# Patient Record
Sex: FEMALE | Race: WHITE | NOT HISPANIC OR LATINO
[De-identification: names, ages, dates, MRNs, and addresses within clinical notes are randomized per-mention and may not be internally consistent; named-entity substitution may affect disease eponyms.]

---

## 2017-09-15 ENCOUNTER — APPOINTMENT (OUTPATIENT)
Dept: NEUROSURGERY | Facility: CLINIC | Age: 57
End: 2017-09-15
Payer: COMMERCIAL

## 2017-09-15 DIAGNOSIS — Z78.9 OTHER SPECIFIED HEALTH STATUS: ICD-10-CM

## 2017-09-15 PROCEDURE — 99203 OFFICE O/P NEW LOW 30 MIN: CPT

## 2017-09-18 VITALS — BODY MASS INDEX: 44.2 KG/M2 | HEIGHT: 55 IN | WEIGHT: 191 LBS

## 2017-09-18 PROBLEM — Z78.9 NON-SMOKER: Status: ACTIVE | Noted: 2017-09-18

## 2017-09-18 RX ORDER — AMLODIPINE BESYLATE 5 MG/1
5 TABLET ORAL
Qty: 30 | Refills: 0 | Status: ACTIVE | COMMUNITY
Start: 2017-03-18

## 2017-09-18 RX ORDER — ROSUVASTATIN CALCIUM 10 MG/1
10 TABLET, FILM COATED ORAL
Qty: 30 | Refills: 0 | Status: ACTIVE | COMMUNITY
Start: 2017-05-25

## 2017-09-25 ENCOUNTER — APPOINTMENT (OUTPATIENT)
Dept: CT IMAGING | Facility: CLINIC | Age: 57
End: 2017-09-25

## 2017-11-09 ENCOUNTER — APPOINTMENT (OUTPATIENT)
Dept: NEUROSURGERY | Facility: CLINIC | Age: 57
End: 2017-11-09
Payer: COMMERCIAL

## 2017-11-09 VITALS
TEMPERATURE: 98.2 F | BODY MASS INDEX: 29.35 KG/M2 | HEIGHT: 67 IN | RESPIRATION RATE: 15 BRPM | WEIGHT: 187 LBS | DIASTOLIC BLOOD PRESSURE: 90 MMHG | HEART RATE: 74 BPM | SYSTOLIC BLOOD PRESSURE: 153 MMHG

## 2017-11-09 DIAGNOSIS — Z78.9 OTHER SPECIFIED HEALTH STATUS: ICD-10-CM

## 2017-11-09 DIAGNOSIS — Z80.43 FAMILY HISTORY OF MALIGNANT NEOPLASM OF TESTIS: ICD-10-CM

## 2017-11-09 DIAGNOSIS — Z86.69 PERSONAL HISTORY OF OTHER DISEASES OF THE NERVOUS SYSTEM AND SENSE ORGANS: ICD-10-CM

## 2017-11-09 DIAGNOSIS — Z80.0 FAMILY HISTORY OF MALIGNANT NEOPLASM OF DIGESTIVE ORGANS: ICD-10-CM

## 2017-11-09 DIAGNOSIS — F17.210 NICOTINE DEPENDENCE, CIGARETTES, UNCOMPLICATED: ICD-10-CM

## 2017-11-09 DIAGNOSIS — Z86.39 PERSONAL HISTORY OF OTHER ENDOCRINE, NUTRITIONAL AND METABOLIC DISEASE: ICD-10-CM

## 2017-11-09 DIAGNOSIS — Z80.3 FAMILY HISTORY OF MALIGNANT NEOPLASM OF BREAST: ICD-10-CM

## 2017-11-09 DIAGNOSIS — Z85.9 PERSONAL HISTORY OF MALIGNANT NEOPLASM, UNSPECIFIED: ICD-10-CM

## 2017-11-09 DIAGNOSIS — Z87.09 PERSONAL HISTORY OF OTHER DISEASES OF THE RESPIRATORY SYSTEM: ICD-10-CM

## 2017-11-09 DIAGNOSIS — Z98.2 PRESENCE OF CEREBROSPINAL FLUID DRAINAGE DEVICE: ICD-10-CM

## 2017-11-09 DIAGNOSIS — G91.1 OBSTRUCTIVE HYDROCEPHALUS: ICD-10-CM

## 2017-11-09 DIAGNOSIS — Z86.79 PERSONAL HISTORY OF OTHER DISEASES OF THE CIRCULATORY SYSTEM: ICD-10-CM

## 2017-11-09 PROCEDURE — 99214 OFFICE O/P EST MOD 30 MIN: CPT

## 2017-11-09 RX ORDER — MULTIVITAMIN
TABLET ORAL
Refills: 0 | Status: ACTIVE | COMMUNITY

## 2017-11-09 RX ORDER — ZINC OXIDE 13 %
CREAM (GRAM) TOPICAL
Refills: 0 | Status: ACTIVE | COMMUNITY

## 2017-11-09 RX ORDER — LEVOCETIRIZINE DIHYDROCHLORIDE 5 MG/1
5 TABLET, FILM COATED ORAL
Refills: 0 | Status: ACTIVE | COMMUNITY

## 2017-11-12 PROBLEM — G91.1 OBSTRUCTIVE HYDROCEPHALUS: Status: ACTIVE | Noted: 2017-09-15

## 2017-11-12 PROBLEM — Z98.2 VENTRICULAR SHUNT IN PLACE: Status: ACTIVE | Noted: 2017-11-12

## 2018-06-12 ENCOUNTER — INPATIENT (INPATIENT)
Facility: HOSPITAL | Age: 58
LOS: 2 days | Discharge: HOME | End: 2018-06-15
Attending: HOSPITALIST | Admitting: HOSPITALIST
Payer: COMMERCIAL

## 2018-06-12 VITALS
RESPIRATION RATE: 18 BRPM | WEIGHT: 173.94 LBS | HEART RATE: 104 BPM | OXYGEN SATURATION: 99 % | DIASTOLIC BLOOD PRESSURE: 76 MMHG | SYSTOLIC BLOOD PRESSURE: 150 MMHG | HEIGHT: 67 IN | TEMPERATURE: 98 F

## 2018-06-12 LAB
ALBUMIN SERPL ELPH-MCNC: 4.5 G/DL — SIGNIFICANT CHANGE UP (ref 3.3–5)
ALP SERPL-CCNC: 125 U/L — HIGH (ref 40–120)
ALT FLD-CCNC: 26 U/L — SIGNIFICANT CHANGE UP (ref 12–78)
ANION GAP SERPL CALC-SCNC: 6 MMOL/L — SIGNIFICANT CHANGE UP (ref 5–17)
APPEARANCE UR: CLEAR — SIGNIFICANT CHANGE UP
AST SERPL-CCNC: 19 U/L — SIGNIFICANT CHANGE UP (ref 15–37)
BILIRUB SERPL-MCNC: 0.5 MG/DL — SIGNIFICANT CHANGE UP (ref 0.2–1.2)
BILIRUB UR-MCNC: NEGATIVE — SIGNIFICANT CHANGE UP
BUN SERPL-MCNC: 15 MG/DL — SIGNIFICANT CHANGE UP (ref 7–23)
CALCIUM SERPL-MCNC: 9.8 MG/DL — SIGNIFICANT CHANGE UP (ref 8.5–10.1)
CHLORIDE SERPL-SCNC: 106 MMOL/L — SIGNIFICANT CHANGE UP (ref 96–108)
CO2 SERPL-SCNC: 25 MMOL/L — SIGNIFICANT CHANGE UP (ref 22–31)
COLOR SPEC: YELLOW — SIGNIFICANT CHANGE UP
CREAT SERPL-MCNC: 0.74 MG/DL — SIGNIFICANT CHANGE UP (ref 0.5–1.3)
DIFF PNL FLD: NEGATIVE — SIGNIFICANT CHANGE UP
GLUCOSE SERPL-MCNC: 100 MG/DL — HIGH (ref 70–99)
GLUCOSE UR QL: NEGATIVE MG/DL — SIGNIFICANT CHANGE UP
HCT VFR BLD CALC: 43.6 % — SIGNIFICANT CHANGE UP (ref 34.5–45)
HGB BLD-MCNC: 15.1 G/DL — SIGNIFICANT CHANGE UP (ref 11.5–15.5)
INR BLD: 1.05 RATIO — SIGNIFICANT CHANGE UP (ref 0.88–1.16)
KETONES UR-MCNC: NEGATIVE — SIGNIFICANT CHANGE UP
LEUKOCYTE ESTERASE UR-ACNC: NEGATIVE — SIGNIFICANT CHANGE UP
MCHC RBC-ENTMCNC: 32 PG — SIGNIFICANT CHANGE UP (ref 27–34)
MCHC RBC-ENTMCNC: 34.6 GM/DL — SIGNIFICANT CHANGE UP (ref 32–36)
MCV RBC AUTO: 92.4 FL — SIGNIFICANT CHANGE UP (ref 80–100)
NITRITE UR-MCNC: NEGATIVE — SIGNIFICANT CHANGE UP
PH UR: 7 — SIGNIFICANT CHANGE UP (ref 5–8)
PLATELET # BLD AUTO: 340 K/UL — SIGNIFICANT CHANGE UP (ref 150–400)
POTASSIUM SERPL-MCNC: 4 MMOL/L — SIGNIFICANT CHANGE UP (ref 3.5–5.3)
POTASSIUM SERPL-SCNC: 4 MMOL/L — SIGNIFICANT CHANGE UP (ref 3.5–5.3)
PROT SERPL-MCNC: 8.6 GM/DL — HIGH (ref 6–8.3)
PROT UR-MCNC: NEGATIVE MG/DL — SIGNIFICANT CHANGE UP
PROTHROM AB SERPL-ACNC: 11.3 SEC — SIGNIFICANT CHANGE UP (ref 9.8–12.7)
RBC # BLD: 4.72 M/UL — SIGNIFICANT CHANGE UP (ref 3.8–5.2)
RBC # FLD: 12.7 % — SIGNIFICANT CHANGE UP (ref 10.3–14.5)
SODIUM SERPL-SCNC: 137 MMOL/L — SIGNIFICANT CHANGE UP (ref 135–145)
SP GR SPEC: 1.01 — SIGNIFICANT CHANGE UP (ref 1.01–1.02)
UROBILINOGEN FLD QL: NEGATIVE MG/DL — SIGNIFICANT CHANGE UP
WBC # BLD: 11.59 K/UL — HIGH (ref 3.8–10.5)
WBC # FLD AUTO: 11.59 K/UL — HIGH (ref 3.8–10.5)

## 2018-06-12 RX ORDER — ONDANSETRON 8 MG/1
4 TABLET, FILM COATED ORAL ONCE
Qty: 0 | Refills: 0 | Status: COMPLETED | OUTPATIENT
Start: 2018-06-12 | End: 2018-06-12

## 2018-06-12 RX ORDER — SODIUM CHLORIDE 9 MG/ML
1000 INJECTION INTRAMUSCULAR; INTRAVENOUS; SUBCUTANEOUS ONCE
Qty: 0 | Refills: 0 | Status: COMPLETED | OUTPATIENT
Start: 2018-06-12 | End: 2018-06-12

## 2018-06-12 RX ADMIN — SODIUM CHLORIDE 1000 MILLILITER(S): 9 INJECTION INTRAMUSCULAR; INTRAVENOUS; SUBCUTANEOUS at 23:20

## 2018-06-12 NOTE — ED PROVIDER NOTE - OBJECTIVE STATEMENT
56 y/o female with a PMHx of hydrocephalus s/p ventricular shunt in spine, meningitis, gastritis s/p cholecystectomy presents to the ED c/o  worsening epigastric esophageal pain and decreased appetite for 2-3 days. Pt has difficulty swallowing, watery mouth, excessive mucous, and white foam in the mouth for the 2-3 months. Pt describes pain as a burning pain accompanied with difficulty eating and swallowing. Pt states that she feels a large air bubble in her stomach after PO intake, which is then followed by increased belching and flatulence. AlkaSeltzer alleviates sx.  Last BM was 3 days ago. Pt has lost about 10 lbs in the past 2 weeks due to her decreased appetite. +LLQ pain, +Lower back pain, + abd distension, +nausea, +lethargy. Denies rhinorrhea, vomiting, cough, SOB, CP. Smoker 1ppd. No EtOH.

## 2018-06-12 NOTE — ED PROVIDER NOTE - PROGRESS NOTE DETAILS
Dr. Shanks [facility] mediciane is primary care. pt tba unable to tolerate food/fluid spoke with pt and  about gastric mass B Samara ROGERS

## 2018-06-13 DIAGNOSIS — Z98.2 PRESENCE OF CEREBROSPINAL FLUID DRAINAGE DEVICE: Chronic | ICD-10-CM

## 2018-06-13 LAB
BASOPHILS # BLD AUTO: 0 K/UL — SIGNIFICANT CHANGE UP (ref 0–0.2)
BASOPHILS NFR BLD AUTO: 0 % — SIGNIFICANT CHANGE UP (ref 0–2)
DACRYOCYTES BLD QL SMEAR: SLIGHT — SIGNIFICANT CHANGE UP
EOSINOPHIL # BLD AUTO: 0.12 K/UL — SIGNIFICANT CHANGE UP (ref 0–0.5)
EOSINOPHIL NFR BLD AUTO: 1 % — SIGNIFICANT CHANGE UP (ref 0–6)
LIDOCAIN IGE QN: 214 U/L — SIGNIFICANT CHANGE UP (ref 73–393)
LYMPHOCYTES # BLD AUTO: 35 % — SIGNIFICANT CHANGE UP (ref 13–44)
LYMPHOCYTES # BLD AUTO: 4.06 K/UL — HIGH (ref 1–3.3)
MANUAL SMEAR VERIFICATION: SIGNIFICANT CHANGE UP
MICROCYTES BLD QL: SLIGHT — SIGNIFICANT CHANGE UP
MONOCYTES # BLD AUTO: 1.27 K/UL — HIGH (ref 0–0.9)
MONOCYTES NFR BLD AUTO: 11 % — SIGNIFICANT CHANGE UP (ref 2–14)
NEUTROPHILS # BLD AUTO: 4.87 K/UL — SIGNIFICANT CHANGE UP (ref 1.8–7.4)
NEUTROPHILS NFR BLD AUTO: 42 % — LOW (ref 43–77)
NRBC # BLD: 0 /100 — SIGNIFICANT CHANGE UP (ref 0–0)
NRBC # BLD: SIGNIFICANT CHANGE UP /100 WBCS (ref 0–0)
OVALOCYTES BLD QL SMEAR: SLIGHT — SIGNIFICANT CHANGE UP
PLAT MORPH BLD: NORMAL — SIGNIFICANT CHANGE UP
POIKILOCYTOSIS BLD QL AUTO: SLIGHT — SIGNIFICANT CHANGE UP
RBC BLD AUTO: ABNORMAL
SPHEROCYTES BLD QL SMEAR: SLIGHT — SIGNIFICANT CHANGE UP
VARIANT LYMPHS # BLD: 11 % — HIGH (ref 0–6)

## 2018-06-13 PROCEDURE — 99285 EMERGENCY DEPT VISIT HI MDM: CPT

## 2018-06-13 PROCEDURE — 74177 CT ABD & PELVIS W/CONTRAST: CPT | Mod: 26

## 2018-06-13 RX ORDER — SODIUM CHLORIDE 9 MG/ML
1000 INJECTION INTRAMUSCULAR; INTRAVENOUS; SUBCUTANEOUS
Qty: 0 | Refills: 0 | Status: DISCONTINUED | OUTPATIENT
Start: 2018-06-13 | End: 2018-06-15

## 2018-06-13 RX ORDER — LANOLIN ALCOHOL/MO/W.PET/CERES
3 CREAM (GRAM) TOPICAL AT BEDTIME
Qty: 0 | Refills: 0 | Status: DISCONTINUED | OUTPATIENT
Start: 2018-06-13 | End: 2018-06-15

## 2018-06-13 RX ORDER — NICOTINE POLACRILEX 2 MG
1 GUM BUCCAL DAILY
Qty: 0 | Refills: 0 | Status: DISCONTINUED | OUTPATIENT
Start: 2018-06-13 | End: 2018-06-15

## 2018-06-13 RX ORDER — ONDANSETRON 8 MG/1
4 TABLET, FILM COATED ORAL EVERY 6 HOURS
Qty: 0 | Refills: 0 | Status: DISCONTINUED | OUTPATIENT
Start: 2018-06-13 | End: 2018-06-15

## 2018-06-13 RX ORDER — AMLODIPINE BESYLATE 2.5 MG/1
5 TABLET ORAL DAILY
Qty: 0 | Refills: 0 | Status: DISCONTINUED | OUTPATIENT
Start: 2018-06-13 | End: 2018-06-15

## 2018-06-13 RX ORDER — ENOXAPARIN SODIUM 100 MG/ML
40 INJECTION SUBCUTANEOUS EVERY 24 HOURS
Qty: 0 | Refills: 0 | Status: DISCONTINUED | OUTPATIENT
Start: 2018-06-13 | End: 2018-06-15

## 2018-06-13 RX ORDER — LORATADINE 10 MG/1
10 TABLET ORAL DAILY
Qty: 0 | Refills: 0 | Status: DISCONTINUED | OUTPATIENT
Start: 2018-06-13 | End: 2018-06-15

## 2018-06-13 RX ADMIN — ENOXAPARIN SODIUM 40 MILLIGRAM(S): 100 INJECTION SUBCUTANEOUS at 17:42

## 2018-06-13 RX ADMIN — SODIUM CHLORIDE 75 MILLILITER(S): 9 INJECTION INTRAMUSCULAR; INTRAVENOUS; SUBCUTANEOUS at 12:52

## 2018-06-13 RX ADMIN — Medication 1 PATCH: at 04:37

## 2018-06-13 RX ADMIN — LORATADINE 10 MILLIGRAM(S): 10 TABLET ORAL at 21:37

## 2018-06-13 NOTE — H&P ADULT - FAMILY HISTORY
Family history of colon cancer     Father  Still living? Unknown  Family history of gastric cancer, Age at diagnosis: Age Unknown     Grandparent  Still living? Unknown  Family history of gastric cancer, Age at diagnosis: Age Unknown

## 2018-06-13 NOTE — CONSULT NOTE ADULT - ASSESSMENT
Upper abdominal pain with regurgitation weight loss and possible dysphagia. She has attempted eating a my a 3rd bite, she does vomit at times. At times, she brings up mucus.  Consent for possible gastric cancer in addition to history of family multiple members having had cancers. She is not clear what her dad had that another family member did have a gastric gastrointestinal stromal tumor.    Clear liquids, IV fluids, analgesics.  Plan endoscopy.    Biliary dilatation possibly secondary to cholecystectomy. However, further evaluation will have to depend findings on endoscopy.  LFTs for now not elevated.    Consent regarding malignancy and differential discussed with patient.  Antiemetics for nausea

## 2018-06-13 NOTE — CONSULT NOTE ADULT - SUBJECTIVE AND OBJECTIVE BOX
Patient is a 57y old  Female who presents with a chief complaint of dysphagia, regurgitation, weight loss (13 Jun 2018 09:50)      HPI:  56 yo F hx of htn, 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  Pt reports decreased po intake as a result of her symptoms and has lost approx 10 lbs in 2 weeks.  Pt c/o recent fatigue/low energy. (13 Jun 2018 09:50)    States that she has had abdominal pain. About 6 months ago, she had severe abdominal pain that lasted a while and she thought maybe it was pancreatitis. She so different gastroenterologist and was evaluated for pancreatitis and per patient serological testing was negative.    Additionally, more recently, she been having upper abdominal pain associated with nausea and vomiting. The pains been going on for about 3 months. This radiated to the left lower quadrant, but predominantly in the upper abdomen and left upper quadrant. The pain is sharp, crampy, at times becomes more severe for her recently. She has cold our office to make an appointment.  As his symptoms got worse, while awaiting an appointment at the hospital.  States that the pain has been getting worse. Over the last couple weeks she has been having weight loss.  She's lost about 10 pounds.  She did recently try some Cyndi[place] but no other pain medications except aspirin once.        PAST MEDICAL & SURGICAL HISTORY:  Essential hypertension  Intracranial shunt      MEDICATIONS  (STANDING):  amLODIPine   Tablet 5 milliGRAM(s) Oral daily  enoxaparin Injectable 40 milliGRAM(s) SubCutaneous every 24 hours  loratadine 10 milliGRAM(s) Oral daily  nicotine 21 mG/24 Hr(s) Transdermal Patch - Peds 1 Patch Transdermal daily  sodium chloride 0.9%. 1000 milliLiter(s) (75 mL/Hr) IV Continuous <Continuous>    MEDICATIONS  (PRN):  ondansetron Injectable 4 milliGRAM(s) IV Push every 6 hours PRN Nausea      Allergies    sulfa drugs (Unknown)    Intolerances        SOCIAL HISTORY:    FAMILY HISTORY:  Family history of colon cancer  Family history of gastric cancer (Father, Grandparent)      REVIEW OF SYSTEMS:    CONSTITUTIONAL: No weakness, fevers or chills  EYES/ENT: No visual changes;  No vertigo or throat pain   NECK: No pain or stiffness  RESPIRATORY: No cough, wheezing, hemoptysis; No shortness of breath  CARDIOVASCULAR: No chest pain or palpitations  GENITOURINARY: No dysuria, frequency or hematuria  NEUROLOGICAL: No numbness or weakness  SKIN: No itching, burning, rashes, or lesions   All other review of systems is negative unless indicated above.    Vital Signs Last 24 Hrs  T(C): 36.6 (13 Jun 2018 12:55), Max: 37 (13 Jun 2018 05:35)  T(F): 97.9 (13 Jun 2018 12:55), Max: 98.6 (13 Jun 2018 05:35)  HR: 78 (13 Jun 2018 12:55) (76 - 104)  BP: 126/62 (13 Jun 2018 12:55) (107/76 - 152/80)  BP(mean): --  RR: 16 (13 Jun 2018 12:55) (16 - 18)  SpO2: 100% (13 Jun 2018 12:55) (99% - 100%)    PHYSICAL EXAM:    Constitutional: NAD, well-developed  HEENT: EOMI, throat clear  Neck: No LAD, supple  Respiratory: CTA and P  Cardiovascular: S1 and S2, RRR, no M  Gastrointestinal: BS+, soft, NT/ND, neg HSM,  Extremities: No peripheral edema, neg clubing, cyanosis  Vascular: 2+ peripheral pulses  Neurological: A/O x 3, no focal deficits  Psychiatric: Normal mood, normal affect  Skin: No rashes    LABS:  CBC Full  -  ( 12 Jun 2018 23:09 )  WBC Count : 11.59 K/uL  Hemoglobin : 15.1 g/dL  Hematocrit : 43.6 %  Platelet Count - Automated : 340 K/uL  Mean Cell Volume : 92.4 fl  Mean Cell Hemoglobin : 32.0 pg  Mean Cell Hemoglobin Concentration : 34.6 gm/dL  Auto Neutrophil # : 4.87 K/uL  Auto Lymphocyte # : 4.06 K/uL  Auto Monocyte # : 1.27 K/uL  Auto Eosinophil # : 0.12 K/uL  Auto Basophil # : 0.00 K/uL  Auto Neutrophil % : 42.0 %  Auto Lymphocyte % : 35.0 %  Auto Monocyte % : 11.0 %  Auto Eosinophil % : 1.0 %  Auto Basophil % : 0.0 %    06-12    137  |  106  |  15  ----------------------------<  100<H>  4.0   |  25  |  0.74    Ca    9.8      12 Jun 2018 23:09    TPro  8.6<H>  /  Alb  4.5  /  TBili  0.5  /  DBili  x   /  AST  19  /  ALT  26  /  AlkPhos  125<H>  06-12    PT/INR - ( 12 Jun 2018 23:09 )   PT: 11.3 sec;   INR: 1.05 ratio                 RADIOLOGY & ADDITIONAL STUDIES:  < from: CT Abdomen and Pelvis w/ Oral Cont and w/ IV Cont (06.13.18 @ 01:04) >    EXAM:  CT ABDOMEN AND PELVIS OC IC                            PROCEDURE DATE:  06/13/2018          INTERPRETATION:  CT ABDOMEN AND PELVIS DATED 6/13/2018.    CLINICAL INFORMATION:  Abdominal pain and constipation.    TECHNIQUE:  Axial CT images through the abdomen and pelvis are acquired   with administration of oral and intravenous contrast. Images are   reformatted in the sagittal and coronal planes. 90cc of Omnipaque 350   were administered without event.  10cc were discarded.    The study is correlated with a prior exam from 12/14/2011.    FINDINGS:    There is a 1.1 cm lung nodule in the left lower lobe which has increased   in size compared to prior exam from 2011 where it measured 0.9 cm.     A right posterior hepatic lobe cyst has increased in size, previously   measuring up to 3.8 cm and currently measuring up to 7.4 cm. The   gallbladder is surgically absent. There is mild intrahepatic and extra   hepatic biliary ductal dilatation with the common bile duct measuring up   to 13mm with distal tapering. No discrete choledocholith is identified.   The pancreas and spleen are unremarkable. There are again multiple   adrenal nodules, similar to the prior exam. A right adrenal nodule   previously measured up to 2.1 cm and currently measures up to 2.7 cm.   Left adrenal nodules are without significant interval change. The kidneys   enhance symmetrically. There is no right or left hydronephrosis. The   urinary bladder is unremarkable in appearance. The uterus and adnexa are   unremarkable.    There is no bowel obstruction. A normal appendix is identified. There is   apparent masslike nodular thickening of the gastric cardia with several   mildly enlarged perigastric lymph nodes which is new compared with prior   exam. There is no pneumoperitoneum, ascites, or loculated collection.    There is atherosclerotic calcification of the aortoiliac tree. The   abdominal aorta is normal in caliber.    There is a tiny fat-containing umbilical hernia.    The osseous structures areunremarkable apart from degenerative changes   of the spine and scattered bone islands, stable compared with prior exam.    IMPRESSION:    Apparent masslike nodular thickening of the gastric cardia with several   mildly enlarged perigastric lymph nodes which is concerning for   underlying neoplasm. Endoscopic evaluation is recommended for further   evaluation.    1.1 cm lung nodule in the left lower lobe which has increased in size   compared to prior exam from 2011 where it measured 0.9 cm. PET/CT and/or   biopsy is recommended for further evaluation on a nonemergent basis.    Bilateral adrenal nodules which are indeterminate based on CT with slight   interval increase in size of the right adrenal nodule. Nonemergent MRI of   the abdomen would be helpful for better characterization.          < end of copied text >

## 2018-06-13 NOTE — H&P ADULT - HISTORY OF PRESENT ILLNESS
58 yo F hx of htn, 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  Pt reports decreased po intake as a result of her symptoms and has lost approx 10 lbs in 2 weeks.  Pt c/o recent fatigue/low energy.

## 2018-06-13 NOTE — ED ADULT NURSE NOTE - OBJECTIVE STATEMENT
pt presents to ED for complaints of epigastric and esophageal burning. pt states pain began a few days ago, but has worsened today. pt states she has had decreased PO intake and pt presents to ED for complaints of epigastric and esophageal burning. pt states pain began a few days ago, but has worsened today. pt states she has had decreased PO intake the last few days. pt states she also feels like she has a gas bubble and has been belching and passing gas more often.  no other complaints at this time. 22 g placed to left AC. labs sent. fluids as ordered.

## 2018-06-13 NOTE — H&P ADULT - NSHPREVIEWOFSYSTEMS_GEN_ALL_CORE
CONSTITUTIONAL: +fatigue, no fevers or chills  EYES/ENT: No visual changes;  No vertigo or throat pain   NECK: No pain or stiffness  RESPIRATORY: No cough, wheezing, hemoptysis; No shortness of breath  CARDIOVASCULAR: No chest pain or palpitations  GASTROINTESTINAL: Per HPI  GENITOURINARY: No dysuria, frequency or hematuria  NEUROLOGICAL: No numbness or weakness  SKIN: No itching, burning, rashes, or lesions   All other review of systems is negative unless indicated above.

## 2018-06-13 NOTE — H&P ADULT - NSHPLABSRESULTS_GEN_ALL_CORE
15.1   11.59 )-----------( 340      ( 2018 23:09 )             43.6     137  |  106  |  15  ----------------------------<  100<H>  4.0   |  25  |  0.74    Ca    9.8      2018 23:09    TPro  8.6<H>  /  Alb  4.5  /  TBili  0.5  /  DBili  x   /  AST  19  /  ALT  26  /  AlkPhos  125<H>  06-12    LIVER FUNCTIONS - ( 2018 23:09 )  Alb: 4.5 g/dL / Pro: 8.6 gm/dL / ALK PHOS: 125 U/L / ALT: 26 U/L / AST: 19 U/L / GGT: x           PT/INR - ( 2018 23:09 )   PT: 11.3 sec;   INR: 1.05 ratio      Urinalysis Basic - ( 2018 23:09 )  Color: Yellow / Appearance: Clear / S.010 / pH: x  Gluc: x / Ketone: Negative  / Bili: Negative / Urobili: Negative mg/dL   Blood: x / Protein: Negative mg/dL / Nitrite: Negative   Leuk Esterase: Negative / RBC: x / WBC x   Sq Epi: x / Non Sq Epi: x / Bacteria: x    < from: CT Abdomen and Pelvis w/ Oral Cont and w/ IV Cont (18 @ 01:04) >    IMPRESSION:  Apparent masslike nodular thickening of the gastric cardia with several   mildly enlarged perigastric lymph nodes which is concerning for   underlying neoplasm. Endoscopic evaluation is recommended for further   evaluation.    1.1 cm lung nodule in the left lower lobe which has increased in size   compared to prior exam from  where it measured 0.9 cm. PET/CT and/or   biopsy is recommended for further evaluation on a nonemergent basis.    Bilateral adrenal nodules which are indeterminate based on CT with slight   interval increase in size of the right adrenal nodule. Nonemergent MRI of   the abdomen would be helpful for better characterization.

## 2018-06-13 NOTE — H&P ADULT - ASSESSMENT
58 yo F hx of htn, 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  Pt reports decreased po intake as a result of her symptoms and has lost approx 10 lbs in 2 weeks.  Pt c/o recent fatigue/low energy.     # Dysphagia, regurgitation, weight loss  - likely 2/2 gastric cancer possibly with gastric outlet obstruction  - Pt was seen by Dr. Gusman and had EGD approx 6 yr ago which showed gastritis  - Discussed with GI - Dr. Everett- EGD scheduled for 7:30 AM tomorrow  - CLD, NPO after MN    # intra and extrahepatic biliary dilation likely 2/2 hx of cholecystectomy. Less likely any biliary obstruction.  - no elevated LFTs  - GI eval    # LLL Lung nodule slightly increased in size over 7 year period from 0.9cm to 1.1 cm  - Seen by Dr. Dhillon when initially discovered however has not been followed up per pt report.   - Pt with strong smoking hx  - will obtain pulm eval    # Liver cysts, adrenal nodules of unclear etiology  - Pt aware of findings and aware of their presence on previous imaging done in 2011.  - will hold on further eval presently.    DVT ppx: Lovenox

## 2018-06-13 NOTE — H&P ADULT - NSHPPHYSICALEXAM_GEN_ALL_CORE
Constitutional: NAD, well-groomed, well-developed  HEENT: PERR, EOMI, Normal Hearing, MMM  Neck: No LAD, No JVD  Back: Normal spine flexure, No CVA tenderness  Respiratory: CTABL  Cardiovascular: S1 and S2, RRR, no M/G/R  Gastrointestinal: BS+, soft, NT/ND  Extremities: No peripheral edema  Vascular: 2+ peripheral pulses  Neurological: A/O x 3, no focal deficits  Psychiatric: Normal mood, normal affect  Musculoskeletal: 5/5 strength b/l upper and lower extremities  Skin: No rashes

## 2018-06-14 ENCOUNTER — RESULT REVIEW (OUTPATIENT)
Age: 58
End: 2018-06-14

## 2018-06-14 LAB
ANION GAP SERPL CALC-SCNC: 8 MMOL/L — SIGNIFICANT CHANGE UP (ref 5–17)
BUN SERPL-MCNC: 4 MG/DL — LOW (ref 7–23)
CALCIUM SERPL-MCNC: 8.4 MG/DL — LOW (ref 8.5–10.1)
CHLORIDE SERPL-SCNC: 109 MMOL/L — HIGH (ref 96–108)
CO2 SERPL-SCNC: 25 MMOL/L — SIGNIFICANT CHANGE UP (ref 22–31)
CREAT SERPL-MCNC: 0.52 MG/DL — SIGNIFICANT CHANGE UP (ref 0.5–1.3)
GLUCOSE SERPL-MCNC: 100 MG/DL — HIGH (ref 70–99)
MAGNESIUM SERPL-MCNC: 2.4 MG/DL — SIGNIFICANT CHANGE UP (ref 1.6–2.6)
PHOSPHATE SERPL-MCNC: 2.9 MG/DL — SIGNIFICANT CHANGE UP (ref 2.5–4.5)
POTASSIUM SERPL-MCNC: 3.4 MMOL/L — LOW (ref 3.5–5.3)
POTASSIUM SERPL-SCNC: 3.4 MMOL/L — LOW (ref 3.5–5.3)
SODIUM SERPL-SCNC: 142 MMOL/L — SIGNIFICANT CHANGE UP (ref 135–145)

## 2018-06-14 PROCEDURE — 88360 TUMOR IMMUNOHISTOCHEM/MANUAL: CPT | Mod: 26

## 2018-06-14 PROCEDURE — 88313 SPECIAL STAINS GROUP 2: CPT | Mod: 26

## 2018-06-14 PROCEDURE — 71260 CT THORAX DX C+: CPT | Mod: 26

## 2018-06-14 PROCEDURE — 88305 TISSUE EXAM BY PATHOLOGIST: CPT | Mod: 26

## 2018-06-14 PROCEDURE — 88312 SPECIAL STAINS GROUP 1: CPT | Mod: 26

## 2018-06-14 RX ORDER — PANTOPRAZOLE SODIUM 20 MG/1
40 TABLET, DELAYED RELEASE ORAL
Qty: 0 | Refills: 0 | Status: DISCONTINUED | OUTPATIENT
Start: 2018-06-14 | End: 2018-06-15

## 2018-06-14 RX ADMIN — ENOXAPARIN SODIUM 40 MILLIGRAM(S): 100 INJECTION SUBCUTANEOUS at 17:33

## 2018-06-14 RX ADMIN — PANTOPRAZOLE SODIUM 40 MILLIGRAM(S): 20 TABLET, DELAYED RELEASE ORAL at 09:54

## 2018-06-14 RX ADMIN — Medication 3 MILLIGRAM(S): at 00:52

## 2018-06-14 RX ADMIN — Medication 1 PATCH: at 09:45

## 2018-06-14 RX ADMIN — SODIUM CHLORIDE 75 MILLILITER(S): 9 INJECTION INTRAMUSCULAR; INTRAVENOUS; SUBCUTANEOUS at 04:06

## 2018-06-14 RX ADMIN — Medication 1 PATCH: at 05:46

## 2018-06-14 RX ADMIN — Medication 3 MILLIGRAM(S): at 21:37

## 2018-06-14 RX ADMIN — LORATADINE 10 MILLIGRAM(S): 10 TABLET ORAL at 21:37

## 2018-06-14 NOTE — CHART NOTE - NSCHARTNOTEFT_GEN_A_CORE
Upon Nutritional Assessment by the Registered Dietitian your patient was determined to meet criteria / has evidence of the following diagnosis/diagnoses:          [ ]  Mild Protein Calorie Malnutrition        [ ]  Moderate Protein Calorie Malnutrition        [x ] Severe Protein Calorie Malnutrition        [ ] Unspecified Protein Calorie Malnutrition        [ ] Underweight / BMI <19        [ ] Morbid Obesity / BMI > 40      Findings as based on:  •  Comprehensive nutrition assessment and consultation  •  Calorie counts (nutrient intake analysis)  •  Food acceptance and intake status from observations by staff  •  Follow up  •  Patient education  •  Intervention secondary to interdisciplinary rounds  •   concerns    . Pt at risk for severe protein-calorie malnutrition in context of acute condition    Pt reports wt loss of 6.48% UBW in past 2-3 weeks,   PO intake < 50% needs x 1 week      Pt also reports 4 episodes of trouble swallowing and regurgitation over past 4 months.     Treatment:      The following diet has been recommended:  Advance to low-fiber diet when medically feasible, and as tolerated  add MVI w/minerals  Pt rejects supplements due to sugar and HFCS in them  BRATTY  foods recommended for diarrhea (bananas, rice, applesauce, tea, toast, yogurt)    PROVIDER Section:     By signing this assessment you are acknowledging and agree with the diagnosis/diagnoses assigned by the Registered Dietitian    Comments:

## 2018-06-14 NOTE — CONSULT NOTE ADULT - ASSESSMENT
58 yo female with what appears to be a gastric cancer, although Endoscopy findings reveal esophageal pathology ? / stricture  , therefore Gastro-esophageal cancer is also in the differential.  Need ro discuss Endoscopy findings with GI and review pathology. There are perigastric nodes, no overt distant metastases.    For Gastric cancer would recommend " FLOT : regimen as neoadjuvant   For GE cancers : Chemotherapy/ RT prior to surgery  In both cases consider CT-PET/ and-or Laparoscopic exam for accurate staging    Will discuss with GI, pathology, Surgery

## 2018-06-14 NOTE — CONSULT NOTE ADULT - ASSESSMENT
57 year old woman with esophageal stricture/gastric cardia mass, suspicious for malignancy.  Pathology pending.  With her long history of reflux gastritis and esophagitis, she is high risk for malignancy.  Given the CT imaging showing gastric thickening and perigastric LAD, gastric malignancy is more likely.  However, it is also possible there is extension above the GE junction to involve the distal esophagus.  Identification of where a majority of the tumor resides is paramount as the treatment varies depending on location. 57 year old woman with esophageal stricture/gastric cardia mass, suspicious for malignancy.  Pathology pending.  With her long history of reflux gastritis and esophagitis, she is high risk for malignancy.  Given the CT imaging showing gastric thickening and perigastric LAD, gastric malignancy is more likely.  However, it is also possible there is extension above the GE junction to involve the distal esophagus.  Identification of where a majority of the tumor resides is paramount as the treatment varies depending on location.    Cancer of the GE junction is classified according to the Siewert classificaiton.  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  Additionally, the enlarged nodes are perigastric more than periesophageal, again favoring this as a Siewert 2.  These cancers are generally treated as gastric adenocarcinoma.  As a result, I would favor perioperative chemotherapy and defer to medical oncology to determine the regimen (FLOT vs. MAGIC/ECF equivalent).  I extensively discussed surgical options with the patient after neoadjuvant therapy, but standard of care for her lesion would be total gastrectomy with lower esophageal resection in continuity with regional lymphadenectomy.  This would be dependent on lack of progression or metastatic spread on neoadjuvant therapy.    Most of the staging has been performed and does not show evidence of metastatic disease (CT chest shows old lung nodule present in 2011).  The two further tests worth discussing are EUS/FNA of suspicious LNs vs. PET/CT.  Both are acceptable, however EUS gives better T and N staging (80-90% accuracy). Additionally, diagnostic laparoscopy can be considered prior to starting systemic therapy to rule out occult mets - reasonable in Siewert II with likely T3/T4 invasion.  Will discuss with medical oncology team.    At this point, she is able to eat and I would hold off on stent placement.  If this worsens as outpt, we can have interventional GI place stent for temporary palliation during chemo.    Plan:  1) OK to discharge from surgical perspective - no acute intervention needed at this time  2) PET/CT vs. EUS/FNA (preferred) - would reserve PET/CT for pre-surgical evaluation  3) Diagnostic laparoscopy to rule out occult mets prior to systemic therapy  4) Tumor markers - baseline CEA  5) Protein shakes / supplements  6) Final path pending  7) PPI    Thank you very much for involving me in the care of this jamari patient.

## 2018-06-14 NOTE — PROVIDER CONTACT NOTE (OTHER) - NAME OF MD/NP/PA/DO NOTIFIED:
GI consult Dr. Everett left msg with Carm[place]/office
Pulm consult Dr. Dhillon left msg with [place]/office
Dr. Alexandro Lainez
Dr. Whyte

## 2018-06-14 NOTE — PROGRESS NOTE ADULT - SUBJECTIVE AND OBJECTIVE BOX
CHIEF COMPLAINT: dysphagia    SUBJECTIVE: EGD this AM highly suggestive of malignancy of gastroesophageal origin.  No new complaints.     REVIEW OF SYSTEMS: All other review of systems is negative unless indicated above    Vital Signs Last 24 Hrs  T(C): 36.4 (14 Jun 2018 14:18), Max: 36.9 (13 Jun 2018 20:00)  T(F): 97.5 (14 Jun 2018 14:18), Max: 98.4 (13 Jun 2018 20:00)  HR: 103 (14 Jun 2018 14:18) (75 - 103)  BP: 112/92 (14 Jun 2018 14:18) (112/92 - 136/67)  RR: 18 (14 Jun 2018 14:18) (17 - 18)  SpO2: 100% (14 Jun 2018 14:18) (94% - 100%)    PHYSICAL EXAM:              Constitutional: NAD, well-groomed, well-developed  	HEENT: PERR, EOMI, Normal Hearing, MMM  	Neck: No LAD, No JVD  	Back: Normal spine flexure, No CVA tenderness  	Respiratory: CTABL  	Cardiovascular: S1 and S2, RRR, no M/G/R  	Gastrointestinal: BS+, soft, NT/ND  	Extremities: No peripheral edema  	Vascular: 2+ peripheral pulses  	Neurological: A/O x 3, no focal deficits  	Psychiatric: Normal mood, normal affect  	Musculoskeletal: 5/5 strength b/l upper and lower extremities              Skin: No rashes    MEDICATIONS:  MEDICATIONS  (STANDING):  amLODIPine   Tablet 5 milliGRAM(s) Oral daily  enoxaparin Injectable 40 milliGRAM(s) SubCutaneous every 24 hours  loratadine 10 milliGRAM(s) Oral daily  melatonin 3 milliGRAM(s) Oral at bedtime  nicotine 21 mG/24 Hr(s) Transdermal Patch - Peds 1 Patch Transdermal daily  pantoprazole    Tablet 40 milliGRAM(s) Oral before breakfast  sodium chloride 0.9%. 1000 milliLiter(s) (75 mL/Hr) IV Continuous <Continuous>    LABS: All Labs Reviewed:                        15.1   11.59 )-----------( 340      ( 12 Jun 2018 23:09 )             43.6     142  |  109<H>  |  4<L>  ----------------------------<  100<H>  3.4<L>   |  25  |  0.52    Ca    8.4<L>      14 Jun 2018 06:17  Phos  2.9     06-14  Mg     2.4     06-14    TPro  8.6<H>  /  Alb  4.5  /  TBili  0.5  /  DBili  x   /  AST  19  /  ALT  26  /  AlkPhos  125<H>  06-12    PT/INR - ( 12 Jun 2018 23:09 )   PT: 11.3 sec;   INR: 1.05 ratio      < from: CT Abdomen and Pelvis w/ Oral Cont and w/ IV Cont (06.13.18 @ 01:04) >  Apparent masslike nodular thickening of the gastric cardia with several   mildly enlarged perigastric lymph nodes which is concerning for   underlying neoplasm. Endoscopic evaluation is recommended for further   evaluation.

## 2018-06-14 NOTE — CONSULT NOTE ADULT - SUBJECTIVE AND OBJECTIVE BOX
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  Pt reports decreased po intake as a result of her symptoms and has lost approx 10 lbs in 2 weeks.  Pt c/o recent fatigue/low energy. (2018 09:50)    Currently without nausea or vomiting.  + flatus.  Able to tolerate clears without any issues.  Gives long history of reflux - on PPI in the past, had endoscopy 6 years ago that showed gastritis, esophagitis.  Does not sound like she had obvious Galvez's esophagus or had biopsies of the GE junction at that time.    Strong family history of cancer - son testicular, mother breast/melanoma?, grandmother colon, aunt GIST, uncle gastric?    Had EGD which showed esophageal stricture, gastric cardia mass - biopsy performed    PAST MEDICAL & SURGICAL HISTORY:  Essential hypertension  Intracranial shunt      MEDICATIONS  (STANDING):  amLODIPine   Tablet 5 milliGRAM(s) Oral daily  enoxaparin Injectable 40 milliGRAM(s) SubCutaneous every 24 hours  loratadine 10 milliGRAM(s) Oral daily  melatonin 3 milliGRAM(s) Oral at bedtime  nicotine 21 mG/24 Hr(s) Transdermal Patch - Peds 1 Patch Transdermal daily  pantoprazole    Tablet 40 milliGRAM(s) Oral before breakfast  sodium chloride 0.9%. 1000 milliLiter(s) (75 mL/Hr) IV Continuous <Continuous>    MEDICATIONS  (PRN):  ondansetron Injectable 4 milliGRAM(s) IV Push every 6 hours PRN Nausea      Allergies    sulfa drugs (Unknown)    Intolerances        FAMILY HISTORY:  Family history of colon cancer  Family history of gastric cancer (Father, Grandparent)      Review of Systems    Constitutional, Eyes, ENT, Cardiovascular, Respiratory, Gastrointestinal, Genitourinary, Musculoskeletal, Integumentary, Neurological, Psychiatric, Endocrine, Heme/Lymph and Allergic/Immunologic review of systems are otherwise negative except as noted in HPI.     Vital Signs Last 24 Hrs  T(C): 36.4 (2018 14:18), Max: 36.7 (2018 09:52)  T(F): 97.5 (2018 14:18), Max: 98.1 (2018 09:52)  HR: 103 (2018 14:18) (75 - 103)  BP: 112/92 (2018 14:18) (112/92 - 136/67)  BP(mean): --  RR: 18 (2018 14:18) (17 - 18)  SpO2: 100% (2018 14:18) (94% - 100%)    Physical Exam  Constitutional: well developed, well nourished, in no acute distress and thin.   Eyes: PERRL, EOMI, no conjunctival infection, anicteric.   ENT: pharynx is unremarkable, moist mucus membrane, no oral lesions.   Neck: supple without JVD, no thyromegaly or masses appreciated.   Pulmonary: clear to auscultation bilaterally, no dullness, no wheezing.   Cardiac: RRR, normal S1S2, no murmurs, rubs, gallops.   Vascular: no JVD, no calf tenderness, venous stasis changes, varices.   Abdomen: normoactive bowel sounds, soft and nontender, no hepatosplenomegaly or masses appreciated.   Lymphatic: no peripheral adenopathy appreciated.   Musculoskeletal: full range of motion and no deformities appreciated.   Skin: normal appearance, no rash, nodules, vesicles, ulcers, erythema.   Neurology: grossly intact.   Psychiatric: affect appropriate.       LABS:  CBC Full  -  ( 2018 23:09 )  WBC Count : 11.59 K/uL  Hemoglobin : 15.1 g/dL  Hematocrit : 43.6 %  Platelet Count - Automated : 340 K/uL  Mean Cell Volume : 92.4 fl  Mean Cell Hemoglobin : 32.0 pg  Mean Cell Hemoglobin Concentration : 34.6 gm/dL  Auto Neutrophil # : 4.87 K/uL  Auto Lymphocyte # : 4.06 K/uL  Auto Monocyte # : 1.27 K/uL  Auto Eosinophil # : 0.12 K/uL  Auto Basophil # : 0.00 K/uL  Auto Neutrophil % : 42.0 %  Auto Lymphocyte % : 35.0 %  Auto Monocyte % : 11.0 %  Auto Eosinophil % : 1.0 %  Auto Basophil % : 0.0 %    -    142  |  109<H>  |  4<L>  ----------------------------<  100<H>  3.4<L>   |  25  |  0.52    Ca    8.4<L>      2018 06:17  Phos  2.9     06-14  Mg     2.4     06-14    TPro  8.6<H>  /  Alb  4.5  /  TBili  0.5  /  DBili  x   /  AST  19  /  ALT  26  /  AlkPhos  125<H>  06-12    PT/INR - ( 2018 23:09 )   PT: 11.3 sec;   INR: 1.05 ratio           Urinalysis Basic - ( 2018 23:09 )    Color: Yellow / Appearance: Clear / S.010 / pH: x  Gluc: x / Ketone: Negative  / Bili: Negative / Urobili: Negative mg/dL   Blood: x / Protein: Negative mg/dL / Nitrite: Negative   Leuk Esterase: Negative / RBC: x / WBC x   Sq Epi: x / Non Sq Epi: x / Bacteria: x        RADIOLOGY & ADDITIONAL STUDIES:

## 2018-06-14 NOTE — [facility]ACTICE NURSE CONSULT - ASSESSMENT
Patient alert/oriented eating a clear liquid diet. Patient is aware of presumed diagnosis after her EGD. Concerned that the disease may have spread so that she might not be eligible for surgery. Also concerned about the impact of her diagnosis on her 3 sons and . Patient reports that she has had other experiences with chronic illness as has had neurosurgery when 25 years old and remained hospitalized for 4 moths. Her eldest son hd testicular cancer 5 years ago and mother had breast cancer. Patient is aware will have a PET scan on discharge and will follow up with Dr. Whyte

## 2018-06-14 NOTE — CONSULT NOTE ADULT - SUBJECTIVE AND OBJECTIVE BOX
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  Pt reports decreased po intake as a result of her symptoms and has lost approx 10 lbs in 2 weeks.  Pt c/o recent fatigue/low energy. (2018 09:50)  < from: CT Abdomen and Pelvis w/ Oral Cont and w/ IV Cont (18 @ 01:04) >  EXAM:  CT ABDOMEN AND PELVIS OC IC                            PROCEDURE DATE:  2018          INTERPRETATION:  CT ABDOMEN AND PELVIS DATED 2018.    CLINICAL INFORMATION:  Abdominal pain and constipation.    TECHNIQUE:  Axial CT images through the abdomen and pelvis are acquired   with administration of oral and intravenous contrast. Images are   reformatted in the sagittal and coronal planes. 90cc of Omnipaque 350   were administered without event.  10cc were discarded.    The study is correlated with a prior exam from 2011.    FINDINGS:    There is a 1.1 cm lung nodule in the left lower lobe which has increased   in size compared to prior exam from  where it measured 0.9 cm.     A right posterior hepatic lobe cyst has increased in size, previously   measuring up to 3.8 cm and currently measuring up to 7.4 cm. The   gallbladder is surgically absent. There is mild intrahepatic and extra   hepatic biliary ductal dilatation with the common bile duct measuring up   to 13mm with distal tapering. No discrete choledocholith is identified.   The pancreas and spleen are unremarkable. There are again multiple   adrenal nodules, similar to the prior exam. A right adrenal nodule   previously measured up to 2.1 cm and currently measures up to 2.7 cm.   Left adrenal nodules are without significant interval change. The kidneys   enhance symmetrically. There is no right or left hydronephrosis. The   urinary bladder is unremarkable in appearance. The uterus and adnexa are   unremarkable.    There is no bowel obstruction. A normal appendix is identified. There is   apparent masslike nodular thickening of the gastric cardia with several   mildly enlarged perigastric lymph nodes which is new compared with prior   exam. There is no pneumoperitoneum, ascites, or loculated collection.    There is atherosclerotic calcification of the aortoiliac tree. The   abdominal aorta is normal in caliber.    There is a tiny fat-containing umbilical hernia.    The osseous structures areunremarkable apart from degenerative changes   of the spine and scattered bone islands, stable compared with prior exam.    IMPRESSION:    Apparent masslike nodular thickening of the gastric cardia with several   mildly enlarged perigastric lymph nodes which is concerning for   underlying neoplasm. Endoscopic evaluation is recommended for further   evaluation.    1.1 cm lung nodule in the left lower lobe which has increased in size   compared to prior exam from  where it measured 0.9 cm. PET/CT and/or   biopsy is recommended for further evaluation on a nonemergent basis.    Bilateral adrenal nodules which are indeterminate based on CT with slight   interval increase in size of the right adrenal nodule. Nonemergent MRI of   the abdomen would be helpful for better characterization.                  BRAYDEN BARBOUR   This document has been electronically signed. 2018  2:17AM              < end of copied text >            < from: CT Chest w/ IV Cont (18 @ 09:25) >    PROCEDURE DATE:  2018          INTERPRETATION:  Exam Date: 2018 9:25 AM  Clinical Information: Lung nodule seen on CT of the abdomen  Technique:       CT of the chest was performed with axialimages obtained   from the thoracic to the inlet to the bilateral adrenal glands following   administration of IV contrast      90 ml of Omnipaque 350 was injected   intravenously. Maximum intensity projection images were obtained.  Comparison:  CT abdomen 2018    FINDINGS:    Lungs, Airways and Pleura: Central tracheobronchial tree is grossly   patent. No endobronchial lesions. No pneumothorax. No pulmonary edema.   Well-circumscribed left lower lobe round nodular density measuring 1.1   cm, slightly increased in size as compared to 2011 where it   measured 0.9 cm.  No other discrete nodules. No segmental consolidation.   No pleural effusion. No pulmonary edema.    Heart and Great Vessels: Atherosclerotic changes of the aorta and   coronary vasculature. Heart is normal in size. No pericardial effusions.    Mediastinum and Delia: 1 cm right hilar lymph node.    Neck and Chest Wall: Left thyroid nodule.    Bones: Degenerative changes. Multiple tiny lucencies throughout the   osseous structures may represent osteopenia, bone scan as indicated.    Upper Abdomen:  Masslike thickening of the gastroesophageal junction into   the gastric cardia with adjacent perigastric lymph nodes compatible with   neoplasm till proven otherwise, recommend direct visualization. Multiple   nodular densities in the bilateral adrenal glands indeterminate on   current study however appear changed unchanged dating back to . Prior   cholecystectomy. Multiple enhancing lesions seen throughout theliver   which appear unchanged compared to prior study of  likely   representing hemangiomas. A posterior right hepatic cyst. Prior   cholecystectomy.    IMPRESSION:         1.1 cm nodule in the left lower lobe slightly increased in size as   compared to 2011 where measured 0.9 cm. This is amenable to   percutaneous biopsy if indicated.    Findings as described above compatible with gastric carcinoma till proven   otherwise, recommend endoscopy and tissue sampling.    < end of copied text >          PAST MEDICAL & SURGICAL HISTORY:  Essential hypertension  Intracranial shunt      MEDICATIONS  (STANDING):  amLODIPine   Tablet 5 milliGRAM(s) Oral daily  enoxaparin Injectable 40 milliGRAM(s) SubCutaneous every 24 hours  loratadine 10 milliGRAM(s) Oral daily  melatonin 3 milliGRAM(s) Oral at bedtime  nicotine 21 mG/24 Hr(s) Transdermal Patch - Peds 1 Patch Transdermal daily  pantoprazole    Tablet 40 milliGRAM(s) Oral before breakfast  sodium chloride 0.9%. 1000 milliLiter(s) (75 mL/Hr) IV Continuous <Continuous>    MEDICATIONS  (PRN):  ondansetron Injectable 4 milliGRAM(s) IV Push every 6 hours PRN Nausea      Allergies    sulfa drugs (Unknown)    Intolerances        SOCIAL HISTORY:    FAMILY HISTORY:  Family history of colon cancer  Family history of gastric cancer (Father, Grandparent)      Vital Signs Last 24 Hrs  T(C): 36.7 (2018 09:52), Max: 36.9 (2018 20:00)  T(F): 98.1 (2018 09:52), Max: 98.4 (2018 20:00)  HR: 75 (2018 09:52) (75 - 88)  BP: 136/67 (2018 09:52) (117/46 - 136/67)  BP(mean): --  RR: 18 (2018 09:52) (16 - 18)  SpO2: 96% (2018 09:52) (94% - 100%)      LABS:                        15.1   11.59 )-----------( 340      ( 2018 23:09 )             43.6     06-14    142  |  109<H>  |  4<L>  ----------------------------<  100<H>  3.4<L>   |  25  |  0.52    Ca    8.4<L>      2018 06:17  Phos  2.9     06-  Mg     2.4     -    TPro  8.6<H>  /  Alb  4.5  /  TBili  0.5  /  DBili  x   /  AST  19  /  ALT  26  /  AlkPhos  125<H>  06-12    PT/INR - ( 2018 23:09 )   PT: 11.3 sec;   INR: 1.05 ratio           Urinalysis Basic - ( 2018 23:09 )    Color: Yellow / Appearance: Clear / S.010 / pH: x  Gluc: x / Ketone: Negative  / Bili: Negative / Urobili: Negative mg/dL   Blood: x / Protein: Negative mg/dL / Nitrite: Negative   Leuk Esterase: Negative / RBC: x / WBC x   Sq Epi: x / Non Sq Epi: x / Bacteria: x        RADIOLOGY & ADDITIONAL STUDIES: HPI:  56 yo F hx of htn, 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  Pt reports decreased po intake as a result of her symptoms and has lost approx 10 lbs in 2 weeks. She underwent CT imaging ( see below) and Endoscopy this AM / the latter revealed a strictured lower esophagus and a mass in the fundus; The esophogeal stricture was dilated and biopsies of the esophagus and stomach were performed; pathology is pending.  >  EXAM:  CT ABDOMEN AND PELVIS OC IC                            PROCEDURE DATE:  2018          INTERPRETATION:  CT ABDOMEN AND PELVIS DATED 2018.    CLINICAL INFORMATION:  Abdominal pain and constipation.    TECHNIQUE:  Axial CT images through the abdomen and pelvis are acquired   with administration of oral and intravenous contrast. Images are   reformatted in the sagittal and coronal planes. 90cc of Omnipaque 350   were administered without event.  10cc were discarded.    The study is correlated with a prior exam from 2011.    FINDINGS:    There is a 1.1 cm lung nodule in the left lower lobe which has increased   in size compared to prior exam from  where it measured 0.9 cm.     A right posterior hepatic lobe cyst has increased in size, previously   measuring up to 3.8 cm and currently measuring up to 7.4 cm. The   gallbladder is surgically absent. There is mild intrahepatic and extra   hepatic biliary ductal dilatation with the common bile duct measuring up   to 13mm with distal tapering. No discrete choledocholith is identified.   The pancreas and spleen are unremarkable. There are again multiple   adrenal nodules, similar to the prior exam. A right adrenal nodule   previously measured up to 2.1 cm and currently measures up to 2.7 cm.   Left adrenal nodules are without significant interval change. The kidneys   enhance symmetrically. There is no right or left hydronephrosis. The   urinary bladder is unremarkable in appearance. The uterus and adnexa are   unremarkable.    There is no bowel obstruction. A normal appendix is identified. There is   apparent masslike nodular thickening of the gastric cardia with several   mildly enlarged perigastric lymph nodes which is new compared with prior   exam. There is no pneumoperitoneum, ascites, or loculated collection.    There is atherosclerotic calcification of the aortoiliac tree. The   abdominal aorta is normal in caliber.    There is a tiny fat-containing umbilical hernia.    The osseous structures are unremarkable apart from degenerative changes   of the spine and scattered bone islands, stable compared with prior exam.    IMPRESSION:    Apparent masslike nodular thickening of the gastric cardia with several   mildly enlarged perigastric lymph nodes which is concerning for   underlying neoplasm. Endoscopic evaluation is recommended for further   evaluation.    1.1 cm lung nodule in the left lower lobe which has increased in size   compared to prior exam from  where it measured 0.9 cm. PET/CT and/or   biopsy is recommended for further evaluation on a nonemergent basis.    Bilateral adrenal nodules which are indeterminate based on CT with slight   interval increase in size of the right adrenal nodule. Nonemergent MRI of   the abdomen would be helpful for better characterization.            < from: CT Chest w/ IV Cont (18 @ 09:25)     INTERPRETATION:  Exam Date: 2018 9:25 AM  Clinical Information: Lung nodule seen on CT of the abdomen  Technique:       CT of the chest was performed with axialimages obtained   from the thoracic to the inlet to the bilateral adrenal glands following   administration of IV contrast      90 ml of Omnipaque 350 was injected   intravenously. Maximum intensity projection images were obtained.  Comparison:  CT abdomen 2018    FINDINGS:    Lungs, Airways and Pleura: Central tracheobronchial tree is grossly   patent. No endobronchial lesions. No pneumothorax. No pulmonary edema.   Well-circumscribed left lower lobe round nodular density measuring 1.1   cm, slightly increased in size as compared to 2011 where it   measured 0.9 cm.  No other discrete nodules. No segmental consolidation.   No pleural effusion. No pulmonary edema.    Heart and Great Vessels: Atherosclerotic changes of the aorta and   coronary vasculature. Heart is normal in size. No pericardial effusions.    Mediastinum and Delia: 1 cm right hilar lymph node.    Neck and Chest Wall: Left thyroid nodule.    Bones: Degenerative changes. Multiple tiny lucencies throughout the   osseous structures may represent osteopenia, bone scan as indicated.    Upper Abdomen:  Masslike thickening of the gastroesophageal junction into   the gastric cardia with adjacent perigastric lymph nodes compatible with   neoplasm till proven otherwise, recommend direct visualization. Multiple   nodular densities in the bilateral adrenal glands indeterminate on   current study however appear changed unchanged dating back to . Prior   cholecystectomy. Multiple enhancing lesions seen throughout the liver   which appear unchanged compared to prior study of  likely   representing hemangiomas. A posterior right hepatic cyst. Prior   cholecystectomy.    IMPRESSION:         1.1 cm nodule in the left lower lobe slightly increased in size as   compared to 2011 where measured 0.9 cm. This is amenable to   percutaneous biopsy if indicated.    Findings as described above compatible with gastric carcinoma till proven   otherwise, recommend endoscopy and tissue sampling.      PAST MEDICAL & SURGICAL HISTORY:  Essential hypertension  Intracranial shunt      MEDICATIONS  (STANDING):  amLODIPine   Tablet 5 milliGRAM(s) Oral daily  enoxaparin Injectable 40 milliGRAM(s) SubCutaneous every 24 hours  loratadine 10 milliGRAM(s) Oral daily  melatonin 3 milliGRAM(s) Oral at bedtime  nicotine 21 mG/24 Hr(s) Transdermal Patch - Peds 1 Patch Transdermal daily  pantoprazole    Tablet 40 milliGRAM(s) Oral before breakfast  sodium chloride 0.9%. 1000 milliLiter(s) (75 mL/Hr) IV Continuous <Continuous>    MEDICATIONS  (PRN):  ondansetron Injectable 4 milliGRAM(s) IV Push every 6 hours PRN Nausea      Allergies    sulfa drugs (Unknown)    Intolerances        SOCIAL HISTORY:     FAMILY HISTORY:  Family history of colon cancer  Family history of gastric cancer (Father, Grandparent)  Mother breast cancer      Vital Signs Last 24 Hrs  T(C): 36.7 (2018 09:52), Max: 36.9 (2018 20:00)  T(F): 98.1 (2018 09:52), Max: 98.4 (2018 20:00)  HR: 75 (2018 09:52) (75 - 88)  BP: 136/67 (2018 09:52) (117/46 - 136/67)  BP(mean): --  RR: 18 (2018 09:52) (16 - 18)  SpO2: 96% (2018 09:52) (94% - 100%)    PHYSICAL EXAM:      Constitutional: Appears comfortable, in  NAD, A/O X 3     Eyes: EOMI, PERRLA ;No icterus    ENMT:  No oral lesions, thrush; pharynx not injected    Neck:  Supple; No masses, lymph nodes, no bruits    Breasts: NA    Back: No tenderness     Respiratory:  Clear to A/P, No wheezes, rhonchi, rales. No dullness to percussion    Cardiovascular: Normal rate and rhythm; normal S1 and S2; No murmurs. No gallop    Gastrointestinal: No distension, normal bowl sounds; No tendeness , guarding, rebound;  no masses, no hepatosplenimegaly, no fluid wave    Genitourinary: No flank pains, no inguninal adenopathy    Rectal: NA    Extremities:  No phlebitis, no edema    Vascular: No acrocyanosis, no edema    Neurological: Alert and oriented. Cranial nerves II-XII WNL, Upper extremity / lower extremity  strength WNL;  Reflexes WNL, Plantar downgoing ; No cerebellar signs    Skin: No rash, petichae, purpura, echymoses    Lymph Nodes: No cervical, supraclavicular, axillary, inguinal adenopathy    Musculoskeletal: No joint swelling    Psychiatric: Alert, oriented, normal affect        LABS:                        15.1   11.59 )-----------( 340      ( 2018 23:09 )             43.6     06-14    142  |  109<H>  |  4<L>  ----------------------------<  100<H>  3.4<L>   |  25  |  0.52    Ca    8.4<L>      2018 06:17  Phos  2.9     06-14  Mg     2.4     06-14    TPro  8.6<H>  /  Alb  4.5  /  TBili  0.5  /  DBili  x   /  AST  19  /  ALT  26  /  AlkPhos  125<H>  06-12    PT/INR - ( 2018 23:09 )   PT: 11.3 sec;   INR: 1.05 ratio           Urinalysis Basic - ( 2018 23:09 )    Color: Yellow / Appearance: Clear / S.010 / pH: x  Gluc: x / Ketone: Negative  / Bili: Negative / Urobili: Negative mg/dL   Blood: x / Protein: Negative mg/dL / Nitrite: Negative   Leuk Esterase: Negative / RBC: x / WBC x   Sq Epi: x / Non Sq Epi: x / Bacteria: x HPI:  58 yo F hx of htn, 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  Pt reports decreased po intake as a result of her symptoms and has lost approx 10 lbs in 2 weeks. She underwent CT imaging ( see below) and Endoscopy this AM / the latter revealed a strictured lower esophagus and a mass in the fundus; The esophogeal stricture was dilated and biopsies of the esophagus and stomach were performed; pathology is pending.  >  EXAM:  CT ABDOMEN AND PELVIS OC IC                            PROCEDURE DATE:  2018          INTERPRETATION:  CT ABDOMEN AND PELVIS DATED 2018.    CLINICAL INFORMATION:  Abdominal pain and constipation.    TECHNIQUE:  Axial CT images through the abdomen and pelvis are acquired   with administration of oral and intravenous contrast. Images are   reformatted in the sagittal and coronal planes. 90cc of Omnipaque 350   were administered without event.  10cc were discarded.    The study is correlated with a prior exam from 2011.    FINDINGS:    There is a 1.1 cm lung nodule in the left lower lobe which has increased   in size compared to prior exam from  where it measured 0.9 cm.     A right posterior hepatic lobe cyst has increased in size, previously   measuring up to 3.8 cm and currently measuring up to 7.4 cm. The   gallbladder is surgically absent. There is mild intrahepatic and extra   hepatic biliary ductal dilatation with the common bile duct measuring up   to 13mm with distal tapering. No discrete choledocholith is identified.   The pancreas and spleen are unremarkable. There are again multiple   adrenal nodules, similar to the prior exam. A right adrenal nodule   previously measured up to 2.1 cm and currently measures up to 2.7 cm.   Left adrenal nodules are without significant interval change. The kidneys   enhance symmetrically. There is no right or left hydronephrosis. The   urinary bladder is unremarkable in appearance. The uterus and adnexa are   unremarkable.    There is no bowel obstruction. A normal appendix is identified. There is   apparent masslike nodular thickening of the gastric cardia with several   mildly enlarged perigastric lymph nodes which is new compared with prior   exam. There is no pneumoperitoneum, ascites, or loculated collection.    There is atherosclerotic calcification of the aortoiliac tree. The   abdominal aorta is normal in caliber.    There is a tiny fat-containing umbilical hernia.    The osseous structures are unremarkable apart from degenerative changes   of the spine and scattered bone islands, stable compared with prior exam.    IMPRESSION:    Apparent masslike nodular thickening of the gastric cardia with several   mildly enlarged perigastric lymph nodes which is concerning for   underlying neoplasm. Endoscopic evaluation is recommended for further   evaluation.    1.1 cm lung nodule in the left lower lobe which has increased in size   compared to prior exam from  where it measured 0.9 cm. PET/CT and/or   biopsy is recommended for further evaluation on a nonemergent basis.    Bilateral adrenal nodules which are indeterminate based on CT with slight   interval increase in size of the right adrenal nodule. Nonemergent MRI of   the abdomen would be helpful for better characterization.            < from: CT Chest w/ IV Cont (18 @ 09:25)     INTERPRETATION:  Exam Date: 2018 9:25 AM  Clinical Information: Lung nodule seen on CT of the abdomen  Technique:       CT of the chest was performed with axialimages obtained   from the thoracic to the inlet to the bilateral adrenal glands following   administration of IV contrast      90 ml of Omnipaque 350 was injected   intravenously. Maximum intensity projection images were obtained.  Comparison:  CT abdomen 2018    FINDINGS:    Lungs, Airways and Pleura: Central tracheobronchial tree is grossly   patent. No endobronchial lesions. No pneumothorax. No pulmonary edema.   Well-circumscribed left lower lobe round nodular density measuring 1.1   cm, slightly increased in size as compared to 2011 where it   measured 0.9 cm.  No other discrete nodules. No segmental consolidation.   No pleural effusion. No pulmonary edema.    Heart and Great Vessels: Atherosclerotic changes of the aorta and   coronary vasculature. Heart is normal in size. No pericardial effusions.    Mediastinum and Delia: 1 cm right hilar lymph node.    Neck and Chest Wall: Left thyroid nodule.    Bones: Degenerative changes. Multiple tiny lucencies throughout the   osseous structures may represent osteopenia, bone scan as indicated.    Upper Abdomen:  Masslike thickening of the gastroesophageal junction into   the gastric cardia with adjacent perigastric lymph nodes compatible with   neoplasm till proven otherwise, recommend direct visualization. Multiple   nodular densities in the bilateral adrenal glands indeterminate on   current study however appear changed unchanged dating back to . Prior   cholecystectomy. Multiple enhancing lesions seen throughout the liver   which appear unchanged compared to prior study of  likely   representing hemangiomas. A posterior right hepatic cyst. Prior   cholecystectomy.    IMPRESSION:         1.1 cm nodule in the left lower lobe slightly increased in size as   compared to 2011 where measured 0.9 cm. This is amenable to   percutaneous biopsy if indicated.    Findings as described above compatible with gastric carcinoma till proven   otherwise, recommend endoscopy and tissue sampling.      PAST MEDICAL & SURGICAL HISTORY:  Essential hypertension  Intracranial shunt      MEDICATIONS  (STANDING):  amLODIPine   Tablet 5 milliGRAM(s) Oral daily  enoxaparin Injectable 40 milliGRAM(s) SubCutaneous every 24 hours  loratadine 10 milliGRAM(s) Oral daily  melatonin 3 milliGRAM(s) Oral at bedtime  nicotine 21 mG/24 Hr(s) Transdermal Patch - Peds 1 Patch Transdermal daily  pantoprazole    Tablet 40 milliGRAM(s) Oral before breakfast  sodium chloride 0.9%. 1000 milliLiter(s) (75 mL/Hr) IV Continuous <Continuous>    MEDICATIONS  (PRN):  ondansetron Injectable 4 milliGRAM(s) IV Push every 6 hours PRN Nausea      Allergies    sulfa drugs (Unknown)    Intolerances        SOCIAL HISTORY:     FAMILY HISTORY:  Family history of colon cancer  Family history of gastric cancer (Father, Grandparent)  Mother breast cancer  Son Testicular cancer  Aunt GIST      Vital Signs Last 24 Hrs  T(C): 36.7 (2018 09:52), Max: 36.9 (2018 20:00)  T(F): 98.1 (2018 09:52), Max: 98.4 (2018 20:00)  HR: 75 (2018 09:52) (75 - 88)  BP: 136/67 (2018 09:52) (117/46 - 136/67)  BP(mean): --  RR: 18 (2018 09:52) (16 - 18)  SpO2: 96% (2018 09:52) (94% - 100%)    PHYSICAL EXAM:      Constitutional: Appears comfortable, in  NAD, A/O X 3     Eyes: EOMI, PERRLA ;No icterus    ENMT:  No oral lesions, thrush; pharynx not injected    Neck:  Supple; No masses, lymph nodes, no bruits    Breasts: NA    Back: No tenderness     Respiratory:  Clear to A/P, No wheezes, rhonchi, rales. No dullness to percussion    Cardiovascular: Normal rate and rhythm; normal S1 and S2; No murmurs. No gallop    Gastrointestinal: No distension, normal bowl sounds; No tendeness , guarding, rebound;  no masses, no hepatosplenimegaly, no fluid wave    Genitourinary: No flank pains, no inguninal adenopathy    Rectal: NA    Extremities:  No phlebitis, no edema    Vascular: No acrocyanosis, no edema    Neurological: Alert and oriented. Cranial nerves II-XII WNL, Upper extremity / lower extremity  strength WNL;  Reflexes WNL, Plantar downgoing ; No cerebellar signs    Skin: No rash, petichae, purpura, echymoses    Lymph Nodes: No cervical, supraclavicular, axillary, inguinal adenopathy    Musculoskeletal: No joint swelling    Psychiatric: Alert, oriented, normal affect        LABS:                        15.1   11.59 )-----------( 340      ( 2018 23:09 )             43.6     06-14    142  |  109<H>  |  4<L>  ----------------------------<  100<H>  3.4<L>   |  25  |  0.52    Ca    8.4<L>      2018 06:17  Phos  2.9     06-14  Mg     2.4     06-14    TPro  8.6<H>  /  Alb  4.5  /  TBili  0.5  /  DBili  x   /  AST  19  /  ALT  26  /  AlkPhos  125<H>  06-12    PT/INR - ( 2018 23:09 )   PT: 11.3 sec;   INR: 1.05 ratio           Urinalysis Basic - ( 2018 23:09 )    Color: Yellow / Appearance: Clear / S.010 / pH: x  Gluc: x / Ketone: Negative  / Bili: Negative / Urobili: Negative mg/dL   Blood: x / Protein: Negative mg/dL / Nitrite: Negative   Leuk Esterase: Negative / RBC: x / WBC x   Sq Epi: x / Non Sq Epi: x / Bacteria: x

## 2018-06-14 NOTE — CONSULT NOTE ADULT - SUBJECTIVE AND OBJECTIVE BOX
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  Pt reports decreased po intake as a result of her symptoms and has lost approx 10 lbs in 2 weeks.  Pt c/o recent fatigue/low energy. (2018 09:50)      PAST MEDICAL & SURGICAL HISTORY:  Essential hypertension  Intracranial shunt      PREVIOUS DIAGNOSTIC TESTING:      MEDICATIONS  (STANDING):  amLODIPine   Tablet 5 milliGRAM(s) Oral daily  enoxaparin Injectable 40 milliGRAM(s) SubCutaneous every 24 hours  loratadine 10 milliGRAM(s) Oral daily  melatonin 3 milliGRAM(s) Oral at bedtime  nicotine 21 mG/24 Hr(s) Transdermal Patch - Peds 1 Patch Transdermal daily  pantoprazole    Tablet 40 milliGRAM(s) Oral before breakfast  sodium chloride 0.9%. 1000 milliLiter(s) (75 mL/Hr) IV Continuous <Continuous>    MEDICATIONS  (PRN):  ondansetron Injectable 4 milliGRAM(s) IV Push every 6 hours PRN Nausea      FAMILY HISTORY:  Family history of colon cancer  Family history of gastric cancer (Father, Grandparent)        REVIEW OF SYSTEM:  Denies dyspnea at the moment, otherwise 12 point ROS negative     Vital Signs Last 24 Hrs  T(C): 36.4 (2018 05:10), Max: 36.9 (2018 20:00)  T(F): 97.6 (2018 05:10), Max: 98.4 (2018 20:00)  HR: 87 (2018 05:10) (76 - 88)  BP: 117/69 (2018 05:10) (107/76 - 126/62)  BP(mean): --  RR: 17 (2018 05:10) (16 - 18)  SpO2: 94% (2018 05:10) (94% - 100%)    I&O's Summary    2018 07:01  -  2018 07:00  --------------------------------------------------------  IN: 1000 mL / OUT: 0 mL / NET: 1000 mL      PHYSICAL EXAM  General Appearance: cooperative, no acute distress,   HEENT: PERRL, conjunctiva clear, EOM's intact, non injected pharynx, no exudate, TM   normal  Neck: Supple, , no adenopathy, thyroid: not enlarged, no carotid bruit or JVD  Back: Symmetric, no  tenderness,no soft tissue tenderness  Lungs: ***  Heart: Regular rate and rhythm, S1, S2 normal, no murmur, rub or gallop  Abdomen: Soft, non-tender, bowel sounds active , no hepatosplenomegaly  Extremities: no cyanosis or edema, no joint swelling  Skin: Skin color, texture normal, no rashes   Neurologic: Alert and oriented X3 , cranial nerves intact, sensory and motor normal,    ECG:    LABS:                          15.1   11.59 )-----------( 340      ( 2018 23:09 )             43.6     06-14    142  |  109<H>  |  4<L>  ----------------------------<  100<H>  3.4<L>   |  25  |  0.52    Ca    8.4<L>      2018 06:17  Phos  2.9     06-14  Mg     2.4     06-14    TPro  8.6<H>  /  Alb  4.5  /  TBili  0.5  /  DBili  x   /  AST  19  /  ALT  26  /  AlkPhos  125<H>  06-12            PT/INR - ( 2018 23:09 )   PT: 11.3 sec;   INR: 1.05 ratio           Urinalysis Basic - ( 2018 23:09 )    Color: Yellow / Appearance: Clear / S.010 / pH: x  Gluc: x / Ketone: Negative  / Bili: Negative / Urobili: Negative mg/dL   Blood: x / Protein: Negative mg/dL / Nitrite: Negative   Leuk Esterase: Negative / RBC: x / WBC x   Sq Epi: x / Non Sq Epi: x / Bacteria: x            RADIOLOGY & ADDITIONAL STUDIES:    IMPRESSION:    Apparent masslike nodular thickening of the gastric cardia with several   mildly enlarged perigastric lymph nodes which is concerning for   underlying neoplasm. Endoscopic evaluation is recommended for further   evaluation.    1.1 cm lung nodule in the left lower lobe which has increased in size   compared to prior exam from 2011 where it measured 0.9 cm. PET/CT and/or   biopsy is recommended for further evaluation on a nonemergent basis.    Bilateral adrenal nodules which are indeterminate based on CT with slight   interval increase in size of the right adrenal nodule. Nonemergent MRI of   the abdomen would be helpful for better characterization.

## 2018-06-14 NOTE — PROGRESS NOTE ADULT - ASSESSMENT
56 yo F hx of htn, 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  Pt reports decreased po intake as a result of her symptoms and has lost approx 10 lbs in 2 weeks.  Pt c/o recent fatigue/low energy.     # Dysphagia, regurgitation, weight loss 2/2 obstruction related to gastric/esophageal cancer   - CT A/P noted   - EGD with Malignant-appearing esophageal stenosis which was Dilated and Biopsied.  Malignant gastric tumor in the gastric fundus which was Biopsied   - CLD advance as tolerated  - If pt continues to be unable to tolerate diet despite dilation of esophageal stricture consider stent?  - Med onc and surgical onc consulted  - perigastric lymph nodes likely neoplastic    # intra and extrahepatic biliary dilation likely 2/2 hx of cholecystectomy. Less likely any biliary obstruction.  - no elevated LFTs  - GI eval    # LLL Lung nodule slightly increased in size over 7 year period from 0.9cm to 1.1 cm- unlikely of any significance  - Pt with strong smoking hx  - CT Chest noted  - pulm eval appreicated    # Liver cysts, adrenal nodules of unclear etiology  - Pt aware of findings and aware of their presence on previous imaging done in 2011.  - will hold on further eval presently.    DVT ppx: Lovenox

## 2018-06-14 NOTE — ADVANCED PRACTICE NURSE CONSULT - RECOMMEDATIONS
Patient is aware that I am available for information and resources in the community. The patient is very talkative and believes she has to be the "strong one" to support her family. She will need some guidance and support but as of now understand the immediate plan of care

## 2018-06-14 NOTE — DIETITIAN INITIAL EVALUATION ADULT. - NS AS NUTRI DX NUTRIENT
Malnutrition/Pt meets criteria for severe protein-calorie malnutrition in context of chronic disease

## 2018-06-14 NOTE — DIETITIAN INITIAL EVALUATION ADULT. - ENERGY NEEDS
Ht.    67  "        Wt.   79     kg               BMI    27              IBW  61     kg               Pt is at  129  %  IBW

## 2018-06-14 NOTE — DIETITIAN INITIAL EVALUATION ADULT. - OTHER INFO
56 yo F hx of htn, post traumatic hydrocephalus with ventricular shunt to spine (>30 yr ago), smoker, vitamin D deficiency presenting with 2-3 month history of progressive dysphagia, food regurgitation, on/off epigastric pain, 10 lb weight loss in last 2-3 weeks.  Pt reports decreased po intake as a result of her symptoms and has lost approx 10 lbs in 2 weeks.  Problems: dysphagia, intra and extra-hepatic biliary dilation, hx of cholecystectomy, lung nodule, liver cysts. Consult for wt loss.  Pt is 58 yo F hx of htn, post traumatic hydrocephalus with ventricular shunt to spine (>30 yr ago), smoker, vitamin D deficiency presenting with 2-3 month history of progressive dysphagia, food regurgitation, on/off epigastric pain, 10 lb weight loss in last 2-3 weeks.  Pt reports decreased po intake as a result of her symptoms and has lost approx 10 lbs in 2 weeks.  Problems: dysphagia, intra and extra-hepatic biliary dilation, hx of cholecystectomy, lung nodule, liver cysts.  Pt reports trouble swallowing, reflux in past three months on 4 different occasions. Gurpreet 20.  No edema noted.  Skin intact. Pt at risk for severe protein-calorie malnutrition.  Pt reports wt loss of 6.48% UBW in past 2-3 weeks, PO intake < 75% x 3 weeks.  Pt also reports 4 episodes of trouble swallowing and regurgitation over past 4 months.  Pt currently experiencing "massive diarrhea".  Pt reports non-tolerance to HH clear liquid diet due to sugar and HFCS amounts.  Pt sensitive to milk products.   Pt on CL diet, consider advancing as soon as medically feasible to low fiber diet, with emphasis on BRATTY foods (bread, rice, applesauce, toast, tea, yogurt.)  Suggest add MVI w/vitamins.  Pt does not tolerate supplements. Suggest new weight. Will monitor PO intake, labs, meds and wt. Consult for wt loss.  Pt is 58 yo F hx of htn, post traumatic hydrocephalus with ventricular shunt to spine (>30 yr ago), smoker, vitamin D deficiency presenting with 2-3 month history of progressive dysphagia, food regurgitation, on/off epigastric pain, 10 lb weight loss in last 2-3 weeks.  Pt reports decreased po intake as a result of her symptoms and has lost approx 10 lbs in 2 weeks.  Problems: dysphagia, intra and extra-hepatic biliary dilation, hx of cholecystectomy, lung nodule, liver cysts.  Pt reports trouble swallowing, reflux in past three months on 4 different occasions. Pt at risk for severe protein-calorie malnutrition in context of acute illness.  Pt reports wt loss of 6.48% UBW in past 2-3 weeks, PO intake < 75% x 3 weeks.  Pt also reports 4 episodes of trouble swallowing and regurgitation over past 4 months.  Pt currently experiencing "massive diarrhea".  Pt reports non-tolerance to HH clear liquid diet due to sugar and HFCS amounts.  Pt sensitive to milk products.   Pt on CL diet, consider advancing as soon as medically feasible to low fiber diet, with emphasis on BRATTY foods (bread, rice, applesauce, toast, tea, yogurt.)  Suggest add MVI w/vitamins.  Pt does not tolerate supplements. Suggest new weight. Will monitor PO intake, tolerance, labs and weight.

## 2018-06-15 ENCOUNTER — TRANSCRIPTION ENCOUNTER (OUTPATIENT)
Age: 58
End: 2018-06-15

## 2018-06-15 VITALS
TEMPERATURE: 98 F | OXYGEN SATURATION: 99 % | HEART RATE: 89 BPM | RESPIRATION RATE: 18 BRPM | SYSTOLIC BLOOD PRESSURE: 140 MMHG | DIASTOLIC BLOOD PRESSURE: 74 MMHG

## 2018-06-15 DIAGNOSIS — Z90.49 ACQUIRED ABSENCE OF OTHER SPECIFIED PARTS OF DIGESTIVE TRACT: Chronic | ICD-10-CM

## 2018-06-15 LAB
CEA SERPL-MCNC: 4.1 NG/ML — HIGH (ref 0–3.8)
SURGICAL PATHOLOGY FINAL REPORT - CH: SIGNIFICANT CHANGE UP

## 2018-06-15 RX ORDER — NICOTINE POLACRILEX 2 MG
1 GUM BUCCAL
Qty: 1 | Refills: 0
Start: 2018-06-15

## 2018-06-15 RX ORDER — PANTOPRAZOLE SODIUM 20 MG/1
1 TABLET, DELAYED RELEASE ORAL
Qty: 30 | Refills: 0
Start: 2018-06-15 | End: 2018-07-14

## 2018-06-15 RX ADMIN — SODIUM CHLORIDE 75 MILLILITER(S): 9 INJECTION INTRAMUSCULAR; INTRAVENOUS; SUBCUTANEOUS at 00:03

## 2018-06-15 RX ADMIN — PANTOPRAZOLE SODIUM 40 MILLIGRAM(S): 20 TABLET, DELAYED RELEASE ORAL at 09:23

## 2018-06-15 RX ADMIN — Medication 1 PATCH: at 11:15

## 2018-06-15 RX ADMIN — AMLODIPINE BESYLATE 5 MILLIGRAM(S): 2.5 TABLET ORAL at 05:40

## 2018-06-15 RX ADMIN — LORATADINE 10 MILLIGRAM(S): 10 TABLET ORAL at 11:15

## 2018-06-15 NOTE — ADVANCED PRACTICE NURSE CONSULT - RECOMMEDATIONS
Neck pain radiating down R shoulder x 1 week. Also c/o R knee pain/swelling. Denies injury/trauma. Patient plans to call me and have her sons and  be in touch with me for further informatiion and resources. pateint stated she will also call me after she has the PET scan. NASIR

## 2018-06-15 NOTE — DISCHARGE NOTE ADULT - MEDICATION SUMMARY - MEDICATIONS TO TAKE
I will START or STAY ON the medications listed below when I get home from the hospital:    Xyzal 5 mg oral tablet  -- 1 tab(s) by mouth once a day (in the evening)  -- Indication: For .    amLODIPine 5 mg oral tablet  -- 1 tab(s) by mouth once a day  -- Indication: For .    pantoprazole 40 mg oral delayed release tablet  -- 1 tab(s) by mouth once a day (before a meal)  -- Indication: For GASTRIC acid suppression    nicotine 21 mg/24 hr transdermal film, extended release  -- 1 patch by transdermal patch once a day   -- Indication: For smoking cessation    Super B Complex oral tablet  -- 1 tab(s) by mouth once a day  -- Indication: For .

## 2018-06-15 NOTE — DISCHARGE NOTE ADULT - CARE PLAN
Principal Discharge DX:	Gastric cancer  Goal:	follow up  Assessment and plan of treatment:	Follow up with Dr. Whyte, Dr. Lainez, Dr. Everett  Secondary Diagnosis:	Dysphagia  Assessment and plan of treatment:	Diet as tolerated  You may need a esophageal stent in the future

## 2018-06-15 NOTE — DISCHARGE NOTE ADULT - CARE PROVIDER_API CALL
Carmella Whyte), Hematology; Internal Medicine; Medical Oncology  [address] [place] [address]  Phone: [phone]  Fax: [phone]    Alexandro Lainez (MD), Surgery; Surgical Oncology  [address] [place] [address]  Phone: [phone]  Fax: [phone]    Jay Everett), Gastroenterology  [address] [place] [address]  Phone: [phone]  Fax: [phone]    Tha Mccartney), Internal Medicine  [address] [place] [address]  Phone: [phone]

## 2018-06-15 NOTE — PROGRESS NOTE ADULT - ASSESSMENT
58 yo F hx of htn, 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  Pt reports decreased po intake as a result of her symptoms and has lost approx 10 lbs in 2 weeks.  Pt c/o recent fatigue/low energy.       Tumor, malignant-appearing, distal esophagus and going into the fundus/cardia region.  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  With family history of stomach cancer, this is concerning for hereditary gastric cancer syndrome, we will await pathology for further delineation and assessment of risk factors.    Although her tumor was dilated, dilations only temporize the obstruction and patient will likely have recurrent episodes of dysphagia.  Would advance diet to puréed diet, and I suspect that she will be able to tolerate this. Would avoid solids.    Should she tolerate diet, discharge planning and outpatient follow-up. I discussed with her that our office will give her a call to come in for discussion of pathology results once they become available.    Left lower lobe nodule, slightly increased in size. Strong history of smoking discussed with patient. However, over a 7 year mildly increased in size is likely nonsignificant but will await oncology input.      With regards to likely malignant tumor, I suspect she would benefit from neoadjuvant therapy 1st and then consideration for surgical intervention. Further staging can be undertaken as out patient.      Reflux disease and would continue acid suppression    Smoking cessation discussed with patient.    Case discussed with

## 2018-06-15 NOTE — PROGRESS NOTE ADULT - SUBJECTIVE AND OBJECTIVE BOX
Patient is a 57y old  Female who presents with a chief complaint of dysphagia, regurgitation, weight loss (13 Jun 2018 09:50)      HPI:  58 yo F hx of htn, 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  Pt reports decreased po intake as a result of her symptoms and has lost approx 10 lbs in 2 weeks.  Pt c/o recent fatigue/low energy. (13 Jun 2018 09:50)    Patient more comfortable with acid suppression. States that her epigastric discomfort and belching have improved. Negative nausea vomiting. Tolerated clear liquids.  Anxious about her diagnosis.  Negative fevers.  Metastases with oncology, oncology note appreciated message left for him  Case also discussed with patient's .  She feels that her dysphagia is better with liquids.        PAST MEDICAL & SURGICAL HISTORY:  Essential hypertension  Intracranial shunt      MEDICATIONS  (STANDING):  amLODIPine   Tablet 5 milliGRAM(s) Oral daily  enoxaparin Injectable 40 milliGRAM(s) SubCutaneous every 24 hours  loratadine 10 milliGRAM(s) Oral daily  melatonin 3 milliGRAM(s) Oral at bedtime  nicotine 21 mG/24 Hr(s) Transdermal Patch - Peds 1 Patch Transdermal daily  pantoprazole    Tablet 40 milliGRAM(s) Oral before breakfast  sodium chloride 0.9%. 1000 milliLiter(s) (75 mL/Hr) IV Continuous <Continuous>    MEDICATIONS  (PRN):  ondansetron Injectable 4 milliGRAM(s) IV Push every 6 hours PRN Nausea      Allergies    sulfa drugs (Unknown)    Intolerances        SOCIAL HISTORY:NC, pos smoking    FAMILY HISTORY:  Family history of colon cancer  Family history of gastric cancer (Father, Grandparent)      REVIEW OF SYSTEMS:    CONSTITUTIONAL: No weakness, fevers or chills  EYES/ENT: No visual changes;  No vertigo or throat pain   NECK: No pain or stiffness  RESPIRATORY: No cough, wheezing, hemoptysis; No shortness of breath  CARDIOVASCULAR: No chest pain or palpitations  GENITOURINARY: No dysuria, frequency or hematuria  NEUROLOGICAL: No numbness or weakness  SKIN: No itching, burning, rashes, or lesions   All other review of systems is negative unless indicated above.    Vital Signs Last 24 Hrs  T(C): 36.7 (15 Johnny 2018 05:07), Max: 36.7 (14 Jun 2018 09:52)  T(F): 98.1 (15 Johnny 2018 05:07), Max: 98.1 (14 Jun 2018 09:52)  HR: 76 (15 Johnny 2018 05:07) (75 - 103)  BP: 123/65 (15 Johnny 2018 05:07) (112/92 - 144/74)  BP(mean): --  RR: 18 (15 Johnny 2018 05:07) (18 - 18)  SpO2: 92% (15 Johnny 2018 05:07) (92% - 100%)    PHYSICAL EXAM:    Constitutional: NAD, well-developed  HEENT: EOMI, throat clear  Neck: No LAD, supple  Respiratory: CTA and P  Cardiovascular: S1 and S2, RRR, no M  Gastrointestinal: BS+, soft, epig mild tend/ND, neg HSM,  Extremities: No peripheral edema, neg clubing, cyanosis  Vascular: 2+ peripheral pulses  Neurological: A/O x 3, no focal deficits  Psychiatric: Normal mood, normal affect  Skin: No rashes    LABS:    06-14    142  |  109<H>  |  4<L>  ----------------------------<  100<H>  3.4<L>   |  25  |  0.52    Ca    8.4<L>      14 Jun 2018 06:17  Phos  2.9     06-14  Mg     2.4     06-14              RADIOLOGY & ADDITIONAL STUDIES:  < from: CT Abdomen and Pelvis w/ Oral Cont and w/ IV Cont (06.13.18 @ 01:04) >  EXAM:  CT ABDOMEN AND PELVIS OC IC                            PROCEDURE DATE:  06/13/2018          INTERPRETATION:  CT ABDOMEN AND PELVIS DATED 6/13/2018.    CLINICAL INFORMATION:  Abdominal pain and constipation.    TECHNIQUE:  Axial CT images through the abdomen and pelvis are acquired   with administration of oral and intravenous contrast. Images are   reformatted in the sagittal and coronal planes. 90cc of Omnipaque 350   were administered without event.  10cc were discarded.    The study is correlated with a prior exam from 12/14/2011.    FINDINGS:    There is a 1.1 cm lung nodule in the left lower lobe which has increased   in size compared to prior exam from 2011 where it measured 0.9 cm.     A right posterior hepatic lobe cyst has increased in size, previously   measuring up to 3.8 cm and currently measuring up to 7.4 cm. The   gallbladder is surgically absent. There is mild intrahepatic and extra   hepatic biliary ductal dilatation with the common bile duct measuring up   to 13mm with distal tapering. No discrete choledocholith is identified.   The pancreas and spleen are unremarkable. There are again multiple   adrenal nodules, similar to the prior exam. A right adrenal nodule   previously measured up to 2.1 cm and currently measures up to 2.7 cm.   Left adrenal nodules are without significant interval change. The kidneys   enhance symmetrically. There is no right or left hydronephrosis. The   urinary bladder is unremarkable in appearance. The uterus and adnexa are   unremarkable.    There is no bowel obstruction. A normal appendix is identified. There is   apparent masslike nodular thickening of the gastric cardia with several   mildly enlarged perigastric lymph nodes which is new compared with prior   exam. There is no pneumoperitoneum, ascites, or loculated collection.    There is atherosclerotic calcification of the aortoiliac tree. The   abdominal aorta is normal in caliber.    There is a tiny fat-containing umbilical hernia.    The osseous structures areunremarkable apart from degenerative changes   of the spine and scattered bone islands, stable compared with prior exam.    IMPRESSION:    Apparent masslike nodular thickening of the gastric cardia with several   mildly enlarged perigastric lymph nodes which is concerning for   underlying neoplasm. Endoscopic evaluation is recommended for further   evaluation.    1.1 cm lung nodule in the left lower lobe which has increased in size   compared to prior exam from 2011 where it measured 0.9 cm. PET/CT and/or   biopsy is recommended for further evaluation on a nonemergent basis.    Bilateral adrenal nodules which are indeterminate based on CT with slight   interval increase in size of the right adrenal nodule. Nonemergent MRI of   the abdomen would be helpful for better characterization.              < end of copied text >  < from: CT Chest w/ IV Cont (06.14.18 @ 09:25) >  EXAM:  CT CHEST IC                            PROCEDURE DATE:  06/14/2018          INTERPRETATION:  Exam Date: 6/14/2018 9:25 AM  Clinical Information: Lung nodule seen on CT of the abdomen  Technique:       CT of the chest was performed with axialimages obtained   from the thoracic to the inlet to the bilateral adrenal glands following   administration of IV contrast      90 ml of Omnipaque 350 was injected   intravenously. Maximum intensity projection images were obtained.  Comparison:  CT abdomen 6/13/2018    FINDINGS:    Lungs, Airways and Pleura: Central tracheobronchial tree is grossly   patent. No endobronchial lesions. No pneumothorax. No pulmonary edema.   Well-circumscribed left lower lobe round nodular density measuring 1.1   cm, slightly increased in size as compared to 12/14/2011 where it   measured 0.9 cm.  No other discrete nodules. No segmental consolidation.   No pleural effusion. No pulmonary edema.    Heart and Great Vessels: Atherosclerotic changes of the aorta and   coronary vasculature. Heart is normal in size. No pericardial effusions.    Mediastinum and Delia: 1 cm right hilar lymph node.    Neck and Chest Wall: Left thyroid nodule.    Bones: Degenerative changes. Multiple tiny lucencies throughout the   osseous structures may represent osteopenia, bone scan as indicated.    Upper Abdomen:  Masslike thickening of the gastroesophageal junction into   the gastric cardia with adjacent perigastric lymph nodes compatible with   neoplasm till proven otherwise, recommend direct visualization. Multiple   nodular densities in the bilateral adrenal glands indeterminate on   current study however appear changed unchanged dating back to 2011. Prior   cholecystectomy. Multiple enhancing lesions seen throughout theliver   which appear unchanged compared to prior study of 2011 likely   representing hemangiomas. A posterior right hepatic cyst. Prior   cholecystectomy.    IMPRESSION:         1.1 cm nodule in the left lower lobe slightly increased in size as   compared to 12/14/2011 where measured 0.9 cm. This is amenable to   percutaneous biopsy if indicated.    Findings as described above compatible with gastric carcinoma till proven   otherwise, recommend endoscopy and tissue sampling.          < end of copied text >

## 2018-06-15 NOTE — DISCHARGE NOTE ADULT - CARE PROVIDERS DIRECT ADDRESSES
,DirectAddress_Unknown,[email or web address],DirectAddress_Unknown,DirectAddress_Unknown

## 2018-06-15 NOTE — DISCHARGE NOTE ADULT - HOSPITAL COURSE
CC: dysphagia  Subjective: tiolerating diet this AM.  No new complaints.  ROS: Neg except as above    Vital Signs Last 24 Hrs  T(C): 36.7 (15 Johnny 2018 05:07), Max: 36.7 (14 Jun 2018 21:48)  T(F): 98.1 (15 Johnny 2018 05:07), Max: 98.1 (15 Johnny 2018 05:07)  HR: 76 (15 Johnny 2018 05:07) (76 - 103)  BP: 123/65 (15 Johnny 2018 05:07) (112/92 - 144/74)  RR: 18 (15 Johnny 2018 05:07) (18 - 18)  SpO2: 92% (15 Johnny 2018 05:07) (92% - 100%)    PHYSICAL EXAM:              Constitutional: NAD, well-groomed, well-developed  	HEENT: PERR, EOMI, Normal Hearing, MMM  	Neck: No LAD, No JVD  	Back: Normal spine flexure, No CVA tenderness  	Respiratory: CTABL  	Cardiovascular: S1 and S2, RRR, no M/G/R  	Gastrointestinal: BS+, soft, NT/ND  	Extremities: No peripheral edema  	Vascular: 2+ peripheral pulses  	Neurological: A/O x 3, no focal deficits  	Psychiatric: Normal mood, normal affect  	Musculoskeletal: 5/5 strength b/l upper and lower extremities              Skin: No rashes    142  |  109<H>  |  4<L>  ----------------------------<  100<H>  3.4<L>   |  25  |  0.52    Ca    8.4<L>      14 Jun 2018 06:17  Phos  2.9     06-14  Mg     2.4     06-14    A/P  56 yo F hx of htn, 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  Pt reports decreased po intake as a result of her symptoms and has lost approx 10 lbs in 2 weeks.  Pt c/o recent fatigue/low energy.     # Dysphagia, regurgitation, weight loss 2/2 obstruction related to gastric/esophageal cancer   - CT A/P noted   - EGD with Malignant-appearing esophageal stenosis which was Dilated and Biopsied.  Malignant gastric tumor in the gastric fundus which was Biopsied   - tolerating diet for nowdue to dilation however is temporizing and may need more definitive mgmt in near future  - Consultant involvement is appreciated  - perigastric lymph nodes likely neoplastic- biopsy?  - PETCT outpt being arranged  - neoadjuvant?    # intra and extrahepatic biliary dilation likely 2/2 hx of cholecystectomy. Less likely any biliary obstruction.  - no elevated LFTs  - GI eval    # LLL Lung nodule slightly increased in size over 7 year period from 0.9cm to 1.1 cm- unlikely of any significance  - Pt with strong smoking hx  - CT Chest noted- outpt f/u  - pulm eval appreicated    # Liver cysts, adrenal nodules of unclear etiology  - Pt aware of findings and aware of their presence on previous imaging done in 2011.  - will hold on further eval presently.    Case d/w Dr. Whyte, Dr. Lainez, Dr. Mccartney    time spent on discharge 35 minutes

## 2018-06-15 NOTE — DISCHARGE NOTE ADULT - PLAN OF CARE
follow up Follow up with Dr. Whyte, Dr. Lainez, Dr. Everett Diet as tolerated  You may need a esophageal stent in the future

## 2018-06-15 NOTE — DISCHARGE NOTE ADULT - PATIENT PORTAL LINK FT
You can access the [facility] Patient Portal, offered by [facility], by registering with the following website: [email or web address]

## 2018-06-15 NOTE — PROGRESS NOTE ADULT - SUBJECTIVE AND OBJECTIVE BOX
INTERVAL HISTORY:    Feeling well  Eating and tolerating diet  Path: Adenocarcinoma  HER/ PDL1 pending          Allergies    sulfa drugs (Unknown)    Intolerances        MEDICATIONS  (STANDING):  amLODIPine   Tablet 5 milliGRAM(s) Oral daily  enoxaparin Injectable 40 milliGRAM(s) SubCutaneous every 24 hours  loratadine 10 milliGRAM(s) Oral daily  melatonin 3 milliGRAM(s) Oral at bedtime  nicotine 21 mG/24 Hr(s) Transdermal Patch - Peds 1 Patch Transdermal daily  pantoprazole    Tablet 40 milliGRAM(s) Oral before breakfast  sodium chloride 0.9%. 1000 milliLiter(s) (75 mL/Hr) IV Continuous <Continuous>    MEDICATIONS  (PRN):  ondansetron Injectable 4 milliGRAM(s) IV Push every 6 hours PRN Nausea      Vital Signs Last 24 Hrs  T(C): 36.7 (15 Johnny 2018 05:07), Max: 36.7 (14 Jun 2018 21:48)  T(F): 98.1 (15 Johnny 2018 05:07), Max: 98.1 (15 Johnny 2018 05:07)  HR: 76 (15 Johnny 2018 05:07) (76 - 103)  BP: 123/65 (15 Johnny 2018 05:07) (112/92 - 144/74)  BP(mean): --  RR: 18 (15 Johnny 2018 05:07) (18 - 18)  SpO2: 92% (15 Johnny 2018 05:07) (92% - 100%)    PHYSICAL EXAM:    GENERAL: NAD,   HEAD:  Atraumatic, Normocephalic  EYES: EOMI, PERRLA, conjunctiva and sclera clear    NECK: Supple, No JVD, Normal thyroid  NERVOUS SYSTEM:    CHEST/LUNG: Clear to percussion bilaterally; No rales, rhonchi,   HEART: Regular rate and rhythm;   ABDOMEN: Soft, Nontender.  EXTREMITIES:   edema:-  LYMPH: No lymphadenopathy noted        LABS:    06-14    142  |  109<H>  |  4<L>  ----------------------------<  100<H>  3.4<L>   |  25  |  0.52    Ca    8.4<L>      14 Jun 2018 06:17  Phos  2.9     06-14  Mg     2.4     06-14              RADIOLOGY & ADDITIONAL STUDIES:    PATHOLOGY:

## 2018-06-15 NOTE — ADVANCED PRACTICE NURSE CONSULT - ASSESSMENT
Patient feeling more discomfort after taking regular diet this AM. Noted increased distention of her upper abdomen. No nausea. Has met with oncologist and oncologic surgeon, waiting to get decision  on treatment once all scans and pathology is reviewed. Spoke with patient's  who is concerned abut the plan of care yet supportive of present approach to treatment.  Patient has some financial concerns about how the insurance will cover treatment so  instructed to follow up with  at the oncologist's office.

## 2018-06-15 NOTE — PROGRESS NOTE ADULT - ASSESSMENT
Gastric or GE cancer  Localized?   + perigastric nodes    A/P    CT PET  Check final path  ? Lap exam to stage  Neoadjuvant therapy : to be dictated by tumor origin, location

## 2018-06-16 LAB — CANCER AG19-9 SERPL-ACNC: 32.4 U/ML — SIGNIFICANT CHANGE UP

## 2018-06-20 DIAGNOSIS — R14.0 ABDOMINAL DISTENSION (GASEOUS): ICD-10-CM

## 2018-06-20 DIAGNOSIS — K31.89 OTHER DISEASES OF STOMACH AND DUODENUM: ICD-10-CM

## 2018-06-20 DIAGNOSIS — I10 ESSENTIAL (PRIMARY) HYPERTENSION: ICD-10-CM

## 2018-06-20 DIAGNOSIS — R13.10 DYSPHAGIA, UNSPECIFIED: ICD-10-CM

## 2018-06-20 DIAGNOSIS — R19.06 EPIGASTRIC SWELLING, MASS OR LUMP: ICD-10-CM

## 2018-06-20 DIAGNOSIS — G91.9 HYDROCEPHALUS, UNSPECIFIED: ICD-10-CM

## 2018-06-20 DIAGNOSIS — K22.2 ESOPHAGEAL OBSTRUCTION: ICD-10-CM

## 2018-06-20 DIAGNOSIS — K76.89 OTHER SPECIFIED DISEASES OF LIVER: ICD-10-CM

## 2018-06-20 DIAGNOSIS — K21.9 GASTRO-ESOPHAGEAL REFLUX DISEASE WITHOUT ESOPHAGITIS: ICD-10-CM

## 2018-06-20 DIAGNOSIS — E43 UNSPECIFIED SEVERE PROTEIN-CALORIE MALNUTRITION: ICD-10-CM

## 2018-06-20 DIAGNOSIS — C16.0 MALIGNANT NEOPLASM OF CARDIA: ICD-10-CM

## 2018-06-20 DIAGNOSIS — K29.70 GASTRITIS, UNSPECIFIED, WITHOUT BLEEDING: ICD-10-CM

## 2018-06-20 DIAGNOSIS — R91.1 SOLITARY PULMONARY NODULE: ICD-10-CM

## 2018-06-20 DIAGNOSIS — E55.9 VITAMIN D DEFICIENCY, UNSPECIFIED: ICD-10-CM

## 2018-06-20 DIAGNOSIS — R63.0 ANOREXIA: ICD-10-CM

## 2018-06-20 DIAGNOSIS — C16.1 MALIGNANT NEOPLASM OF FUNDUS OF STOMACH: ICD-10-CM

## 2018-06-20 DIAGNOSIS — K31.1 ADULT HYPERTROPHIC PYLORIC STENOSIS: ICD-10-CM

## 2018-06-20 DIAGNOSIS — K83.8 OTHER SPECIFIED DISEASES OF BILIARY TRACT: ICD-10-CM

## 2018-06-20 DIAGNOSIS — K59.00 CONSTIPATION, UNSPECIFIED: ICD-10-CM

## 2018-06-21 ENCOUNTER — APPOINTMENT (OUTPATIENT)
Dept: GASTROENTEROLOGY | Facility: CLINIC | Age: 58
End: 2018-06-21
Payer: COMMERCIAL

## 2018-06-21 VITALS
WEIGHT: 174 LBS | HEART RATE: 85 BPM | OXYGEN SATURATION: 98 % | SYSTOLIC BLOOD PRESSURE: 134 MMHG | BODY MASS INDEX: 27.31 KG/M2 | RESPIRATION RATE: 16 BRPM | DIASTOLIC BLOOD PRESSURE: 79 MMHG | HEIGHT: 67 IN

## 2018-06-21 PROCEDURE — 99205 OFFICE O/P NEW HI 60 MIN: CPT

## 2018-06-25 ENCOUNTER — APPOINTMENT (OUTPATIENT)
Dept: SURGICAL ONCOLOGY | Facility: CLINIC | Age: 58
End: 2018-06-25

## 2018-06-26 ENCOUNTER — MESSAGE (OUTPATIENT)
Age: 58
End: 2018-06-26

## 2018-06-28 ENCOUNTER — APPOINTMENT (OUTPATIENT)
Dept: SURGICAL ONCOLOGY | Facility: CLINIC | Age: 58
End: 2018-06-28

## 2018-06-29 ENCOUNTER — APPOINTMENT (OUTPATIENT)
Dept: DERMATOLOGY | Facility: CLINIC | Age: 58
End: 2018-06-29

## 2018-06-29 ENCOUNTER — APPOINTMENT (OUTPATIENT)
Dept: GASTROENTEROLOGY | Facility: HOSPITAL | Age: 58
End: 2018-06-29

## 2018-07-03 ENCOUNTER — APPOINTMENT (OUTPATIENT)
Dept: SURGICAL ONCOLOGY | Facility: HOSPITAL | Age: 58
End: 2018-07-03

## 2018-07-15 ENCOUNTER — TRANSCRIPTION ENCOUNTER (OUTPATIENT)
Age: 58
End: 2018-07-15

## 2018-11-22 PROBLEM — I10 ESSENTIAL (PRIMARY) HYPERTENSION: Chronic | Status: ACTIVE | Noted: 2018-06-13

## 2018-11-22 PROBLEM — K29.70 GASTRITIS, UNSPECIFIED, WITHOUT BLEEDING: Chronic | Status: ACTIVE | Noted: 2018-06-15

## 2018-12-14 ENCOUNTER — RX RENEWAL (OUTPATIENT)
Age: 58
End: 2018-12-14

## 2019-02-04 ENCOUNTER — APPOINTMENT (OUTPATIENT)
Dept: NEUROSURGERY | Facility: CLINIC | Age: 59
End: 2019-02-04

## 2019-06-09 ENCOUNTER — EMERGENCY (EMERGENCY)
Facility: HOSPITAL | Age: 59
LOS: 0 days | Discharge: HOME | End: 2019-06-09
Attending: EMERGENCY MEDICINE | Admitting: EMERGENCY MEDICINE
Payer: COMMERCIAL

## 2019-06-09 VITALS
RESPIRATION RATE: 17 BRPM | HEART RATE: 98 BPM | SYSTOLIC BLOOD PRESSURE: 156 MMHG | TEMPERATURE: 99 F | DIASTOLIC BLOOD PRESSURE: 72 MMHG | OXYGEN SATURATION: 97 %

## 2019-06-09 VITALS — WEIGHT: 169.98 LBS | HEIGHT: 66 IN

## 2019-06-09 DIAGNOSIS — R10.9 UNSPECIFIED ABDOMINAL PAIN: ICD-10-CM

## 2019-06-09 DIAGNOSIS — R19.7 DIARRHEA, UNSPECIFIED: ICD-10-CM

## 2019-06-09 DIAGNOSIS — E78.5 HYPERLIPIDEMIA, UNSPECIFIED: ICD-10-CM

## 2019-06-09 DIAGNOSIS — Z90.49 ACQUIRED ABSENCE OF OTHER SPECIFIED PARTS OF DIGESTIVE TRACT: ICD-10-CM

## 2019-06-09 DIAGNOSIS — Z98.2 PRESENCE OF CEREBROSPINAL FLUID DRAINAGE DEVICE: Chronic | ICD-10-CM

## 2019-06-09 DIAGNOSIS — Z85.00 PERSONAL HISTORY OF MALIGNANT NEOPLASM OF UNSPECIFIED DIGESTIVE ORGAN: ICD-10-CM

## 2019-06-09 DIAGNOSIS — I10 ESSENTIAL (PRIMARY) HYPERTENSION: ICD-10-CM

## 2019-06-09 DIAGNOSIS — K21.9 GASTRO-ESOPHAGEAL REFLUX DISEASE WITHOUT ESOPHAGITIS: ICD-10-CM

## 2019-06-09 DIAGNOSIS — Z90.49 ACQUIRED ABSENCE OF OTHER SPECIFIED PARTS OF DIGESTIVE TRACT: Chronic | ICD-10-CM

## 2019-06-09 LAB
ALBUMIN SERPL ELPH-MCNC: 3.6 G/DL — SIGNIFICANT CHANGE UP (ref 3.3–5)
ALP SERPL-CCNC: 156 U/L — HIGH (ref 40–120)
ALT FLD-CCNC: 22 U/L — SIGNIFICANT CHANGE UP (ref 12–78)
ANION GAP SERPL CALC-SCNC: 11 MMOL/L — SIGNIFICANT CHANGE UP (ref 5–17)
APPEARANCE UR: CLEAR — SIGNIFICANT CHANGE UP
AST SERPL-CCNC: 18 U/L — SIGNIFICANT CHANGE UP (ref 15–37)
BASOPHILS # BLD AUTO: 0.02 K/UL — SIGNIFICANT CHANGE UP (ref 0–0.2)
BASOPHILS NFR BLD AUTO: 0.2 % — SIGNIFICANT CHANGE UP (ref 0–2)
BILIRUB SERPL-MCNC: 0.7 MG/DL — SIGNIFICANT CHANGE UP (ref 0.2–1.2)
BILIRUB UR-MCNC: NEGATIVE — SIGNIFICANT CHANGE UP
BUN SERPL-MCNC: 10 MG/DL — SIGNIFICANT CHANGE UP (ref 7–23)
CALCIUM SERPL-MCNC: 8.4 MG/DL — LOW (ref 8.5–10.1)
CHLORIDE SERPL-SCNC: 104 MMOL/L — SIGNIFICANT CHANGE UP (ref 96–108)
CO2 SERPL-SCNC: 24 MMOL/L — SIGNIFICANT CHANGE UP (ref 22–31)
COLOR SPEC: YELLOW — SIGNIFICANT CHANGE UP
CREAT SERPL-MCNC: 0.72 MG/DL — SIGNIFICANT CHANGE UP (ref 0.5–1.3)
DIFF PNL FLD: NEGATIVE — SIGNIFICANT CHANGE UP
EOSINOPHIL # BLD AUTO: 0.09 K/UL — SIGNIFICANT CHANGE UP (ref 0–0.5)
EOSINOPHIL NFR BLD AUTO: 1 % — SIGNIFICANT CHANGE UP (ref 0–6)
GLUCOSE SERPL-MCNC: 135 MG/DL — HIGH (ref 70–99)
GLUCOSE UR QL: NEGATIVE MG/DL — SIGNIFICANT CHANGE UP
HCT VFR BLD CALC: 33.8 % — LOW (ref 34.5–45)
HGB BLD-MCNC: 10.7 G/DL — LOW (ref 11.5–15.5)
IMM GRANULOCYTES NFR BLD AUTO: 0.3 % — SIGNIFICANT CHANGE UP (ref 0–1.5)
KETONES UR-MCNC: NEGATIVE — SIGNIFICANT CHANGE UP
LEUKOCYTE ESTERASE UR-ACNC: NEGATIVE — SIGNIFICANT CHANGE UP
LIDOCAIN IGE QN: 76 U/L — SIGNIFICANT CHANGE UP (ref 73–393)
LYMPHOCYTES # BLD AUTO: 0.95 K/UL — LOW (ref 1–3.3)
LYMPHOCYTES # BLD AUTO: 10.8 % — LOW (ref 13–44)
MCHC RBC-ENTMCNC: 30.1 PG — SIGNIFICANT CHANGE UP (ref 27–34)
MCHC RBC-ENTMCNC: 31.7 GM/DL — LOW (ref 32–36)
MCV RBC AUTO: 95.2 FL — SIGNIFICANT CHANGE UP (ref 80–100)
MONOCYTES # BLD AUTO: 0.69 K/UL — SIGNIFICANT CHANGE UP (ref 0–0.9)
MONOCYTES NFR BLD AUTO: 7.8 % — SIGNIFICANT CHANGE UP (ref 2–14)
NEUTROPHILS # BLD AUTO: 7.02 K/UL — SIGNIFICANT CHANGE UP (ref 1.8–7.4)
NEUTROPHILS NFR BLD AUTO: 79.9 % — HIGH (ref 43–77)
NITRITE UR-MCNC: NEGATIVE — SIGNIFICANT CHANGE UP
PH UR: 6.5 — SIGNIFICANT CHANGE UP (ref 5–8)
PLATELET # BLD AUTO: 267 K/UL — SIGNIFICANT CHANGE UP (ref 150–400)
POTASSIUM SERPL-MCNC: 3.3 MMOL/L — LOW (ref 3.5–5.3)
POTASSIUM SERPL-SCNC: 3.3 MMOL/L — LOW (ref 3.5–5.3)
PROT SERPL-MCNC: 6.9 GM/DL — SIGNIFICANT CHANGE UP (ref 6–8.3)
PROT UR-MCNC: NEGATIVE MG/DL — SIGNIFICANT CHANGE UP
RBC # BLD: 3.55 M/UL — LOW (ref 3.8–5.2)
RBC # FLD: 14.8 % — HIGH (ref 10.3–14.5)
SODIUM SERPL-SCNC: 139 MMOL/L — SIGNIFICANT CHANGE UP (ref 135–145)
SP GR SPEC: 1 — LOW (ref 1.01–1.02)
UROBILINOGEN FLD QL: NEGATIVE MG/DL — SIGNIFICANT CHANGE UP
WBC # BLD: 8.8 K/UL — SIGNIFICANT CHANGE UP (ref 3.8–10.5)
WBC # FLD AUTO: 8.8 K/UL — SIGNIFICANT CHANGE UP (ref 3.8–10.5)

## 2019-06-09 PROCEDURE — 99284 EMERGENCY DEPT VISIT MOD MDM: CPT

## 2019-06-09 PROCEDURE — 74177 CT ABD & PELVIS W/CONTRAST: CPT | Mod: 26

## 2019-06-09 RX ORDER — SODIUM CHLORIDE 9 MG/ML
1000 INJECTION INTRAMUSCULAR; INTRAVENOUS; SUBCUTANEOUS ONCE
Refills: 0 | Status: COMPLETED | OUTPATIENT
Start: 2019-06-09 | End: 2019-06-09

## 2019-06-09 RX ORDER — POTASSIUM CHLORIDE 20 MEQ
20 PACKET (EA) ORAL ONCE
Refills: 0 | Status: COMPLETED | OUTPATIENT
Start: 2019-06-09 | End: 2019-06-09

## 2019-06-09 RX ORDER — ONDANSETRON 8 MG/1
4 TABLET, FILM COATED ORAL ONCE
Refills: 0 | Status: COMPLETED | OUTPATIENT
Start: 2019-06-09 | End: 2019-06-09

## 2019-06-09 RX ORDER — POTASSIUM CHLORIDE 20 MEQ
20 PACKET (EA) ORAL ONCE
Refills: 0 | Status: DISCONTINUED | OUTPATIENT
Start: 2019-06-09 | End: 2019-06-09

## 2019-06-09 RX ADMIN — SODIUM CHLORIDE 1000 MILLILITER(S): 9 INJECTION INTRAMUSCULAR; INTRAVENOUS; SUBCUTANEOUS at 17:54

## 2019-06-09 RX ADMIN — Medication 20 MILLIEQUIVALENT(S): at 21:00

## 2019-06-09 RX ADMIN — SODIUM CHLORIDE 1000 MILLILITER(S): 9 INJECTION INTRAMUSCULAR; INTRAVENOUS; SUBCUTANEOUS at 19:00

## 2019-06-09 NOTE — ED STATDOCS - OBJECTIVE STATEMENT
59 y/o f with PMHx of gastritis, HTN, HLD, GERD, s/p cholecystectomy, gastroesophageal cancer surgery 11/20/18, left lower lobectomy over 30 years ago presenting to the ED c/o abd pain x1 week. +Intermittent diarrhea. States sx improve after emptying bladder or bowels. Saw PA at PCP office x3 days ago and told she does not have UTI. Denies fever, chills, any other acute c/o. Never smoker, drinks EtOH rarely.

## 2019-06-09 NOTE — ED ADULT TRIAGE NOTE - CHIEF COMPLAINT QUOTE
c/o abdominal pain at bladder site for past 5 days, pt went to pmd and U/A completed which was negative, sent to ER for evaluation of possible mass to at bladder area HX; gastroesophageal cancer surgery on 11/2018, left lower lobectomy, brain surgery

## 2019-06-09 NOTE — ED STATDOCS - PROGRESS NOTE DETAILS
58 yr. old female PMH: Gastritis,HTN, HLD, Cholecystectomy, Gastroesophgeal CA, Surgery after Chemo and Radiation presents to ED with abdominal pain onset 1 week. Had 6 BM's on Friday after being constipated. No fever or chills. Seen and examined by attending in intake. Plan: IV, IVF, Labs and CT abd./pelvis. Will F/U with results and re evaluate. Margot NP Results of Lab work and CT abd./pelvis discussed with patient. Instructed to F/U with Oncologist and PMD. MTangredi NP

## 2019-06-09 NOTE — ED STATDOCS - PMH
Essential hypertension    Gastritis    GERD (gastroesophageal reflux disease)    HLD (hyperlipidemia)

## 2019-06-09 NOTE — ED STATDOCS - GASTROINTESTINAL, MLM
abdomen non-distended. Bowel sounds present. +epigastric TTP, +firm mass palpated suprapubic, nontender

## 2019-06-09 NOTE — ED ADULT NURSE NOTE - FINAL NURSING ELECTRONIC SIGNATURE
pt called back with concern about taking clindamycin, she remembered being told to take clindamycin 3 times a day but her prescription bottle states 4 times daily, after chart review there's conflicting information, it was written in a noted as clinda 600 TID and on her discharge paperwork as clinda 300 4 times daily, advised pt to take clinda as prescribed to her (300mg 4 times daily), she states she has developed diarrhea, I advised pt to return to the ED if diarrhea continues or worsens, she develops pain or fever 09-Jun-2019 21:02

## 2019-06-17 ENCOUNTER — TRANSCRIPTION ENCOUNTER (OUTPATIENT)
Age: 59
End: 2019-06-17

## 2021-08-06 NOTE — CONSULT NOTE ADULT - ASSESSMENT
sent   1) Lung Nodule  2) Gastric Mass  3) Abdominal Lymphadenopathy  4) Anorexia  5) Weight Loss  6) Bilateral Adrenal Nodules  7) Abnormal CT    58 yo F hx of htn, 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  Pt reports decreased po intake as a result of her symptoms and has lost approximately 10 lbs in 2 weeks.  Underwent EGD today that revealed a gastric mass in the fungus, suspicious for malignancy.   CT Abdomen revealed a 1.1cm lung nodule in the LLL  This has been seen in the past by Dr. Dhillon and was being followed as an outpatient 1) Lung Nodule  2) Gastric Mass  3) Abdominal Lymphadenopathy  4) Anorexia  5) Weight Loss  6) Bilateral Adrenal Nodules  7) Abnormal CT    58 yo F hx of htn, 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  Pt reports decreased po intake as a result of her symptoms and has lost approximately 10 lbs in 2 weeks.  Underwent EGD today that revealed a gastric mass in the fungus, suspicious for malignancy.   CT Abdomen revealed a 1.1cm lung nodule in the LLL  This has been seen in the past by Dr. Dhillon in 2011, lost to follow up   CT chest ordered by me today revealed a 1.1cm nodule, no mediastinal adenopathy appreciated (awaiting final read). This nodule likely does not represent metastatic disease but may warrant transthoracic needle aspiration (IR guided if feasible)   Will continue to follow   Follow up Oncology recommendations  Thank you for the consult 1) Lung Nodule  2) Gastric Mass  3) Abdominal Lymphadenopathy  4) Anorexia  5) Weight Loss  6) Bilateral Adrenal Nodules  7) Abnormal CT    58 yo F hx of htn, 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  Pt reports decreased po intake as a result of her symptoms and has lost approximately 10 lbs in 2 weeks.  Underwent EGD today that revealed a gastric mass in the fungus, suspicious for malignancy.   CT Abdomen revealed a 1.1cm lung nodule in the LLL  This has been seen in the past by Dr. Dhillon in 2011, lost to follow up   CT chest ordered by me today revealed a 1.1cm nodule, no mediastinal adenopathy appreciated (awaiting final read). This nodule likely does not represent metastatic disease but may warrant transthoracic needle aspiration (IR guided if feasible)   Unfortunately, not accessible by bronchoscopy   Will continue to follow   Follow up Oncology recommendations  Thank you for the consult

## 2021-12-08 ENCOUNTER — INPATIENT (INPATIENT)
Facility: HOSPITAL | Age: 61
LOS: 3 days | Discharge: HOME | DRG: 389 | End: 2021-12-12
Attending: SURGERY | Admitting: SURGERY
Payer: COMMERCIAL

## 2021-12-08 VITALS — WEIGHT: 169.09 LBS | HEIGHT: 66 IN

## 2021-12-08 DIAGNOSIS — Z98.2 PRESENCE OF CEREBROSPINAL FLUID DRAINAGE DEVICE: Chronic | ICD-10-CM

## 2021-12-08 DIAGNOSIS — Z90.49 ACQUIRED ABSENCE OF OTHER SPECIFIED PARTS OF DIGESTIVE TRACT: Chronic | ICD-10-CM

## 2021-12-08 LAB
ALBUMIN SERPL ELPH-MCNC: 3.6 G/DL — SIGNIFICANT CHANGE UP (ref 3.3–5)
ALP SERPL-CCNC: 162 U/L — HIGH (ref 40–120)
ALT FLD-CCNC: 32 U/L — SIGNIFICANT CHANGE UP (ref 12–78)
ANION GAP SERPL CALC-SCNC: 4 MMOL/L — LOW (ref 5–17)
APPEARANCE UR: CLEAR — SIGNIFICANT CHANGE UP
AST SERPL-CCNC: 41 U/L — HIGH (ref 15–37)
BASOPHILS # BLD AUTO: 0.04 K/UL — SIGNIFICANT CHANGE UP (ref 0–0.2)
BASOPHILS NFR BLD AUTO: 0.5 % — SIGNIFICANT CHANGE UP (ref 0–2)
BILIRUB SERPL-MCNC: 0.7 MG/DL — SIGNIFICANT CHANGE UP (ref 0.2–1.2)
BILIRUB UR-MCNC: NEGATIVE — SIGNIFICANT CHANGE UP
BUN SERPL-MCNC: 12 MG/DL — SIGNIFICANT CHANGE UP (ref 7–23)
CALCIUM SERPL-MCNC: 8.8 MG/DL — SIGNIFICANT CHANGE UP (ref 8.5–10.1)
CHLORIDE SERPL-SCNC: 105 MMOL/L — SIGNIFICANT CHANGE UP (ref 96–108)
CO2 SERPL-SCNC: 24 MMOL/L — SIGNIFICANT CHANGE UP (ref 22–31)
COLOR SPEC: YELLOW — SIGNIFICANT CHANGE UP
CREAT SERPL-MCNC: 0.69 MG/DL — SIGNIFICANT CHANGE UP (ref 0.5–1.3)
DIFF PNL FLD: ABNORMAL
EOSINOPHIL # BLD AUTO: 0.12 K/UL — SIGNIFICANT CHANGE UP (ref 0–0.5)
EOSINOPHIL NFR BLD AUTO: 1.4 % — SIGNIFICANT CHANGE UP (ref 0–6)
GLUCOSE SERPL-MCNC: 122 MG/DL — HIGH (ref 70–99)
GLUCOSE UR QL: NEGATIVE MG/DL — SIGNIFICANT CHANGE UP
HCT VFR BLD CALC: 44.6 % — SIGNIFICANT CHANGE UP (ref 34.5–45)
HGB BLD-MCNC: 15 G/DL — SIGNIFICANT CHANGE UP (ref 11.5–15.5)
IMM GRANULOCYTES NFR BLD AUTO: 0.2 % — SIGNIFICANT CHANGE UP (ref 0–1.5)
KETONES UR-MCNC: NEGATIVE — SIGNIFICANT CHANGE UP
LACTATE SERPL-SCNC: 1.2 MMOL/L — SIGNIFICANT CHANGE UP (ref 0.7–2)
LEUKOCYTE ESTERASE UR-ACNC: ABNORMAL
LIDOCAIN IGE QN: 81 U/L — SIGNIFICANT CHANGE UP (ref 73–393)
LYMPHOCYTES # BLD AUTO: 1.34 K/UL — SIGNIFICANT CHANGE UP (ref 1–3.3)
LYMPHOCYTES # BLD AUTO: 15.6 % — SIGNIFICANT CHANGE UP (ref 13–44)
MCHC RBC-ENTMCNC: 33.6 GM/DL — SIGNIFICANT CHANGE UP (ref 32–36)
MCHC RBC-ENTMCNC: 33.9 PG — SIGNIFICANT CHANGE UP (ref 27–34)
MCV RBC AUTO: 100.7 FL — HIGH (ref 80–100)
MONOCYTES # BLD AUTO: 1.3 K/UL — HIGH (ref 0–0.9)
MONOCYTES NFR BLD AUTO: 15.1 % — HIGH (ref 2–14)
NEUTROPHILS # BLD AUTO: 5.77 K/UL — SIGNIFICANT CHANGE UP (ref 1.8–7.4)
NEUTROPHILS NFR BLD AUTO: 67.2 % — SIGNIFICANT CHANGE UP (ref 43–77)
NITRITE UR-MCNC: NEGATIVE — SIGNIFICANT CHANGE UP
PH UR: 7 — SIGNIFICANT CHANGE UP (ref 5–8)
PLATELET # BLD AUTO: 228 K/UL — SIGNIFICANT CHANGE UP (ref 150–400)
POTASSIUM SERPL-MCNC: 5 MMOL/L — SIGNIFICANT CHANGE UP (ref 3.5–5.3)
POTASSIUM SERPL-SCNC: 5 MMOL/L — SIGNIFICANT CHANGE UP (ref 3.5–5.3)
PROT SERPL-MCNC: 7.3 GM/DL — SIGNIFICANT CHANGE UP (ref 6–8.3)
PROT UR-MCNC: 15 MG/DL
RBC # BLD: 4.43 M/UL — SIGNIFICANT CHANGE UP (ref 3.8–5.2)
RBC # FLD: 12.6 % — SIGNIFICANT CHANGE UP (ref 10.3–14.5)
SODIUM SERPL-SCNC: 133 MMOL/L — LOW (ref 135–145)
SP GR SPEC: 1.01 — SIGNIFICANT CHANGE UP (ref 1.01–1.02)
TROPONIN I, HIGH SENSITIVITY RESULT: 15.75 NG/L — SIGNIFICANT CHANGE UP
UROBILINOGEN FLD QL: 1 MG/DL
WBC # BLD: 8.59 K/UL — SIGNIFICANT CHANGE UP (ref 3.8–10.5)
WBC # FLD AUTO: 8.59 K/UL — SIGNIFICANT CHANGE UP (ref 3.8–10.5)

## 2021-12-08 PROCEDURE — 93010 ELECTROCARDIOGRAM REPORT: CPT

## 2021-12-08 PROCEDURE — 99285 EMERGENCY DEPT VISIT HI MDM: CPT

## 2021-12-08 RX ORDER — MORPHINE SULFATE 50 MG/1
4 CAPSULE, EXTENDED RELEASE ORAL ONCE
Refills: 0 | Status: DISCONTINUED | OUTPATIENT
Start: 2021-12-08 | End: 2021-12-08

## 2021-12-08 RX ORDER — FAMOTIDINE 10 MG/ML
20 INJECTION INTRAVENOUS ONCE
Refills: 0 | Status: COMPLETED | OUTPATIENT
Start: 2021-12-08 | End: 2021-12-08

## 2021-12-08 RX ORDER — ONDANSETRON 8 MG/1
4 TABLET, FILM COATED ORAL ONCE
Refills: 0 | Status: COMPLETED | OUTPATIENT
Start: 2021-12-08 | End: 2021-12-08

## 2021-12-08 RX ORDER — SODIUM CHLORIDE 9 MG/ML
1000 INJECTION INTRAMUSCULAR; INTRAVENOUS; SUBCUTANEOUS ONCE
Refills: 0 | Status: COMPLETED | OUTPATIENT
Start: 2021-12-08 | End: 2021-12-08

## 2021-12-08 RX ADMIN — SODIUM CHLORIDE 1000 MILLILITER(S): 9 INJECTION INTRAMUSCULAR; INTRAVENOUS; SUBCUTANEOUS at 21:09

## 2021-12-08 RX ADMIN — FAMOTIDINE 20 MILLIGRAM(S): 10 INJECTION INTRAVENOUS at 22:20

## 2021-12-08 RX ADMIN — MORPHINE SULFATE 4 MILLIGRAM(S): 50 CAPSULE, EXTENDED RELEASE ORAL at 22:20

## 2021-12-08 RX ADMIN — ONDANSETRON 4 MILLIGRAM(S): 8 TABLET, FILM COATED ORAL at 22:20

## 2021-12-08 NOTE — ED STATDOCS - NSICDXFAMILYHX_GEN_ALL_CORE_FT
FAMILY HISTORY:  Family history of colon cancer    Father  Still living? Unknown  Family history of gastric cancer, Age at diagnosis: Age Unknown    Grandparent  Still living? Unknown  Family history of gastric cancer, Age at diagnosis: Age Unknown

## 2021-12-08 NOTE — ED STATDOCS - ATTENDING CONTRIBUTION TO CARE
Dr. Pascual: I performed a face to face bedside interview with patient regarding history of present illness, review of symptoms and past medical history. I completed an independent physical exam.  I have discussed patient's plan of care with PA.   I agree with note as stated above, having amended the EMR as needed to reflect my findings.   This includes HISTORY OF PRESENT ILLNESS, HIV, PAST MEDICAL/SURGICAL/FAMILY/SOCIAL HISTORY, ALLERGIES AND HOME MEDICATIONS, REVIEW OF SYSTEMS, PHYSICAL EXAM, and any PROGRESS NOTES during the time I functioned as the attending physician for this patient.

## 2021-12-08 NOTE — ED ADULT TRIAGE NOTE - CHIEF COMPLAINT QUOTE
sent to ED by dr. cagle for abdominal pain worsening throughout day. nausea and diarrhea. denies vomiting. history of metastatic GI cancer, on chemotherapy.

## 2021-12-08 NOTE — ED STATDOCS - PROGRESS NOTE DETAILS
pt aware of labs and awaiting ct offers no complaints currently. -Sarah Henderson PA-C called surgery resident for sbo, left message ti nurse as he was in the or. AKOSUA Pascual DO spoke with surgical resident, tba to Dr. Felix, ngclair Pascual DO

## 2021-12-08 NOTE — ED STATDOCS - GASTROINTESTINAL, MLM
abdomen soft and non-distended. Bowel sounds hyperactive. diffuse abd tenderness, no rebound or guarding

## 2021-12-08 NOTE — ED STATDOCS - OBJECTIVE STATEMENT
59 y/o F with a PMHx of GERD, HLD, gastritis, essential HTN, esophageal and stomach CA presents to the ED c/o cramping abd pain x 4 days. Pt states that her son and  at home were having diarrhea over this past weekend. Pt endorses having diarrhea, nausea and abd bloating. Last chemo session 6 days ago.  Oncologist: Dr. Whyte

## 2021-12-08 NOTE — ED STATDOCS - NSICDXPASTMEDICALHX_GEN_ALL_CORE_FT
PAST MEDICAL HISTORY:  Essential hypertension     Gastritis     GERD (gastroesophageal reflux disease)     HLD (hyperlipidemia)

## 2021-12-09 DIAGNOSIS — Z98.890 OTHER SPECIFIED POSTPROCEDURAL STATES: Chronic | ICD-10-CM

## 2021-12-09 DIAGNOSIS — K56.609 UNSPECIFIED INTESTINAL OBSTRUCTION, UNSPECIFIED AS TO PARTIAL VERSUS COMPLETE OBSTRUCTION: ICD-10-CM

## 2021-12-09 PROBLEM — E78.5 HYPERLIPIDEMIA, UNSPECIFIED: Chronic | Status: ACTIVE | Noted: 2019-06-09

## 2021-12-09 PROBLEM — K21.9 GASTRO-ESOPHAGEAL REFLUX DISEASE WITHOUT ESOPHAGITIS: Chronic | Status: ACTIVE | Noted: 2019-06-09

## 2021-12-09 LAB
ANION GAP SERPL CALC-SCNC: 7 MMOL/L — SIGNIFICANT CHANGE UP (ref 5–17)
BASOPHILS # BLD AUTO: 0.03 K/UL — SIGNIFICANT CHANGE UP (ref 0–0.2)
BASOPHILS NFR BLD AUTO: 0.8 % — SIGNIFICANT CHANGE UP (ref 0–2)
BUN SERPL-MCNC: 9 MG/DL — SIGNIFICANT CHANGE UP (ref 7–23)
CALCIUM SERPL-MCNC: 8.2 MG/DL — LOW (ref 8.5–10.1)
CHLORIDE SERPL-SCNC: 107 MMOL/L — SIGNIFICANT CHANGE UP (ref 96–108)
CO2 SERPL-SCNC: 21 MMOL/L — LOW (ref 22–31)
CREAT SERPL-MCNC: 0.51 MG/DL — SIGNIFICANT CHANGE UP (ref 0.5–1.3)
EOSINOPHIL # BLD AUTO: 0.04 K/UL — SIGNIFICANT CHANGE UP (ref 0–0.5)
EOSINOPHIL NFR BLD AUTO: 1.1 % — SIGNIFICANT CHANGE UP (ref 0–6)
GLUCOSE SERPL-MCNC: 187 MG/DL — HIGH (ref 70–99)
HCT VFR BLD CALC: 37.7 % — SIGNIFICANT CHANGE UP (ref 34.5–45)
HCV AB S/CO SERPL IA: 0.07 S/CO — SIGNIFICANT CHANGE UP (ref 0–0.99)
HCV AB SERPL-IMP: SIGNIFICANT CHANGE UP
HGB BLD-MCNC: 12.7 G/DL — SIGNIFICANT CHANGE UP (ref 11.5–15.5)
IMM GRANULOCYTES NFR BLD AUTO: 0.3 % — SIGNIFICANT CHANGE UP (ref 0–1.5)
LYMPHOCYTES # BLD AUTO: 0.65 K/UL — LOW (ref 1–3.3)
LYMPHOCYTES # BLD AUTO: 17.5 % — SIGNIFICANT CHANGE UP (ref 13–44)
MAGNESIUM SERPL-MCNC: 2 MG/DL — SIGNIFICANT CHANGE UP (ref 1.6–2.6)
MCHC RBC-ENTMCNC: 33.5 PG — SIGNIFICANT CHANGE UP (ref 27–34)
MCHC RBC-ENTMCNC: 33.7 GM/DL — SIGNIFICANT CHANGE UP (ref 32–36)
MCV RBC AUTO: 99.5 FL — SIGNIFICANT CHANGE UP (ref 80–100)
MONOCYTES # BLD AUTO: 0.72 K/UL — SIGNIFICANT CHANGE UP (ref 0–0.9)
MONOCYTES NFR BLD AUTO: 19.4 % — HIGH (ref 2–14)
NEUTROPHILS # BLD AUTO: 2.27 K/UL — SIGNIFICANT CHANGE UP (ref 1.8–7.4)
NEUTROPHILS NFR BLD AUTO: 60.9 % — SIGNIFICANT CHANGE UP (ref 43–77)
PHOSPHATE SERPL-MCNC: 2.5 MG/DL — SIGNIFICANT CHANGE UP (ref 2.5–4.5)
PLATELET # BLD AUTO: 187 K/UL — SIGNIFICANT CHANGE UP (ref 150–400)
POTASSIUM SERPL-MCNC: 3.4 MMOL/L — LOW (ref 3.5–5.3)
POTASSIUM SERPL-SCNC: 3.4 MMOL/L — LOW (ref 3.5–5.3)
RBC # BLD: 3.79 M/UL — LOW (ref 3.8–5.2)
RBC # FLD: 12.6 % — SIGNIFICANT CHANGE UP (ref 10.3–14.5)
SODIUM SERPL-SCNC: 135 MMOL/L — SIGNIFICANT CHANGE UP (ref 135–145)
WBC # BLD: 3.72 K/UL — LOW (ref 3.8–10.5)
WBC # FLD AUTO: 3.72 K/UL — LOW (ref 3.8–10.5)

## 2021-12-09 PROCEDURE — 99223 1ST HOSP IP/OBS HIGH 75: CPT

## 2021-12-09 PROCEDURE — 71045 X-RAY EXAM CHEST 1 VIEW: CPT

## 2021-12-09 PROCEDURE — 86901 BLOOD TYPING SEROLOGIC RH(D): CPT

## 2021-12-09 PROCEDURE — 74250 X-RAY XM SM INT 1CNTRST STD: CPT | Mod: 26

## 2021-12-09 PROCEDURE — 84100 ASSAY OF PHOSPHORUS: CPT

## 2021-12-09 PROCEDURE — 85730 THROMBOPLASTIN TIME PARTIAL: CPT

## 2021-12-09 PROCEDURE — 80048 BASIC METABOLIC PNL TOTAL CA: CPT

## 2021-12-09 PROCEDURE — 80053 COMPREHEN METABOLIC PANEL: CPT

## 2021-12-09 PROCEDURE — 36415 COLL VENOUS BLD VENIPUNCTURE: CPT

## 2021-12-09 PROCEDURE — 74250 X-RAY XM SM INT 1CNTRST STD: CPT

## 2021-12-09 PROCEDURE — 86850 RBC ANTIBODY SCREEN: CPT

## 2021-12-09 PROCEDURE — 85027 COMPLETE CBC AUTOMATED: CPT

## 2021-12-09 PROCEDURE — 83735 ASSAY OF MAGNESIUM: CPT

## 2021-12-09 PROCEDURE — 86900 BLOOD TYPING SEROLOGIC ABO: CPT

## 2021-12-09 PROCEDURE — 85025 COMPLETE CBC W/AUTO DIFF WBC: CPT

## 2021-12-09 PROCEDURE — 85610 PROTHROMBIN TIME: CPT

## 2021-12-09 PROCEDURE — 86803 HEPATITIS C AB TEST: CPT

## 2021-12-09 PROCEDURE — 74177 CT ABD & PELVIS W/CONTRAST: CPT | Mod: 26,MA

## 2021-12-09 PROCEDURE — C9113: CPT

## 2021-12-09 RX ORDER — SODIUM CHLORIDE 9 MG/ML
1000 INJECTION, SOLUTION INTRAVENOUS
Refills: 0 | Status: DISCONTINUED | OUTPATIENT
Start: 2021-12-09 | End: 2021-12-11

## 2021-12-09 RX ORDER — AMLODIPINE BESYLATE 2.5 MG/1
5 TABLET ORAL DAILY
Refills: 0 | Status: DISCONTINUED | OUTPATIENT
Start: 2021-12-09 | End: 2021-12-12

## 2021-12-09 RX ORDER — PANTOPRAZOLE SODIUM 20 MG/1
40 TABLET, DELAYED RELEASE ORAL DAILY
Refills: 0 | Status: DISCONTINUED | OUTPATIENT
Start: 2021-12-09 | End: 2021-12-12

## 2021-12-09 RX ORDER — IBUPROFEN 200 MG
1 TABLET ORAL
Qty: 0 | Refills: 0 | DISCHARGE

## 2021-12-09 RX ORDER — HYDROMORPHONE HYDROCHLORIDE 2 MG/ML
1 INJECTION INTRAMUSCULAR; INTRAVENOUS; SUBCUTANEOUS EVERY 4 HOURS
Refills: 0 | Status: DISCONTINUED | OUTPATIENT
Start: 2021-12-09 | End: 2021-12-12

## 2021-12-09 RX ORDER — DULOXETINE HYDROCHLORIDE 30 MG/1
1 CAPSULE, DELAYED RELEASE ORAL
Qty: 0 | Refills: 0 | DISCHARGE

## 2021-12-09 RX ORDER — AMLODIPINE BESYLATE 2.5 MG/1
1 TABLET ORAL
Qty: 0 | Refills: 0 | DISCHARGE

## 2021-12-09 RX ORDER — MORPHINE SULFATE 50 MG/1
4 CAPSULE, EXTENDED RELEASE ORAL ONCE
Refills: 0 | Status: DISCONTINUED | OUTPATIENT
Start: 2021-12-09 | End: 2021-12-09

## 2021-12-09 RX ORDER — ONDANSETRON 8 MG/1
4 TABLET, FILM COATED ORAL ONCE
Refills: 0 | Status: COMPLETED | OUTPATIENT
Start: 2021-12-09 | End: 2021-12-09

## 2021-12-09 RX ORDER — LEVOCETIRIZINE DIHYDROCHLORIDE 0.5 MG/ML
1 SOLUTION ORAL
Qty: 0 | Refills: 0 | DISCHARGE

## 2021-12-09 RX ORDER — METOCLOPRAMIDE HCL 10 MG
10 TABLET ORAL EVERY 6 HOURS
Refills: 0 | Status: DISCONTINUED | OUTPATIENT
Start: 2021-12-09 | End: 2021-12-09

## 2021-12-09 RX ORDER — KETOROLAC TROMETHAMINE 30 MG/ML
15 SYRINGE (ML) INJECTION EVERY 6 HOURS
Refills: 0 | Status: DISCONTINUED | OUTPATIENT
Start: 2021-12-09 | End: 2021-12-12

## 2021-12-09 RX ORDER — ONDANSETRON 8 MG/1
4 TABLET, FILM COATED ORAL EVERY 6 HOURS
Refills: 0 | Status: DISCONTINUED | OUTPATIENT
Start: 2021-12-09 | End: 2021-12-12

## 2021-12-09 RX ORDER — DEXAMETHASONE 0.5 MG/5ML
4 ELIXIR ORAL EVERY 6 HOURS
Refills: 0 | Status: DISCONTINUED | OUTPATIENT
Start: 2021-12-09 | End: 2021-12-12

## 2021-12-09 RX ORDER — ENOXAPARIN SODIUM 100 MG/ML
40 INJECTION SUBCUTANEOUS DAILY
Refills: 0 | Status: DISCONTINUED | OUTPATIENT
Start: 2021-12-09 | End: 2021-12-12

## 2021-12-09 RX ORDER — TERBINAFINE HCL 250 MG
1 TABLET ORAL
Qty: 0 | Refills: 0 | DISCHARGE

## 2021-12-09 RX ORDER — METOCLOPRAMIDE HCL 10 MG
10 TABLET ORAL EVERY 6 HOURS
Refills: 0 | Status: DISCONTINUED | OUTPATIENT
Start: 2021-12-09 | End: 2021-12-12

## 2021-12-09 RX ORDER — OCTREOTIDE ACETATE 200 UG/ML
50 INJECTION, SOLUTION INTRAVENOUS; SUBCUTANEOUS EVERY 8 HOURS
Refills: 0 | Status: DISCONTINUED | OUTPATIENT
Start: 2021-12-09 | End: 2021-12-12

## 2021-12-09 RX ADMIN — SODIUM CHLORIDE 125 MILLILITER(S): 9 INJECTION, SOLUTION INTRAVENOUS at 22:59

## 2021-12-09 RX ADMIN — PANTOPRAZOLE SODIUM 40 MILLIGRAM(S): 20 TABLET, DELAYED RELEASE ORAL at 10:49

## 2021-12-09 RX ADMIN — Medication 1 MILLIGRAM(S): at 05:00

## 2021-12-09 RX ADMIN — Medication 15 MILLIGRAM(S): at 23:30

## 2021-12-09 RX ADMIN — ENOXAPARIN SODIUM 40 MILLIGRAM(S): 100 INJECTION SUBCUTANEOUS at 10:49

## 2021-12-09 RX ADMIN — ONDANSETRON 4 MILLIGRAM(S): 8 TABLET, FILM COATED ORAL at 21:18

## 2021-12-09 RX ADMIN — SODIUM CHLORIDE 125 MILLILITER(S): 9 INJECTION, SOLUTION INTRAVENOUS at 05:26

## 2021-12-09 RX ADMIN — HYDROMORPHONE HYDROCHLORIDE 1 MILLIGRAM(S): 2 INJECTION INTRAMUSCULAR; INTRAVENOUS; SUBCUTANEOUS at 17:17

## 2021-12-09 RX ADMIN — Medication 15 MILLIGRAM(S): at 23:45

## 2021-12-09 RX ADMIN — SODIUM CHLORIDE 125 MILLILITER(S): 9 INJECTION, SOLUTION INTRAVENOUS at 14:32

## 2021-12-09 RX ADMIN — OCTREOTIDE ACETATE 50 MICROGRAM(S): 200 INJECTION, SOLUTION INTRAVENOUS; SUBCUTANEOUS at 21:10

## 2021-12-09 RX ADMIN — OCTREOTIDE ACETATE 50 MICROGRAM(S): 200 INJECTION, SOLUTION INTRAVENOUS; SUBCUTANEOUS at 14:32

## 2021-12-09 RX ADMIN — MORPHINE SULFATE 4 MILLIGRAM(S): 50 CAPSULE, EXTENDED RELEASE ORAL at 03:20

## 2021-12-09 RX ADMIN — MORPHINE SULFATE 4 MILLIGRAM(S): 50 CAPSULE, EXTENDED RELEASE ORAL at 03:38

## 2021-12-09 NOTE — H&P ADULT - ASSESSMENT
A/P:  59 yo F with SBO 2/2 adhesion versus possible tumor involvement of a loop of small bowel   HD stable    Plan:  Admit to Dr. Sahu   NPO  IVF  NGT   pain control  reglan  octreotide  dexamethasone  GI/DVT prop    Plan d/w Dr. Sahu

## 2021-12-09 NOTE — DIETITIAN INITIAL EVALUATION ADULT. - PHYSCIAL ASSESSMENT
Gurpreet Rehman  No BM documented appropriate wt for ht - with moderate upper body muscle/ fat wasting/other (specify)

## 2021-12-09 NOTE — ED ADULT NURSE REASSESSMENT NOTE - NS ED NURSE REASSESS COMMENT FT1
Received patient from [facility] R[facility], patient resting comfortably, no distress noted. VS stable as charted. Awaiting CT results. Patient updated on plan of care.

## 2021-12-09 NOTE — DIETITIAN INITIAL EVALUATION ADULT. - MALNUTRITION
Pt meets criteria for moderate malnutrition in context of acute on chronic illness r/t decreased ability to meet estimated nutrient needs 2/2 GI cancer/ SBO AEB moderate muscle/ fat wasting, 1.67% wt loss x 3 days Pt meets criteria for moderate malnutrition in context of acute on chronic illness

## 2021-12-09 NOTE — H&P ADULT - NSHPPHYSICALEXAM_GEN_ALL_CORE
o/e:  AO x3, NAD  AVSS    chest: non-labored breathing  Heart: RRR s1s2  abd: soft, tenderness epigastric area, RLQ, non-distended, prior surgical scars   ext. no cyanosis or edema

## 2021-12-09 NOTE — CONSULT NOTE ADULT - SUBJECTIVE AND OBJECTIVE BOX
60-year-old female  June 2018 presented with adenocarcinoma Esophageal/gastric Region (Esophageal stricture/proximal cardia mass)  Stage unknown, But clinically node positive  High-grade tumor  No actionable mutations  PD-L1/CPS 6/18  BRCA negative    June 2018 treated with chemotherapy radiation therapy  Weekly paclitaxel/carboplatin plus radiation therapy    November 12, 2018 esophagogastrectomy/pyeloplasty/J-tube placement, right thoracotomy esophagogastrectomy  NO Residual tumor in the esophagus or stomach, 07 lymph nodes negative    June 2019 (8 months later)  lower abdominal and pelvic pains  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    June 18,2019 : MARIE/BSO, pathology adenocarcinoma gastric origin ovarian and omental metastases  CPS of 1  CROW plus on next generation sequencing    July 16, 2019 Started FOLFOX chemotherapy    After 10 treatments developed peripheral neuropathy  Oxaliplatin stopped    Continued 48-hour 5-FU infusion Every 2 weeks  s/p Cycle 48 Last week    Now presents to hospital with increased burping/belching anorexia significant fatigue x5 days  Episode of loose stool, no flatus    Past medical history includes basal cell carcinoma, GERD/reflux, hydrocephalus due to injury, hypertension, hyperlipidemia, seizure, venous thrombosis right IJ vein thrombosis  Bilateral adrenal nodule,Etiology unclear    Tobacco Stopped June 2018      Physical examination  Fatigued 60-year-old female in bed with NG tube and dark-colored fluid in suction canister    Neck supple  Lungs clear  Abdomen soft minimal tenderness no rebound no guarding no bowel sounds  Extremities without edema  Lymph nodes nonpalpable  Skin warm dry without diffuse ecchymosis purpura petechiae      CBC Full  -  ( 09 Dec 2021 06:14 )  WBC Count : 3.72 K/uL  RBC Count : 3.79 M/uL  Hemoglobin : 12.7 g/dL  Hematocrit : 37.7 %  Platelet Count - Automated : 187 K/uL  Mean Cell Volume : 99.5 fl  Mean Cell Hemoglobin : 33.5 pg  Mean Cell Hemoglobin Concentration : 33.7 gm/dL  Auto Neutrophil # : 2.27 K/uL  Auto Lymphocyte # : 0.65 K/uL  Auto Monocyte # : 0.72 K/uL  Auto Eosinophil # : 0.04 K/uL  Auto Basophil # : 0.03 K/uL  Auto Neutrophil % : 60.9 %  Auto Lymphocyte % : 17.5 %  Auto Monocyte % : 19.4 %  Auto Eosinophil % : 1.1 %  Auto Basophil % : 0.8 %      12-09    135  |  107  |  9   ----------------------------<  187<H>  3.4<L>   |  21<L>  |  0.51    Ca    8.2<L>      09 Dec 2021 06:14  Phos  2.5     12-09  Mg     2.0     12-09    TPro  7.3  /  Alb  3.6  /  TBili  0.7  /  DBili  x   /  AST  41<H>  /  ALT  32  /  AlkPhos  162<H>  12-08              Vital Signs Last 24 Hrs  T(C): 36.5 (09 Dec 2021 15:44), Max: 36.8 (09 Dec 2021 02:30)  T(F): 97.7 (09 Dec 2021 15:44), Max: 98.3 (09 Dec 2021 02:30)  HR: 92 (09 Dec 2021 15:44) (89 - 94)  BP: 157/71 (09 Dec 2021 15:44) (157/71 - 176/85)  BP(mean): 110 (08 Dec 2021 20:08) (110 - 110)  RR: 18 (09 Dec 2021 15:44) (17 - 18)  SpO2: 95% (09 Dec 2021 15:44) (95% - 100%)    CT scan abdomen reviewed with Dr. Yan Nixon radiology  Partial small bowel obstruction  Evident peritoneal disease  Serosal metastases at transition site  No liver metastases noted        IMPRESSION:    Small bowel obstruction with a transition point at the anterior aspect of   the lower abdomen/upper pelvis, likely secondary to a serosal implant in   this region.    Nodularity in the omentum consistent with carcinomatosis and matted down   appearance of small bowel loops in the pelvis suspicious for additional   implants.    Moderate intra and extra hepatic biliary ductal dilatation, slightly   increased since 6/9/2019; correlation is recommended with LFTs.    Apreliminary report was provided by [facility]. The findings in the final   report were discussed with Dr. Ortega 9:18 AM on 12/9/2021.

## 2021-12-09 NOTE — H&P ADULT - NSHPLABSRESULTS_GEN_ALL_CORE
LABS:                        15.0   8.59  )-----------( 228      ( 08 Dec 2021 20:57 )             44.6     12-    133<L>  |  105  |  12  ----------------------------<  122<H>  5.0   |  24  |  0.69    Ca    8.8      08 Dec 2021 20:57    TPro  7.3  /  Alb  3.6  /  TBili  0.7  /  DBili  x   /  AST  41<H>  /  ALT  32  /  AlkPhos  162<H>  12-      Urinalysis Basic - ( 08 Dec 2021 20:57 )    Color: Yellow / Appearance: Clear / S.010 / pH: x  Gluc: x / Ketone: Negative  / Bili: Negative / Urobili: 1 mg/dL   Blood: x / Protein: 15 mg/dL / Nitrite: Negative   Leuk Esterase: Trace / RBC: 0-2 /HPF / WBC 0-2   Sq Epi: x / Non Sq Epi: Occasional / Bacteria: Few      CT abd:   Probable mid small bowel obstruction possibly due to an adhesion versus  possible tumor involvement of a loop of small bowel in the mid anterior  abdomen. Additional tiny areas of peritoneal enhancement suggestive of   tumor  implants.  2

## 2021-12-09 NOTE — DIETITIAN INITIAL EVALUATION ADULT. - PERTINENT MEDS FT
MEDICATIONS  (STANDING):  amLODIPine   Tablet 5 milliGRAM(s) Oral daily  dextrose 5% + sodium chloride 0.45%. 1000 milliLiter(s) (125 mL/Hr) IV Continuous <Continuous>  enoxaparin Injectable 40 milliGRAM(s) SubCutaneous daily  octreotide  Injectable 50 MICROGram(s) IV Push every 8 hours  pantoprazole  Injectable 40 milliGRAM(s) IV Push daily    MEDICATIONS  (PRN):  dexAMETHasone  Injectable 4 milliGRAM(s) IV Push every 6 hours PRN nausea and vomiting  HYDROmorphone  Injectable 1 milliGRAM(s) IV Push every 4 hours PRN Severe Pain (7 - 10)  ketorolac   Injectable 15 milliGRAM(s) IV Push every 6 hours PRN Moderate Pain (4 - 6)  metoclopramide Injectable 10 milliGRAM(s) IV Push every 6 hours PRN nausea and vomiting  ondansetron Injectable 4 milliGRAM(s) IV Push every 6 hours PRN Nausea

## 2021-12-09 NOTE — H&P ADULT - HISTORY OF PRESENT ILLNESS
61 yo F with PMH of GERD, HLD, Gastritic, HTN, esophageal/stomach CA chemo/radiation s/p laparoscopic resection 2018, oophorectomy 2019, h/o hydrocephalus s/p intracranial shunt 1987 presents to the ED c/o cramping abd pain x 4 days. pt states she is having abdominal pain since Saturday associated with non-bloody diarrhea. Pt reports she takes laxatives at times due to constipation. Pt last passed flatus yesterday PM and last BM Tuesday. Denies fever or chills, n/v/ Pt also gets maintenance chemo, Last chemo session 6 days ago here at (Dr. Whyte)

## 2021-12-09 NOTE — DIETITIAN INITIAL EVALUATION ADULT. - PERTINENT LABORATORY DATA
12-09    135  |  107  |  9   ----------------------------<  187<H>  3.4<L>   |  21<L>  |  0.51    Ca    8.2<L>      09 Dec 2021 06:14  Phos  2.5     12-09  Mg     2.0     12-09    TPro  7.3  /  Alb  3.6  /  TBili  0.7  /  DBili  x   /  AST  41<H>  /  ALT  32  /  AlkPhos  162<H>  12-08

## 2021-12-09 NOTE — DIETITIAN INITIAL EVALUATION ADULT. - ADD RECOMMEND
1) Suggest advance diet to Clear Liquids to Full Liquids to regular, when medically feasible and as tolerated. If pt unable to tolerate PO within 3 days, re-consult RD to initiate nutrition support, 2) MVI w/ minerals daily to ensure 100% RDA met, 3) Monitor bowel movements, if no BM for >3 days, consider implementing bowel regimen. RD will continue to monitor NPO status, labs, hydration, and wt prn.

## 2021-12-09 NOTE — DIETITIAN INITIAL EVALUATION ADULT. - OTHER INFO
59 yo F with PMH of GERD, HLD, Gastritic, HTN, esophageal/stomach CA chemo/radiation s/p laparoscopic resection 2018, oophorectomy 2019, h/o hydrocephalus s/p intracranial shunt 1987 presents to the ED c/o cramping abd pain x 4 days. pt states she is having abdominal pain since Saturday associated with non-bloody diarrhea. Pt also gets maintenance chemo, Last chemo session 6 days ago here at  (Dr. Whyte). As per surgical PA, pending short bowel series today and possible surgery tomorrow. NGT suction tube in place.    Pt is currently NPO. RD took bed scale wt 12/9/21 at 76 kg. 61 yo F with PMH of GERD, HLD, Gastritic, HTN, esophageal/stomach CA chemo/radiation s/p laparoscopic resection 2018, oophorectomy 2019, h/o hydrocephalus s/p intracranial shunt 1987 presents to the ED c/o cramping abd pain x 4 days. pt states she is having abdominal pain since Saturday associated with non-bloody diarrhea. Pt also gets maintenance chemo, Last chemo session 6 days ago here at  (Dr. Whyte). As per surgical PA, pending short bowel series today and possible surgery tomorrow. NGT suction tube in place.    Pt is currently NPO. RD took bed scale wt 12/9/21 at 76 kg. States weighed self at 170# 3 days ago - 3#/ 1.76% x 3 days (severe and clinically significant) 2/2 extremely poor intake. Observed w/ moderate fat wasting on upper body and face. Recommend to provide nutrition to pt via tolerated route ASAP and medically feasible. Pt should NOT be NPO >3 days. Will monitor NPO status closely. See recommendations below.

## 2021-12-09 NOTE — DIETITIAN INITIAL EVALUATION ADULT. - ORAL INTAKE PTA/DIET HISTORY
Reports fair to good appetite - cooks and eats "fresh" foods. States chemotherapy sometimes interferes w/ eating 2/2 nausea.

## 2021-12-09 NOTE — ED PEDIATRIC NURSE REASSESSMENT NOTE - NS ED NURSE REASSESS COMMENT FT2
16 fr NGT placed, with no complications. Chest X-Ray done to confirm placement. 250cc of yellow colored output noted immediately. Placed to low intermittent suction. Report given to Victor M RAND.

## 2021-12-09 NOTE — CONSULT NOTE ADULT - ASSESSMENT
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  Carboplatin Taxol chemotherapy radiation therapy  November 2018 surgery disease resolved    8 months later June 2019 Recurrent tumor in pelvis/ovaries/ Omentum  June 2019 MARIE/BSO,"Optimal debulking"  10 cycles of FOLFOX  Subsequent 5-FU infusion every 2 weeks Approximately 2 years    CT scan August 3, 2021 [facility]  Reported no evidence of disease    Now with small bowel obstruction  CT Scan., serosal mass transition pointOf GE junction  Evident peritoneal metastases    Plan:  Obtain last Scan from [facility]  August 5, 2021  Compared to Present Scan    Surgery consultation evaluation  Questionable exploratory lap to assess for SBO secondary to adhesions or more likely recurrent cancer    Will review with medical oncology and surgery    Continue conservative management with NG tube and hydration    Follow CBC electrolytes

## 2021-12-10 LAB
ANION GAP SERPL CALC-SCNC: 7 MMOL/L — SIGNIFICANT CHANGE UP (ref 5–17)
BUN SERPL-MCNC: 3 MG/DL — LOW (ref 7–23)
CALCIUM SERPL-MCNC: 8.9 MG/DL — SIGNIFICANT CHANGE UP (ref 8.5–10.1)
CHLORIDE SERPL-SCNC: 111 MMOL/L — HIGH (ref 96–108)
CO2 SERPL-SCNC: 23 MMOL/L — SIGNIFICANT CHANGE UP (ref 22–31)
CREAT SERPL-MCNC: 0.58 MG/DL — SIGNIFICANT CHANGE UP (ref 0.5–1.3)
GLUCOSE SERPL-MCNC: 183 MG/DL — HIGH (ref 70–99)
HCT VFR BLD CALC: 39.6 % — SIGNIFICANT CHANGE UP (ref 34.5–45)
HGB BLD-MCNC: 13.2 G/DL — SIGNIFICANT CHANGE UP (ref 11.5–15.5)
MAGNESIUM SERPL-MCNC: 2.1 MG/DL — SIGNIFICANT CHANGE UP (ref 1.6–2.6)
MCHC RBC-ENTMCNC: 33.3 GM/DL — SIGNIFICANT CHANGE UP (ref 32–36)
MCHC RBC-ENTMCNC: 33.8 PG — SIGNIFICANT CHANGE UP (ref 27–34)
MCV RBC AUTO: 101.3 FL — HIGH (ref 80–100)
PHOSPHATE SERPL-MCNC: 2.7 MG/DL — SIGNIFICANT CHANGE UP (ref 2.5–4.5)
PLATELET # BLD AUTO: 226 K/UL — SIGNIFICANT CHANGE UP (ref 150–400)
POTASSIUM SERPL-MCNC: 3.4 MMOL/L — LOW (ref 3.5–5.3)
POTASSIUM SERPL-SCNC: 3.4 MMOL/L — LOW (ref 3.5–5.3)
RBC # BLD: 3.91 M/UL — SIGNIFICANT CHANGE UP (ref 3.8–5.2)
RBC # FLD: 12.9 % — SIGNIFICANT CHANGE UP (ref 10.3–14.5)
SODIUM SERPL-SCNC: 141 MMOL/L — SIGNIFICANT CHANGE UP (ref 135–145)
WBC # BLD: 5.11 K/UL — SIGNIFICANT CHANGE UP (ref 3.8–10.5)
WBC # FLD AUTO: 5.11 K/UL — SIGNIFICANT CHANGE UP (ref 3.8–10.5)

## 2021-12-10 PROCEDURE — 99232 SBSQ HOSP IP/OBS MODERATE 35: CPT

## 2021-12-10 RX ORDER — ALBUTEROL 90 UG/1
2 AEROSOL, METERED ORAL EVERY 6 HOURS
Refills: 0 | Status: DISCONTINUED | OUTPATIENT
Start: 2021-12-10 | End: 2021-12-12

## 2021-12-10 RX ADMIN — Medication 15 MILLIGRAM(S): at 22:45

## 2021-12-10 RX ADMIN — OCTREOTIDE ACETATE 50 MICROGRAM(S): 200 INJECTION, SOLUTION INTRAVENOUS; SUBCUTANEOUS at 22:15

## 2021-12-10 RX ADMIN — ENOXAPARIN SODIUM 40 MILLIGRAM(S): 100 INJECTION SUBCUTANEOUS at 11:21

## 2021-12-10 RX ADMIN — OCTREOTIDE ACETATE 50 MICROGRAM(S): 200 INJECTION, SOLUTION INTRAVENOUS; SUBCUTANEOUS at 17:07

## 2021-12-10 RX ADMIN — ONDANSETRON 4 MILLIGRAM(S): 8 TABLET, FILM COATED ORAL at 22:22

## 2021-12-10 RX ADMIN — HYDROMORPHONE HYDROCHLORIDE 1 MILLIGRAM(S): 2 INJECTION INTRAMUSCULAR; INTRAVENOUS; SUBCUTANEOUS at 09:04

## 2021-12-10 RX ADMIN — SODIUM CHLORIDE 125 MILLILITER(S): 9 INJECTION, SOLUTION INTRAVENOUS at 06:27

## 2021-12-10 RX ADMIN — AMLODIPINE BESYLATE 5 MILLIGRAM(S): 2.5 TABLET ORAL at 11:21

## 2021-12-10 RX ADMIN — OCTREOTIDE ACETATE 50 MICROGRAM(S): 200 INJECTION, SOLUTION INTRAVENOUS; SUBCUTANEOUS at 05:29

## 2021-12-10 RX ADMIN — PANTOPRAZOLE SODIUM 40 MILLIGRAM(S): 20 TABLET, DELAYED RELEASE ORAL at 11:21

## 2021-12-10 RX ADMIN — Medication 15 MILLIGRAM(S): at 23:15

## 2021-12-10 RX ADMIN — SODIUM CHLORIDE 125 MILLILITER(S): 9 INJECTION, SOLUTION INTRAVENOUS at 22:22

## 2021-12-10 NOTE — PROVIDER CONTACT NOTE (OTHER) - SITUATION
pt does not have iv access, pt has weakened veins
Spoke to Maxi at [facility] office answering service regarding consult.

## 2021-12-10 NOTE — PATIENT PROFILE ADULT - FALL HARM RISK - UNIVERSAL INTERVENTIONS
Bed in lowest position, wheels locked, appropriate side rails in place/Call bell, personal items and telephone in reach/Instruct patient to call for assistance before getting out of bed or chair/Non-slip footwear when patient is out of bed/[place] to call system/Physically safe environment - no spills, clutter or unnecessary equipment/Purposeful Proactive Rounding/Room/bathroom lighting operational, light cord in reach

## 2021-12-11 ENCOUNTER — TRANSCRIPTION ENCOUNTER (OUTPATIENT)
Age: 61
End: 2021-12-11

## 2021-12-11 LAB
ALBUMIN SERPL ELPH-MCNC: 2.8 G/DL — LOW (ref 3.3–5)
ALP SERPL-CCNC: 275 U/L — HIGH (ref 40–120)
ALT FLD-CCNC: 240 U/L — HIGH (ref 12–78)
ANION GAP SERPL CALC-SCNC: 6 MMOL/L — SIGNIFICANT CHANGE UP (ref 5–17)
AST SERPL-CCNC: 94 U/L — HIGH (ref 15–37)
BASOPHILS # BLD AUTO: 0.05 K/UL — SIGNIFICANT CHANGE UP (ref 0–0.2)
BASOPHILS NFR BLD AUTO: 0.7 % — SIGNIFICANT CHANGE UP (ref 0–2)
BILIRUB SERPL-MCNC: 0.4 MG/DL — SIGNIFICANT CHANGE UP (ref 0.2–1.2)
BUN SERPL-MCNC: 2 MG/DL — LOW (ref 7–23)
CALCIUM SERPL-MCNC: 8.5 MG/DL — SIGNIFICANT CHANGE UP (ref 8.5–10.1)
CHLORIDE SERPL-SCNC: 108 MMOL/L — SIGNIFICANT CHANGE UP (ref 96–108)
CO2 SERPL-SCNC: 25 MMOL/L — SIGNIFICANT CHANGE UP (ref 22–31)
CREAT SERPL-MCNC: 0.52 MG/DL — SIGNIFICANT CHANGE UP (ref 0.5–1.3)
EOSINOPHIL # BLD AUTO: 0.26 K/UL — SIGNIFICANT CHANGE UP (ref 0–0.5)
EOSINOPHIL NFR BLD AUTO: 3.9 % — SIGNIFICANT CHANGE UP (ref 0–6)
GLUCOSE SERPL-MCNC: 157 MG/DL — HIGH (ref 70–99)
HCT VFR BLD CALC: 36 % — SIGNIFICANT CHANGE UP (ref 34.5–45)
HGB BLD-MCNC: 12 G/DL — SIGNIFICANT CHANGE UP (ref 11.5–15.5)
IMM GRANULOCYTES NFR BLD AUTO: 0.1 % — SIGNIFICANT CHANGE UP (ref 0–1.5)
LYMPHOCYTES # BLD AUTO: 1.35 K/UL — SIGNIFICANT CHANGE UP (ref 1–3.3)
LYMPHOCYTES # BLD AUTO: 20.2 % — SIGNIFICANT CHANGE UP (ref 13–44)
MAGNESIUM SERPL-MCNC: 2.1 MG/DL — SIGNIFICANT CHANGE UP (ref 1.6–2.6)
MCHC RBC-ENTMCNC: 33.3 GM/DL — SIGNIFICANT CHANGE UP (ref 32–36)
MCHC RBC-ENTMCNC: 34.2 PG — HIGH (ref 27–34)
MCV RBC AUTO: 102.6 FL — HIGH (ref 80–100)
MONOCYTES # BLD AUTO: 0.95 K/UL — HIGH (ref 0–0.9)
MONOCYTES NFR BLD AUTO: 14.2 % — HIGH (ref 2–14)
NEUTROPHILS # BLD AUTO: 4.05 K/UL — SIGNIFICANT CHANGE UP (ref 1.8–7.4)
NEUTROPHILS NFR BLD AUTO: 60.9 % — SIGNIFICANT CHANGE UP (ref 43–77)
PHOSPHATE SERPL-MCNC: 3.4 MG/DL — SIGNIFICANT CHANGE UP (ref 2.5–4.5)
PLATELET # BLD AUTO: 219 K/UL — SIGNIFICANT CHANGE UP (ref 150–400)
POTASSIUM SERPL-MCNC: 3.2 MMOL/L — LOW (ref 3.5–5.3)
POTASSIUM SERPL-SCNC: 3.2 MMOL/L — LOW (ref 3.5–5.3)
PROT SERPL-MCNC: 5.6 GM/DL — LOW (ref 6–8.3)
RBC # BLD: 3.51 M/UL — LOW (ref 3.8–5.2)
RBC # FLD: 13 % — SIGNIFICANT CHANGE UP (ref 10.3–14.5)
SODIUM SERPL-SCNC: 139 MMOL/L — SIGNIFICANT CHANGE UP (ref 135–145)
WBC # BLD: 6.67 K/UL — SIGNIFICANT CHANGE UP (ref 3.8–10.5)
WBC # FLD AUTO: 6.67 K/UL — SIGNIFICANT CHANGE UP (ref 3.8–10.5)

## 2021-12-11 PROCEDURE — 99239 HOSP IP/OBS DSCHRG MGMT >30: CPT

## 2021-12-11 RX ORDER — POTASSIUM CHLORIDE 20 MEQ
40 PACKET (EA) ORAL ONCE
Refills: 0 | Status: COMPLETED | OUTPATIENT
Start: 2021-12-11 | End: 2021-12-11

## 2021-12-11 RX ORDER — POTASSIUM CHLORIDE 20 MEQ
10 PACKET (EA) ORAL ONCE
Refills: 0 | Status: COMPLETED | OUTPATIENT
Start: 2021-12-11 | End: 2021-12-11

## 2021-12-11 RX ORDER — LANOLIN ALCOHOL/MO/W.PET/CERES
5 CREAM (GRAM) TOPICAL AT BEDTIME
Refills: 0 | Status: DISCONTINUED | OUTPATIENT
Start: 2021-12-11 | End: 2021-12-12

## 2021-12-11 RX ORDER — SODIUM CHLORIDE 9 MG/ML
1000 INJECTION INTRAMUSCULAR; INTRAVENOUS; SUBCUTANEOUS
Refills: 0 | Status: DISCONTINUED | OUTPATIENT
Start: 2021-12-11 | End: 2021-12-12

## 2021-12-11 RX ADMIN — ONDANSETRON 4 MILLIGRAM(S): 8 TABLET, FILM COATED ORAL at 06:17

## 2021-12-11 RX ADMIN — AMLODIPINE BESYLATE 5 MILLIGRAM(S): 2.5 TABLET ORAL at 09:28

## 2021-12-11 RX ADMIN — SODIUM CHLORIDE 60 MILLILITER(S): 9 INJECTION INTRAMUSCULAR; INTRAVENOUS; SUBCUTANEOUS at 15:16

## 2021-12-11 RX ADMIN — PANTOPRAZOLE SODIUM 40 MILLIGRAM(S): 20 TABLET, DELAYED RELEASE ORAL at 09:29

## 2021-12-11 RX ADMIN — OCTREOTIDE ACETATE 50 MICROGRAM(S): 200 INJECTION, SOLUTION INTRAVENOUS; SUBCUTANEOUS at 22:12

## 2021-12-11 RX ADMIN — Medication 5 MILLIGRAM(S): at 03:09

## 2021-12-11 RX ADMIN — Medication 100 MILLIEQUIVALENT(S): at 11:28

## 2021-12-11 RX ADMIN — OCTREOTIDE ACETATE 50 MICROGRAM(S): 200 INJECTION, SOLUTION INTRAVENOUS; SUBCUTANEOUS at 06:17

## 2021-12-11 RX ADMIN — OCTREOTIDE ACETATE 50 MICROGRAM(S): 200 INJECTION, SOLUTION INTRAVENOUS; SUBCUTANEOUS at 16:21

## 2021-12-11 RX ADMIN — ENOXAPARIN SODIUM 40 MILLIGRAM(S): 100 INJECTION SUBCUTANEOUS at 09:28

## 2021-12-11 RX ADMIN — Medication 40 MILLIEQUIVALENT(S): at 09:29

## 2021-12-11 RX ADMIN — SODIUM CHLORIDE 125 MILLILITER(S): 9 INJECTION, SOLUTION INTRAVENOUS at 06:17

## 2021-12-11 NOTE — CHART NOTE - NSCHARTNOTEFT_GEN_A_CORE
Clinical Nutrition Follow Up Note:    * 59yo female admitted with SBO, passed gastrografin challenge,  NGT removed.  pt pending d/c today.    *Labs Reviewed:  12-11    139  |  108  |  2<L>  ----------------------------<  157<H>  3.2<L>   |  25  |  0.52    Ca    8.5      11 Dec 2021 06:44  Phos  3.4     12-11  Mg     2.1     12-11    TPro  5.6<L>  /  Alb  2.8<L>  /  TBili  0.4  /  DBili  x   /  AST  94<H>  /  ALT  240<H>  /  AlkPhos  275<H>  12-11    *hypokalemia noted; monitor and replete prn.  consider checking vitamin D level and supplement prn.      *pertinent meds: dextrose 5% + sodium chloride 0.45%, pantoprazole, octreotide, hydromorphone, melatonin, reglan, dexamethasone.    *(+) BM on 12/10 , diarrhea  ; pt not on bowel regimen.    *acosta score of 21 :no PU documented.   (+1) Lt/Rt leg edema documented.    *Pt c/w minimal PO intake, meeting <50% of estimated nutr needs.  d/w pt to try small, frequent meals with protein at each meal.  encouraged ensure enlive vanilla BID.  pt verbalized understanding.    *Malnutrition dx: moderate malnutrition in context of acute on chronic illness r/t decreased ability to meet estimated nutrient needs 2/2 GI cancer/ SBO AEB moderate muscle/ fat wasting, 1.67% wt loss x 3 days        Diet, Low Fiber (12-11-21 @ 08:44) [Active]        Estimated Needs: Based on 76Kg current wt  Calories:  1900-2280Kcal (25-30 Kcal/Kg)  Protein:  114-129g (1.5-1.7 g/Kg)  Fluids: 2506-0088  mL (25-30 mL/Kg)    *Wt Hx:  Height (cm): 167.6 (12-08-21 @ 19:20)  Weight (kg): 76.7 (12-08-21 @ 19:20)  BMI (kg/m2): 27.3 (12-08-21 @ 19:20)  BSA (m2): 1.86 (12-08-21 @ 19:20)    Recommendations:  1) add ensure enlive BID  2) encourage small, frequent meals to optimize PO intake  3) daily wt checks to track/trend changes  4) consider checking vitamin D level and supplement prn        *will continue to monitor and follow up with adjustments to treatment plan prn*    Tiki Ascencio MA, RDN, CDN, CNSC [phone]

## 2021-12-11 NOTE — DISCHARGE NOTE PROVIDER - DETAILS OF MALNUTRITION DIAGNOSIS/DIAGNOSES
This patient has been assessed with a concern for Malnutrition and was treated during this hospitalization for the following Nutrition diagnosis/diagnoses:     -  12/09/2021: Moderate protein-calorie malnutrition

## 2021-12-11 NOTE — DISCHARGE NOTE PROVIDER - CARE PROVIDER_API CALL
Ryan Sahu)  Surgery  [address] [place] [address]  Phone: [phone]  Fax: [phone]  Follow Up Time: 2 weeks    Ryan Bermeo)  Medical Oncology  [address] [place] [address], [address] [place] [address]  Phone: [phone]  Fax: [phone]  Follow Up Time: 2 weeks   Ryan Sahu (MD)  Surgery  [address] [place] [address]  Phone: [phone]  Fax: [phone]  Follow Up Time: 2 weeks    Carmella Whyte  [address], [address] [place] [address]  Phone: [phone]  Fax: [phone]  Follow Up Time: 2 weeks

## 2021-12-11 NOTE — DISCHARGE NOTE PROVIDER - PROVIDER TOKENS
PROVIDER:[TOKEN:[[id number]],FOLLOWUP:[2 weeks]],PROVIDER:[TOKEN:[[id number]],FOLLOWUP:[2 weeks]] PROVIDER:[TOKEN:[[id number]],FOLLOWUP:[2 weeks]],PROVIDER:[TOKEN:[[id number]],FOLLOWUP:[2 weeks]]

## 2021-12-11 NOTE — DISCHARGE NOTE PROVIDER - HOSPITAL COURSE
61 yo F with PMH of GERD, HLD, Gastritic, HTN, esophageal/stomach CA chemo/radiation s/p laparoscopic resection 2018, oophorectomy 2019, h/o hydrocephalus s/p intracranial shunt 1987 presents to the ED c/o cramping abd pain x 4 days. pt states she is having abdominal pain since Saturday associated with non-bloody diarrhea. Pt reports she takes laxatives at times due to constipation. Pt last passed flatus yesterday PM and last BM Tuesday. Denies fever or chills, n/v/ Pt also gets maintenance chemo, Last chemo session 6 days ago here at (Dr. Whyte)  Patient was treated conservatively with NG tube placement and gastrografin challenge. Now patient is having multiple bowel movement and tolerating diet. Stable for discharge

## 2021-12-11 NOTE — DISCHARGE NOTE PROVIDER - NSDCMRMEDTOKEN_GEN_ALL_CORE_FT
amLODIPine 2.5 mg oral tablet: 1 tab(s) orally once a day  meloxicam 7.5 mg oral tablet: 1 tab(s) orally 2 times a day  pantoprazole 40 mg oral delayed release tablet: 1 tab(s) orally once a day (before a meal)  rosuvastatin 5 mg oral tablet: 1 tab(s) orally once a day  topiramate 25 mg oral capsule: 1 cap(s) orally 2 times a day

## 2021-12-11 NOTE — DISCHARGE NOTE PROVIDER - NSDCFUADDINST_GEN_ALL_CORE_FT
please resume regular activities/diet as tolerated    please take over the counter pain medications as needed

## 2021-12-11 NOTE — DISCHARGE NOTE PROVIDER - CARE PROVIDERS DIRECT ADDRESSES
,[email or web address]

## 2021-12-12 ENCOUNTER — TRANSCRIPTION ENCOUNTER (OUTPATIENT)
Age: 61
End: 2021-12-12

## 2021-12-12 VITALS
HEART RATE: 91 BPM | OXYGEN SATURATION: 95 % | SYSTOLIC BLOOD PRESSURE: 138 MMHG | TEMPERATURE: 98 F | DIASTOLIC BLOOD PRESSURE: 69 MMHG | RESPIRATION RATE: 18 BRPM

## 2021-12-12 LAB
ANION GAP SERPL CALC-SCNC: 7 MMOL/L — SIGNIFICANT CHANGE UP (ref 5–17)
BUN SERPL-MCNC: 6 MG/DL — LOW (ref 7–23)
CALCIUM SERPL-MCNC: 8.9 MG/DL — SIGNIFICANT CHANGE UP (ref 8.5–10.1)
CHLORIDE SERPL-SCNC: 109 MMOL/L — HIGH (ref 96–108)
CO2 SERPL-SCNC: 26 MMOL/L — SIGNIFICANT CHANGE UP (ref 22–31)
CREAT SERPL-MCNC: 0.58 MG/DL — SIGNIFICANT CHANGE UP (ref 0.5–1.3)
GLUCOSE SERPL-MCNC: 143 MG/DL — HIGH (ref 70–99)
MAGNESIUM SERPL-MCNC: 2 MG/DL — SIGNIFICANT CHANGE UP (ref 1.6–2.6)
PHOSPHATE SERPL-MCNC: 3.7 MG/DL — SIGNIFICANT CHANGE UP (ref 2.5–4.5)
POTASSIUM SERPL-MCNC: 3.5 MMOL/L — SIGNIFICANT CHANGE UP (ref 3.5–5.3)
POTASSIUM SERPL-SCNC: 3.5 MMOL/L — SIGNIFICANT CHANGE UP (ref 3.5–5.3)
SODIUM SERPL-SCNC: 142 MMOL/L — SIGNIFICANT CHANGE UP (ref 135–145)

## 2021-12-12 RX ORDER — ATORVASTATIN CALCIUM 80 MG/1
20 TABLET, FILM COATED ORAL ONCE
Refills: 0 | Status: COMPLETED | OUTPATIENT
Start: 2021-12-12 | End: 2021-12-12

## 2021-12-12 RX ADMIN — Medication 5 MILLIGRAM(S): at 00:41

## 2021-12-12 RX ADMIN — ENOXAPARIN SODIUM 40 MILLIGRAM(S): 100 INJECTION SUBCUTANEOUS at 09:46

## 2021-12-12 RX ADMIN — PANTOPRAZOLE SODIUM 40 MILLIGRAM(S): 20 TABLET, DELAYED RELEASE ORAL at 09:46

## 2021-12-12 RX ADMIN — OCTREOTIDE ACETATE 50 MICROGRAM(S): 200 INJECTION, SOLUTION INTRAVENOUS; SUBCUTANEOUS at 06:46

## 2021-12-12 RX ADMIN — ATORVASTATIN CALCIUM 20 MILLIGRAM(S): 80 TABLET, FILM COATED ORAL at 01:08

## 2021-12-12 RX ADMIN — AMLODIPINE BESYLATE 5 MILLIGRAM(S): 2.5 TABLET ORAL at 09:46

## 2021-12-12 RX ADMIN — SODIUM CHLORIDE 60 MILLILITER(S): 9 INJECTION INTRAMUSCULAR; INTRAVENOUS; SUBCUTANEOUS at 06:47

## 2021-12-12 NOTE — DISCHARGE NOTE NURSING/CASE MANAGEMENT/SOCIAL WORK - PATIENT PORTAL LINK FT
You can access the FollowMyHealth Patient Portal offered by [facility] by registering at the following website: [email or web address]. By joining [facility]’s FollowMyHealth portal, you will also be able to view your health information using other applications (apps) compatible with our system.

## 2021-12-12 NOTE — PROGRESS NOTE ADULT - NUTRITIONAL ASSESSMENT
This patient has been assessed with a concern for Malnutrition and has been determined to have a diagnosis/diagnoses of Moderate protein-calorie malnutrition.    This patient is being managed with:   Diet Clear Liquid-  Entered: Dec 10 2021  8:32AM    
This patient has been assessed with a concern for Malnutrition and has been determined to have a diagnosis/diagnoses of Moderate protein-calorie malnutrition.    This patient is being managed with:   Diet Low Fiber-  Entered: Dec 11 2021  8:44AM    

## 2021-12-12 NOTE — DISCHARGE NOTE NURSING/CASE MANAGEMENT/SOCIAL WORK - NSDCPEFALRISK_GEN_ALL_CORE
For information on Fall & Injury Prevention, visit: [email or web address] OR  [email or web address] OR  https://www.cdc.gov/steadi/patient.html

## 2021-12-12 NOTE — PROGRESS NOTE ADULT - REASON FOR ADMISSION
SBO 2/2 adhesion vs tumor
SBO 2/2 adhesion vs tumor  Metastatic gastric cancer
SBO 2/2 adhesion vs tumor

## 2021-12-12 NOTE — DISCHARGE NOTE NURSING/CASE MANAGEMENT/SOCIAL WORK - BRAND OF COVID-19 VACCINATION
TRANSFER - OUT REPORT:    Verbal report given to Greg Harrell on [place]  being transferred to [address] [place] [address] for routine progression of care       Report consisted of patients Situation, Background, Assessment and Recommendations(SBAR). Information from the following report(s) SBAR, Kardex, Procedure Summary and MAR was reviewed with the receiving nurse. Opportunity for questions and clarification was provided. Dual Chamber generator change with Dr Mcdaniel Mask  VVIR   2gm ancef  5 versed  100 fentanyl  Left chest incision.  Suture, dermabond, aquacel Moderna dose 1 and 2

## 2021-12-12 NOTE — PROGRESS NOTE ADULT - ASSESSMENT
61 yo F presents w/ SBO, adhesions vs. carcinomatosis  passed gastrografin challenge, contrast in the colon  Tolerating diet, having bowel function    Plan:  continue regular diet  Patient to stay for hydration due to multiple loose stool  DVT/GI PPX  will plan for discharge tomorrow    Plan discussed with surgery team and attending, Dr. Sahu
59 yo F presents w/ SBO, adhesions vs. carcinomatosis  passed gastrografin challenge, contrast in the colon  Tolerating diet, having bowel function    Plan:  diarrhea improved, stable for discharge today      Plan discussed with surgery team and attending, Dr. Sahu
61 yo F admitted for SBO  passed gastrografin challenge, contrast in the colon  NGT clamp trial minimal output    Plan:  remove NGT  start CLD  c/w IVF  pain control  monitor Bowel function  GI/DVT prop    plan d/w attending 
Metastatic gastric cancer  Had been on maintenance 5-FU infusion    Now presents with SBO    CT scan with nodularity in the omentum and serosal implant a transition point suggestive of progressive disease.    Plan:  Continue Conservative management  Observe if she can tolerate eating    Moving forward will need to reinitiate chemotherapy  Will review this with Dr. Don As to which chemotherapy regime would be best    If relapses Obstructs quickly may need surgical intervention
60-year-old female with history of metastatic gastric cancer  s/p Initial gastrectomy  FOLFOX chemotherapy  Recently on 5-FU maintenance and doing well for over a year    Now admitted with bowel obstruction    CT scan suggest progression of disease/carcinomatosis, serosal implants  Partial obstruction    Plan:  Currently receiving conservative management for acute Partial  Bowel obstruction   to decide on new chemotherapy in the next Few days  We will need to review this Strategy with surgery as I am concerned patient Is at risk for total obstruction prior to new  chemotherapy resolving obstruction

## 2021-12-12 NOTE — PROGRESS NOTE ADULT - SUBJECTIVE AND OBJECTIVE BOX
Patient seen this morning  NG tube out  Fatigued  Tolerating liquids,However still belching and still with mild abdominal tenderness  Diarrhea no flatus    Abdomen soft mild tenderness bowel sounds hypoactive  Extremities without edema    CBC Full  -  ( 11 Dec 2021 06:44 )  WBC Count : 6.67 K/uL  RBC Count : 3.51 M/uL  Hemoglobin : 12.0 g/dL  Hematocrit : 36.0 %  Platelet Count - Automated : 219 K/uL  Mean Cell Volume : 102.6 fl  Mean Cell Hemoglobin : 34.2 pg  Mean Cell Hemoglobin Concentration : 33.3 gm/dL  Auto Neutrophil # : 4.05 K/uL  Auto Lymphocyte # : 1.35 K/uL  Auto Monocyte # : 0.95 K/uL  Auto Eosinophil # : 0.26 K/uL  Auto Basophil # : 0.05 K/uL  Auto Neutrophil % : 60.9 %  Auto Lymphocyte % : 20.2 %  Auto Monocyte % : 14.2 %  Auto Eosinophil % : 3.9 %  Auto Basophil % : 0.7 %      12-11    139  |  108  |  2<L>  ----------------------------<  157<H>  3.2<L>   |  25  |  0.52    Ca    8.5      11 Dec 2021 06:44  Phos  3.4     12-11  Mg     2.1     12-11    TPro  5.6<L>  /  Alb  2.8<L>  /  TBili  0.4  /  DBili  x   /  AST  94<H>  /  ALT  240<H>  /  AlkPhos  275<H>  12-11          PT/INR - ( 10 Dec 2021 11:15 )   PT: 12.9 sec;   INR: 1.12 ratio         PTT - ( 10 Dec 2021 11:15 )  PTT:28.8 sec    Vital Signs Last 24 Hrs  T(C): 37.3 (11 Dec 2021 14:50), Max: 37.3 (11 Dec 2021 14:50)  T(F): 99.1 (11 Dec 2021 14:50), Max: 99.1 (11 Dec 2021 14:50)  HR: 110 (11 Dec 2021 14:50) (77 - 110)  BP: 118/67 (11 Dec 2021 14:50) (118/67 - 152/83)  BP(mean): --  RR: 18 (11 Dec 2021 14:50) (18 - 18)  SpO2: 97% (11 Dec 2021 14:50) (95% - 97%)  < from: CT Abdomen and Pelvis w/ Oral Cont and w/ IV Cont (12.09.21 @ 00:50) >    ACC: [id number] EXAM:  CT ABDOMEN AND PELVIS OC IC                          PROCEDURE DATE:  12/09/2021          CLINICAL INFORMATION: Esophageal/gastric cancer with surgery. Abdominal   pain.    COMPARISON: CT abdomen/pelvis 6/9/2019 and 6/13/2018 and CT chest   6/14/2018    CONTRAST/COMPLICATIONS:  IV Contrast: Omnipaque 350  90 cc administered   10 cc discarded  Oral Contrast: Omnipaque 300 + Fruit 2o  Complications: None reported at time of study completion    PROCEDURE:  CT of the Abdomen and Pelvis was performed.  Sagittal and coronal reformats were performed.    FINDINGS:  LOWER CHEST: Small left pleural effusion. Linear atelectasis or scarring   in the right lower lobe. Partially imaged postsurgical changes in the   lower chest with a probable gastric pull-through.    LIVER: 5.8 cm cyst in the left hepatic lobe.  BILE DUCTS: Moderate intrahepatic biliary ductal dilatation and dilated   common bile duct measures 1.2 cm, slightly increased since 6/9/2019;   common bile duct previously measured 1.0 cm.  GALLBLADDER: Cholecystectomy.  SPLEEN: Within normal limits.  PANCREAS: Atrophic.  ADRENALS: Able bilateral adrenal nodules including a 2.5 cm right adrenal   nodule and multiple nodules in the left adrenal gland measuring up to 1.8   cm since 6/13/2018.  KIDNEYS/URETERS: No hydronephrosis.    BLADDER: Unremarkable.  REPRODUCTIVE ORGANS: Hysterectomy.    BOWEL: Dilated loops of small bowel with a transition point at the   anterior aspect of the lower abdomen/upper pelvis (series 2 image 72).   There is enhancement with nodularity in the region of the transition   point suspicious for an implant. Colonic diverticulosis without evidence   of diverticulitis. Multiple small bowel loops appear adhesed to the   anterior abdominal wall.  PERITONEUM: Nodularity in the omentum consistent with carcinomatosis (for   example series 2 image 62 in the right hemiabdomen). Small bowel loops in   the pelvis appear matted down suspicious for underlying carcinomatosis.   Small amount of ascites.  VESSELS: Atherosclerotic changes. Abdominal aorta normal in caliber.  RETROPERITONEUM/LYMPH NODES: No lymphadenopathy.  ABDOMINAL WALL: Unremarkable.  BONES: No aggressive osseous lesion. Degenerative changes in the spine.    IMPRESSION:    Small bowel obstruction with a transition point at the anterior aspect of   the lower abdomen/upper pelvis, likely secondary to a serosal implant in   this region.    Nodularity in the omentum consistent with carcinomatosis and matted down   appearance of small bowel loops in the pelvis suspicious for additional   implants.    Moderate intra and extra hepatic biliary ductal dilatation, slightly   increased since 6/9/2019; correlation is recommended with LFTs.    Apreliminary report was provided by [facility]. The findings in the final   report were discussed with Dr. Ortega 9:18 AM on 12/9/2021.        
SURGERY DAILY PROGRESS NOTE:     Subjective:  Patient seen and examined this AM at bedside. No acute events overnight and patient resting comfortably. Tolerating diet. Reports the BM has gone down. Has bowel function. Denies fever/chills, shortness of breath, chest pain. VS reviewed    Objective:    MEDICATIONS  (STANDING):  amLODIPine   Tablet 5 milliGRAM(s) Oral daily  enoxaparin Injectable 40 milliGRAM(s) SubCutaneous daily  octreotide  Injectable 50 MICROGram(s) IV Push every 8 hours  pantoprazole  Injectable 40 milliGRAM(s) IV Push daily  sodium chloride 0.9%. 1000 milliLiter(s) (60 mL/Hr) IV Continuous <Continuous>    MEDICATIONS  (PRN):  ALBUTerol    90 MICROgram(s) HFA Inhaler 2 Puff(s) Inhalation every 6 hours PRN Bronchospasm  dexAMETHasone  Injectable 4 milliGRAM(s) IV Push every 6 hours PRN nausea and vomiting  HYDROmorphone  Injectable 1 milliGRAM(s) IV Push every 4 hours PRN Severe Pain (7 - 10)  ketorolac   Injectable 15 milliGRAM(s) IV Push every 6 hours PRN Moderate Pain (4 - 6)  melatonin 5 milliGRAM(s) Oral at bedtime PRN Sleep  metoclopramide Injectable 10 milliGRAM(s) IV Push every 6 hours PRN nausea and vomiting  ondansetron Injectable 4 milliGRAM(s) IV Push every 6 hours PRN Nausea      Vital Signs Last 24 Hrs  T(C): 36.7 (12 Dec 2021 09:45), Max: 37.3 (11 Dec 2021 14:50)  T(F): 98 (12 Dec 2021 09:45), Max: 99.1 (11 Dec 2021 14:50)  HR: 91 (12 Dec 2021 09:45) (91 - 110)  BP: 138/69 (12 Dec 2021 09:45) (107/57 - 138/69)  BP(mean): --  RR: 18 (12 Dec 2021 09:45) (18 - 18)  SpO2: 95% (12 Dec 2021 09:45) (95% - 97%)      PHYSICAL EXAM   GENERAL: NAD, AOx3, well developed  HEAD: Atraumatic, normocephalic  EYES: EOMI, PERRLA, conjunctiva and sclera clear  ENT: moist mucous membrane  NECK: supple, No JVD, midline trachea  CHEST/LUNG: clear to auscultation b/l, no rales, rhonchi, wheezing or rubs. unlabored respirations  Heart: S1, S2 normal, RRR w/o murmur  ABDOMEN: soft, nondistended, nontender  EXTREMITIES: +2 peripheral pulses, brisk cap refill. no clubbing, cyanosis or edema  NERVOUS SYSTEM: AOx3, speech clear, no neuro-deficits  MSK: full ROM, no deformities  SKIN: warm to touch, no rash or lesions      I&O's Detail    11 Dec 2021 07:01  -  12 Dec 2021 07:00  --------------------------------------------------------  IN:    sodium chloride 0.9%: 1000 mL  Total IN: 1000 mL    OUT:  Total OUT: 0 mL    Total NET: 1000 mL          Daily     Daily     LABS:                        12.0   6.67  )-----------( 219      ( 11 Dec 2021 06:44 )             36.0     12-12    142  |  109<H>  |  6<L>  ----------------------------<  143<H>  3.5   |  26  |  0.58    Ca    8.9      12 Dec 2021 08:20  Phos  3.7     12-12  Mg     2.0     12-12    TPro  5.6<L>  /  Alb  2.8<L>  /  TBili  0.4  /  DBili  x   /  AST  94<H>  /  ALT  240<H>  /  AlkPhos  275<H>  12-11          RADIOLOGY & ADDITIONAL STUDIES:        
SURGERY DAILY PROGRESS NOTE:     Subjective:  Patient seen and examined at bedside during am rounds doing well, reports passing flatus and small BM. PT had gastrografin challenge via NGT, contrast seen in the colon. Denies any fevers, chills, n/v/d, chest pain or shortness of breath    Objective:    MEDICATIONS  (STANDING):  amLODIPine   Tablet 5 milliGRAM(s) Oral daily  dextrose 5% + sodium chloride 0.45%. 1000 milliLiter(s) (125 mL/Hr) IV Continuous <Continuous>  enoxaparin Injectable 40 milliGRAM(s) SubCutaneous daily  octreotide  Injectable 50 MICROGram(s) IV Push every 8 hours  pantoprazole  Injectable 40 milliGRAM(s) IV Push daily    MEDICATIONS  (PRN):  dexAMETHasone  Injectable 4 milliGRAM(s) IV Push every 6 hours PRN nausea and vomiting  HYDROmorphone  Injectable 1 milliGRAM(s) IV Push every 4 hours PRN Severe Pain (7 - 10)  ketorolac   Injectable 15 milliGRAM(s) IV Push every 6 hours PRN Moderate Pain (4 - 6)  metoclopramide Injectable 10 milliGRAM(s) IV Push every 6 hours PRN nausea and vomiting  ondansetron Injectable 4 milliGRAM(s) IV Push every 6 hours PRN Nausea      Vital Signs Last 24 Hrs  T(C): 37.4 (10 Dec 2021 07:49), Max: 37.4 (10 Dec 2021 07:49)  T(F): 99.3 (10 Dec 2021 07:49), Max: 99.3 (10 Dec 2021 07:49)  HR: 91 (10 Dec 2021 07:49) (80 - 92)  BP: 161/71 (10 Dec 2021 07:49) (152/70 - 161/71)  BP(mean): --  RR: 19 (10 Dec 2021 07:49) (18 - 19)  SpO2: 97% (10 Dec 2021 07:49) (91% - 97%)      PHYSICAL EXAM   Gen: well-appearing, in no acute distress  CV: pulse regularly present   Resp: airway patent, non-labored breathing  Abd: soft, non-tender, non-distended  ext. no cyanosis or edema      I&O's Detail    09 Dec 2021 07:01  -  10 Dec 2021 07:00  --------------------------------------------------------  IN:    dextrose 5% + sodium chloride 0.45%:  mL  Total IN:  mL    OUT:    Nasogastric/Oral tube (mL): 0 mL  Total OUT: 0 mL    Total NET:  mL          Daily     Daily     LABS:                        12.7   3.72  )-----------( 187      ( 09 Dec 2021 06:14 )             37.7         135  |  107  |  9   ----------------------------<  187<H>  3.4<L>   |  21<L>  |  0.51    Ca    8.2<L>      09 Dec 2021 06:14  Phos  2.5       Mg     2.0         TPro  7.3  /  Alb  3.6  /  TBili  0.7  /  DBili  x   /  AST  41<H>  /  ALT  32  /  AlkPhos  162<H>        Urinalysis Basic - ( 08 Dec 2021 20:57 )    Color: Yellow / Appearance: Clear / S.010 / pH: x  Gluc: x / Ketone: Negative  / Bili: Negative / Urobili: 1 mg/dL   Blood: x / Protein: 15 mg/dL / Nitrite: Negative   Leuk Esterase: Trace / RBC: 0-2 /HPF / WBC 0-2   Sq Epi: x / Non Sq Epi: Occasional / Bacteria: Few            
Patient seen earlier this morning  NG tube removed  States she did have bowel movement but no flatus  Belching has started again after apple juice  Currently on liquid diet    Abdomen soft minimal tenderness bowel sounds present  < from: CT Abdomen and Pelvis w/ Oral Cont and w/ IV Cont (12.09.21 @ 00:50) >  PROCEDURE:  CT of the Abdomen and Pelvis was performed.  Sagittal and coronal reformats were performed.    FINDINGS:  LOWER CHEST: Small left pleural effusion. Linear atelectasis or scarring   in the right lower lobe. Partially imaged postsurgical changes in the   lower chest with a probable gastric pull-through.    LIVER: 5.8 cm cyst in the left hepatic lobe.  BILE DUCTS: Moderate intrahepatic biliary ductal dilatation and dilated   common bile duct measures 1.2 cm, slightly increased since 6/9/2019;   common bile duct previously measured 1.0 cm.  GALLBLADDER: Cholecystectomy.  SPLEEN: Within normal limits.  PANCREAS: Atrophic.  ADRENALS: Able bilateral adrenal nodules including a 2.5 cm right adrenal   nodule and multiple nodules in the left adrenal gland measuring up to 1.8   cm since 6/13/2018.  KIDNEYS/URETERS: No hydronephrosis.    BLADDER: Unremarkable.  REPRODUCTIVE ORGANS: Hysterectomy.    BOWEL: Dilated loops of small bowel with a transition point at the   anterior aspect of the lower abdomen/upper pelvis (series 2 image 72).   There is enhancement with nodularity in the region of the transition   point suspicious for an implant. Colonic diverticulosis without evidence   of diverticulitis. Multiple small bowel loops appear adhesed to the   anterior abdominal wall.  PERITONEUM: Nodularity in the omentum consistent with carcinomatosis (for   example series 2 image 62 in the right hemiabdomen). Small bowel loops in   the pelvis appear matted down suspicious for underlying carcinomatosis.   Small amount of ascites.  VESSELS: Atherosclerotic changes. Abdominal aorta normal in caliber.  RETROPERITONEUM/LYMPH NODES: No lymphadenopathy.  ABDOMINAL WALL: Unremarkable.  BONES: No aggressive osseous lesion. Degenerative changes in the spine.    IMPRESSION:    Small bowel obstruction with a transition point at the anterior aspect of   the lower abdomen/upper pelvis, likely secondary to a serosal implant in   this region.    Nodularity in the omentum consistent with carcinomatosis and matted down   appearance of small bowel loops in the pelvis suspicious for additional   implants.    Moderate intra and extra hepatic biliary ductal dilatation, slightly   increased since 6/9/2019; correlation is recommended with LFTs.      CBC Full  -  ( 10 Dec 2021 08:32 )  WBC Count : 5.11 K/uL  RBC Count : 3.91 M/uL  Hemoglobin : 13.2 g/dL  Hematocrit : 39.6 %  Platelet Count - Automated : 226 K/uL  Mean Cell Volume : 101.3 fl  Mean Cell Hemoglobin : 33.8 pg  Mean Cell Hemoglobin Concentration : 33.3 gm/dL    12-10    141  |  111<H>  |  3<L>  ----------------------------<  183<H>  3.4<L>   |  23  |  0.58    Ca    8.9      10 Dec 2021 08:32  Phos  2.7     12-10  Mg     2.1     12-10    TPro  7.3  /  Alb  3.6  /  TBili  0.7  /  DBili  x   /  AST  41<H>  /  ALT  32  /  AlkPhos  162<H>  12-08          PT/INR - ( 10 Dec 2021 11:15 )   PT: 12.9 sec;   INR: 1.12 ratio         PTT - ( 10 Dec 2021 11:15 )  PTT:28.8 sec    Vital Signs Last 24 Hrs  T(C): 37.1 (10 Dec 2021 18:13), Max: 37.4 (10 Dec 2021 07:49)  T(F): 98.7 (10 Dec 2021 18:13), Max: 99.3 (10 Dec 2021 07:49)  HR: 82 (10 Dec 2021 18:13) (80 - 91)  BP: 147/57 (10 Dec 2021 18:13) (136/75 - 161/71)  BP(mean): --  RR: 18 (10 Dec 2021 18:13) (18 - 19)  SpO2: 97% (10 Dec 2021 18:13) (91% - 98%)  `  
SURGERY DAILY PROGRESS NOTE:     Subjective:  Patient seen and examined this AM at bedside. No acute events overnight and patient resting comfortably. Patient had multiple bowel movement, mostly loose stool/diarrhea. Denies n/v, tolerating regular diet. Denies fever/chills, shortness of breath, chest pain. VS reviewed    Objective:    MEDICATIONS  (STANDING):  amLODIPine   Tablet 5 milliGRAM(s) Oral daily  enoxaparin Injectable 40 milliGRAM(s) SubCutaneous daily  octreotide  Injectable 50 MICROGram(s) IV Push every 8 hours  pantoprazole  Injectable 40 milliGRAM(s) IV Push daily  sodium chloride 0.9%. 1000 milliLiter(s) (60 mL/Hr) IV Continuous <Continuous>    MEDICATIONS  (PRN):  ALBUTerol    90 MICROgram(s) HFA Inhaler 2 Puff(s) Inhalation every 6 hours PRN Bronchospasm  dexAMETHasone  Injectable 4 milliGRAM(s) IV Push every 6 hours PRN nausea and vomiting  HYDROmorphone  Injectable 1 milliGRAM(s) IV Push every 4 hours PRN Severe Pain (7 - 10)  ketorolac   Injectable 15 milliGRAM(s) IV Push every 6 hours PRN Moderate Pain (4 - 6)  melatonin 5 milliGRAM(s) Oral at bedtime PRN Sleep  metoclopramide Injectable 10 milliGRAM(s) IV Push every 6 hours PRN nausea and vomiting  ondansetron Injectable 4 milliGRAM(s) IV Push every 6 hours PRN Nausea      Vital Signs Last 24 Hrs  T(C): 37.1 (11 Dec 2021 21:23), Max: 37.3 (11 Dec 2021 14:50)  T(F): 98.8 (11 Dec 2021 21:23), Max: 99.1 (11 Dec 2021 14:50)  HR: 97 (11 Dec 2021 21:23) (77 - 110)  BP: 107/57 (11 Dec 2021 21:23) (107/57 - 145/67)  BP(mean): --  RR: 18 (11 Dec 2021 21:23) (18 - 18)  SpO2: 96% (11 Dec 2021 21:23) (95% - 97%)      PHYSICAL EXAM   GENERAL: NAD, AOx3, well developed, well nourished  HEAD: Atraumatic, normocephalic  EYES: EOMI, PERRLA, conjunctiva and sclera clear  ENT: moist mucous membrane  NECK: supple, No JVD, midline trachea  CHEST/LUNG: clear to auscultation b/l, no rales, rhonchi, wheezing or rubs. unlabored respirations  Heart: S1, S2 normal, RRR w/o murmur  ABDOMEN: soft, nondistended, nontender. no organomegaly  EXTREMITIES: +2 peripheral pulses, brisk cap refill. no clubbing, cyanosis or edema  NERVOUS SYSTEM: AOx3, speech clear, no neuro-deficits  MSK: full ROM, no deformities  SKIN: warm to touch, no rash or lesions      I&O's Detail      Daily     Daily     LABS:                        12.0   6.67  )-----------( 219      ( 11 Dec 2021 06:44 )             36.0     12-11    139  |  108  |  2<L>  ----------------------------<  157<H>  3.2<L>   |  25  |  0.52    Ca    8.5      11 Dec 2021 06:44  Phos  3.4     12-11  Mg     2.1     12-11    TPro  5.6<L>  /  Alb  2.8<L>  /  TBili  0.4  /  DBili  x   /  AST  94<H>  /  ALT  240<H>  /  AlkPhos  275<H>  12-11    PT/INR - ( 10 Dec 2021 11:15 )   PT: 12.9 sec;   INR: 1.12 ratio         PTT - ( 10 Dec 2021 11:15 )  PTT:28.8 sec      RADIOLOGY & ADDITIONAL STUDIES:

## 2021-12-16 DIAGNOSIS — Z87.891 PERSONAL HISTORY OF NICOTINE DEPENDENCE: ICD-10-CM

## 2021-12-16 DIAGNOSIS — K21.9 GASTRO-ESOPHAGEAL REFLUX DISEASE WITHOUT ESOPHAGITIS: ICD-10-CM

## 2021-12-16 DIAGNOSIS — Z88.2 ALLERGY STATUS TO SULFONAMIDES: ICD-10-CM

## 2021-12-16 DIAGNOSIS — Z91.040 LATEX ALLERGY STATUS: ICD-10-CM

## 2021-12-16 DIAGNOSIS — Z92.3 PERSONAL HISTORY OF IRRADIATION: ICD-10-CM

## 2021-12-16 DIAGNOSIS — I10 ESSENTIAL (PRIMARY) HYPERTENSION: ICD-10-CM

## 2021-12-16 DIAGNOSIS — K56.609 UNSPECIFIED INTESTINAL OBSTRUCTION, UNSPECIFIED AS TO PARTIAL VERSUS COMPLETE OBSTRUCTION: ICD-10-CM

## 2021-12-16 DIAGNOSIS — Z92.21 PERSONAL HISTORY OF ANTINEOPLASTIC CHEMOTHERAPY: ICD-10-CM

## 2021-12-16 DIAGNOSIS — C16.9 MALIGNANT NEOPLASM OF STOMACH, UNSPECIFIED: ICD-10-CM

## 2021-12-16 DIAGNOSIS — E44.0 MODERATE PROTEIN-CALORIE MALNUTRITION: ICD-10-CM

## 2021-12-16 DIAGNOSIS — Z90.49 ACQUIRED ABSENCE OF OTHER SPECIFIED PARTS OF DIGESTIVE TRACT: ICD-10-CM

## 2021-12-16 DIAGNOSIS — E78.5 HYPERLIPIDEMIA, UNSPECIFIED: ICD-10-CM

## 2022-03-03 ENCOUNTER — OUTPATIENT (OUTPATIENT)
Dept: OUTPATIENT SERVICES | Facility: HOSPITAL | Age: 62
LOS: 1 days | End: 2022-03-03

## 2022-03-03 ENCOUNTER — INPATIENT (INPATIENT)
Facility: HOSPITAL | Age: 62
LOS: 21 days | Discharge: HOSPICE | DRG: 329 | End: 2022-03-25
Attending: SURGERY | Admitting: SURGERY
Payer: COMMERCIAL

## 2022-03-03 ENCOUNTER — APPOINTMENT (OUTPATIENT)
Dept: SURGICAL ONCOLOGY | Facility: CLINIC | Age: 62
End: 2022-03-03
Payer: COMMERCIAL

## 2022-03-03 VITALS
OXYGEN SATURATION: 97 % | RESPIRATION RATE: 15 BRPM | TEMPERATURE: 98 F | DIASTOLIC BLOOD PRESSURE: 69 MMHG | SYSTOLIC BLOOD PRESSURE: 122 MMHG | HEART RATE: 95 BPM

## 2022-03-03 DIAGNOSIS — Z20.818 CONTACT WITH AND (SUSPECTED) EXPOSURE TO OTHER BACTERIAL COMMUNICABLE DISEASES: ICD-10-CM

## 2022-03-03 DIAGNOSIS — C48.2 MALIGNANT NEOPLASM OF PERITONEUM, UNSPECIFIED: ICD-10-CM

## 2022-03-03 DIAGNOSIS — Z98.2 PRESENCE OF CEREBROSPINAL FLUID DRAINAGE DEVICE: Chronic | ICD-10-CM

## 2022-03-03 DIAGNOSIS — Z98.890 OTHER SPECIFIED POSTPROCEDURAL STATES: Chronic | ICD-10-CM

## 2022-03-03 DIAGNOSIS — Z90.49 ACQUIRED ABSENCE OF OTHER SPECIFIED PARTS OF DIGESTIVE TRACT: Chronic | ICD-10-CM

## 2022-03-03 LAB
ANION GAP SERPL CALC-SCNC: 5 MMOL/L — SIGNIFICANT CHANGE UP (ref 5–17)
BUN SERPL-MCNC: 18 MG/DL — SIGNIFICANT CHANGE UP (ref 7–23)
CALCIUM SERPL-MCNC: 8.4 MG/DL — LOW (ref 8.5–10.1)
CHLORIDE SERPL-SCNC: 110 MMOL/L — HIGH (ref 96–108)
CO2 SERPL-SCNC: 23 MMOL/L — SIGNIFICANT CHANGE UP (ref 22–31)
CREAT SERPL-MCNC: 0.6 MG/DL — SIGNIFICANT CHANGE UP (ref 0.5–1.3)
EGFR: 101 ML/MIN/1.73M2 — SIGNIFICANT CHANGE UP
GLUCOSE SERPL-MCNC: 99 MG/DL — SIGNIFICANT CHANGE UP (ref 70–99)
POTASSIUM SERPL-MCNC: 4.6 MMOL/L — SIGNIFICANT CHANGE UP (ref 3.5–5.3)
POTASSIUM SERPL-SCNC: 4.6 MMOL/L — SIGNIFICANT CHANGE UP (ref 3.5–5.3)
SARS-COV-2 RNA SPEC QL NAA+PROBE: SIGNIFICANT CHANGE UP
SODIUM SERPL-SCNC: 138 MMOL/L — SIGNIFICANT CHANGE UP (ref 135–145)

## 2022-03-03 PROCEDURE — 89051 BODY FLUID CELL COUNT: CPT

## 2022-03-03 PROCEDURE — 85730 THROMBOPLASTIN TIME PARTIAL: CPT

## 2022-03-03 PROCEDURE — 88108 CYTOPATH CONCENTRATE TECH: CPT

## 2022-03-03 PROCEDURE — 71260 CT THORAX DX C+: CPT

## 2022-03-03 PROCEDURE — 80053 COMPREHEN METABOLIC PANEL: CPT

## 2022-03-03 PROCEDURE — 87070 CULTURE OTHR SPECIMN AEROBIC: CPT

## 2022-03-03 PROCEDURE — 84100 ASSAY OF PHOSPHORUS: CPT

## 2022-03-03 PROCEDURE — U0005: CPT

## 2022-03-03 PROCEDURE — 82306 VITAMIN D 25 HYDROXY: CPT

## 2022-03-03 PROCEDURE — 85027 COMPLETE CBC AUTOMATED: CPT

## 2022-03-03 PROCEDURE — 88307 TISSUE EXAM BY PATHOLOGIST: CPT

## 2022-03-03 PROCEDURE — 87075 CULTR BACTERIA EXCEPT BLOOD: CPT

## 2022-03-03 PROCEDURE — 85610 PROTHROMBIN TIME: CPT

## 2022-03-03 PROCEDURE — 86850 RBC ANTIBODY SCREEN: CPT

## 2022-03-03 PROCEDURE — 80076 HEPATIC FUNCTION PANEL: CPT

## 2022-03-03 PROCEDURE — P9047: CPT | Mod: JG

## 2022-03-03 PROCEDURE — 83605 ASSAY OF LACTIC ACID: CPT

## 2022-03-03 PROCEDURE — 82652 VIT D 1 25-DIHYDROXY: CPT

## 2022-03-03 PROCEDURE — 83615 LACTATE (LD) (LDH) ENZYME: CPT

## 2022-03-03 PROCEDURE — 87635 SARS-COV-2 COVID-19 AMP PRB: CPT

## 2022-03-03 PROCEDURE — 93005 ELECTROCARDIOGRAM TRACING: CPT

## 2022-03-03 PROCEDURE — 94640 AIRWAY INHALATION TREATMENT: CPT

## 2022-03-03 PROCEDURE — 85025 COMPLETE CBC W/AUTO DIFF WBC: CPT

## 2022-03-03 PROCEDURE — 84132 ASSAY OF SERUM POTASSIUM: CPT

## 2022-03-03 PROCEDURE — 83036 HEMOGLOBIN GLYCOSYLATED A1C: CPT

## 2022-03-03 PROCEDURE — 97161 PT EVAL LOW COMPLEX 20 MIN: CPT | Mod: GP

## 2022-03-03 PROCEDURE — 74270 X-RAY XM COLON 1CNTRST STD: CPT

## 2022-03-03 PROCEDURE — 71045 X-RAY EXAM CHEST 1 VIEW: CPT

## 2022-03-03 PROCEDURE — C9113: CPT

## 2022-03-03 PROCEDURE — 84157 ASSAY OF PROTEIN OTHER: CPT

## 2022-03-03 PROCEDURE — U0003: CPT

## 2022-03-03 PROCEDURE — 84134 ASSAY OF PREALBUMIN: CPT

## 2022-03-03 PROCEDURE — 99244 OFF/OP CNSLTJ NEW/EST MOD 40: CPT

## 2022-03-03 PROCEDURE — 97116 GAIT TRAINING THERAPY: CPT | Mod: GP

## 2022-03-03 PROCEDURE — 87102 FUNGUS ISOLATION CULTURE: CPT

## 2022-03-03 PROCEDURE — 36415 COLL VENOUS BLD VENIPUNCTURE: CPT

## 2022-03-03 PROCEDURE — 83986 ASSAY PH BODY FLUID NOS: CPT

## 2022-03-03 PROCEDURE — 83735 ASSAY OF MAGNESIUM: CPT

## 2022-03-03 PROCEDURE — 84478 ASSAY OF TRIGLYCERIDES: CPT

## 2022-03-03 PROCEDURE — 80048 BASIC METABOLIC PNL TOTAL CA: CPT

## 2022-03-03 PROCEDURE — C1889: CPT

## 2022-03-03 PROCEDURE — C9399: CPT

## 2022-03-03 PROCEDURE — 82042 OTHER SOURCE ALBUMIN QUAN EA: CPT

## 2022-03-03 PROCEDURE — 97110 THERAPEUTIC EXERCISES: CPT | Mod: GP

## 2022-03-03 PROCEDURE — 82945 GLUCOSE OTHER FLUID: CPT

## 2022-03-03 PROCEDURE — 97530 THERAPEUTIC ACTIVITIES: CPT | Mod: GP

## 2022-03-03 PROCEDURE — 82962 GLUCOSE BLOOD TEST: CPT

## 2022-03-03 PROCEDURE — 74177 CT ABD & PELVIS W/CONTRAST: CPT

## 2022-03-03 PROCEDURE — 86900 BLOOD TYPING SEROLOGIC ABO: CPT

## 2022-03-03 PROCEDURE — 86901 BLOOD TYPING SEROLOGIC RH(D): CPT

## 2022-03-03 PROCEDURE — 74018 RADEX ABDOMEN 1 VIEW: CPT

## 2022-03-03 PROCEDURE — 88305 TISSUE EXAM BY PATHOLOGIST: CPT

## 2022-03-03 RX ORDER — KETOROLAC TROMETHAMINE 30 MG/ML
15 SYRINGE (ML) INJECTION ONCE
Refills: 0 | Status: DISCONTINUED | OUTPATIENT
Start: 2022-03-03 | End: 2022-03-04

## 2022-03-03 RX ORDER — SODIUM CHLORIDE 9 MG/ML
1000 INJECTION, SOLUTION INTRAVENOUS
Refills: 0 | Status: DISCONTINUED | OUTPATIENT
Start: 2022-03-03 | End: 2022-03-06

## 2022-03-03 RX ORDER — SENNA PLUS 8.6 MG/1
2 TABLET ORAL AT BEDTIME
Refills: 0 | Status: DISCONTINUED | OUTPATIENT
Start: 2022-03-03 | End: 2022-03-18

## 2022-03-03 RX ORDER — KETOROLAC TROMETHAMINE 30 MG/ML
15 SYRINGE (ML) INJECTION ONCE
Refills: 0 | Status: DISCONTINUED | OUTPATIENT
Start: 2022-03-03 | End: 2022-03-08

## 2022-03-03 RX ORDER — ONDANSETRON 8 MG/1
4 TABLET, FILM COATED ORAL EVERY 6 HOURS
Refills: 0 | Status: DISCONTINUED | OUTPATIENT
Start: 2022-03-03 | End: 2022-03-18

## 2022-03-03 RX ORDER — ACETAMINOPHEN 500 MG
1000 TABLET ORAL EVERY 6 HOURS
Refills: 0 | Status: DISCONTINUED | OUTPATIENT
Start: 2022-03-03 | End: 2022-03-09

## 2022-03-03 RX ORDER — KETOROLAC TROMETHAMINE 30 MG/ML
15 SYRINGE (ML) INJECTION ONCE
Refills: 0 | Status: DISCONTINUED | OUTPATIENT
Start: 2022-03-03 | End: 2022-03-03

## 2022-03-03 RX ORDER — SODIUM CHLORIDE 9 MG/ML
1000 INJECTION, SOLUTION INTRAVENOUS
Refills: 0 | Status: DISCONTINUED | OUTPATIENT
Start: 2022-03-03 | End: 2022-03-18

## 2022-03-03 RX ORDER — ACETAMINOPHEN 500 MG
1000 TABLET ORAL ONCE
Refills: 0 | Status: COMPLETED | OUTPATIENT
Start: 2022-03-03 | End: 2022-03-03

## 2022-03-03 RX ORDER — PANTOPRAZOLE SODIUM 20 MG/1
40 TABLET, DELAYED RELEASE ORAL EVERY 12 HOURS
Refills: 0 | Status: DISCONTINUED | OUTPATIENT
Start: 2022-03-03 | End: 2022-03-09

## 2022-03-03 RX ORDER — ONDANSETRON 8 MG/1
4 TABLET, FILM COATED ORAL EVERY 6 HOURS
Refills: 0 | Status: DISCONTINUED | OUTPATIENT
Start: 2022-03-03 | End: 2022-03-09

## 2022-03-03 RX ORDER — ALBUTEROL 90 UG/1
2 AEROSOL, METERED ORAL EVERY 6 HOURS
Refills: 0 | Status: DISCONTINUED | OUTPATIENT
Start: 2022-03-03 | End: 2022-03-09

## 2022-03-03 RX ORDER — MORPHINE SULFATE 50 MG/1
2 CAPSULE, EXTENDED RELEASE ORAL EVERY 4 HOURS
Refills: 0 | Status: DISCONTINUED | OUTPATIENT
Start: 2022-03-03 | End: 2022-03-09

## 2022-03-03 RX ADMIN — Medication 1000 MILLIGRAM(S): at 23:38

## 2022-03-03 RX ADMIN — Medication 400 MILLIGRAM(S): at 23:08

## 2022-03-03 RX ADMIN — Medication 15 MILLIGRAM(S): at 20:56

## 2022-03-03 RX ADMIN — Medication 15 MILLIGRAM(S): at 21:26

## 2022-03-03 NOTE — PATIENT PROFILE ADULT - FALL HARM RISK - UNIVERSAL INTERVENTIONS
Bed in lowest position, wheels locked, appropriate side rails in place/Call bell, personal items and telephone in reach/Instruct patient to call for assistance before getting out of bed or chair/Non-slip footwear when patient is out of bed/[place] to call system/Physically safe environment - no spills, clutter or unnecessary equipment/Purposeful Proactive Rounding/Room/bathroom lighting operational, light cord in reach

## 2022-03-04 ENCOUNTER — RESULT REVIEW (OUTPATIENT)
Age: 62
End: 2022-03-04

## 2022-03-04 DIAGNOSIS — Z20.818 CONTACT WITH AND (SUSPECTED) EXPOSURE TO OTHER BACTERIAL COMMUNICABLE DISEASES: ICD-10-CM

## 2022-03-04 LAB
ALBUMIN FLD-MCNC: 2.2 G/DL — SIGNIFICANT CHANGE UP
ALBUMIN SERPL ELPH-MCNC: 3 G/DL — LOW (ref 3.3–5)
ALP SERPL-CCNC: 201 U/L — HIGH (ref 40–120)
ALT FLD-CCNC: 113 U/L — HIGH (ref 12–78)
ANION GAP SERPL CALC-SCNC: 6 MMOL/L — SIGNIFICANT CHANGE UP (ref 5–17)
APTT BLD: 37.3 SEC — HIGH (ref 27.5–35.5)
AST SERPL-CCNC: 60 U/L — HIGH (ref 15–37)
B PERT IGG+IGM PNL SER: CLEAR — SIGNIFICANT CHANGE UP
BILIRUB SERPL-MCNC: 0.7 MG/DL — SIGNIFICANT CHANGE UP (ref 0.2–1.2)
BUN SERPL-MCNC: 18 MG/DL — SIGNIFICANT CHANGE UP (ref 7–23)
CALCIUM SERPL-MCNC: 8.8 MG/DL — SIGNIFICANT CHANGE UP (ref 8.5–10.1)
CHLORIDE SERPL-SCNC: 105 MMOL/L — SIGNIFICANT CHANGE UP (ref 96–108)
CO2 SERPL-SCNC: 25 MMOL/L — SIGNIFICANT CHANGE UP (ref 22–31)
COLOR FLD: YELLOW — SIGNIFICANT CHANGE UP
CREAT SERPL-MCNC: 0.69 MG/DL — SIGNIFICANT CHANGE UP (ref 0.5–1.3)
EGFR: 97 ML/MIN/1.73M2 — SIGNIFICANT CHANGE UP
FLUID INTAKE SUBSTANCE CLASS: SIGNIFICANT CHANGE UP
FLUID SEGMENTED GRANULOCYTES: 15 % — SIGNIFICANT CHANGE UP
GLUCOSE FLD-MCNC: 100 MG/DL — SIGNIFICANT CHANGE UP
GLUCOSE SERPL-MCNC: 103 MG/DL — HIGH (ref 70–99)
GRAM STN FLD: SIGNIFICANT CHANGE UP
HCT VFR BLD CALC: 39.9 % — SIGNIFICANT CHANGE UP (ref 34.5–45)
HGB BLD-MCNC: 12.9 G/DL — SIGNIFICANT CHANGE UP (ref 11.5–15.5)
INR BLD: 1.22 RATIO — HIGH (ref 0.88–1.16)
LDH SERPL L TO P-CCNC: 210 U/L — SIGNIFICANT CHANGE UP
LYMPHOCYTES # FLD: 20 % — SIGNIFICANT CHANGE UP
MAGNESIUM SERPL-MCNC: 2.5 MG/DL — SIGNIFICANT CHANGE UP (ref 1.6–2.6)
MCHC RBC-ENTMCNC: 32.3 GM/DL — SIGNIFICANT CHANGE UP (ref 32–36)
MCHC RBC-ENTMCNC: 32.3 PG — SIGNIFICANT CHANGE UP (ref 27–34)
MCV RBC AUTO: 100 FL — SIGNIFICANT CHANGE UP (ref 80–100)
MONOS+MACROS # FLD: 65 % — SIGNIFICANT CHANGE UP
PH FLD: 7.7 — SIGNIFICANT CHANGE UP
PHOSPHATE SERPL-MCNC: 4.3 MG/DL — SIGNIFICANT CHANGE UP (ref 2.5–4.5)
PLATELET # BLD AUTO: 280 K/UL — SIGNIFICANT CHANGE UP (ref 150–400)
POTASSIUM SERPL-MCNC: 4.4 MMOL/L — SIGNIFICANT CHANGE UP (ref 3.5–5.3)
POTASSIUM SERPL-SCNC: 4.4 MMOL/L — SIGNIFICANT CHANGE UP (ref 3.5–5.3)
PROT FLD-MCNC: 3 G/DL — SIGNIFICANT CHANGE UP
PROT SERPL-MCNC: 5.8 GM/DL — LOW (ref 6–8.3)
PROTHROM AB SERPL-ACNC: 14.2 SEC — HIGH (ref 10.5–13.4)
RBC # BLD: 3.99 M/UL — SIGNIFICANT CHANGE UP (ref 3.8–5.2)
RBC # FLD: 14.2 % — SIGNIFICANT CHANGE UP (ref 10.3–14.5)
RCV VOL RI: 3000 /UL — HIGH (ref 0–0)
SODIUM SERPL-SCNC: 136 MMOL/L — SIGNIFICANT CHANGE UP (ref 135–145)
SPECIMEN SOURCE: SIGNIFICANT CHANGE UP
TOTAL NUCLEATED CELL COUNT, BODY FLUID: 325 /UL — SIGNIFICANT CHANGE UP
TUBE TYPE: SIGNIFICANT CHANGE UP
WBC # BLD: 8.44 K/UL — SIGNIFICANT CHANGE UP (ref 3.8–10.5)
WBC # FLD AUTO: 8.44 K/UL — SIGNIFICANT CHANGE UP (ref 3.8–10.5)

## 2022-03-04 PROCEDURE — 74177 CT ABD & PELVIS W/CONTRAST: CPT | Mod: 26

## 2022-03-04 PROCEDURE — 71260 CT THORAX DX C+: CPT | Mod: 26

## 2022-03-04 PROCEDURE — 99252 IP/OBS CONSLTJ NEW/EST SF 35: CPT | Mod: 25

## 2022-03-04 PROCEDURE — 88305 TISSUE EXAM BY PATHOLOGIST: CPT | Mod: 26

## 2022-03-04 PROCEDURE — 99253 IP/OBS CNSLTJ NEW/EST LOW 45: CPT

## 2022-03-04 PROCEDURE — 71045 X-RAY EXAM CHEST 1 VIEW: CPT | Mod: 26

## 2022-03-04 PROCEDURE — 32557 INSERT CATH PLEURA W/ IMAGE: CPT

## 2022-03-04 PROCEDURE — 88108 CYTOPATH CONCENTRATE TECH: CPT | Mod: 26

## 2022-03-04 PROCEDURE — 99232 SBSQ HOSP IP/OBS MODERATE 35: CPT

## 2022-03-04 RX ORDER — ELECTROLYTE SOLUTION,INJ
1 VIAL (ML) INTRAVENOUS
Refills: 0 | Status: DISCONTINUED | OUTPATIENT
Start: 2022-03-04 | End: 2022-03-04

## 2022-03-04 RX ORDER — DEXAMETHASONE 0.5 MG/5ML
10 ELIXIR ORAL EVERY 6 HOURS
Refills: 0 | Status: DISCONTINUED | OUTPATIENT
Start: 2022-03-04 | End: 2022-03-08

## 2022-03-04 RX ORDER — LANOLIN ALCOHOL/MO/W.PET/CERES
5 CREAM (GRAM) TOPICAL AT BEDTIME
Refills: 0 | Status: DISCONTINUED | OUTPATIENT
Start: 2022-03-04 | End: 2022-03-09

## 2022-03-04 RX ORDER — OCTREOTIDE ACETATE 200 UG/ML
100 INJECTION, SOLUTION INTRAVENOUS; SUBCUTANEOUS EVERY 8 HOURS
Refills: 0 | Status: DISCONTINUED | OUTPATIENT
Start: 2022-03-04 | End: 2022-03-09

## 2022-03-04 RX ORDER — METOCLOPRAMIDE HCL 10 MG
10 TABLET ORAL EVERY 6 HOURS
Refills: 0 | Status: DISCONTINUED | OUTPATIENT
Start: 2022-03-04 | End: 2022-03-04

## 2022-03-04 RX ORDER — PROCHLORPERAZINE MALEATE 5 MG
10 TABLET ORAL ONCE
Refills: 0 | Status: COMPLETED | OUTPATIENT
Start: 2022-03-04 | End: 2022-03-05

## 2022-03-04 RX ORDER — DEXAMETHASONE 0.5 MG/5ML
10 ELIXIR ORAL EVERY 6 HOURS
Refills: 0 | Status: DISCONTINUED | OUTPATIENT
Start: 2022-03-04 | End: 2022-03-04

## 2022-03-04 RX ORDER — TOPIRAMATE 25 MG
25 TABLET ORAL EVERY 12 HOURS
Refills: 0 | Status: DISCONTINUED | OUTPATIENT
Start: 2022-03-04 | End: 2022-03-09

## 2022-03-04 RX ORDER — METOPROLOL TARTRATE 50 MG
5 TABLET ORAL EVERY 6 HOURS
Refills: 0 | Status: DISCONTINUED | OUTPATIENT
Start: 2022-03-04 | End: 2022-03-09

## 2022-03-04 RX ADMIN — SODIUM CHLORIDE 100 MILLILITER(S): 9 INJECTION, SOLUTION INTRAVENOUS at 00:57

## 2022-03-04 RX ADMIN — ONDANSETRON 4 MILLIGRAM(S): 8 TABLET, FILM COATED ORAL at 22:05

## 2022-03-04 RX ADMIN — Medication 25 MILLIGRAM(S): at 22:05

## 2022-03-04 RX ADMIN — OCTREOTIDE ACETATE 100 MICROGRAM(S): 200 INJECTION, SOLUTION INTRAVENOUS; SUBCUTANEOUS at 15:22

## 2022-03-04 RX ADMIN — MORPHINE SULFATE 2 MILLIGRAM(S): 50 CAPSULE, EXTENDED RELEASE ORAL at 02:01

## 2022-03-04 RX ADMIN — PANTOPRAZOLE SODIUM 40 MILLIGRAM(S): 20 TABLET, DELAYED RELEASE ORAL at 22:04

## 2022-03-04 RX ADMIN — Medication 15 MILLIGRAM(S): at 09:51

## 2022-03-04 RX ADMIN — MORPHINE SULFATE 2 MILLIGRAM(S): 50 CAPSULE, EXTENDED RELEASE ORAL at 18:44

## 2022-03-04 RX ADMIN — SODIUM CHLORIDE 100 MILLILITER(S): 9 INJECTION, SOLUTION INTRAVENOUS at 23:20

## 2022-03-04 RX ADMIN — Medication 5 MILLIGRAM(S): at 22:05

## 2022-03-04 RX ADMIN — OCTREOTIDE ACETATE 100 MICROGRAM(S): 200 INJECTION, SOLUTION INTRAVENOUS; SUBCUTANEOUS at 22:04

## 2022-03-04 RX ADMIN — MORPHINE SULFATE 2 MILLIGRAM(S): 50 CAPSULE, EXTENDED RELEASE ORAL at 23:44

## 2022-03-04 RX ADMIN — Medication 102 MILLIGRAM(S): at 17:11

## 2022-03-04 RX ADMIN — Medication 102 MILLIGRAM(S): at 23:20

## 2022-03-04 RX ADMIN — SODIUM CHLORIDE 100 MILLILITER(S): 9 INJECTION, SOLUTION INTRAVENOUS at 11:17

## 2022-03-04 RX ADMIN — MORPHINE SULFATE 2 MILLIGRAM(S): 50 CAPSULE, EXTENDED RELEASE ORAL at 23:20

## 2022-03-04 RX ADMIN — PANTOPRAZOLE SODIUM 40 MILLIGRAM(S): 20 TABLET, DELAYED RELEASE ORAL at 08:44

## 2022-03-04 RX ADMIN — Medication 15 MILLIGRAM(S): at 10:14

## 2022-03-04 RX ADMIN — MORPHINE SULFATE 2 MILLIGRAM(S): 50 CAPSULE, EXTENDED RELEASE ORAL at 08:43

## 2022-03-04 RX ADMIN — MORPHINE SULFATE 2 MILLIGRAM(S): 50 CAPSULE, EXTENDED RELEASE ORAL at 02:31

## 2022-03-04 RX ADMIN — Medication 1 EACH: at 22:34

## 2022-03-04 NOTE — CHART NOTE - NSCHARTNOTEFT_GEN_A_CORE
Clinical Nutrition PPN Recommendation Note    *see full initial assessment for information*    *labs reviewed: 03-04; potassium, phos, and magnesium are upper level norm.  potassium is more into normal range, will add small amount (20mEq) and monitor closely.  will not add phos or magnesium.  corrected Ca WNL, not able to add to PN bag as CAPS is out, supplement outside of PN bag at this time.  bilirubin total WNL.  obtain triglyceride level and POCT q6hrs.    136  |  105  |  18  ----------------------------<  103<H>  4.4   |  25  |  0.69    Ca    8.8      04 Mar 2022 00:35  Phos  4.3     03-04  Mg     2.5     03-04    TPro  5.8<L>  /  Alb  3.0<L>  /  TBili  0.7  /  DBili  x   /  AST  60<H>  /  ALT  113<H>  /  AlkPhos  201<H>  03-04    ESTIMATED NUTR NEEDS: based on 73Kg admit wt  1825-2190Kcal (25-30Kcal/Kg)  110-146g protein (1.5-2 g/Kg)  1825-2190mL (25-30mL/Kg)    PPN RECOMMENDATIONS:  2000mL total volume via Peripheral line    90g Amino Acids  100g Dextrose  0g Lipids 20% (obtain triglyceride level before starting lipids)  145 mEq NaCl  9 mEq NaAcetate  0 mMol NaPhos  10 mEq KCl  10 mEq KAcetate  0 mMol KPhos  0 mEq Calcium Gluconate (supplement outside of PPN bag)  0 mEq Magnesium Sulfate  100 mg thiamine  1 mL trace elements  10 mL MVI    total calories: 700Kcal (meets ~38% of estimated calorie needs and ~82% of estimated protein needs); osmolarity 895.    ADDITIONAL RECOMMENDATIONS:  1) obtain triglyceride level  2) POCT q6hrs; maintain blood glucose control 140-180mg/dL  3) strict I/O's  4) daily lytes with magnesium and phos levels  5) daily wt checks to track/trend changes  6) obtain PICC line and ID consult    *will monitor and adjust treatement plan prn*  Tiki Ascencio MA, RDN, CDN, CNSC [phone]

## 2022-03-04 NOTE — HISTORY OF PRESENT ILLNESS
[de-identified] : Ms. 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 Whyte, which she has tolerated well. \par \par She was admitted to  about 2 months ago with a partial SBO that resolved with non-operative management. She comes today, however, with many of the same obstructive symptoms. She reports progressive abdominal distention, pain, and "gassiness" without the gas coming out most times. She does report the ability to have BMs and pass flatus. She does tolerate food PO but admits it is minimal and difficult for things to feel like they pass. Lately, as well, she has developed chest tightness on the left side and progressive shortness of breath.

## 2022-03-04 NOTE — H&P ADULT - NSHPLABSRESULTS_GEN_ALL_CORE
.  LABS:     03-03    138  |  110<H>  |  18  ----------------------------<  99  4.6   |  23  |  0.60    Ca    8.4<L>      03 Mar 2022 20:53                RADIOLOGY, EKG & ADDITIONAL TESTS: Reviewed.

## 2022-03-04 NOTE — DIETITIAN INITIAL EVALUATION ADULT. - PERTINENT MEDS FT
MEDICATIONS  (STANDING):  lactated ringers. 1000 milliLiter(s) (100 mL/Hr) IV Continuous <Continuous>  melatonin 5 milliGRAM(s) Oral at bedtime  multivitamin 1 Tablet(s) Oral daily  pantoprazole  Injectable 40 milliGRAM(s) IV Push every 12 hours  topiramate 25 milliGRAM(s) Oral every 12 hours    MEDICATIONS  (PRN):  acetaminophen   IVPB .. 1000 milliGRAM(s) IV Intermittent every 6 hours PRN Mild Pain (1 - 3)  ALBUTerol    90 MICROgram(s) HFA Inhaler 2 Puff(s) Inhalation every 6 hours PRN Shortness of Breath  ketorolac   Injectable 15 milliGRAM(s) IV Push once PRN Moderate Pain (4 - 6)  metoprolol tartrate Injectable 5 milliGRAM(s) IV Push every 6 hours PRN SBP > 140  morphine  - Injectable 2 milliGRAM(s) IV Push every 4 hours PRN Severe Pain (7 - 10)  ondansetron Injectable 4 milliGRAM(s) IV Push every 6 hours PRN Nausea

## 2022-03-04 NOTE — H&P ADULT - NSHPPHYSICALEXAM_GEN_ALL_CORE
Physical Exam:  Pt is AAOx3  General: in mild distress   Chest: decreased breath sounds left side  Heart: RR, no murmurs, no rubs, no gallops.   Abdomen: Soft, minimal tenderness, distended no masses, BS normal.    Back: Normal curvature, no tenderness.   Neuro: Physiological, no localizing findings.   Skin: Normal, no rashes, no lesions noted.   Extremities: Warm, well perfused, no edema, Pulses intact    Vital Signs (24 Hrs):  T(C): 36.2 (03-03-22 @ 23:49), Max: 36.5 (03-03-22 @ 19:58)  HR: 77 (03-03-22 @ 23:49) (77 - 95)  BP: 115/64 (03-03-22 @ 23:49) (115/64 - 122/69)  RR: 16 (03-03-22 @ 23:49) (15 - 16)  SpO2: 95% (03-03-22 @ 23:49) (95% - 97%)  Wt(kg): --  Daily     Daily     I&O's Summary

## 2022-03-04 NOTE — CONSULT NOTE ADULT - SUBJECTIVE AND OBJECTIVE BOX
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 Patient underwent PET CT roughly one month ago which showed small amount of left sided pleural effusion, as per patient. 2 days ago, patient saw her oncologist who ordered a CXR which showed marked increase in pleural effusion.  Patient saw Dr. Sahu as outpatient today and was direct admit to .  Of note, patient was admitted few months ago for abdominal pain and partial SBO.  PAtient endorses last BM this am.  Denies fever, chills, chest pain, abdominal pain, n/v/d.     Pt seen at bedside.  NAD Large left plerual effusion noted on CT scan of chest. Pt states when she ambulates she feels SOB.  now s/p Left pigtail insertion     PAST MEDICAL & SURGICAL HISTORY:  Essential hypertension    Gastritis    HLD (hyperlipidemia)    GERD (gastroesophageal reflux disease)    Intracranial shunt    S/P cholecystectomy    History of esophageal surgery        MEDICATIONS  (STANDING):  lactated ringers. 1000 milliLiter(s) (100 mL/Hr) IV Continuous <Continuous>  melatonin 5 milliGRAM(s) Oral at bedtime  multivitamin 1 Tablet(s) Oral daily  pantoprazole  Injectable 40 milliGRAM(s) IV Push every 12 hours  prochlorperazine   Tablet 10 milliGRAM(s) Oral once  topiramate 25 milliGRAM(s) Oral every 12 hours    MEDICATIONS  (PRN):  acetaminophen   IVPB .. 1000 milliGRAM(s) IV Intermittent every 6 hours PRN Mild Pain (1 - 3)  ALBUTerol    90 MICROgram(s) HFA Inhaler 2 Puff(s) Inhalation every 6 hours PRN Shortness of Breath  metoprolol tartrate Injectable 5 milliGRAM(s) IV Push every 6 hours PRN SBP > 140  morphine  - Injectable 2 milliGRAM(s) IV Push every 4 hours PRN Severe Pain (7 - 10)  ondansetron Injectable 4 milliGRAM(s) IV Push every 6 hours PRN Nausea    Antiplatelet therapy:  none                         Last dose/amt:    Allergies    latex (Unknown)  sulfa drugs (Unknown)    Intolerances        SOCIAL HISTORY:  Smoker: [ ] Yes  [x ] No        PACK YEARS:                         WHEN QUIT?  ETOH use: [ ] Yes  [x ] No              FREQUENCY / QUANTITY:  Ilicit Drug use:  [ ] Yes  [x ] No  Occupation:  Live with:   Assisted device use: no    FAMILY HISTORY:  Family history of gastric cancer (Father, Grandparent)    Family history of colon cancer        Review of Systems  CONSTITUTIONAL:  Fevers[ ] chills[ ] sweats[ ] fatigue[ ] weight loss[ ] weight gain [ ]                                     NEGATIVE [ x]   NEURO:  parathesias[ ] seizures [ ]  syncope [ ]  confusion [ ]                                                                                NEGATIVE[ x]   EYES: glasses[ ]  blurry vision[ ]  discharge[ ] pain[ ] glaucoma [ ]                                                                          NEGATIVE[x ]   ENMT:  difficulty hearing [ ]  vertigo[ ]  dysphagia[ ] epistaxis[ ] recent dental work [ ]                                    NEGATIVE[x ]   CV:  chest pain[ ] palpitations[ ] FORTUNE [x ] diaphoresis [ ]                                                                                           NEGATIVE[ ]   RESPIRATORY:  wheezing[ ] SOB[ x] cough [ ] sputum[ ] hemoptysis[ ]                                                                  NEGATIVE[ ]   GI:  nausea[ x]  vomiting [ ]  diarrhea[ ] constipation [ ] melena [ ]                                                                      NEGATIVE[ ]   : hematuria[ ]  dysuria[ ] urgency[ ] incontinence[ ]                                                                                            NEGATIVE[ ]   MUSCULOSKELETAL  arthritis[ ]  joint swelling [ ] muscle weakness [ ]                                                                NEGATIVE[x ]   SKIN/BREAST:  rash[ ] itching [ ]  hair loss[ ] masses[ ]                                                                                              NEGATIVE[x ]   PSYCH:  dementia [ ] depression [ ] anxiety[ ]                                                                                                               NEGATIVE[ x]   HEME/LYMPH:  bruises easily[ ] enlarged lymph nodes[ ] tender lymph nodes[ ]                                               NEGATIVE[x ]   ENDOCRINE:  cold intolerance[ ] heat intolerance[ ] polydipsia[ ]                                                                          NEGATIVE[x ]     PHYSICAL EXAM  Vital Signs Last 24 Hrs  T(C): 36.5 (04 Mar 2022 08:19), Max: 36.5 (03 Mar 2022 19:58)  T(F): 97.7 (04 Mar 2022 08:19), Max: 97.7 (03 Mar 2022 19:58)  HR: 81 (04 Mar 2022 08:19) (72 - 95)  BP: 116/57 (04 Mar 2022 08:19) (111/53 - 122/69)  BP(mean): --  RR: 16 (04 Mar 2022 08:19) (15 - 16)  SpO2: 94% (04 Mar 2022 08:19) (94% - 97%)    CONSTITUTIONAL:                                                                          WNL[x ]   Neuro: WNL[x ] Normal exam oriented to person/place & time with no focal motor or sensory  deficits. Other                     Eyes: WNL[x ]   Normal exam of conjunctiva & lids, pupils equally reactive. Other     ENT: WNL[x ]    Normal exam of nasal/oral mucosa with absence of cyanosis. Other  Neck: WNL[x ]  Normal exam of jugular veins, trachea & thyroid. Other  Chest: WNL[ ] Normal lung exam with good air movement absence of wheezes, rales, or rhonchi: Other decreased Lt Base                                                                                 CV:  Auscultation: normal [x ] S3[ ] S4[ ] Irregular [ ] Rub[ ] Clicks[ ]    Murmurs none:[x ]systolic [ ]  diastolic [ ] holosystolic [ ]  Carotids: No Bruits[x ] Other                 Abdominal Aorta: normal [ ] nonpalpable[ ]Other                                                                                      GI:           WNL[ ] Normal exam of abdomen, liver & spleen with no noted masses or tenderness. Other  distended hypoactive BS                                                                                                       Extremities: WNL[ ] Normal no evidence of cyanosis or deformity Edema: none[ ]trace[x ]1+[ ]2+[ ]3+[ ]4+[ ]                                                            LABS:                        12.9   8.44  )-----------( 280      ( 04 Mar 2022 00:35 )             39.9     03-04    136  |  105  |  18  ----------------------------<  103<H>  4.4   |  25  |  0.69    Ca    8.8      04 Mar 2022 00:35  Phos  4.3     03-04  Mg     2.5     03-04    TPro  5.8<L>  /  Alb  3.0<L>  /  TBili  0.7  /  DBili  x   /  AST  60<H>  /  ALT  113<H>  /  AlkPhos  201<H>  03-04    PT/INR - ( 04 Mar 2022 00:35 )   PT: 14.2 sec;   INR: 1.22 ratio         PTT - ( 04 Mar 2022 00:35 )  PTT:37.3 sec        < from: CT Chest w/ IV Cont (03.04.22 @ 02:00) >    IMPRESSION:  1. A previous gastric pull-through, esophagectomy.  2. A large left pleural effusion is present.  3. A well-defined cyst within the right hepatic lobe measures 5.3 cm, no  significant change.  4. Previous cholecystectomy, intra and extrahepatic biliary ductal   dilatation,  similar to prior.  5. An indeterminate nodule within the left adrenal gland measures 2.3 cm   no  significant interval change.  6. An indeterminate nodule within the right adrenal gland measures 2.3   cm, no  significant interval change.  7. A small amount of fluid within the abdomen and in the pelvis.  8. A dilated loops of small bowel, fluid levels, fold thickening   consistent  with small bowel obstruction a transition zone in the mid to low abdomen.  9. There is nodular enhancement within the peritoneum, the mesentery and   lining  the small bowel loops, especially seen towards the pelvis and in the mid  ventral abdomen suggesting tumor implants.    < end of copied text >

## 2022-03-04 NOTE — H&P ADULT - ASSESSMENT
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 Sahu   - CT chest/abdomen/pelvis with PO and IV contrast   - IR consult am   - Nutrition consult for PPN   - Nephrology consult for TPN   - PICC line insertion   - Pain control     Plan discussed with Dr. Sahu

## 2022-03-04 NOTE — CONSULT NOTE ADULT - ASSESSMENT
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 Patient underwent PET CT roughly one month ago which showed small amount of left sided pleural effusion, as per patient. 2 days ago, patient saw her oncologist who ordered a CXR which showed marked increase in pleural effusion.  Pt had CT + partial bowel obstruction.  Plan for OR next week.    Consulted for Lt Pleural effusion.      plan  s/p Left Pigtail  Pigtail clamped at one liter for now  will unclamp to WS later today   CXR in am   Pleural studies   DW Dr Aguilar

## 2022-03-04 NOTE — CONSULT NOTE ADULT - SUBJECTIVE AND OBJECTIVE BOX
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 Patient underwent PET CT roughly one month ago which showed small amount of left sided pleural effusion, as per patient. 2 days ago, patient saw her oncologist who ordered a CXR which showed marked increase in pleural effusion.  Patient saw Dr. Sahu as outpatient today and was direct admit to .  Of note, patient was admitted few months ago for abdominal pain and partial SBO.  PAtient endorses last BM this am.  Denies fever, chills, chest pain, abdominal pain, n/v/d.      (04 Mar 2022 00:22)      PAST MEDICAL & SURGICAL HISTORY:  Essential hypertension    Gastritis    HLD (hyperlipidemia)    GERD (gastroesophageal reflux disease)    Intracranial shunt    S/P cholecystectomy    History of esophageal surgery        MEDICATIONS  (STANDING):  lactated ringers. 1000 milliLiter(s) (100 mL/Hr) IV Continuous <Continuous>  melatonin 5 milliGRAM(s) Oral at bedtime  multivitamin 1 Tablet(s) Oral daily  pantoprazole  Injectable 40 milliGRAM(s) IV Push every 12 hours  topiramate 25 milliGRAM(s) Oral every 12 hours    MEDICATIONS  (PRN):  acetaminophen   IVPB .. 1000 milliGRAM(s) IV Intermittent every 6 hours PRN Mild Pain (1 - 3)  ALBUTerol    90 MICROgram(s) HFA Inhaler 2 Puff(s) Inhalation every 6 hours PRN Shortness of Breath  metoprolol tartrate Injectable 5 milliGRAM(s) IV Push every 6 hours PRN SBP > 140  morphine  - Injectable 2 milliGRAM(s) IV Push every 4 hours PRN Severe Pain (7 - 10)  ondansetron Injectable 4 milliGRAM(s) IV Push every 6 hours PRN Nausea      Allergies    latex (Unknown)  sulfa drugs (Unknown)    Intolerances        SOCIAL HISTORY:    FAMILY HISTORY:  Family history of gastric cancer (Father, Grandparent)    Family history of colon cancer        Vital Signs Last 24 Hrs  T(C): 36.5 (04 Mar 2022 08:19), Max: 36.5 (03 Mar 2022 19:58)  T(F): 97.7 (04 Mar 2022 08:19), Max: 97.7 (03 Mar 2022 19:58)  HR: 81 (04 Mar 2022 08:19) (72 - 95)  BP: 116/57 (04 Mar 2022 08:19) (111/53 - 122/69)  BP(mean): --  RR: 16 (04 Mar 2022 08:19) (15 - 16)  SpO2: 94% (04 Mar 2022 08:19) (94% - 97%)      LABS:                        12.9   8.44  )-----------( 280      ( 04 Mar 2022 00:35 )             39.9     03-04    136  |  105  |  18  ----------------------------<  103<H>  4.4   |  25  |  0.69    Ca    8.8      04 Mar 2022 00:35  Phos  4.3     03-04  Mg     2.5     03-04    TPro  5.8<L>  /  Alb  3.0<L>  /  TBili  0.7  /  DBili  x   /  AST  60<H>  /  ALT  113<H>  /  AlkPhos  201<H>  03-04    PT/INR - ( 04 Mar 2022 00:35 )   PT: 14.2 sec;   INR: 1.22 ratio         PTT - ( 04 Mar 2022 00:35 )  PTT:37.3 sec      RADIOLOGY & ADDITIONAL STUDIES: HPI:    Patient is a 61-year-old female diagnosed with gastroesophageal adenocarcinoma several years ago which received neoadjuvant chemotherapy/radiation followed by surgery and had a pathologic complete response.  Unfortunately within the year she had developed Krukenberg tumors underwent a second surgical procedure.  Was found to have omental metastases.  Patient was then placed on FOLFOX chemotherapy and eventually infusional 5-FU maintenance with Excellent long-term control   Patient was admitted to the hospital a little over a month ago with abdominal pain and imaging studies demonstrated partial small bowel obstruction.  There were also omental nodules.  She'll be covered with conservative management and did not require surgery.  As an outpatient she was started on FOLFIRI chemotherapy However significant toxicity with the first cycle including severe diarrhea alopecia and nausea asthenia weight loss.  Further chemotherapy was held and she recently received a singular infusion of immunotherapy.  A CT PET scan was performed a little over a week ago and did not reveal any overt metastases.More recently she had a dyspnea and chest x-ray revealed a left pleural effusion.  She was seen by a surgery and a follow-up and that because of the left effusion, symptoms was admitted to the hospital for further management.  Follow-up CT scan demonstrates signs of small bowel obstruction, once again with a transition point. This morning as she underwent a left chest tube placement .  Drained of straw-colored fluid.  Cytology pending      PAST MEDICAL & SURGICAL HISTORY:  Essential hypertension    Gastritis    HLD (hyperlipidemia)    GERD (gastroesophageal reflux disease)    Intracranial shunt    S/P cholecystectomy    History of esophageal surgery        MEDICATIONS  (STANDING):  lactated ringers. 1000 milliLiter(s) (100 mL/Hr) IV Continuous <Continuous>  melatonin 5 milliGRAM(s) Oral at bedtime  multivitamin 1 Tablet(s) Oral daily  pantoprazole  Injectable 40 milliGRAM(s) IV Push every 12 hours  topiramate 25 milliGRAM(s) Oral every 12 hours    MEDICATIONS  (PRN):  acetaminophen   IVPB .. 1000 milliGRAM(s) IV Intermittent every 6 hours PRN Mild Pain (1 - 3)  ALBUTerol    90 MICROgram(s) HFA Inhaler 2 Puff(s) Inhalation every 6 hours PRN Shortness of Breath  metoprolol tartrate Injectable 5 milliGRAM(s) IV Push every 6 hours PRN SBP > 140  morphine  - Injectable 2 milliGRAM(s) IV Push every 4 hours PRN Severe Pain (7 - 10)  ondansetron Injectable 4 milliGRAM(s) IV Push every 6 hours PRN Nausea      Allergies    latex (Unknown)  sulfa drugs (Unknown)    Intolerances        SOCIAL HISTORY:    FAMILY HISTORY:  Family history of gastric cancer (Father, Grandparent)    Family history of colon cancer        Vital Signs Last 24 Hrs  T(C): 36.5 (04 Mar 2022 08:19), Max: 36.5 (03 Mar 2022 19:58)  T(F): 97.7 (04 Mar 2022 08:19), Max: 97.7 (03 Mar 2022 19:58)  HR: 81 (04 Mar 2022 08:19) (72 - 95)  BP: 116/57 (04 Mar 2022 08:19) (111/53 - 122/69)  BP(mean): --  RR: 16 (04 Mar 2022 08:19) (15 - 16)  SpO2: 94% (04 Mar 2022 08:19) (94% - 97%)  No acute distress  Appears comfortable  No adenopathy  No rash  Abdomen distended  Nontender  Bowel sounds present      LABS:                        12.9   8.44  )-----------( 280      ( 04 Mar 2022 00:35 )             39.9     03-04    136  |  105  |  18  ----------------------------<  103<H>  4.4   |  25  |  0.69    Ca    8.8      04 Mar 2022 00:35  Phos  4.3     03-04  Mg     2.5     03-04    TPro  5.8<L>  /  Alb  3.0<L>  /  TBili  0.7  /  DBili  x   /  AST  60<H>  /  ALT  113<H>  /  AlkPhos  201<H>  03-04    PT/INR - ( 04 Mar 2022 00:35 )   PT: 14.2 sec;   INR: 1.22 ratio         PTT - ( 04 Mar 2022 00:35 )  PTT:37.3 sec      RADIOLOGY & ADDITIONAL STUDIES:  < from: CT Abdomen and Pelvis w/ Oral Cont and w/ IV Cont (03.04.22 @ 02:01) >  NTERPRETATION:  PROCEDURE INFORMATION:  Exam: CT Chest With Contrast; Diagnostic  Exam date and time: 3/4/2022 1:42 AM  Age: 61 years old  Clinical indication: Malignant neoplasm of the gastroesophageal junction;   Prior surgery; Surgery date: 6+ months; Surgery  type: Esophagectomy 4 yrs ago; Patient HX: Pleural effusion    TECHNIQUE:  Imaging protocol: Diagnostic computed tomographyof the chest with   contrast.  3D rendering (Not supervised by radiologist): MIP and/or 3D reconstructed  images were created by the technologist.  Contrast material: OMNI 350; Contrast volume: 90 ml; Contrast route:  INTRAVENOUS (IV);  Other contrast: Oral, omni 300;    COMPARISON:  CT CHEST WITH IV CONTRAST 6/14/2018 9:06 AM    FINDINGS:  Thyroid: A nodularity thyroid gland, a dominant left nodule with   calcifications  measures 15 mm, recommend ultrasound correlation.  Lungs: A consolidations in the left lower lobe and left middle lobe, may  represent atelectasis, pneumonia, pneumonitis, among other entities.  Atelectatic changes in the left upper lobe. Minimal subpleural   atelectasis of  the right lung.  Pleural spaces: A large left pleural effusion, underlying atelectasis.  Heart: Unremarkable. No cardiomegaly. No pericardial effusion.  Mediastinal space: A previous esophagectomy, a gastric pull-through.  No   leak.  Aorta: A mild-to-moderate aortic atherosclerosis. Right chest wall  infusion port is seen with the catheter tip in the SVC.  Lymph nodes: A prominent low-density lymph node within the mediastinum   measures  11 mm.  Bones/joints: A mild degeneration of the spine, no severe spinal canal  narrowing. No destructive osseous lesion. A chronic right a rib defect is  present.  Soft tissues: Unremarkable.    < end of copied text >      ak

## 2022-03-04 NOTE — REASON FOR VISIT
[Initial Consultation] : an initial consultation for [Esophagus Cancer] : esophagus cancer [Referred By: ___] : Referred By: [unfilled] [Spouse] : spouse

## 2022-03-04 NOTE — REVIEW OF SYSTEMS
[Negative] : Heme/Lymph [FreeTextEntry6] : shortness of breath, left-sided chest tightness/heaviness, wheezing  [FreeTextEntry8] : abdominal pain, distension, "excess gas" feeling, decreased PO intake

## 2022-03-04 NOTE — ASSESSMENT
[FreeTextEntry1] : 61 year old woman with metastatic GE junction signet ring adenocarcinoma. Now with recurrent/persistent partial SBO as well as left-sided effusion (malignant?). Give her pulmonary and GI problems progressing I think her needs outreach that which she can manage at home. I told her I will admit her to the hospital, where I will have a left thoracentesis performed for therapeutic and dx purposes. I will also obtain CT imaging to determine if her transition point is the same as last hospital admission, in which case there is hope that a palliative laparotomy may be beneficial. I told her I would start TPN, first, however, to give her some time to optimize her nutrition.

## 2022-03-04 NOTE — DIETITIAN INITIAL EVALUATION ADULT. - PERTINENT LABORATORY DATA
03-04    136  |  105  |  18  ----------------------------<  103<H>  4.4   |  25  |  0.69    Ca    8.8      04 Mar 2022 00:35  Phos  4.3     03-04  Mg     2.5     03-04    TPro  5.8<L>  /  Alb  3.0<L>  /  TBili  0.7  /  DBili  x   /  AST  60<H>  /  ALT  113<H>  /  AlkPhos  201<H>  03-04

## 2022-03-04 NOTE — H&P ADULT - NSICDXPASTSURGICALHX_GEN_ALL_CORE_FT
PAST SURGICAL HISTORY:  History of esophageal surgery     Intracranial shunt     S/P cholecystectomy

## 2022-03-04 NOTE — CONSULT NOTE ADULT - ATTENDING COMMENTS
Patient seen and examined. She comes in with a partial small bowel obstruction that just not appear to be improving over time. She has a history of esophageal/GEJ tumor s/p esophagectomy. She also has increasing left pleural effusion which was picked up on imaging. She does have increased work of breathing. She denies any cough, fever, chills, hemoptysis, pleuritic chest pain.     She has a left pigtail catheter in place, will drain slowly over time to avoid re expansion pulmonary edema.   Ct scan shows abdominal implants. This is likely a malignant pleural effusion, will await cytology.   Recommended a pleurx catheter in the near future.     Thank you for the consult, will cont to follow along with you.

## 2022-03-04 NOTE — DIETITIAN INITIAL EVALUATION ADULT. - MALNUTRITION
moderate malnutrition in acute on chronic illness r/t decreased PO intake 2/2 CA and altered GI function AEB meeting <75% of ENN (now NPO) and mild muscle/fat wasting

## 2022-03-04 NOTE — H&P ADULT - HISTORY OF PRESENT ILLNESS
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 Patient underwent PET CT roughly one month ago which showed small amount of left sided pleural effusion, as per patient. 2 days ago, patient saw her oncologist who ordered a CXR which showed marked increase in pleural effusion.  Patient saw Dr. Sahu as outpatient today and was direct admit to .  Of note, patient was admitted few months ago for abdominal pain and partial SBO.  PAtient endorses last BM this am.  Denies fever, chills, chest pain, abdominal pain, n/v/d.

## 2022-03-04 NOTE — DIETITIAN INITIAL EVALUATION ADULT. - OTHER INFO
60yo with PMH significant for GEJ CA s/p esophagectomy, krunkenburg tumors s/p BSO p/w SOB x 1.5 wks.  PET CT ~ 1 mo ago showed Lt sided pleural effusion.  CXR showed increase in pleural effusion.  Few months ago was admitted for abd pain and partial SBO.  Pt admitted with Lt sided pleural effusion and SBO.  pending further decision of for surgery.  Per MD Dan, pt will be NPO without oral diet for extended time, pending PICC line placement for TPN.  Will have PPN started until PICC line placed.    *pt with implanted port, unclear if approved to be used for TPN*

## 2022-03-04 NOTE — CONSULT NOTE ADULT - ASSESSMENT
Gastroesophageal cancer with omental metastases  Small bowel obstruction/partial  Rule out progressive cancer  Left pleural effusion status post chest tube placement    Plan    Check cytology  Nutritional support  Possible surgery for SBO

## 2022-03-04 NOTE — PHYSICAL EXAM
[Normal] : supple, no neck mass and thyroid not enlarged [Normal] : oriented to person, place and time, with appropriate affect [de-identified] : mild tumor cachexia  [de-identified] : clear breath sounds on right side. diminished/distant breath sounds from bass to apex on left. mild wheezing

## 2022-03-05 LAB
ALBUMIN SERPL ELPH-MCNC: 2.1 G/DL — LOW (ref 3.3–5)
ALP SERPL-CCNC: 458 U/L — HIGH (ref 40–120)
ALT FLD-CCNC: 146 U/L — HIGH (ref 12–78)
ANION GAP SERPL CALC-SCNC: 5 MMOL/L — SIGNIFICANT CHANGE UP (ref 5–17)
AST SERPL-CCNC: 184 U/L — HIGH (ref 15–37)
BASOPHILS # BLD AUTO: 0.02 K/UL — SIGNIFICANT CHANGE UP (ref 0–0.2)
BASOPHILS NFR BLD AUTO: 0.3 % — SIGNIFICANT CHANGE UP (ref 0–2)
BILIRUB SERPL-MCNC: 0.6 MG/DL — SIGNIFICANT CHANGE UP (ref 0.2–1.2)
BUN SERPL-MCNC: 14 MG/DL — SIGNIFICANT CHANGE UP (ref 7–23)
CALCIUM SERPL-MCNC: 7.9 MG/DL — LOW (ref 8.5–10.1)
CHLORIDE SERPL-SCNC: 106 MMOL/L — SIGNIFICANT CHANGE UP (ref 96–108)
CO2 SERPL-SCNC: 24 MMOL/L — SIGNIFICANT CHANGE UP (ref 22–31)
CREAT SERPL-MCNC: 0.52 MG/DL — SIGNIFICANT CHANGE UP (ref 0.5–1.3)
EGFR: 104 ML/MIN/1.73M2 — SIGNIFICANT CHANGE UP
EOSINOPHIL # BLD AUTO: 0 K/UL — SIGNIFICANT CHANGE UP (ref 0–0.5)
EOSINOPHIL NFR BLD AUTO: 0 % — SIGNIFICANT CHANGE UP (ref 0–6)
GLUCOSE SERPL-MCNC: 244 MG/DL — HIGH (ref 70–99)
HCT VFR BLD CALC: 35.7 % — SIGNIFICANT CHANGE UP (ref 34.5–45)
HGB BLD-MCNC: 11.5 G/DL — SIGNIFICANT CHANGE UP (ref 11.5–15.5)
IMM GRANULOCYTES NFR BLD AUTO: 0.4 % — SIGNIFICANT CHANGE UP (ref 0–1.5)
LYMPHOCYTES # BLD AUTO: 0.7 K/UL — LOW (ref 1–3.3)
LYMPHOCYTES # BLD AUTO: 10 % — LOW (ref 13–44)
MAGNESIUM SERPL-MCNC: 1.9 MG/DL — SIGNIFICANT CHANGE UP (ref 1.6–2.6)
MCHC RBC-ENTMCNC: 32.2 GM/DL — SIGNIFICANT CHANGE UP (ref 32–36)
MCHC RBC-ENTMCNC: 32.4 PG — SIGNIFICANT CHANGE UP (ref 27–34)
MCV RBC AUTO: 100.6 FL — HIGH (ref 80–100)
MONOCYTES # BLD AUTO: 0.28 K/UL — SIGNIFICANT CHANGE UP (ref 0–0.9)
MONOCYTES NFR BLD AUTO: 4 % — SIGNIFICANT CHANGE UP (ref 2–14)
NEUTROPHILS # BLD AUTO: 6 K/UL — SIGNIFICANT CHANGE UP (ref 1.8–7.4)
NEUTROPHILS NFR BLD AUTO: 85.3 % — HIGH (ref 43–77)
PHOSPHATE SERPL-MCNC: 3.9 MG/DL — SIGNIFICANT CHANGE UP (ref 2.5–4.5)
PLATELET # BLD AUTO: 210 K/UL — SIGNIFICANT CHANGE UP (ref 150–400)
POTASSIUM SERPL-MCNC: 4.6 MMOL/L — SIGNIFICANT CHANGE UP (ref 3.5–5.3)
POTASSIUM SERPL-SCNC: 4.6 MMOL/L — SIGNIFICANT CHANGE UP (ref 3.5–5.3)
PROT SERPL-MCNC: 4.5 GM/DL — LOW (ref 6–8.3)
RBC # BLD: 3.55 M/UL — LOW (ref 3.8–5.2)
RBC # FLD: 13.8 % — SIGNIFICANT CHANGE UP (ref 10.3–14.5)
SODIUM SERPL-SCNC: 135 MMOL/L — SIGNIFICANT CHANGE UP (ref 135–145)
WBC # BLD: 7.03 K/UL — SIGNIFICANT CHANGE UP (ref 3.8–10.5)
WBC # FLD AUTO: 7.03 K/UL — SIGNIFICANT CHANGE UP (ref 3.8–10.5)

## 2022-03-05 PROCEDURE — 99232 SBSQ HOSP IP/OBS MODERATE 35: CPT

## 2022-03-05 PROCEDURE — 99253 IP/OBS CNSLTJ NEW/EST LOW 45: CPT

## 2022-03-05 RX ORDER — ELECTROLYTE SOLUTION,INJ
1 VIAL (ML) INTRAVENOUS
Refills: 0 | Status: DISCONTINUED | OUTPATIENT
Start: 2022-03-05 | End: 2022-03-05

## 2022-03-05 RX ADMIN — Medication 1 TABLET(S): at 09:21

## 2022-03-05 RX ADMIN — OCTREOTIDE ACETATE 100 MICROGRAM(S): 200 INJECTION, SOLUTION INTRAVENOUS; SUBCUTANEOUS at 05:47

## 2022-03-05 RX ADMIN — Medication 25 MILLIGRAM(S): at 23:21

## 2022-03-05 RX ADMIN — PANTOPRAZOLE SODIUM 40 MILLIGRAM(S): 20 TABLET, DELAYED RELEASE ORAL at 23:57

## 2022-03-05 RX ADMIN — Medication 102 MILLIGRAM(S): at 05:46

## 2022-03-05 RX ADMIN — MORPHINE SULFATE 2 MILLIGRAM(S): 50 CAPSULE, EXTENDED RELEASE ORAL at 09:19

## 2022-03-05 RX ADMIN — MORPHINE SULFATE 2 MILLIGRAM(S): 50 CAPSULE, EXTENDED RELEASE ORAL at 17:00

## 2022-03-05 RX ADMIN — MORPHINE SULFATE 2 MILLIGRAM(S): 50 CAPSULE, EXTENDED RELEASE ORAL at 16:14

## 2022-03-05 RX ADMIN — OCTREOTIDE ACETATE 100 MICROGRAM(S): 200 INJECTION, SOLUTION INTRAVENOUS; SUBCUTANEOUS at 14:17

## 2022-03-05 RX ADMIN — Medication 1 EACH: at 23:34

## 2022-03-05 RX ADMIN — SODIUM CHLORIDE 100 MILLILITER(S): 9 INJECTION, SOLUTION INTRAVENOUS at 14:18

## 2022-03-05 RX ADMIN — Medication 102 MILLIGRAM(S): at 12:00

## 2022-03-05 RX ADMIN — Medication 10 MILLIGRAM(S): at 05:46

## 2022-03-05 RX ADMIN — Medication 102 MILLIGRAM(S): at 18:25

## 2022-03-05 RX ADMIN — PANTOPRAZOLE SODIUM 40 MILLIGRAM(S): 20 TABLET, DELAYED RELEASE ORAL at 09:22

## 2022-03-05 RX ADMIN — OCTREOTIDE ACETATE 100 MICROGRAM(S): 200 INJECTION, SOLUTION INTRAVENOUS; SUBCUTANEOUS at 23:58

## 2022-03-05 RX ADMIN — Medication 25 MILLIGRAM(S): at 09:22

## 2022-03-05 RX ADMIN — MORPHINE SULFATE 2 MILLIGRAM(S): 50 CAPSULE, EXTENDED RELEASE ORAL at 10:10

## 2022-03-05 NOTE — PROGRESS NOTE ADULT - ASSESSMENT
Metastatic gastric adenocarcinoma / artiley progression  L effusion R/O malignant / await cytology  Elevated liver enzymes/ multifactorial; has fatty liver/ Recent immune therapy/ small vol liver mets and PPN  recently  SBO: From adhesion or tumor implant ( CT PET negative in this area)    A/P    SBO: may need palliative procedure / As discussed with surgical Oncology  Metastatic cancer: May need salvage chemotherapy : Taxol/ Saleem planned; may start Taxol in house/ should be well tolerated  Elevated LFT : GI eval/ consider Holding PPN ON and repeating in next 24/48 hrs

## 2022-03-05 NOTE — PROGRESS NOTE ADULT - ASSESSMENT
60 yo F with pmhx GEJ cancer s/p esophagectomy, krunkenburg tumors s/p BSO presents with left sided pleural effusion   tolerating clears, on PPN  abd distended    Plan:   -cont CLD  -Nutrition consult for PPN/TPN  -offer NGT if pt vomits  - Pain control   -GI/DVT prop  -hospitalist consult  -daily labs      Plan d/w Attending

## 2022-03-05 NOTE — PROGRESS NOTE ADULT - SUBJECTIVE AND OBJECTIVE BOX
INTERVAL HISTORY:    S/P L chest tube  On PPN    Liver enzymes elevated     < from: CT Abdomen and Pelvis w/ Oral Cont and w/ IV Cont (03.04.22 @ 02:01) >  Liver: A hepatic steatosis. A well-defined cyst within the right hepatic   lobe  measures 5.3 cm, no significant change. Small enhancing nodules along the   periphery of the right hepatic lobe are suspicious for metastases.   Hepatic capsular implants also noted.  Gallbladder and bile ducts: Previous cholecystectomy, intra and   extrahepatic  biliary ductal dilatation, similar to prior.  Pancreas: An atrophic pancreas, no interval change.  Spleen: Normal. No splenomegaly.  Adrenal glands: An indeterminate nodule within the left adrenal gland   measures  2.3 cm no significant interval change. An indeterminate nodule within the   right  adrenal gland measures 2.3 cm, no significant interval change.  Kidneys and ureters: No hydronephrosis or nephrolithiasis.    Stomach and bowel: A previous gastric pull-through, esophagectomy.   Increasingly dilated  loops of small bowel, fluid levels, fold thickening consistent with small   bowel  obstruction a transition zone in the mid to lower abdomen deep to the   abdominal wall where there is soft tissue thickening of a collapsed small   bowel loop, increasing with increasing surrounding peritoneal soft tissue   implants.  Appendix: No evidence of appendicitis.    Intraperitoneal space: There is mesenteric edema, mesenteric fluid. Small   ascites. Enlarging anterior peritoneal implants and perihepatic implants.  Vasculature: A moderate aorta iliac atherosclerosis.  Lymph nodes: A mildly prominent mesenteric lymph nodes.    < end of copied text >            REVIEW OF SYSTEMS:      Allergies    latex (Unknown)  sulfa drugs (Unknown)    Intolerances        MEDICATIONS  (STANDING):  dexAMETHasone  IVPB 10 milliGRAM(s) IV Intermittent every 6 hours  lactated ringers. 1000 milliLiter(s) (100 mL/Hr) IV Continuous <Continuous>  melatonin 5 milliGRAM(s) Oral at bedtime  multivitamin 1 Tablet(s) Oral daily  octreotide  Injectable 100 MICROGram(s) IV Push every 8 hours  pantoprazole  Injectable 40 milliGRAM(s) IV Push every 12 hours  Parenteral Nutrition - Adult 1 Each (83 mL/Hr) TPN Continuous <Continuous>  Parenteral Nutrition - Adult 1 Each (83 mL/Hr) TPN Continuous <Continuous>  topiramate 25 milliGRAM(s) Oral every 12 hours    MEDICATIONS  (PRN):  acetaminophen   IVPB .. 1000 milliGRAM(s) IV Intermittent every 6 hours PRN Mild Pain (1 - 3)  ALBUTerol    90 MICROgram(s) HFA Inhaler 2 Puff(s) Inhalation every 6 hours PRN Shortness of Breath  metoprolol tartrate Injectable 5 milliGRAM(s) IV Push every 6 hours PRN SBP > 140  morphine  - Injectable 2 milliGRAM(s) IV Push every 4 hours PRN Severe Pain (7 - 10)  ondansetron Injectable 4 milliGRAM(s) IV Push every 6 hours PRN Nausea      Vital Signs Last 24 Hrs  T(C): 36.7 (05 Mar 2022 15:02), Max: 37.1 (04 Mar 2022 20:00)  T(F): 98.1 (05 Mar 2022 15:02), Max: 98.7 (04 Mar 2022 20:00)  HR: 72 (05 Mar 2022 15:02) (72 - 82)  BP: 131/70 (05 Mar 2022 15:02) (113/76 - 132/84)  BP(mean): --  RR: 16 (05 Mar 2022 15:02) (15 - 18)  SpO2: 95% (05 Mar 2022 15:02) (93% - 97%)    PHYSICAL EXAM:    GENERAL: NAD,   HEAD:  Atraumatic, Normocephalic  EYES: EOMI, PERRLA, conjunctiva and sclera clear    NECK: Supple, No JVD, Normal thyroid  NERVOUS SYSTEM:    CHEST/LUNG: Clear to percussion bilaterally; No rales, rhonchi,   HEART: Regular rate and rhythm;   ABDOMEN: Soft, Nontender.  EXTREMITIES:   edema:-  LYMPH: No lymphadenopathy noted        LABS:                        11.5   7.03  )-----------( 210      ( 05 Mar 2022 05:24 )             35.7     03-05    135  |  106  |  14  ----------------------------<  244<H>  4.6   |  24  |  0.52    Ca    7.9<L>      05 Mar 2022 05:24  Phos  3.9     03-05  Mg     1.9     03-05    TPro  4.5<L>  /  Alb  2.1<L>  /  TBili  0.6  /  DBili  x   /  AST  184<H>  /  ALT  146<H>  /  AlkPhos  458<H>  03-05    PT/INR - ( 04 Mar 2022 00:35 )   PT: 14.2 sec;   INR: 1.22 ratio         PTT - ( 04 Mar 2022 00:35 )  PTT:37.3 sec        RADIOLOGY & ADDITIONAL STUDIES:    PATHOLOGY:

## 2022-03-05 NOTE — PROGRESS NOTE ADULT - SUBJECTIVE AND OBJECTIVE BOX
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 Patient found to have large left pleural effusion Now s/p left pigtail placement. Unclamped overnight; serosanguinous output this AM. Admits to improvement in breathing       (04 Mar 2022 00:22)    Allergies: latex  sulfa drugs    PAST MEDICAL & SURGICAL HISTORY:  Essential hypertension    Gastritis    HLD (hyperlipidemia)    GERD (gastroesophageal reflux disease)    Intracranial shunt    S/P cholecystectomy    History of esophageal surgery      FAMILY HISTORY:  Family history of gastric cancer (Father, Grandparent)    Family history of colon cancer      SOCIAL HISTORY:    Home Medications:    Review of Systems:  Pertinent positives noted above, all others negative    T(F): 97.2 (03-05-22 @ 06:30), Max: 98.7 (03-04-22 @ 20:00)  HR: 76 (03-05-22 @ 08:54) (76 - 82)  BP: 132/84 (03-05-22 @ 08:54) (113/76 - 132/84)  RR: 18 (03-05-22 @ 08:54) (15 - 18)  SpO2: 93% (03-05-22 @ 08:54)  Wt(kg): --    CAPILLARY BLOOD GLUCOSE      POCT Blood Glucose.: 173 mg/dL (05 Mar 2022 11:59)    I&O's Summary    04 Mar 2022 07:01  -  05 Mar 2022 07:00  --------------------------------------------------------  IN: 1000 mL / OUT: 2000 mL / NET: -1000 mL    05 Mar 2022 07:01  -  05 Mar 2022 14:17  --------------------------------------------------------  IN: 240 mL / OUT: 0 mL / NET: 240 mL        Physical Exam:     Gen: NAD  Neuro: awake/alert  CVS:+S1S2  Resp: CTA. Left chest tube serosangenous output  Abd: soft, NT, ND  Ext: warm ,dry, no edema  Skin: well perfused    Meds:    metoprolol tartrate Injectable 5 milliGRAM(s) IV Push every 6 hours PRN     dexAMETHasone  IVPB 10 milliGRAM(s) IV Intermittent every 6 hours  octreotide  Injectable 100 MICROGram(s) IV Push every 8 hours     ALBUTerol    90 MICROgram(s) HFA Inhaler 2 Puff(s) Inhalation every 6 hours PRN     acetaminophen   IVPB .. 1000 milliGRAM(s) IV Intermittent every 6 hours PRN  melatonin 5 milliGRAM(s) Oral at bedtime  morphine  - Injectable 2 milliGRAM(s) IV Push every 4 hours PRN  ondansetron Injectable 4 milliGRAM(s) IV Push every 6 hours PRN  topiramate 25 milliGRAM(s) Oral every 12 hours           pantoprazole  Injectable 40 milliGRAM(s) IV Push every 12 hours        lactated ringers. 1000 milliLiter(s) IV Continuous <Continuous>  multivitamin 1 Tablet(s) Oral daily  Parenteral Nutrition - Adult 1 Each TPN Continuous <Continuous>  Parenteral Nutrition - Adult 1 Each TPN Continuous <Continuous>                                    11.5   7.03  )-----------( 210      ( 05 Mar 2022 05:24 )             35.7       03-05    135  |  106  |  14  ----------------------------<  244<H>  4.6   |  24  |  0.52    Ca    7.9<L>      05 Mar 2022 05:24  Phos  3.9     03-05  Mg     1.9     03-05    TPro  4.5<L>  /  Alb  2.1<L>  /  TBili  0.6  /  DBili  x   /  AST  184<H>  /  ALT  146<H>  /  AlkPhos  458<H>  03-05          PT/INR - ( 04 Mar 2022 00:35 )   PT: 14.2 sec;   INR: 1.22 ratio         PTT - ( 04 Mar 2022 00:35 )  PTT:37.3 sec    .Body Fluid Pleural Fluid --   polymorphonuclear leukocytes seen  No organisms seen per oil power field  by cytocentrifuge 03-04 @ 09:50          Radiology:   < from: Xray Chest 1 View- PORTABLE-Urgent (Xray Chest 1 View- PORTABLE-Urgent .) (03.04.22 @ 11:14) >    ACC: [id number] EXAM:  XR CHEST PORTABLE URGENT 1V                          PROCEDURE DATE:  03/04/2022          INTERPRETATION:  Portable chest radiograph    CLINICAL INFORMATION: Pigtail placement    TECHNIQUE:  Portable  AP chest radiograph.    COMPARISON: 12/9/2021 chest x-ray .    FINDINGS:  CATHETERS AND TUBES: LEFT multi-sidehole pigtail catheter overlies LEFT   lower hemithorax.    PULMONARY: Moderate LEFT effusion and/or basilar airspace consolidation   opacifies the lower two thirds of LEFT immediate thorax. LEFT upper zone   and RIGHT lung parenchyma grossly clear. No pneumothorax.    HEART/VASCULAR: The heart and mediastinum size and configuration are   within normal limits. Mediport catheter tip in SVC.    BONES: Visualized osseous structures are intact.    IMPRESSION:   LEFT multi-sidehole pigtail catheter overlies LEFT lower   hemithorax..  Moderate LEFT pleural effusion and/or basilar airspace/atelectasis..    --- End of Report ---            DANTE ROLON MD; AttendingRadiologist  This document has been electronically signed. Mar  5 2022  8:12AM    < end of copied text >      Assessment/Plan:    -Keep chest tube unclamped to WS  -Check CXR in AM  -Monitor CT output  -Follow up pleural studies    Discussed w/ Dr. Gann

## 2022-03-05 NOTE — CONSULT NOTE ADULT - SUBJECTIVE AND OBJECTIVE BOX
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 Patient underwent PET CT roughly one month ago which showed small amount of left sided pleural effusion, as per patient. 2 days ago, patient saw her oncologist who ordered a CXR which showed marked increase in pleural effusion.  Patient saw Dr. Sahu as outpatient today and was direct admit to .  Of note, patient was admitted few months ago for abdominal pain and partial SBO.  PAtient endorses last BM this am.  Denies fever, chills, chest pain, abdominal pain, n/v/d. 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  NAD Large left plerual effusion noted on CT scan of chest. Pt states when she ambulates she feels SOB.  now s/p Left pigtail insertion     PAST MEDICAL & SURGICAL HISTORY:  Essential hypertension    Gastritis    HLD (hyperlipidemia)    GERD (gastroesophageal reflux disease)    Intracranial shunt    S/P cholecystectomy    History of esophageal surgery        FAMILY HISTORY:  Family history of colon cancer  Family history of gastric cancer (Father, Grandparent)  Mother breast cancer  Son Testicular cancer  Aunt GIST             61 yr old female with small left pleural effusion now increasing in size.  Pt symptomatic with shortness of breath on exertion, warranting admission, is now s/p chest tube on 3/4/22.  She has a h/o Gastroesophageal cancer with omental metastases  She had a partial SBO one month ago and once again is not passing gas and has not had BMs here. CT abdo noted.         PMH:  HTN HLP   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/  K[place]berg Tumor one year after the above s/p Sx  Pt noted to have omental mets s/p Folfiri (severe diarrhea) and IT   s/p cholecystectomy  s/p intracranial shunt    FAMILY HISTORY:  Gastric Ca - Dad   Breast Ca - Mom   Testicular Ca - son     SH:      PHYSICAL EXAM:  Vital Signs Last 24 Hrs  T(F): 98.1 (05 Mar 2022 15:02), Max: 98.7 (04 Mar 2022 20:00)  HR: 72 (05 Mar 2022 15:02) (72 - 82)  BP: 131/70 (05 Mar 2022 15:02) (113/76 - 132/84)  RR: 16 (05 Mar 2022 15:02) (15 - 18)  SpO2: 95% (05 Mar 2022 15:02) (93% - 97%)  GENERAL: NAD, able to lie flat in bed  HEAD:  Atraumatic, Normocephalic  EYES: EOMI, PERRLA, normal sclera  ENT: Moist mucous membranes  NECK: Supple, No JVD, no nuchal rigidity  CHEST/LUNG: Clear to auscultation bilaterally; No rales, rhonchi, wheezing, or rubs. Unlabored respirations  HEART: Regular rate and rhythm; No murmurs, rubs, or gallops  ABDOMEN: Bowel sounds present; Soft, Nontender, Nondistended. No hepatomegaly  EXTREMITIES:  no pitting bilaterally  NERVOUS SYSTEM:  Alert & Oriented X3, speech clear. No focal motor or sensory deficits  MSK: FROM all 4 extremities, full and equal strength  SKIN: No rashes or lesions    LABS: All Labs Reviewed:                    11.5   7.03  )-----------( 210      ( 05 Mar 2022 05:24 )             35.7   135  |  106  |  14  ----------------------------<  244<H>  4.6   |  24  |  0.52  Ca    7.9<L>      05 Mar 2022 05:24  Phos  3.9     03-05  Mg     1.9     03-05    RADIOLOGY/EKG:  CT AP   1. A previous esophagectomy, a gastric pull-through.  3. A prominent low-density lymph node within the mediastinum measures 11   mm.  4. A large left pleural effusion, underlying atelectasis.  5. An indeterminate nodule within the left adrenal gland measures 2.3 cm   no  significant interval change.  6. An indeterminate nodule within the right adrenal gland measures 2.3   cm, no  significant interval change.  7. Small ascites. Enlarging anterior peritoneal implants and perihepatic   implants.  8. Worsening small bowel obstruction with a transition zone in the mid to   lower abdomen deep to the abdominal wall where there is soft tissue   thickening of a collapsed small bowel loop, increasing with increasing   surrounding peritoneal soft tissue implants.  9. Small enhancing nodules along the periphery of the right hepatic lobe   are suspicious for metastases.      acetaminophen   IVPB .. 1000 milliGRAM(s) IV Intermittent every 6 hours PRN  ALBUTerol    90 MICROgram(s) HFA Inhaler 2 Puff(s) Inhalation every 6 hours PRN  dexAMETHasone  IVPB 10 milliGRAM(s) IV Intermittent every 6 hours  lactated ringers. 1000 milliLiter(s) IV Continuous <Continuous>  melatonin 5 milliGRAM(s) Oral at bedtime  metoprolol tartrate Injectable 5 milliGRAM(s) IV Push every 6 hours PRN  morphine  - Injectable 2 milliGRAM(s) IV Push every 4 hours PRN  multivitamin 1 Tablet(s) Oral daily  octreotide  Injectable 100 MICROGram(s) IV Push every 8 hours  ondansetron Injectable 4 milliGRAM(s) IV Push every 6 hours PRN  pantoprazole  Injectable 40 milliGRAM(s) IV Push every 12 hours  Parenteral Nutrition - Adult 1 Each TPN Continuous <Continuous>  Parenteral Nutrition - Adult 1 Each TPN Continuous <Continuous>  topiramate 25 milliGRAM(s) Oral every 12 hours                   61 yr old female with small left pleural effusion now increasing in size.  Pt symptomatic with shortness of breath on exertion, warranting admission, is now s/p chest tube on 3/4/22.  She has a h/o Gastroesophageal cancer with omental metastases.  She had a partial SBO one month ago and once again has not been passing gas or  had BMs since 3/3/22. CT abdo noted.   Pt states her abdo pain is improving, no nausea/vomiting - is drinking water. Poor po intake and weight loss noted in the past few weeks.  No ha lightheadedness cp palps or sob since chest tube was inserted. No tingling or numbness anywhere.      PMH:  HTN HLP   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/  [place]berg Tumor one year after the above s/p Sx  Pt noted to have omental mets s/p Folfiri (severe diarrhea) and IT   Peripheral Neuropathy   s/p cholecystectomy  s/p intracranial shunt    FAMILY HISTORY:  Gastric Ca - Dad   Breast Ca - Mom   Testicular Ca - son     SH:  lives at home with her   TOB - 1 PPD, quit in 2018  ETOH - none for many years       PHYSICAL EXAM:  Vital Signs Last 24 Hrs  T(F): 98.1 (05 Mar 2022 15:02), Max: 98.7 (04 Mar 2022 20:00)  HR: 72 (05 Mar 2022 15:02) (72 - 82)  BP: 131/70 (05 Mar 2022 15:02) (113/76 - 132/84)  RR: 16 (05 Mar 2022 15:02) (15 - 18)  SpO2: 95% (05 Mar 2022 15:02) (93% - 97%)  GENERAL: NAD, able to lie flat in bed  HEAD:  Atraumatic, Normocephalic  EYES: EOMI, PERRLA, normal sclera  ENT: Moist mucous membranes  NECK: Supple, No JVD, no nuchal rigidity  CHEST/LUNG: Clear to auscultation bilaterally; left chest tube   HEART: Regular rate and rhythm; No murmurs, rubs, or gallops  ABDOMEN: Soft, Nontender, mildly distended   EXTREMITIES: mild pitting bilaterally  NERVOUS SYSTEM:  Alert & Oriented X3, speech clear. No focal motor or sensory deficits  MSK: FROM all 4 extremities, full and equal strength  SKIN: No rashes or lesions    LABS: All Labs Reviewed:                    11.5   7.03  )-----------( 210      ( 05 Mar 2022 05:24 )             35.7   135  |  106  |  14  ----------------------------<  244<H>  4.6   |  24  |  0.52  Ca    7.9<L>      05 Mar 2022 05:24  Phos  3.9     03-05  Mg     1.9     03-05    RADIOLOGY/EKG:  CT AP   1. A previous esophagectomy, a gastric pull-through.  3. A prominent low-density lymph node within the mediastinum measures 11   mm.  4. A large left pleural effusion, underlying atelectasis.  5. An indeterminate nodule within the left adrenal gland measures 2.3 cm   no  significant interval change.  6. An indeterminate nodule within the right adrenal gland measures 2.3   cm, no  significant interval change.  7. Small ascites. Enlarging anterior peritoneal implants and perihepatic   implants.  8. Worsening small bowel obstruction with a transition zone in the mid to   lower abdomen deep to the abdominal wall where there is soft tissue   thickening of a collapsed small bowel loop, increasing with increasing   surrounding peritoneal soft tissue implants.  9. Small enhancing nodules along the periphery of the right hepatic lobe   are suspicious for metastases.    MEDS:   acetaminophen   IVPB .. 1000 milliGRAM(s) IV Intermittent every 6 hours PRN  ALBUTerol    90 MICROgram(s) HFA Inhaler 2 Puff(s) Inhalation every 6 hours PRN  dexAMETHasone  IVPB 10 milliGRAM(s) IV Intermittent every 6 hours  lactated ringers. 1000 milliLiter(s) IV Continuous <Continuous>  melatonin 5 milliGRAM(s) Oral at bedtime  metoprolol tartrate Injectable 5 milliGRAM(s) IV Push every 6 hours PRN  morphine  - Injectable 2 milliGRAM(s) IV Push every 4 hours PRN  multivitamin 1 Tablet(s) Oral daily  octreotide  Injectable 100 MICROGram(s) IV Push every 8 hours  ondansetron Injectable 4 milliGRAM(s) IV Push every 6 hours PRN  pantoprazole  Injectable 40 milliGRAM(s) IV Push every 12 hours  Parenteral Nutrition - Adult 1 Each TPN Continuous <Continuous>  Parenteral Nutrition - Adult 1 Each TPN Continuous <Continuous>  topiramate 25 milliGRAM(s) Oral every 12 hours

## 2022-03-05 NOTE — PROGRESS NOTE ADULT - SUBJECTIVE AND OBJECTIVE BOX
SURGERY DAILY PROGRESS NOTE:     Subjective:  Patient seen and examined at bedside during am rounds tolerating CLD. pt is s/p left side CT placement by CT surgery POD 1. Pt is on PPN. Pt was started on MBO protocol.  Denies any fevers, chills, n/v/d, chest pain or shortness of breath    Objective:    MEDICATIONS  (STANDING):  dexAMETHasone  IVPB 10 milliGRAM(s) IV Intermittent every 6 hours  lactated ringers. 1000 milliLiter(s) (100 mL/Hr) IV Continuous <Continuous>  melatonin 5 milliGRAM(s) Oral at bedtime  multivitamin 1 Tablet(s) Oral daily  octreotide  Injectable 100 MICROGram(s) IV Push every 8 hours  pantoprazole  Injectable 40 milliGRAM(s) IV Push every 12 hours  Parenteral Nutrition - Adult 1 Each (83 mL/Hr) TPN Continuous <Continuous>  Parenteral Nutrition - Adult 1 Each (83 mL/Hr) TPN Continuous <Continuous>  topiramate 25 milliGRAM(s) Oral every 12 hours    MEDICATIONS  (PRN):  acetaminophen   IVPB .. 1000 milliGRAM(s) IV Intermittent every 6 hours PRN Mild Pain (1 - 3)  ALBUTerol    90 MICROgram(s) HFA Inhaler 2 Puff(s) Inhalation every 6 hours PRN Shortness of Breath  metoprolol tartrate Injectable 5 milliGRAM(s) IV Push every 6 hours PRN SBP > 140  morphine  - Injectable 2 milliGRAM(s) IV Push every 4 hours PRN Severe Pain (7 - 10)  ondansetron Injectable 4 milliGRAM(s) IV Push every 6 hours PRN Nausea      Vital Signs Last 24 Hrs  T(C): 36.2 (05 Mar 2022 06:30), Max: 37.1 (04 Mar 2022 20:00)  T(F): 97.2 (05 Mar 2022 06:30), Max: 98.7 (04 Mar 2022 20:00)  HR: 76 (05 Mar 2022 08:54) (76 - 82)  BP: 132/84 (05 Mar 2022 08:54) (113/76 - 132/84)  BP(mean): --  RR: 18 (05 Mar 2022 08:54) (15 - 18)  SpO2: 93% (05 Mar 2022 08:54) (93% - 97%)      PHYSICAL EXAM   Gen: well-appearing, in no acute distress  chest: clear, right side mediport in place no erythema   Heart: RRR s1s2  abd: soft, minimally distended, non-tender,   ext. no cyanosis or edema       I&O's Detail    04 Mar 2022 07:01  -  05 Mar 2022 07:00  --------------------------------------------------------  IN:    Lactated Ringers: 1000 mL  Total IN: 1000 mL    OUT:    Chest Tube (mL): 2000 mL  Total OUT: 2000 mL    Total NET: -1000 mL          Daily     Daily     LABS:                        11.5   7.03  )-----------( 210      ( 05 Mar 2022 05:24 )             35.7     03-05    135  |  106  |  14  ----------------------------<  244<H>  4.6   |  24  |  0.52    Ca    7.9<L>      05 Mar 2022 05:24  Phos  3.9     03-05  Mg     1.9     03-05    TPro  4.5<L>  /  Alb  2.1<L>  /  TBili  0.6  /  DBili  x   /  AST  184<H>  /  ALT  146<H>  /  AlkPhos  458<H>  03-05    PT/INR - ( 04 Mar 2022 00:35 )   PT: 14.2 sec;   INR: 1.22 ratio         PTT - ( 04 Mar 2022 00:35 )  PTT:37.3 sec

## 2022-03-05 NOTE — CHART NOTE - NSCHARTNOTEFT_GEN_A_CORE
Clinical Nutrition PPN Recommendation Note    *see full initial assessment for information*    *labs reviewed: ; potassium, phos, and magnesium (low end normal) are WNL. .  corrected Ca WNL, not able to add to PN bag as CAPS is out, supplement outside of PN bag at this time.  bilirubin total WNL.  obtain triglyceride level and POCT q6hrs.    03-05    135  |  106  |  14  ----------------------------<  244<H>  4.6   |  24  |  0.52    Ca    7.9<L>      05 Mar 2022 05:24  Phos  3.9     03-05  Mg     1.9     03-05    TPro  4.5<L>  /  Alb  2.1<L>  /  TBili  0.6  /  DBili  x   /  AST  184<H>  /  ALT  146<H>  /  AlkPhos  458<H>  03-05      ESTIMATED NUTR NEEDS: based on 73Kg admit wt  1825-2190Kcal (25-30Kcal/Kg)  110-146g protein (1.5-2 g/Kg)  1825-2190mL (25-30mL/Kg)    PPN RECOMMENDATIONS:  2000mL total volume via Peripheral line    90g Amino Acids  100g Dextrose  0g Lipids 20% (obtain triglyceride level before starting lipids)  138 mEq NaCl  2 mEq NaAcetate  10 mMol NaPhos  10 mEq KCl  10 mEq KAcetate  0 mMol KPhos  0 mEq Calcium Gluconate (supplement outside of PPN bag)  8 mEq Magnesium Sulfate  100 mg thiamine  1 mL trace elements  10 mL MVI    total calories: 700Kcal (meets ~38% of estimated calorie needs and ~82% of estimated protein needs); osmolarity 895.    ADDITIONAL RECOMMENDATIONS:  1) obtain triglyceride level  2) POCT q6hrs; maintain blood glucose control 140-180mg/dL  3) strict I/O's  4) daily lytes with magnesium and phos levels  5) daily wt checks to track/trend changes  6) obtain PICC line and ID consult    *will monitor and adjust treatement plan prn*  Tiki Ascencio MA, RDN, CDN, CNSC [phone] Clinical Nutrition PPN Recommendation Note    *see full initial assessment for information*    *labs reviewed: ; potassium, phos, and magnesium (low end normal) are WNL. .  corrected Ca WNL, not able to add to PN bag as CAPS is out, supplement outside of PN bag at this time.  bilirubin total WNL.  obtain triglyceride level and POCT q6hrs.    03-05    135  |  106  |  14  ----------------------------<  244<H>  4.6   |  24  |  0.52    Ca    7.9<L>      05 Mar 2022 05:24  Phos  3.9     03-05  Mg     1.9     03-05    TPro  4.5<L>  /  Alb  2.1<L>  /  TBili  0.6  /  DBili  x   /  AST  184<H>  /  ALT  146<H>  /  AlkPhos  458<H>  03-05      ESTIMATED NUTR NEEDS: based on 73Kg admit wt  1825-2190Kcal (25-30Kcal/Kg)  110-146g protein (1.5-2 g/Kg)  1825-2190mL (25-30mL/Kg)    PPN RECOMMENDATIONS:  2000mL total volume via Peripheral line    90g Amino Acids  100g Dextrose  0g Lipids 20% (obtain triglyceride level before starting lipids)  138 mEq NaCl  2 mEq NaAcetate  10 mMol NaPhos  10 mEq KCl  10 mEq KAcetate  0 mMol KPhos  0 mEq Calcium Gluconate (supplement outside of PPN bag)  8 mEq Magnesium Sulfate  100 mg thiamine  1 mL trace elements  10 mL MVI    total calories: 700Kcal (meets ~38% of estimated calorie needs and ~82% of estimated protein needs); osmolarity 895.    ADDITIONAL RECOMMENDATIONS:  1) obtain triglyceride level  2) POCT q6hrs; maintain blood glucose control 140-180mg/dL  3) strict I/O's  4) daily lytes with magnesium and phos levels  5) daily wt checks to track/trend changes  6) obtain PICC line and ID consult    *will monitor and adjust treatement plan prn*  Real Flores RDN [phone]

## 2022-03-05 NOTE — CONSULT NOTE ADULT - ASSESSMENT
Plan:   - Admit to Dr. Sahu   - CT chest/abdomen/pelvis with PO and IV contrast   - IR consult am   - Nutrition consult for PPN   - Nephrology consult for TPN   - PICC line insertion   - Pain control     Plan discussed with Dr. 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 Sahu   - CT chest/abdomen/pelvis with PO and IV contrast   - IR consult am   - Nutrition consult for PPN   - Nephrology consult for TPN   - PICC line insertion   - Pain control     Plan discussed with Dr. Sahu    A/P  Worsening shortness of breath due to left pleural effusion s/p chest tube   recurrent partial SBO   abnormal LFTs - liver mets? drug s/e - immunotherapy? from PPN?  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  [place] Tumor one year after the above s/p Sx  Pt noted to have omental mets s/p Folfiri (severe diarrhea) and IT   Peripheral Neuropathy   HTN HLP   h/o cholecystectomy    Plan:  s/p chest tube, follow-up cytology  encourage IS OOB   diet/dexamethasone/octreotide per primary team   dc crestor  cont topamax for neuropathy  cont IV PPI   cont IV BB   cont IV pain meds  on PPN, monitor lytes and LFTs - discussed with Dr Dan - cont PPN     PREOP EVAL:  61 yr old female with no known h/o MI or CVA   Ambulates, able to climb up a flight of stairs and walk on a flat surface without chest pain as recently as a few days ago  Quit TOB use in 2018   Pls get baseline EKG in AM   Patient has no active cardiac or pulmonary complaints since placement of chest tube  King perioperative risk for MI/CA is 0.8%, no further testing required at this time for intestinal surgery   discussed with French Dna and Pato     Pls call with q  [phone]

## 2022-03-06 LAB
ALBUMIN SERPL ELPH-MCNC: 2.4 G/DL — LOW (ref 3.3–5)
ALP SERPL-CCNC: 441 U/L — HIGH (ref 40–120)
ALT FLD-CCNC: 108 U/L — HIGH (ref 12–78)
ANION GAP SERPL CALC-SCNC: 4 MMOL/L — LOW (ref 5–17)
AST SERPL-CCNC: 68 U/L — HIGH (ref 15–37)
BILIRUB SERPL-MCNC: 0.4 MG/DL — SIGNIFICANT CHANGE UP (ref 0.2–1.2)
BUN SERPL-MCNC: 18 MG/DL — SIGNIFICANT CHANGE UP (ref 7–23)
CALCIUM SERPL-MCNC: 8.5 MG/DL — SIGNIFICANT CHANGE UP (ref 8.5–10.1)
CHLORIDE SERPL-SCNC: 112 MMOL/L — HIGH (ref 96–108)
CO2 SERPL-SCNC: 25 MMOL/L — SIGNIFICANT CHANGE UP (ref 22–31)
CREAT SERPL-MCNC: 0.51 MG/DL — SIGNIFICANT CHANGE UP (ref 0.5–1.3)
EGFR: 105 ML/MIN/1.73M2 — SIGNIFICANT CHANGE UP
GLUCOSE SERPL-MCNC: 139 MG/DL — HIGH (ref 70–99)
HCT VFR BLD CALC: 39 % — SIGNIFICANT CHANGE UP (ref 34.5–45)
HGB BLD-MCNC: 12.7 G/DL — SIGNIFICANT CHANGE UP (ref 11.5–15.5)
MAGNESIUM SERPL-MCNC: 2.3 MG/DL — SIGNIFICANT CHANGE UP (ref 1.6–2.6)
MCHC RBC-ENTMCNC: 32.6 GM/DL — SIGNIFICANT CHANGE UP (ref 32–36)
MCHC RBC-ENTMCNC: 32.8 PG — SIGNIFICANT CHANGE UP (ref 27–34)
MCV RBC AUTO: 100.8 FL — HIGH (ref 80–100)
PHOSPHATE SERPL-MCNC: 2.1 MG/DL — LOW (ref 2.5–4.5)
PLATELET # BLD AUTO: 244 K/UL — SIGNIFICANT CHANGE UP (ref 150–400)
POTASSIUM SERPL-MCNC: 4.4 MMOL/L — SIGNIFICANT CHANGE UP (ref 3.5–5.3)
POTASSIUM SERPL-SCNC: 4.4 MMOL/L — SIGNIFICANT CHANGE UP (ref 3.5–5.3)
PROT SERPL-MCNC: 5 GM/DL — LOW (ref 6–8.3)
RBC # BLD: 3.87 M/UL — SIGNIFICANT CHANGE UP (ref 3.8–5.2)
RBC # FLD: 14 % — SIGNIFICANT CHANGE UP (ref 10.3–14.5)
SODIUM SERPL-SCNC: 141 MMOL/L — SIGNIFICANT CHANGE UP (ref 135–145)
TRIGL SERPL-MCNC: 57 MG/DL — SIGNIFICANT CHANGE UP
WBC # BLD: 15.96 K/UL — HIGH (ref 3.8–10.5)
WBC # FLD AUTO: 15.96 K/UL — HIGH (ref 3.8–10.5)

## 2022-03-06 PROCEDURE — 71045 X-RAY EXAM CHEST 1 VIEW: CPT | Mod: 26

## 2022-03-06 PROCEDURE — 93010 ELECTROCARDIOGRAM REPORT: CPT

## 2022-03-06 PROCEDURE — 99232 SBSQ HOSP IP/OBS MODERATE 35: CPT

## 2022-03-06 RX ORDER — ENOXAPARIN SODIUM 100 MG/ML
40 INJECTION SUBCUTANEOUS EVERY 24 HOURS
Refills: 0 | Status: DISCONTINUED | OUTPATIENT
Start: 2022-03-06 | End: 2022-03-08

## 2022-03-06 RX ORDER — MINERAL OIL
133 OIL (ML) MISCELLANEOUS ONCE
Refills: 0 | Status: COMPLETED | OUTPATIENT
Start: 2022-03-06 | End: 2022-03-06

## 2022-03-06 RX ORDER — SODIUM CHLORIDE 9 MG/ML
1000 INJECTION, SOLUTION INTRAVENOUS
Refills: 0 | Status: DISCONTINUED | OUTPATIENT
Start: 2022-03-06 | End: 2022-03-08

## 2022-03-06 RX ORDER — I.V. FAT EMULSION 20 G/100ML
0.68 EMULSION INTRAVENOUS
Qty: 50 | Refills: 0 | Status: DISCONTINUED | OUTPATIENT
Start: 2022-03-06 | End: 2022-03-06

## 2022-03-06 RX ORDER — ELECTROLYTE SOLUTION,INJ
1 VIAL (ML) INTRAVENOUS
Refills: 0 | Status: DISCONTINUED | OUTPATIENT
Start: 2022-03-06 | End: 2022-03-06

## 2022-03-06 RX ADMIN — MORPHINE SULFATE 2 MILLIGRAM(S): 50 CAPSULE, EXTENDED RELEASE ORAL at 23:49

## 2022-03-06 RX ADMIN — I.V. FAT EMULSION 20.83 GM/KG/DAY: 20 EMULSION INTRAVENOUS at 22:55

## 2022-03-06 RX ADMIN — OCTREOTIDE ACETATE 100 MICROGRAM(S): 200 INJECTION, SOLUTION INTRAVENOUS; SUBCUTANEOUS at 22:21

## 2022-03-06 RX ADMIN — PANTOPRAZOLE SODIUM 40 MILLIGRAM(S): 20 TABLET, DELAYED RELEASE ORAL at 12:14

## 2022-03-06 RX ADMIN — Medication 133 MILLILITER(S): at 18:29

## 2022-03-06 RX ADMIN — ENOXAPARIN SODIUM 40 MILLIGRAM(S): 100 INJECTION SUBCUTANEOUS at 21:10

## 2022-03-06 RX ADMIN — PANTOPRAZOLE SODIUM 40 MILLIGRAM(S): 20 TABLET, DELAYED RELEASE ORAL at 22:21

## 2022-03-06 RX ADMIN — Medication 102 MILLIGRAM(S): at 01:15

## 2022-03-06 RX ADMIN — Medication 102 MILLIGRAM(S): at 23:51

## 2022-03-06 RX ADMIN — Medication 102 MILLIGRAM(S): at 12:14

## 2022-03-06 RX ADMIN — Medication 25 MILLIGRAM(S): at 22:20

## 2022-03-06 RX ADMIN — Medication 25 MILLIGRAM(S): at 12:13

## 2022-03-06 RX ADMIN — Medication 1 TABLET(S): at 12:13

## 2022-03-06 RX ADMIN — Medication 5 MILLIGRAM(S): at 00:35

## 2022-03-06 RX ADMIN — MORPHINE SULFATE 2 MILLIGRAM(S): 50 CAPSULE, EXTENDED RELEASE ORAL at 01:04

## 2022-03-06 RX ADMIN — Medication 102 MILLIGRAM(S): at 06:43

## 2022-03-06 RX ADMIN — MORPHINE SULFATE 2 MILLIGRAM(S): 50 CAPSULE, EXTENDED RELEASE ORAL at 00:34

## 2022-03-06 RX ADMIN — Medication 5 MILLIGRAM(S): at 22:20

## 2022-03-06 RX ADMIN — OCTREOTIDE ACETATE 100 MICROGRAM(S): 200 INJECTION, SOLUTION INTRAVENOUS; SUBCUTANEOUS at 07:29

## 2022-03-06 RX ADMIN — OCTREOTIDE ACETATE 100 MICROGRAM(S): 200 INJECTION, SOLUTION INTRAVENOUS; SUBCUTANEOUS at 15:35

## 2022-03-06 RX ADMIN — Medication 102 MILLIGRAM(S): at 18:19

## 2022-03-06 NOTE — PROGRESS NOTE ADULT - ASSESSMENT
A/P  Worsening shortness of breath due to left pleural effusion s/p chest tube   recurrent partial SBO   abnormal LFTs - liver mets? drug s/e - immunotherapy? from PPN?  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  [place] Tumor one year after the above s/p Sx  Pt noted to have omental mets s/p Folfiri (severe diarrhea) and IT   Peripheral Neuropathy   HTN HLP   h/o cholecystectomy    Plan:  WBCs have doubled but patient offers no complaints - vitals are stable, pt ambulating, cont to monitor   s/p chest tube on this admission, follow-up cytology  encourage IS OOB   diet/dexamethasone/octreotide per primary team   dc crestor  cont topamax for neuropathy  cont IV PPI   cont IV BB   cont IV pain meds  on PPN, monitor lytes and LFTs - discussed with Dr Dan yesterday- cont PPN     PREOP EVAL:  61 yr old female with no known h/o MI or CVA   Ambulates, able to climb up a flight of stairs and walk on a flat surface without chest pain as recently as a few days ago  Quit TOB use in 2018   pre-op EKG done - rev by me - sinus, qtc wnl, no acute st/tw changes   Patient has no active cardiac or pulmonary complaints since placement of chest tube  Malik perioperative risk for MI/CA is 0.8%, no further testing required at this time for intestinal surgery   discussed with French Dan and Pato previously     Pls call with q  [phone]    A/P  Worsening shortness of breath due to left pleural effusion s/p chest tube   recurrent partial SBO   abnormal LFTs - liver mets? drug s/e - immunotherapy? from PPN?  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  [place] Tumor one year after the above s/p Sx  Pt noted to have omental mets s/p Folfiri (severe diarrhea) and IT   Peripheral Neuropathy   HTN HLP   h/o cholecystectomy    Plan:  WBCs have doubled 2/2 IV steroids - pt otherwise stable/afebrile/ ambulating, cont to monitor   s/p chest tube on this admission, follow-up cytology  encourage IS OOB   diet/dexamethasone/octreotide per primary team   dc crestor  cont topamax for neuropathy  cont IV PPI   cont IV BB   cont IV pain meds  on PPN, monitor lytes and LFTs - discussed with Dr Dan yesterday- cont PPN     PREOP EVAL:  61 yr old female with no known h/o MI or CVA   Ambulates, able to climb up a flight of stairs and walk on a flat surface without chest pain as recently as a few days ago  Quit TOB use in 2018   pre-op EKG done - rev by me - sinus, qtc wnl, no acute st/tw changes   Patient has no active cardiac or pulmonary complaints since placement of chest tube  Malik perioperative risk for MI/CA is 0.8%, no further testing required at this time for intestinal surgery   discussed with French Dan and Pato previously     Pls call with q  [phone]    A/P  Worsening shortness of breath due to left pleural effusion s/p chest tube   recurrent partial SBO   abnormal LFTs - liver mets? drug s/e - immunotherapy? from PPN?  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  [place] Tumor one year after the above s/p Sx  Pt noted to have omental mets s/p Folfiri (severe diarrhea) and IT   Peripheral Neuropathy   HTN HLP   h/o cholecystectomy    Plan:  WBCs have doubled 2/2 IV steroids - pt otherwise stable/afebrile/ ambulating, cont to monitor   s/p chest tube on this admission, follow-up cytology  encourage IS OOB   diet/dexamethasone/octreotide per primary team   dc crestor  cont topamax for neuropathy  cont IV PPI   cont IV BB   cont IV pain meds  on PPN, monitor lytes and LFTs - discussed with Sx - cont PPN     PREOP EVAL:  61 yr old female with no known h/o MI or CVA   Ambulates, able to climb up a flight of stairs and walk on a flat surface without chest pain as recently as a few days ago  Quit TOB use in 2018   pre-op EKG done - rev by me - sinus, qtc wnl, no acute st/tw changes   Patient has no active cardiac or pulmonary complaints since placement of chest tube  Malik perioperative risk for MI/CA is 0.8%, no further testing required at this time for intestinal surgery   discussed with French Dan and Pato    VTE Px - rec addn of lovenox     Pls call with q  [phone]

## 2022-03-06 NOTE — PROGRESS NOTE ADULT - SUBJECTIVE AND OBJECTIVE BOX
SURGERY DAILY PROGRESS NOTE:     Subjective:  Patient seen and examined at bedside during am rounds reports her abdomen less distended. Pt reports passed a little flatus, denies any BM. Pt is drinking clears.  AVSS. Denies any fevers, chills, n/v/d, chest pain or shortness of breath. Pt still refused NGT    Objective:    MEDICATIONS  (STANDING):  dexAMETHasone  IVPB 10 milliGRAM(s) IV Intermittent every 6 hours  fat emulsion (Fish Oil and Plant Based) 20% Infusion 0.684 Gm/kG/Day (20.83 mL/Hr) IV Continuous <Continuous>  melatonin 5 milliGRAM(s) Oral at bedtime  mineral oil enema 133 milliLiter(s) Rectal once  multivitamin 1 Tablet(s) Oral daily  octreotide  Injectable 100 MICROGram(s) IV Push every 8 hours  pantoprazole  Injectable 40 milliGRAM(s) IV Push every 12 hours  Parenteral Nutrition - Adult 1 Each (83 mL/Hr) TPN Continuous <Continuous>  Parenteral Nutrition - Adult 1 Each (83 mL/Hr) TPN Continuous <Continuous>  topiramate 25 milliGRAM(s) Oral every 12 hours    MEDICATIONS  (PRN):  acetaminophen   IVPB .. 1000 milliGRAM(s) IV Intermittent every 6 hours PRN Mild Pain (1 - 3)  ALBUTerol    90 MICROgram(s) HFA Inhaler 2 Puff(s) Inhalation every 6 hours PRN Shortness of Breath  metoprolol tartrate Injectable 5 milliGRAM(s) IV Push every 6 hours PRN SBP > 140  morphine  - Injectable 2 milliGRAM(s) IV Push every 4 hours PRN Severe Pain (7 - 10)  ondansetron Injectable 4 milliGRAM(s) IV Push every 6 hours PRN Nausea      Vital Signs Last 24 Hrs  T(C): 36.8 (06 Mar 2022 08:58), Max: 36.8 (06 Mar 2022 08:58)  T(F): 98.2 (06 Mar 2022 08:58), Max: 98.2 (06 Mar 2022 08:58)  HR: 101 (06 Mar 2022 08:58) (64 - 101)  BP: 141/89 (06 Mar 2022 08:58) (123/73 - 141/89)  BP(mean): --  RR: 17 (06 Mar 2022 08:58) (16 - 17)  SpO2: 100% (06 Mar 2022 08:58) (94% - 100%)      PHYSICAL EXAM   Gen: well-appearing, in no acute distress  CV: pulse regularly present   Resp: airway patent, non-labored breathing  Abd: soft, non-tender, minimal distended, better than yesterday, multiple healed surgical scars  ext. no cyanosis or edema      I&O's Detail    05 Mar 2022 07:01  -  06 Mar 2022 07:00  --------------------------------------------------------  IN:    Lactated Ringers: 1000 mL    Oral Fluid: 240 mL    PPN (Peripheral Parenteral Nutrition): 996 mL  Total IN: 2236 mL    OUT:    Chest Tube (mL): 655 mL    Voided (mL): 1300 mL  Total OUT: 1955 mL    Total NET: 281 mL      06 Mar 2022 07:01  -  06 Mar 2022 15:44  --------------------------------------------------------  IN:  Total IN: 0 mL    OUT:    Voided (mL): 550 mL  Total OUT: 550 mL    Total NET: -550 mL          Daily     Daily     LABS:                        12.7   15.96 )-----------( 244      ( 06 Mar 2022 07:43 )             39.0     03-06    141  |  112<H>  |  18  ----------------------------<  139<H>  4.4   |  25  |  0.51    Ca    8.5      06 Mar 2022 07:43  Phos  2.1     03-06  Mg     2.3     03-06    TPro  5.0<L>  /  Alb  2.4<L>  /  TBili  0.4  /  DBili  x   /  AST  68<H>  /  ALT  108<H>  /  AlkPhos  441<H>  03-06

## 2022-03-06 NOTE — CHART NOTE - NSCHARTNOTEFT_GEN_A_CORE
Clinical Nutrition PPN Recommendation Note    *see full initial assessment for information*    *labs reviewed: ; potassium, and magnesium (low end normal) WNL; Po4 is low (will increase in bag 10mEq).   Corrected Ca WNL, not able to add to PN bag as CAPS is out, supplement outside of PN bag at this time.  bilirubin total WNL.  triglyceride level WNL, will start Lipids    03-06    141  |  112<H>  |  18  ----------------------------<  139<H>  4.4   |  25  |  0.51    Ca    8.5      06 Mar 2022 07:43  Phos  2.1     03-06  Mg     2.3     03-06    TPro  5.0<L>  /  Alb  2.4<L>  /  TBili  0.4  /  DBili  x   /  AST  68<H>  /  ALT  108<H>  /  AlkPhos  441<H>  03-06      ESTIMATED NUTR NEEDS: based on 73Kg admit wt  1825-2190Kcal (25-30Kcal/Kg)  110-146g protein (1.5-2 g/Kg)  1825-2190mL (25-30mL/Kg)    PPN RECOMMENDATIONS:  2000mL total volume via Peripheral line    80g Amino Acids  100g Dextrose  50g Lipids 20%   127 mEq NaCl  0 mEq NaAcetate  20 mMol NaPhos  10 mEq KCl  10 mEq KAcetate  0 mMol KPhos  0 mEq Calcium Gluconate (supplement outside of PPN bag)  8 mEq Magnesium Sulfate  100 mg thiamine  1 mL trace elements  10 mL MVI    total calories: 1150 Kcal (meets ~63% of estimated calorie needs and ~82% of estimated protein needs); osmolarity 895.    ADDITIONAL RECOMMENDATIONS:  1) obtain triglyceride level  2) POCT q6hrs; maintain blood glucose control 140-180mg/dL  3) strict I/O's  4) daily lytes with magnesium and phos levels  5) daily wt checks to track/trend changes  6) obtain PICC line and ID consult    *will monitor and adjust treatement plan prn*  Real Flores RDN [phone] Clinical Nutrition TPN Recommendation Note    *see full initial assessment for information*    *labs reviewed: ; potassium, and magnesium (low end normal) WNL; Po4 is low (will increase in bag 10mEq).   Corrected Ca WNL, not able to add to PN bag as CAPS is out, supplement outside of PN bag at this time.  bilirubin total WNL.  triglyceride level WNL, will start Lipids    03/06: RD discussed with Surgery about TPN and will provide via Mediport, Per surgery approved by IV team. RD will modify TPN daily to meet patients nutrient goals.     03-06    141  |  112<H>  |  18  ----------------------------<  139<H>  4.4   |  25  |  0.51    Ca    8.5      06 Mar 2022 07:43  Phos  2.1     03-06  Mg     2.3     03-06    TPro  5.0<L>  /  Alb  2.4<L>  /  TBili  0.4  /  DBili  x   /  AST  68<H>  /  ALT  108<H>  /  AlkPhos  441<H>  03-06      ESTIMATED NUTR NEEDS: based on 73Kg admit wt  1825-2190Kcal (25-30Kcal/Kg)  110-146g protein (1.5-2 g/Kg)  1825-2190mL (25-30mL/Kg)    TPN RECOMMENDATIONS:  2000mL total volume via Implanted Infusion Port    120g Amino Acids  150g Dextrose  50g Lipids 20%   127 mEq NaCl  0 mEq NaAcetate  20 mMol NaPhos  10 mEq KCl  10 mEq KAcetate  0 mMol KPhos  0 mEq Calcium Gluconate (supplement outside of PPN bag)  8 mEq Magnesium Sulfate  100 mg thiamine  1 mL trace elements  10 mL MVI    total calories: 1440 Kcal (meets ~78% of estimated calorie needs and ~100% of estimated protein needs); osmolarity 895.    ADDITIONAL RECOMMENDATIONS:  1) obtain triglyceride level  2) POCT q6hrs; maintain blood glucose control 140-180mg/dL  3) strict I/O's  4) daily lytes with magnesium and phos levels  5) daily wt checks to track/trend changes  6) obtain PICC line and ID consult    *will monitor and adjust treatement plan prn*  Real Flores RDN [phone] Clinical Nutrition TPN Recommendation Note    *see full initial assessment for information*    *labs reviewed: 03-06; potassium, and magnesium (low end normal) WNL; Po4 is low (will increase in bag 10mEq).   Corrected Ca WNL, not able to add to PN bag as CAPS is out, supplement outside of PN bag at this time.  bilirubin total WNL.  triglyceride level WNL, will start Lipids    03/06: RD discussed with Surgery about TPN and will provide via Mediport, Per surgery approved by IV team. RD will modify TPN daily to meet patients nutrient goals.     03-06    141  |  112<H>  |  18  ----------------------------<  139<H>  4.4   |  25  |  0.51    Ca    8.5      06 Mar 2022 07:43  Phos  2.1     03-06  Mg     2.3     03-06    TPro  5.0<L>  /  Alb  2.4<L>  /  TBili  0.4  /  DBili  x   /  AST  68<H>  /  ALT  108<H>  /  AlkPhos  441<H>  03-06    I&O's Detail    05 Mar 2022 07:01  -  06 Mar 2022 07:00  --------------------------------------------------------  IN:    Lactated Ringers: 1000 mL    Oral Fluid: 240 mL    PPN (Peripheral Parenteral Nutrition): 996 mL  Total IN: 2236 mL    OUT:    Chest Tube (mL): 655 mL    Voided (mL): 1300 mL  Total OUT: 1955 mL    Total NET: 281 mL      Fluid positive: last BM noted 03/03; 2+ edema in legs; acosta 19    ESTIMATED NUTR NEEDS: based on 73Kg admit wt  1825-2190Kcal (25-30Kcal/Kg)  110-146g protein (1.5-2 g/Kg)  1825-2190mL (25-30mL/Kg)    TPN RECOMMENDATIONS:  2000mL total volume via Implanted Infusion Port    120g Amino Acids  150g Dextrose  50g Lipids 20%   127 mEq NaCl  0 mEq NaAcetate  20 mMol NaPhos  10 mEq KCl  10 mEq KAcetate  0 mMol KPhos  0 mEq Calcium Gluconate (supplement outside of PPN bag)  8 mEq Magnesium Sulfate  100 mg thiamine  1 mL trace elements  10 mL MVI    total calories: 1440 Kcal (meets ~78% of estimated calorie needs and ~100% of estimated protein needs); osmolarity 895.    ADDITIONAL RECOMMENDATIONS:  1) obtain triglyceride level  2) POCT q6hrs; maintain blood glucose control 140-180mg/dL  3) strict I/O's  4) daily lytes with magnesium and phos levels  5) daily wt checks to track/trend changes  6) obtain PICC line and ID consult    *will monitor and adjust treatement plan prn*  Real Flores RDN [phone]

## 2022-03-06 NOTE — PROVIDER CONTACT NOTE (OTHER) - ACTION/TREATMENT ORDERED:
IVF ordered, will continue lipids as ordered peripherally IVF ordered to infuse x24hrs, will continue lipids as ordered peripherally

## 2022-03-06 NOTE — CHART NOTE - NSCHARTNOTEFT_GEN_A_CORE
Pt seen at bedside this afternoon. Pt reports she want so drink clear liquid diet. Pt only asking for broth. Pt is passing flatus and denies any BM. Pt reports he abdomen is soft. Advised pt to have limited clear diet    Plan d/w Dr. Dan

## 2022-03-06 NOTE — PROGRESS NOTE ADULT - SUBJECTIVE AND OBJECTIVE BOX
Patient is a 61y old  Female who presents with a chief complaint of pleural effusion and partial SBO (05 Mar 2022 18:11)    HPI:  61F with pmhx GEJ cancer s/p esophagectomy, [place] tumors s/p BSO presents to ED with sob. Patient found to have large left pleural effusion Now s/p left pigtail placement. Unclamped overnight; serosanguinous output this AM. Admits to improvement in breathing     (04 Mar 2022 00:22)    Allergies: latex  sulfa drugs    PAST MEDICAL & SURGICAL HISTORY:  Essential hypertension    Gastritis    HLD (hyperlipidemia)    GERD (gastroesophageal reflux disease)    Intracranial shunt    S/P cholecystectomy    History of esophageal surgery      FAMILY HISTORY:  Family history of gastric cancer (Father, Grandparent)    Family history of colon cancer      SOCIAL HISTORY:    Home Medications:    Review of Systems:  ROS as noted above, all others negative    T(F): 98.2 (03-06-22 @ 08:58), Max: 98.2 (03-06-22 @ 08:58)  HR: 101 (03-06-22 @ 08:58) (64 - 101)  BP: 141/89 (03-06-22 @ 08:58) (123/73 - 141/89)  RR: 17 (03-06-22 @ 08:58) (16 - 17)  SpO2: 100% (03-06-22 @ 08:58)  Wt(kg): --    CAPILLARY BLOOD GLUCOSE      POCT Blood Glucose.: 157 mg/dL (06 Mar 2022 05:45)    I&O's Summary    05 Mar 2022 07:01  -  06 Mar 2022 07:00  --------------------------------------------------------  IN: 2236 mL / OUT: 1955 mL / NET: 281 mL    06 Mar 2022 07:01  -  06 Mar 2022 10:09  --------------------------------------------------------  IN: 0 mL / OUT: 550 mL / NET: -550 mL        Physical Exam:     Gen: NAD  Neuro: awake/alert  CVS:+S1S2  Resp: CTA. Left chest tube serosangenous output  Abd: soft, NT, ND  Ext: warm ,dry, no edema  Skin: well perfused    Meds:    metoprolol tartrate Injectable 5 milliGRAM(s) IV Push every 6 hours PRN     dexAMETHasone  IVPB 10 milliGRAM(s) IV Intermittent every 6 hours  octreotide  Injectable 100 MICROGram(s) IV Push every 8 hours     ALBUTerol    90 MICROgram(s) HFA Inhaler 2 Puff(s) Inhalation every 6 hours PRN     acetaminophen   IVPB .. 1000 milliGRAM(s) IV Intermittent every 6 hours PRN  melatonin 5 milliGRAM(s) Oral at bedtime  morphine  - Injectable 2 milliGRAM(s) IV Push every 4 hours PRN  ondansetron Injectable 4 milliGRAM(s) IV Push every 6 hours PRN  topiramate 25 milliGRAM(s) Oral every 12 hours           pantoprazole  Injectable 40 milliGRAM(s) IV Push every 12 hours        multivitamin 1 Tablet(s) Oral daily  Parenteral Nutrition - Adult 1 Each TPN Continuous <Continuous>                                    12.7   15.96 )-----------( 244      ( 06 Mar 2022 07:43 )             39.0       03-06    141  |  112<H>  |  18  ----------------------------<  139<H>  4.4   |  25  |  0.51    Ca    8.5      06 Mar 2022 07:43  Phos  2.1     03-06  Mg     2.3     03-06    TPro  5.0<L>  /  Alb  2.4<L>  /  TBili  0.4  /  DBili  x   /  AST  68<H>  /  ALT  108<H>  /  AlkPhos  441<H>  03-06              .Body Fluid Pleural Fluid   No growth   polymorphonuclear leukocytes seen  No organisms seen per oil power field  by cytocentrifuge 03-04 @ 09:50          Radiology:   < from: Xray Chest 1 View- PORTABLE-Urgent (Xray Chest 1 View- PORTABLE-Urgent .) (03.04.22 @ 11:14) >    ACC: [id number] EXAM:  XR CHEST PORTABLE URGENT 1V                          PROCEDURE DATE:  03/04/2022          INTERPRETATION:  Portable chest radiograph    CLINICAL INFORMATION: Pigtail placement    TECHNIQUE:  Portable  AP chest radiograph.    COMPARISON: 12/9/2021 chest x-ray .    FINDINGS:  CATHETERS AND TUBES: LEFT multi-sidehole pigtail catheter overlies LEFT   lower hemithorax.    PULMONARY: Moderate LEFT effusion and/or basilar airspace consolidation   opacifies the lower two thirds of LEFT immediate thorax. LEFT upper zone   and RIGHT lung parenchyma grossly clear. No pneumothorax.    HEART/VASCULAR: The heart and mediastinum size and configuration are   within normal limits. Mediport catheter tip in SVC.    BONES: Visualized osseous structures are intact.    IMPRESSION:   LEFT multi-sidehole pigtail catheter overlies LEFT lower   hemithorax..  Moderate LEFT pleural effusion and/or basilar airspace/atelectasis..    --- End of Report ---            DANTE ROLON MD; AttendingRadiologist  This document has been electronically signed. Mar  5 2022  8:12AM    < end of copied text >      Assessment/Plan:    -Keep chest tube to WS  -CXR this AM w/ improvement in effussion  -Monitor CT output  -Follow up pleural studies    Discussed w/ Dr. Gann

## 2022-03-06 NOTE — PROVIDER CONTACT NOTE (OTHER) - RECOMMENDATIONS
Recommending port access
IVF till can be readdressed tomorrow, ok to continue lipids peripherally
Recheck BGM at 1200 at start sliding scale if BGM still elevated.

## 2022-03-06 NOTE — PROGRESS NOTE ADULT - SUBJECTIVE AND OBJECTIVE BOX
CC: 61 yr old female with small left pleural effusion now increasing in size.  Pt symptomatic with shortness of breath on exertion, warranting admission, is now s/p chest tube on 3/4/22.  She has a h/o Gastroesophageal cancer with omental metastases.  She had a partial SBO one month ago and once again has not been passing gas or  had BMs since 3/3/22. CT abdo noted.   Pt states her abdo pain is improving, no nausea/vomiting - is drinking water. Poor po intake and weight loss noted in the past few weeks.  No ha lightheadedness cp palps or sob since chest tube was inserted. No tingling or numbness anywhere.    3/6 - denies ha cp palps sob lightheadedness, ambulating in hallway, eager to eat - thinks she may have passed gas     Vital Signs Last 24 Hrs  T(F): 98 (06 Mar 2022 15:50), Max: 98.2 (06 Mar 2022 08:58)  HR: 77 (06 Mar 2022 15:50) (64 - 101)  BP: 131/67 (06 Mar 2022 15:50) (123/73 - 141/89)  RR: 17 (06 Mar 2022 15:50) (16 - 17)  SpO2: 95% (06 Mar 2022 15:50) (94% - 100%)  Constitutional: NAD, awake and alert  HEENT: PERR, EOMI  Neck: Soft and supple,  No JVD  Respiratory: Breath sounds are clear bilaterally, No wheezing, rales or rhonchi  Cardiovascular: S1 and S2, regular rate and rhythm, no Murmurs  Gastrointestinal: soft, nontender, nondistended  Extremities: No peripheral edema  Vascular: 2+ peripheral pulses  Neurological: A/O x 3, no focal deficits  Musculoskeletal: 5/5 strength b/l upper and lower extremities  Skin: No rashes    MEDICATIONS:  MEDICATIONS  (STANDING):  dexAMETHasone  IVPB 10 milliGRAM(s) IV Intermittent every 6 hours  fat emulsion (Fish Oil and Plant Based) 20% Infusion 0.684 Gm/kG/Day (20.83 mL/Hr) IV Continuous <Continuous>  melatonin 5 milliGRAM(s) Oral at bedtime  mineral oil enema 133 milliLiter(s) Rectal once  multivitamin 1 Tablet(s) Oral daily  octreotide  Injectable 100 MICROGram(s) IV Push every 8 hours  pantoprazole  Injectable 40 milliGRAM(s) IV Push every 12 hours  Parenteral Nutrition - Adult 1 Each (83 mL/Hr) TPN Continuous <Continuous>  Parenteral Nutrition - Adult 1 Each (83 mL/Hr) TPN Continuous <Continuous>  topiramate 25 milliGRAM(s) Oral every 12 hours      LABS: All Labs Reviewed:                      12.7   15.96 )-----------( 244      ( 06 Mar 2022 07:43 )             39.0  141  |  112<H>  |  18  ----------------------------<  139<H>  4.4   |  25  |  0.51  Ca    8.5      06 Mar 2022 07:43  Phos  2.1     03-06  Mg     2.3     03-06  TPro  5.0<L>  /  Alb  2.4<L>  /  TBili  0.4  /  DBili  x   /  AST  68<H>  /  ALT  108<H>  /  AlkPhos  441<H>  03-06    RADIOLOGY/EKG:  < from: Xray Chest 1 View- PORTABLE-Routine (Xray Chest 1 View- PORTABLE-Routine in AM.) (03.06.22 @ 09:39) >  Impression: Status post left chest pigtail catheter. Status post Mediport   placement. Heart size unremarkable. Lungs are clear. No pneumothorax. No   pleural effusion.      CTA/P  IMPRESSION:  1. A previous esophagectomy, a gastric pull-through.  2. A nodularity thyroid gland, a dominant left nodule with calcifications  measures 15 mm, recommend ultrasound correlation.  3. A prominent low-density lymph node within the mediastinum measures 11   mm.  4. A large left pleural effusion, underlying atelectasis.  5. An indeterminate nodule within the left adrenal gland measures 2.3 cm   no  significant interval change.  6. An indeterminate nodule within the right adrenal gland measures 2.3   cm, no  significant interval change.  7. Small ascites. Enlarging anterior peritoneal implants and perihepatic   implants.  8. Worsening small bowel obstruction with a transition zone in the mid to   lower abdomen deep to the abdominal wall where there is soft tissue   thickening of a collapsed small bowel loop, increasing with increasing   surrounding peritoneal soft tissue implants.  9. Small enhancing nodules along the periphery of the right hepatic lobe   are suspicious for metastases.

## 2022-03-07 LAB
ALBUMIN SERPL ELPH-MCNC: 2.3 G/DL — LOW (ref 3.3–5)
ALBUMIN SERPL ELPH-MCNC: 2.3 G/DL — LOW (ref 3.3–5)
ALP SERPL-CCNC: 333 U/L — HIGH (ref 40–120)
ALP SERPL-CCNC: 356 U/L — HIGH (ref 40–120)
ALT FLD-CCNC: 79 U/L — HIGH (ref 12–78)
ALT FLD-CCNC: 79 U/L — HIGH (ref 12–78)
ANION GAP SERPL CALC-SCNC: 4 MMOL/L — LOW (ref 5–17)
ANION GAP SERPL CALC-SCNC: 5 MMOL/L — SIGNIFICANT CHANGE UP (ref 5–17)
AST SERPL-CCNC: 37 U/L — SIGNIFICANT CHANGE UP (ref 15–37)
AST SERPL-CCNC: 39 U/L — HIGH (ref 15–37)
BILIRUB DIRECT SERPL-MCNC: <0.1 MG/DL — SIGNIFICANT CHANGE UP (ref 0–0.3)
BILIRUB INDIRECT FLD-MCNC: >0.3 MG/DL — SIGNIFICANT CHANGE UP (ref 0.2–1)
BILIRUB SERPL-MCNC: 0.4 MG/DL — SIGNIFICANT CHANGE UP (ref 0.2–1.2)
BILIRUB SERPL-MCNC: 0.4 MG/DL — SIGNIFICANT CHANGE UP (ref 0.2–1.2)
BUN SERPL-MCNC: 17 MG/DL — SIGNIFICANT CHANGE UP (ref 7–23)
BUN SERPL-MCNC: 17 MG/DL — SIGNIFICANT CHANGE UP (ref 7–23)
CALCIUM SERPL-MCNC: 8.1 MG/DL — LOW (ref 8.5–10.1)
CALCIUM SERPL-MCNC: 8.3 MG/DL — LOW (ref 8.5–10.1)
CHLORIDE SERPL-SCNC: 109 MMOL/L — HIGH (ref 96–108)
CHLORIDE SERPL-SCNC: 110 MMOL/L — HIGH (ref 96–108)
CO2 SERPL-SCNC: 24 MMOL/L — SIGNIFICANT CHANGE UP (ref 22–31)
CO2 SERPL-SCNC: 24 MMOL/L — SIGNIFICANT CHANGE UP (ref 22–31)
CREAT SERPL-MCNC: 0.39 MG/DL — LOW (ref 0.5–1.3)
CREAT SERPL-MCNC: 0.4 MG/DL — LOW (ref 0.5–1.3)
EGFR: 111 ML/MIN/1.73M2 — SIGNIFICANT CHANGE UP
EGFR: 112 ML/MIN/1.73M2 — SIGNIFICANT CHANGE UP
GLUCOSE SERPL-MCNC: 219 MG/DL — HIGH (ref 70–99)
GLUCOSE SERPL-MCNC: 220 MG/DL — HIGH (ref 70–99)
HCT VFR BLD CALC: 35.2 % — SIGNIFICANT CHANGE UP (ref 34.5–45)
HGB BLD-MCNC: 11.5 G/DL — SIGNIFICANT CHANGE UP (ref 11.5–15.5)
MAGNESIUM SERPL-MCNC: 2.3 MG/DL — SIGNIFICANT CHANGE UP (ref 1.6–2.6)
MAGNESIUM SERPL-MCNC: 2.3 MG/DL — SIGNIFICANT CHANGE UP (ref 1.6–2.6)
MCHC RBC-ENTMCNC: 32.7 GM/DL — SIGNIFICANT CHANGE UP (ref 32–36)
MCHC RBC-ENTMCNC: 32.7 PG — SIGNIFICANT CHANGE UP (ref 27–34)
MCV RBC AUTO: 100 FL — SIGNIFICANT CHANGE UP (ref 80–100)
PHOSPHATE SERPL-MCNC: 2.4 MG/DL — LOW (ref 2.5–4.5)
PHOSPHATE SERPL-MCNC: 2.5 MG/DL — SIGNIFICANT CHANGE UP (ref 2.5–4.5)
PLATELET # BLD AUTO: 223 K/UL — SIGNIFICANT CHANGE UP (ref 150–400)
POTASSIUM SERPL-MCNC: 3.5 MMOL/L — SIGNIFICANT CHANGE UP (ref 3.5–5.3)
POTASSIUM SERPL-MCNC: 3.6 MMOL/L — SIGNIFICANT CHANGE UP (ref 3.5–5.3)
POTASSIUM SERPL-SCNC: 3.5 MMOL/L — SIGNIFICANT CHANGE UP (ref 3.5–5.3)
POTASSIUM SERPL-SCNC: 3.6 MMOL/L — SIGNIFICANT CHANGE UP (ref 3.5–5.3)
PROT SERPL-MCNC: 4.8 GM/DL — LOW (ref 6–8.3)
PROT SERPL-MCNC: 4.9 GM/DL — LOW (ref 6–8.3)
RBC # BLD: 3.52 M/UL — LOW (ref 3.8–5.2)
RBC # FLD: 14.1 % — SIGNIFICANT CHANGE UP (ref 10.3–14.5)
SODIUM SERPL-SCNC: 138 MMOL/L — SIGNIFICANT CHANGE UP (ref 135–145)
SODIUM SERPL-SCNC: 138 MMOL/L — SIGNIFICANT CHANGE UP (ref 135–145)
WBC # BLD: 11.53 K/UL — HIGH (ref 3.8–10.5)
WBC # FLD AUTO: 11.53 K/UL — HIGH (ref 3.8–10.5)

## 2022-03-07 PROCEDURE — 99232 SBSQ HOSP IP/OBS MODERATE 35: CPT

## 2022-03-07 PROCEDURE — 74018 RADEX ABDOMEN 1 VIEW: CPT | Mod: 26,59

## 2022-03-07 PROCEDURE — 74270 X-RAY XM COLON 1CNTRST STD: CPT | Mod: 26

## 2022-03-07 PROCEDURE — 99233 SBSQ HOSP IP/OBS HIGH 50: CPT

## 2022-03-07 PROCEDURE — 99231 SBSQ HOSP IP/OBS SF/LOW 25: CPT

## 2022-03-07 RX ORDER — ELECTROLYTE SOLUTION,INJ
1 VIAL (ML) INTRAVENOUS
Refills: 0 | Status: DISCONTINUED | OUTPATIENT
Start: 2022-03-07 | End: 2022-03-07

## 2022-03-07 RX ORDER — SODIUM,POTASSIUM PHOSPHATES 278-250MG
1 POWDER IN PACKET (EA) ORAL DAILY
Refills: 0 | Status: DISCONTINUED | OUTPATIENT
Start: 2022-03-07 | End: 2022-03-09

## 2022-03-07 RX ORDER — I.V. FAT EMULSION 20 G/100ML
0.68 EMULSION INTRAVENOUS
Qty: 50 | Refills: 0 | Status: DISCONTINUED | OUTPATIENT
Start: 2022-03-07 | End: 2022-03-07

## 2022-03-07 RX ADMIN — SODIUM CHLORIDE 75 MILLILITER(S): 9 INJECTION, SOLUTION INTRAVENOUS at 00:38

## 2022-03-07 RX ADMIN — Medication 102 MILLIGRAM(S): at 18:19

## 2022-03-07 RX ADMIN — Medication 102 MILLIGRAM(S): at 11:53

## 2022-03-07 RX ADMIN — Medication 1 PACKET(S): at 15:39

## 2022-03-07 RX ADMIN — OCTREOTIDE ACETATE 100 MICROGRAM(S): 200 INJECTION, SOLUTION INTRAVENOUS; SUBCUTANEOUS at 22:47

## 2022-03-07 RX ADMIN — Medication 1 TABLET(S): at 10:21

## 2022-03-07 RX ADMIN — PANTOPRAZOLE SODIUM 40 MILLIGRAM(S): 20 TABLET, DELAYED RELEASE ORAL at 10:21

## 2022-03-07 RX ADMIN — Medication 25 MILLIGRAM(S): at 10:21

## 2022-03-07 RX ADMIN — Medication 1 EACH: at 23:08

## 2022-03-07 RX ADMIN — ENOXAPARIN SODIUM 40 MILLIGRAM(S): 100 INJECTION SUBCUTANEOUS at 18:18

## 2022-03-07 RX ADMIN — SODIUM CHLORIDE 75 MILLILITER(S): 9 INJECTION, SOLUTION INTRAVENOUS at 18:17

## 2022-03-07 RX ADMIN — OCTREOTIDE ACETATE 100 MICROGRAM(S): 200 INJECTION, SOLUTION INTRAVENOUS; SUBCUTANEOUS at 06:17

## 2022-03-07 RX ADMIN — OCTREOTIDE ACETATE 100 MICROGRAM(S): 200 INJECTION, SOLUTION INTRAVENOUS; SUBCUTANEOUS at 16:56

## 2022-03-07 RX ADMIN — Medication 25 MILLIGRAM(S): at 22:46

## 2022-03-07 RX ADMIN — I.V. FAT EMULSION 20.7 GM/KG/DAY: 20 EMULSION INTRAVENOUS at 23:09

## 2022-03-07 RX ADMIN — Medication 5 MILLIGRAM(S): at 22:46

## 2022-03-07 RX ADMIN — PANTOPRAZOLE SODIUM 40 MILLIGRAM(S): 20 TABLET, DELAYED RELEASE ORAL at 22:47

## 2022-03-07 RX ADMIN — Medication 102 MILLIGRAM(S): at 06:16

## 2022-03-07 NOTE — PROGRESS NOTE ADULT - SUBJECTIVE AND OBJECTIVE BOX
CC: 61 yr old female with small left pleural effusion now increasing in size.  Pt symptomatic with shortness of breath on exertion, warranting admission, is now s/p chest tube on 3/4/22.  She has a h/o Gastroesophageal cancer with omental metastases.  She had a partial SBO one month ago and once again has not been passing gas or  had BMs since 3/3/22. CT abdo noted.   Pt states her abdo pain is improving, no nausea/vomiting - is drinking water. Poor po intake and weight loss noted in the past few weeks.  No ha lightheadedness cp palps or sob since chest tube was inserted. No tingling or numbness anywhere.    3/6 - denies ha cp palps sob lightheadedness, ambulating in hallway, eager to eat - thinks she may have passed gas     Vital Signs Last 24 Hrs  T(F): 98 (06 Mar 2022 15:50), Max: 98.2 (06 Mar 2022 08:58)  HR: 77 (06 Mar 2022 15:50) (64 - 101)  BP: 131/67 (06 Mar 2022 15:50) (123/73 - 141/89)  RR: 17 (06 Mar 2022 15:50) (16 - 17)  SpO2: 95% (06 Mar 2022 15:50) (94% - 100%)  Constitutional: NAD, awake and alert  HEENT: PERR, EOMI  Neck: Soft and supple,  No JVD  Respiratory: Breath sounds are clear bilaterally, No wheezing, rales or rhonchi  Cardiovascular: S1 and S2, regular rate and rhythm, no Murmurs  Gastrointestinal: soft, nontender, nondistended  Extremities: No peripheral edema  Vascular: 2+ peripheral pulses  Neurological: A/O x 3, no focal deficits  Musculoskeletal: 5/5 strength b/l upper and lower extremities  Skin: No rashes    MEDICATIONS:  MEDICATIONS  (STANDING):  dexAMETHasone  IVPB 10 milliGRAM(s) IV Intermittent every 6 hours  fat emulsion (Fish Oil and Plant Based) 20% Infusion 0.684 Gm/kG/Day (20.83 mL/Hr) IV Continuous <Continuous>  melatonin 5 milliGRAM(s) Oral at bedtime  mineral oil enema 133 milliLiter(s) Rectal once  multivitamin 1 Tablet(s) Oral daily  octreotide  Injectable 100 MICROGram(s) IV Push every 8 hours  pantoprazole  Injectable 40 milliGRAM(s) IV Push every 12 hours  Parenteral Nutrition - Adult 1 Each (83 mL/Hr) TPN Continuous <Continuous>  Parenteral Nutrition - Adult 1 Each (83 mL/Hr) TPN Continuous <Continuous>  topiramate 25 milliGRAM(s) Oral every 12 hours      LABS: All Labs Reviewed:                      12.7   15.96 )-----------( 244      ( 06 Mar 2022 07:43 )             39.0  141  |  112<H>  |  18  ----------------------------<  139<H>  4.4   |  25  |  0.51  Ca    8.5      06 Mar 2022 07:43  Phos  2.1     03-06  Mg     2.3     03-06  TPro  5.0<L>  /  Alb  2.4<L>  /  TBili  0.4  /  DBili  x   /  AST  68<H>  /  ALT  108<H>  /  AlkPhos  441<H>  03-06    RADIOLOGY/EKG:  < from: Xray Chest 1 View- PORTABLE-Routine (Xray Chest 1 View- PORTABLE-Routine in AM.) (03.06.22 @ 09:39) >  Impression: Status post left chest pigtail catheter. Status post Mediport   placement. Heart size unremarkable. Lungs are clear. No pneumothorax. No   pleural effusion.      CTA/P  IMPRESSION:  1. A previous esophagectomy, a gastric pull-through.  2. A nodularity thyroid gland, a dominant left nodule with calcifications  measures 15 mm, recommend ultrasound correlation.  3. A prominent low-density lymph node within the mediastinum measures 11   mm.  4. A large left pleural effusion, underlying atelectasis.  5. An indeterminate nodule within the left adrenal gland measures 2.3 cm   no  significant interval change.  6. An indeterminate nodule within the right adrenal gland measures 2.3   cm, no  significant interval change.  7. Small ascites. Enlarging anterior peritoneal implants and perihepatic   implants.  8. Worsening small bowel obstruction with a transition zone in the mid to   lower abdomen deep to the abdominal wall where there is soft tissue   thickening of a collapsed small bowel loop, increasing with increasing   surrounding peritoneal soft tissue implants.  9. Small enhancing nodules along the periphery of the right hepatic lobe   are suspicious for metastases.         CC: 61 yr old female with small left pleural effusion now increasing in size.  Pt symptomatic with shortness of breath on exertion, warranting admission, is now s/p chest tube on 3/4/22.  She has a h/o Gastroesophageal cancer with omental metastases.  She had a partial SBO one month ago and once again has not been passing gas or  had BMs since 3/3/22. CT abdo noted.   Pt states her abdo pain is improving, no nausea/vomiting - is drinking water. Poor po intake and weight loss noted in the past few weeks.  No ha lightheadedness cp palps or sob since chest tube was inserted. No tingling or numbness anywhere.    3/7 - denies ha cp palps sob lightheadedness, ambulating in hallway, on CLD     Vital Signs Last 24 Hrs  T(F): 97.9 (07 Mar 2022 15:25), Max: 98.4 (06 Mar 2022 21:10)  HR: 69 (07 Mar 2022 15:25) (62 - 70)  BP: 148/71 (07 Mar 2022 15:25) (125/80 - 148/71)  RR: 18 (07 Mar 2022 15:25) (18 - 18)  SpO2: 100% (07 Mar 2022 15:25) (95% - 100%)  Constitutional: NAD, awake and alert  HEENT: PERR, EOMI  Neck: Soft and supple,  No JVD  Respiratory: Breath sounds are clear bilaterally, No wheezing, rales or rhonchi; left chest tube   Cardiovascular: S1 and S2, regular rate and rhythm, no Murmurs  Gastrointestinal: soft, nontender, nondistended  Extremities: No peripheral edema  Vascular: 2+ peripheral pulses  Neurological: A/O x 3, no focal deficits  Musculoskeletal: 5/5 strength b/l upper and lower extremities  Skin: No rashes    RADIOLOGY/EKG:  < from: Xray Chest 1 View- PORTABLE-Routine (Xray Chest 1 View- PORTABLE-Routine in AM.) (03.06.22 @ 09:39) >  Impression: Status post left chest pigtail catheter. Status post Mediport   placement. Heart size unremarkable. Lungs are clear. No pneumothorax. No   pleural effusion.      CTA/P  IMPRESSION:  1. A previous esophagectomy, a gastric pull-through.  2. A nodularity thyroid gland, a dominant left nodule with calcifications  measures 15 mm, recommend ultrasound correlation.  3. A prominent low-density lymph node within the mediastinum measures 11   mm.  4. A large left pleural effusion, underlying atelectasis.  5. An indeterminate nodule within the left adrenal gland measures 2.3 cm   no  significant interval change.  6. An indeterminate nodule within the right adrenal gland measures 2.3   cm, no  significant interval change.  7. Small ascites. Enlarging anterior peritoneal implants and perihepatic   implants.  8. Worsening small bowel obstruction with a transition zone in the mid to   lower abdomen deep to the abdominal wall where there is soft tissue   thickening of a collapsed small bowel loop, increasing with increasing   surrounding peritoneal soft tissue implants.  9. Small enhancing nodules along the periphery of the right hepatic lobe   are suspicious for metastases.      LABS: All Labs Reviewed:                        11.5 11.53 )-----------( 223      ( 07 Mar 2022 04:57 )             35.2     138  |  109<H>  |  17  ----------------------------<  219<H>  3.5   |  24  |  0.40<L>    Ca    8.1<L>      07 Mar 2022 04:57  Phos  2.5     03-07  Mg     2.3     03-07    TPro  4.8<L>  /  Alb  2.3<L>  /  TBili  0.4  /  DBili  <0.1  /  AST  39<H>  /  ALT  79<H>  /  AlkPhos  356<H>  03-07      acetaminophen   IVPB .. 1000 milliGRAM(s) IV Intermittent every 6 hours PRN  ALBUTerol    90 MICROgram(s) HFA Inhaler 2 Puff(s) Inhalation every 6 hours PRN  dexAMETHasone  IVPB 10 milliGRAM(s) IV Intermittent every 6 hours  dextrose 5% + sodium chloride 0.45%. 1000 milliLiter(s) IV Continuous <Continuous>  enoxaparin Injectable 40 milliGRAM(s) SubCutaneous every 24 hours  fat emulsion (Fish Oil and Plant Based) 20% Infusion 0.68 Gm/kG/Day IV Continuous <Continuous>  melatonin 5 milliGRAM(s) Oral at bedtime  metoprolol tartrate Injectable 5 milliGRAM(s) IV Push every 6 hours PRN  morphine  - Injectable 2 milliGRAM(s) IV Push every 4 hours PRN  multivitamin 1 Tablet(s) Oral daily  octreotide  Injectable 100 MICROGram(s) IV Push every 8 hours  ondansetron Injectable 4 milliGRAM(s) IV Push every 6 hours PRN  pantoprazole  Injectable 40 milliGRAM(s) IV Push every 12 hours  Parenteral Nutrition - Adult 1 Each TPN Continuous <Continuous>  Parenteral Nutrition - Adult 1 Each TPN Continuous <Continuous>  potassium phosphate / sodium phosphate Powder (PHOS-NaK) 1 Packet(s) Oral daily  topiramate 25 milliGRAM(s) Oral every 12 hours

## 2022-03-07 NOTE — CHART NOTE - NSCHARTNOTEFT_GEN_A_CORE
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      *current status: TPN not infused overnight since implanted port was used for antibiotics, now pending PICC line for TPN.  will order PPN for today (3/7).    *labs reviewed; potassium at lower end of normal, hypophosphatemia noted.  TPN not infused over night as implanted port had been used for antibiotics and can no longer be used for TPN as per surgery.  bilirubin total WNL.  triglycerides WNL for lipids.  corrected Ca WNL, unable to add calcium to PN bag as CAPS is out of calcium gluconate (supplement outside of PN at this time).  POCT were elevated when PN infusing, will add insulin to PN bag.  03-07    138  |  109<H>  |  17  ----------------------------<  219<H>  3.5   |  24  |  0.40<L>    Ca    8.1<L>      07 Mar 2022 04:57  Phos  2.5     03-07  Mg     2.3     03-07    TPro  4.8<L>  /  Alb  2.3<L>  /  TBili  0.4  /  DBili  <0.1  /  AST  39<H>  /  ALT  79<H>  /  AlkPhos  356<H>  03-07    Lipid Panel: Date/Time: 03-05-22 @ 05:24  Triglycerides, Serum: 57    POCT Blood Glucose.: 193 mg/dL (07 Mar 2022 05:58)  POCT Blood Glucose.: 192 mg/dL (07 Mar 2022 00:28)  POCT Blood Glucose.: 169 mg/dL (06 Mar 2022 17:06)  POCT Blood Glucose.: 205 mg/dL (06 Mar 2022 12:24)      *pertinent meds: dextrose 5% + sodium chloride 0.45% IVF, melatonin, dexamethasone, octreotide, morphine, MVI, zofran, pantoprazole    *I&O's Detail    06 Mar 2022 07:01  -  07 Mar 2022 07:00  --------------------------------------------------------  IN:  Total IN: 0 mL    OUT:    Chest Tube (mL): 260 mL    Voided (mL): 1900 mL  Total OUT: 2160 mL    Total NET: -2160 mL      *negative fluid status: BM (+) on 3/3, abd distended, hypoactive bowel sounds.  pt not on bowel regimen.  *NPO x 3 days. clear liquids x 1 day with minimal PO intake.  pending PICC line placement for long term TPN.    *Gurpreet Score of  19; no PU documented. (+2) Lt/Rt leg/ankle edema documented.    ESTIMATED NUTR NEEDS: based on 73Kg admit wt  1825-2190Kcal (25-30Kcal/Kg)  110-146g protein (1.5-2 g/Kg)  1825-2190mL (25-30mL/Kg)      Weight History:  no new weight since admission  Height (cm): 167.6 (03-03-22 @ 22:52)  Weight (kg): 73.1 (03-03-22 @ 22:52)  BMI (kg/m2): 26 (03-03-22 @ 22:52)  BSA (m2): 1.82 (03-03-22 @ 22:52)  IBW: 59Kg      *will continue to monitor and adjust PN prn*    PPN Recommendations: via peripheral line    Total Volume:  90 g  Amino Acids  100 g Dextrose  50 g Lipids 20%  127 mEq Sodium Chloride  0 mEq Sodium Acetate  20 mmol Sodium Phosphate  10 mEq Potassium Chloride  10 mEq Potassium Acetate  0 mmol Potassium Phosphate  0 mEq Calcium Gluconate  8 mEq Magnesium Sulfate  100 mg Thiamine  7 units Regular Insulin  1 ml Trace Elements   10 ml MVI    Total Calories     1200       (   Meets   66%  of  Estimated Energy needs  and    82 %  Protein needs)(osmolarity 895)    Additional Recommendations:    1) Daily weights  2) Strict I & O's  3) Daily lyte checks  4) Weekly triglycerides/LFT checks  5) POCT q6hrs; maintain POCT of 140-180mg/dL    *will monitor and adjust treatement plan prn*  Tiki Ascencio MA, RDN, CDN, CNSC [phone]

## 2022-03-07 NOTE — PROGRESS NOTE ADULT - ASSESSMENT
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 Patient underwent PET CT roughly one month ago which showed small amount of left sided pleural effusion, as per patient. 2 days ago, patient saw her oncologist who ordered a CXR which showed marked increase in pleural effusion.  Pt had CT + partial bowel obstruction.  Plan for OR next week.    Consulted for Lt Pleural effusion.  3/4 s/p Left Pigtail 1.5 L Drained     plan  s/p Left Pigtail  Cult Neg, Exudative, Cyto P   Maintain Pigtail to WS   CXR in am   Will determine weather pt will need Pleurx on this admission. Awaiting cytology results   DW Dr Aguilar 61F with pmhx GEJ cancer s/p esophagectomy, [place] tumors s/p BSO presents to ED with sob for 1.5 weeks duration. Patient underwent PET CT roughly one month ago which showed small amount of left sided pleural effusion, as per patient. 2 days ago, patient saw her oncologist who ordered a CXR which showed marked increase in pleural effusion.  Pt had CT + partial bowel obstruction.  Plan for OR next week.    Consulted for Lt Pleural effusion.  3/4 s/p Left Pigtail 1.5 L Drained     plan  s/p Left Pigtail  Cult Neg, Exudative, Cyto P   Maintain Pigtail to WS   CXR in am   No pleurx on this admission  will d/c Pigtail before discharge   pt can follow up as outpatient if pleurx is needed in the future   HIREN Aguilar 61F with pmhx GEJ cancer s/p esophagectomy, [place] tumors s/p BSO presents to ED with sob for 1.5 weeks duration. Patient underwent PET CT roughly one month ago which showed small amount of left sided pleural effusion, as per patient. 2 days ago, patient saw her oncologist who ordered a CXR which showed marked increase in pleural effusion.  Pt had CT + partial bowel obstruction.  Plan for OR next week.    Consulted for Lt Pleural effusion.  3/4 s/p Left Pigtail 1.5 L Drained     plan  s/p Left Pigtail  Cult Neg, Exudative, Cyto P    Maintain Pigtail to WS   CXR in am   No pleurx on this admission  will d/c Pigtail before discharge   pt can follow up as outpatient if pleurx is needed in the future   HIREN Aguilar

## 2022-03-07 NOTE — PROGRESS NOTE ADULT - SUBJECTIVE AND OBJECTIVE BOX
SURGERY DAILY PROGRESS NOTE:     Subjective:  Patient seen and examined at bedside during am rounds reports her abdomen less distended. Pt reports passed a little flatus, denies any BM. Pt is drinking limited clears.  AVSS. Denies any fevers, chills, n/v/d, chest pain or shortness of breath. Pt still refusing NGT    Objective:    MEDICATIONS  (STANDING):  dexAMETHasone  IVPB 10 milliGRAM(s) IV Intermittent every 6 hours  dextrose 5% + sodium chloride 0.45%. 1000 milliLiter(s) (75 mL/Hr) IV Continuous <Continuous>  enoxaparin Injectable 40 milliGRAM(s) SubCutaneous every 24 hours  fat emulsion (Fish Oil and Plant Based) 20% Infusion 0.684 Gm/kG/Day (20.83 mL/Hr) IV Continuous <Continuous>  melatonin 5 milliGRAM(s) Oral at bedtime  multivitamin 1 Tablet(s) Oral daily  octreotide  Injectable 100 MICROGram(s) IV Push every 8 hours  pantoprazole  Injectable 40 milliGRAM(s) IV Push every 12 hours  Parenteral Nutrition - Adult 1 Each (83 mL/Hr) TPN Continuous <Continuous>  topiramate 25 milliGRAM(s) Oral every 12 hours    MEDICATIONS  (PRN):  acetaminophen   IVPB .. 1000 milliGRAM(s) IV Intermittent every 6 hours PRN Mild Pain (1 - 3)  ALBUTerol    90 MICROgram(s) HFA Inhaler 2 Puff(s) Inhalation every 6 hours PRN Shortness of Breath  metoprolol tartrate Injectable 5 milliGRAM(s) IV Push every 6 hours PRN SBP > 140  morphine  - Injectable 2 milliGRAM(s) IV Push every 4 hours PRN Severe Pain (7 - 10)  ondansetron Injectable 4 milliGRAM(s) IV Push every 6 hours PRN Nausea      Vital Signs Last 24 Hrs  T(C): 36.8 (07 Mar 2022 08:41), Max: 36.9 (06 Mar 2022 21:10)  T(F): 98.2 (07 Mar 2022 08:41), Max: 98.4 (06 Mar 2022 21:10)  HR: 70 (07 Mar 2022 08:41) (62 - 77)  BP: 145/73 (07 Mar 2022 08:41) (125/80 - 145/73)  BP(mean): --  RR: 18 (07 Mar 2022 08:41) (17 - 18)  SpO2: 96% (07 Mar 2022 08:41) (95% - 97%)      PHYSICAL EXAM   Gen: well-appearing, in no acute distress  CV: pulse regularly present   Resp: airway patent, non-labored breathing  Abd: soft, non-tender non-distende  ext. no cyanosis or edema      I&O's Detail    06 Mar 2022 07:01  -  07 Mar 2022 07:00  --------------------------------------------------------  IN:  Total IN: 0 mL    OUT:    Chest Tube (mL): 260 mL    Voided (mL): 1900 mL  Total OUT: 2160 mL    Total NET: -2160 mL          Daily     Daily     LABS:                        11.5   11.53 )-----------( 223      ( 07 Mar 2022 04:57 )             35.2     03-07    138  |  109<H>  |  17  ----------------------------<  219<H>  3.5   |  24  |  0.40<L>    Ca    8.1<L>      07 Mar 2022 04:57  Phos  2.5     03-07  Mg     2.3     03-07    TPro  4.8<L>  /  Alb  2.3<L>  /  TBili  0.4  /  DBili  <0.1  /  AST  39<H>  /  ALT  79<H>  /  AlkPhos  356<H>  03-07          RADIOLOGY & ADDITIONAL STUDIES:    ASSESSMENT/PLAN:

## 2022-03-07 NOTE — PROGRESS NOTE ADULT - ASSESSMENT
61-year-old female  Metastatic gastric adenocarcinoma    sp Taxol carbo in the past,, Good response however had significant neuropathy    Recent recurrence   Treated with immunotherapy, no response although only 1 dose  with bowel obstruction treated with FOLFIRI and subsequent toxicity led to hospitalization  Now with recurrent obstruction    Case reviewed with patient , later Dr. Don, hospitalist    Plan is for surgical intervention in 2 days to hopefully relieve obstruction  This may prove challenging as patient has had prior abdominal surgical interventions and this afternoon's barium scan  Suggest possibly 2 different sites of obstruction    Unfortunately we do not have any promising systemic chemotherapy options  Dr Don Has been considering returning to Taxol However this may be challenging given history of neuropathy    Plan   Review barium scan and surgical strategy with Dr. Stevens    Above reviewed with patient and her  and they are aware of the challenges Regarding effective palliation

## 2022-03-07 NOTE — PROGRESS NOTE ADULT - SUBJECTIVE AND OBJECTIVE BOX
Patient seen earlier this morning     at bedside    Was comfortable  Had not been passing gas or stool  N.p.o. except sips of water  No significant pain    Lungs clear, With chest tube draining Left  Abdomen mildly distended soft nontender no active bowel sounds  Extremities without pitting edema   Barium Enema Single Contrast (Xray Barium Enema Single Contrast .) (03.07.22 @ 15:12) >  BARIUM ENEMA SNGL CON STDY                          PROCEDURE DATE:  03/07/2022          INTERPRETATION:  CLINICAL INFORMATION: Carcinomatosis and small bowel   obstruction.    TECHNIQUE: A water-soluble contrast enema was performed under   fluoroscopic guidance utilizing dilute Omnipaque 350.    COMPARISON: CT abdomen/pelvis March 04, 2022    FINDINGS:  radiograph of the abdomen demonstrates dilated small   bowel loops compatible with the patient's known small bowel obstruction.   Right upper quadrant surgical clips and and left upper quadrant surgical   suture are noted.    The entire colon was opacified. There is no obstruction. There was   difficulty in opacifying the sigmoid colon and there are several areas of   persistent luminal narrowing involving the sigmoid, the most pronounced   of which is at the descending-sigmoid junction and in the mid sigmoid   colon, both corresponding to sites of serosal disease on CT. No   significant narrowing was seen elsewhere in the colon.    IMPRESSION:    Moderate to severe strictures in the proximal and mid sigmoid colon which   are likely due to serosal implants.      CLARY FREED JR, MD; Attending Radiologist

## 2022-03-07 NOTE — PROGRESS NOTE ADULT - ASSESSMENT
A/P  Worsening shortness of breath due to left pleural effusion s/p chest tube   recurrent partial SBO   abnormal LFTs - liver mets? drug s/e - immunotherapy? from PPN?  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  [place] Tumor one year after the above s/p Sx  Pt noted to have omental mets s/p Folfiri (severe diarrhea) and IT   Peripheral Neuropathy   HTN HLP   h/o cholecystectomy    Plan:  WBCs have doubled 2/2 IV steroids - pt otherwise stable/afebrile/ ambulating, cont to monitor   s/p chest tube on this admission, follow-up cytology  encourage IS OOB   diet/dexamethasone/octreotide per primary team   dc crestor  cont topamax for neuropathy  cont IV PPI   cont IV BB   cont IV pain meds  on PPN, monitor lytes and LFTs - discussed with Sx - cont PPN     PREOP EVAL:  61 yr old female with no known h/o MI or CVA   Ambulates, able to climb up a flight of stairs and walk on a flat surface without chest pain as recently as a few days ago  Quit TOB use in 2018   pre-op EKG done - rev by me - sinus, qtc wnl, no acute st/tw changes   Patient has no active cardiac or pulmonary complaints since placement of chest tube  Malik perioperative risk for MI/CA is 0.8%, no further testing required at this time for intestinal surgery   discussed with French Dan and Pato    VTE Px - rec addn of lovenox     Pls call with q  [phone]    A/P  Worsening shortness of breath due to left pleural effusion s/p chest tube   recurrent partial SBO   abnormal LFTs - liver mets? drug s/e - immunotherapy? from PPN?  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 Shunt no longer goes to the peritoneum but to the spine and has been problem-free for many years     Plan:  s/p chest tube on this admission, follow-up cytology - will be low yield - likely malignant eff  encourage IS OOB   diet/dexamethasone/octreotide per primary team   dc crestor  cont topamax for neuropathy  cont IV PPI   cont IV BB   cont IV pain meds  TPN per primary team     PREOP EVAL:  61 yr old female with no known h/o MI or CVA   Ambulates, able to climb up a flight of stairs and walk on a flat surface without chest pain as recently as a few days ago  Quit TOB use in 2018   pre-op EKG done - rev by me - sinus, qtc wnl, no acute st/tw changes   Patient has no active cardiac or pulmonary complaints since placement of chest tube  Malik perioperative risk for MI/CA is 0.8%, no further testing required at this time for intestinal surgery   discussed with French Dan and Ayaka    At this time there are no plans for Taxol - she has received it in the past but has significant neuropathy from it     VTE Px - lovenox     Pls call with q  [phone]

## 2022-03-07 NOTE — PROGRESS NOTE ADULT - SUBJECTIVE AND OBJECTIVE BOX
Subjective: Pt in bed NAD.  Pigtail in and draining serosang fluid     T(C): 36.8 (03-07-22 @ 08:41), Max: 36.9 (03-06-22 @ 21:10)  HR: 70 (03-07-22 @ 08:41) (62 - 77)  BP: 145/73 (03-07-22 @ 08:41) (125/80 - 145/73)  ABP: --  ABP(mean): --  RR: 18 (03-07-22 @ 08:41) (17 - 18)  SpO2: 96% (03-07-22 @ 08:41) (95% - 97%) 2 L NC   Wt(kg): --  CVP(mm Hg): --  CO: --  CI: --  PA: --                                              Tele: SR      CHEST TUBE: Left Pigtail 260cc                               OUTPUT:     per 24 hours    AIR LEAKS:  [ ] YES [ x] NO          03-07    138  |  109<H>  |  17  ----------------------------<  219<H>  3.5   |  24  |  0.40<L>    Ca    8.1<L>      07 Mar 2022 04:57  Phos  2.5     03-07  Mg     2.3     03-07    TPro  4.8<L>  /  Alb  2.3<L>  /  TBili  0.4  /  DBili  <0.1  /  AST  39<H>  /  ALT  79<H>  /  AlkPhos  356<H>  03-07                               11.5   11.53 )-----------( 223      ( 07 Mar 2022 04:57 )             35.2                 CAPILLARY BLOOD GLUCOSE      POCT Blood Glucose.: 193 mg/dL (07 Mar 2022 05:58)  POCT Blood Glucose.: 192 mg/dL (07 Mar 2022 00:28)  POCT Blood Glucose.: 169 mg/dL (06 Mar 2022 17:06)  POCT Blood Glucose.: 205 mg/dL (06 Mar 2022 12:24)           CXR:  < from: Xray Chest 1 View- PORTABLE-Routine (Xray Chest 1 View- PORTABLE-Routine in AM.) (03.06.22 @ 09:39) >  Impression: Status post left chest pigtail catheter. Status post Mediport   placement. Heart size unremarkable. Lungs are clear. No pneumothorax. No   pleural effusion.    < end of copied text >          Exam   Neuro: Alert Awake NAD  Pulm: decreased  at bases + Left Pigtail    CV: RRR S1 S2  Abd:  Soft , less distended   Extremities: 1+ edema         Assessment:  61yFemale    with PAST MEDICAL & SURGICAL HISTORY:  Essential hypertension    Gastritis    HLD (hyperlipidemia)    GERD (gastroesophageal reflux disease)    Intracranial shunt    S/P cholecystectomy    History of esophageal surgery          Plan:

## 2022-03-08 LAB
ALBUMIN SERPL ELPH-MCNC: 2.5 G/DL — LOW (ref 3.3–5)
ALP SERPL-CCNC: 318 U/L — HIGH (ref 40–120)
ALT FLD-CCNC: 180 U/L — HIGH (ref 12–78)
ANION GAP SERPL CALC-SCNC: 4 MMOL/L — LOW (ref 5–17)
AST SERPL-CCNC: 129 U/L — HIGH (ref 15–37)
BASOPHILS # BLD AUTO: 0.01 K/UL — SIGNIFICANT CHANGE UP (ref 0–0.2)
BASOPHILS NFR BLD AUTO: 0.1 % — SIGNIFICANT CHANGE UP (ref 0–2)
BILIRUB SERPL-MCNC: 0.4 MG/DL — SIGNIFICANT CHANGE UP (ref 0.2–1.2)
BUN SERPL-MCNC: 13 MG/DL — SIGNIFICANT CHANGE UP (ref 7–23)
CALCIUM SERPL-MCNC: 8.4 MG/DL — LOW (ref 8.5–10.1)
CHLORIDE SERPL-SCNC: 108 MMOL/L — SIGNIFICANT CHANGE UP (ref 96–108)
CO2 SERPL-SCNC: 26 MMOL/L — SIGNIFICANT CHANGE UP (ref 22–31)
CREAT SERPL-MCNC: 0.52 MG/DL — SIGNIFICANT CHANGE UP (ref 0.5–1.3)
EGFR: 104 ML/MIN/1.73M2 — SIGNIFICANT CHANGE UP
EOSINOPHIL # BLD AUTO: 0 K/UL — SIGNIFICANT CHANGE UP (ref 0–0.5)
EOSINOPHIL NFR BLD AUTO: 0 % — SIGNIFICANT CHANGE UP (ref 0–6)
GLUCOSE SERPL-MCNC: 192 MG/DL — HIGH (ref 70–99)
HCT VFR BLD CALC: 36.6 % — SIGNIFICANT CHANGE UP (ref 34.5–45)
HGB BLD-MCNC: 12 G/DL — SIGNIFICANT CHANGE UP (ref 11.5–15.5)
IMM GRANULOCYTES NFR BLD AUTO: 0.7 % — SIGNIFICANT CHANGE UP (ref 0–1.5)
LYMPHOCYTES # BLD AUTO: 0.74 K/UL — LOW (ref 1–3.3)
LYMPHOCYTES # BLD AUTO: 6.3 % — LOW (ref 13–44)
MAGNESIUM SERPL-MCNC: 2.3 MG/DL — SIGNIFICANT CHANGE UP (ref 1.6–2.6)
MCHC RBC-ENTMCNC: 32.8 GM/DL — SIGNIFICANT CHANGE UP (ref 32–36)
MCHC RBC-ENTMCNC: 32.8 PG — SIGNIFICANT CHANGE UP (ref 27–34)
MCV RBC AUTO: 100 FL — SIGNIFICANT CHANGE UP (ref 80–100)
MONOCYTES # BLD AUTO: 0.64 K/UL — SIGNIFICANT CHANGE UP (ref 0–0.9)
MONOCYTES NFR BLD AUTO: 5.5 % — SIGNIFICANT CHANGE UP (ref 2–14)
NEUTROPHILS # BLD AUTO: 10.24 K/UL — HIGH (ref 1.8–7.4)
NEUTROPHILS NFR BLD AUTO: 87.4 % — HIGH (ref 43–77)
PHOSPHATE SERPL-MCNC: 3.1 MG/DL — SIGNIFICANT CHANGE UP (ref 2.5–4.5)
PLATELET # BLD AUTO: 236 K/UL — SIGNIFICANT CHANGE UP (ref 150–400)
POTASSIUM SERPL-MCNC: 3.7 MMOL/L — SIGNIFICANT CHANGE UP (ref 3.5–5.3)
POTASSIUM SERPL-SCNC: 3.7 MMOL/L — SIGNIFICANT CHANGE UP (ref 3.5–5.3)
PREALB SERPL-MCNC: 18 MG/DL — LOW (ref 20–40)
PROT SERPL-MCNC: 5.1 GM/DL — LOW (ref 6–8.3)
RBC # BLD: 3.66 M/UL — LOW (ref 3.8–5.2)
RBC # FLD: 14.1 % — SIGNIFICANT CHANGE UP (ref 10.3–14.5)
SARS-COV-2 RNA SPEC QL NAA+PROBE: SIGNIFICANT CHANGE UP
SODIUM SERPL-SCNC: 138 MMOL/L — SIGNIFICANT CHANGE UP (ref 135–145)
TRIGL SERPL-MCNC: 189 MG/DL — HIGH
WBC # BLD: 11.71 K/UL — HIGH (ref 3.8–10.5)
WBC # FLD AUTO: 11.71 K/UL — HIGH (ref 3.8–10.5)

## 2022-03-08 PROCEDURE — 99232 SBSQ HOSP IP/OBS MODERATE 35: CPT

## 2022-03-08 PROCEDURE — 99232 SBSQ HOSP IP/OBS MODERATE 35: CPT | Mod: 57

## 2022-03-08 PROCEDURE — 71045 X-RAY EXAM CHEST 1 VIEW: CPT | Mod: 26

## 2022-03-08 PROCEDURE — 99231 SBSQ HOSP IP/OBS SF/LOW 25: CPT

## 2022-03-08 RX ORDER — PROCHLORPERAZINE MALEATE 5 MG
5 TABLET ORAL ONCE
Refills: 0 | Status: COMPLETED | OUTPATIENT
Start: 2022-03-08 | End: 2022-03-08

## 2022-03-08 RX ORDER — I.V. FAT EMULSION 20 G/100ML
0.68 EMULSION INTRAVENOUS
Qty: 50 | Refills: 0 | Status: DISCONTINUED | OUTPATIENT
Start: 2022-03-08 | End: 2022-03-09

## 2022-03-08 RX ORDER — ELECTROLYTE SOLUTION,INJ
1 VIAL (ML) INTRAVENOUS
Refills: 0 | Status: DISCONTINUED | OUTPATIENT
Start: 2022-03-08 | End: 2022-03-09

## 2022-03-08 RX ADMIN — MORPHINE SULFATE 2 MILLIGRAM(S): 50 CAPSULE, EXTENDED RELEASE ORAL at 00:02

## 2022-03-08 RX ADMIN — OCTREOTIDE ACETATE 100 MICROGRAM(S): 200 INJECTION, SOLUTION INTRAVENOUS; SUBCUTANEOUS at 06:34

## 2022-03-08 RX ADMIN — Medication 25 MILLIGRAM(S): at 09:36

## 2022-03-08 RX ADMIN — Medication 102 MILLIGRAM(S): at 06:20

## 2022-03-08 RX ADMIN — PANTOPRAZOLE SODIUM 40 MILLIGRAM(S): 20 TABLET, DELAYED RELEASE ORAL at 09:36

## 2022-03-08 RX ADMIN — MORPHINE SULFATE 2 MILLIGRAM(S): 50 CAPSULE, EXTENDED RELEASE ORAL at 01:02

## 2022-03-08 RX ADMIN — Medication 102 MILLIGRAM(S): at 00:02

## 2022-03-08 RX ADMIN — MORPHINE SULFATE 2 MILLIGRAM(S): 50 CAPSULE, EXTENDED RELEASE ORAL at 00:19

## 2022-03-08 RX ADMIN — OCTREOTIDE ACETATE 100 MICROGRAM(S): 200 INJECTION, SOLUTION INTRAVENOUS; SUBCUTANEOUS at 14:29

## 2022-03-08 RX ADMIN — Medication 1 PACKET(S): at 09:36

## 2022-03-08 RX ADMIN — Medication 5 MILLIGRAM(S): at 22:39

## 2022-03-08 RX ADMIN — PANTOPRAZOLE SODIUM 40 MILLIGRAM(S): 20 TABLET, DELAYED RELEASE ORAL at 22:43

## 2022-03-08 RX ADMIN — OCTREOTIDE ACETATE 100 MICROGRAM(S): 200 INJECTION, SOLUTION INTRAVENOUS; SUBCUTANEOUS at 23:46

## 2022-03-08 RX ADMIN — Medication 5 MILLIGRAM(S): at 22:41

## 2022-03-08 RX ADMIN — Medication 25 MILLIGRAM(S): at 22:41

## 2022-03-08 NOTE — PROGRESS NOTE ADULT - SUBJECTIVE AND OBJECTIVE BOX
Subjective:  Pt seen, informed cytology came back negative from pleural fluid. Seen on RA w  at bedside.    Vital Signs:  Vital Signs Last 24 Hrs  T(C): 36.9 (03-08-22 @ 15:00), Max: 36.9 (03-08-22 @ 15:00)  T(F): 98.4 (03-08-22 @ 15:00), Max: 98.4 (03-08-22 @ 15:00)  HR: 67 (03-08-22 @ 15:00) (58 - 72)  BP: 129/77 (03-08-22 @ 15:00) (129/77 - 142/73)  RR: 18 (03-08-22 @ 15:00) (18 - 18)  SpO2: 98% (03-08-22 @ 15:00) (95% - 99%) on (O2)    Telemetry/Alarms:    Relevant labs, radiology and Medications reviewed                        12.0   11.71 )-----------( 236      ( 08 Mar 2022 06:13 )             36.6     03-08    138  |  108  |  13  ----------------------------<  192<H>  3.7   |  26  |  0.52    Ca    8.4<L>      08 Mar 2022 06:13  Phos  3.1     03-08  Mg     2.3     03-08    TPro  5.1<L>  /  Alb  2.5<L>  /  TBili  0.4  /  DBili  x   /  AST  129<H>  /  ALT  180<H>  /  AlkPhos  318<H>  03-08      MEDICATIONS  (STANDING):  fat emulsion (Fish Oil and Plant Based) 20% Infusion 0.68 Gm/kG/Day (20.7 mL/Hr) IV Continuous <Continuous>  melatonin 5 milliGRAM(s) Oral at bedtime  multivitamin 1 Tablet(s) Oral daily  octreotide  Injectable 100 MICROGram(s) IV Push every 8 hours  pantoprazole  Injectable 40 milliGRAM(s) IV Push every 12 hours  Parenteral Nutrition - Adult 1 Each (83 mL/Hr) TPN Continuous <Continuous>  Parenteral Nutrition - Adult 1 Each (83 mL/Hr) TPN Continuous <Continuous>  potassium phosphate / sodium phosphate Powder (PHOS-NaK) 1 Packet(s) Oral daily  topiramate 25 milliGRAM(s) Oral every 12 hours    MEDICATIONS  (PRN):  acetaminophen   IVPB .. 1000 milliGRAM(s) IV Intermittent every 6 hours PRN Mild Pain (1 - 3)  ALBUTerol    90 MICROgram(s) HFA Inhaler 2 Puff(s) Inhalation every 6 hours PRN Shortness of Breath  metoprolol tartrate Injectable 5 milliGRAM(s) IV Push every 6 hours PRN SBP > 140  morphine  - Injectable 2 milliGRAM(s) IV Push every 4 hours PRN Severe Pain (7 - 10)  ondansetron Injectable 4 milliGRAM(s) IV Push every 6 hours PRN Nausea      Physical exam  Neuro: Alert Awake NAD  Pulm: decreased  at bases + Left Pigtail , drained 340cc last 24 hours  CV: RRR S1 S2  Abd:  Soft ,  distended   Extremities: 1+ edema     I&O's Summary    07 Mar 2022 07:01  -  08 Mar 2022 07:00  --------------------------------------------------------  IN: 1173.3 mL / OUT: 690 mL / NET: 483.3 mL        Assessment  61y Female  w/ PAST MEDICAL & SURGICAL HISTORY:  Essential hypertension    Gastritis    HLD (hyperlipidemia)    GERD (gastroesophageal reflux disease)    Intracranial shunt    S/P cholecystectomy    History of esophageal surgery    admitted with complaints of Patient is a 61y old  Female who presents with a chief complaint of pleural effusion and partial SBO (08 Mar 2022 16:42)  .  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 Patient underwent PET CT roughly one month ago which showed small amount of left sided pleural effusion, as per patient. 2 days ago, patient saw her oncologist who ordered a CXR which showed marked increase in pleural effusion.  Pt had CT + partial bowel obstruction.  Plan for OR next week.    Consulted for Lt Pleural effusion.  3/4 s/p Left Pigtail 1.5 L Drained Exudative effusion, cyto negative    OR tomorrow w surgery  cont chest tube to waterseal  daily CXR    Discussed with Cardiothoracic Team at AM rounds.

## 2022-03-08 NOTE — PROGRESS NOTE ADULT - SUBJECTIVE AND OBJECTIVE BOX
Patient clinically without change  No significant pain  Remains n.p.o. except for sips of water  No vomiting, No flatus    Abdomen remains distended no significant bowel sounds    Case reviewed with oncologic surgery and hospitalist  Tomorrow we will go to the operating room for exploratory surgery to see if a diverting procedure can be done due to her intermittent partial obstructions  For palliation    All aware this may be challenging as there may be more disease and adhesions present then scan reveals    Systemic therapy options very limited particularly given underlying peripheral neuropathy  Above has been reviewed with patient On several occasions

## 2022-03-08 NOTE — CHART NOTE - NSCHARTNOTEFT_GEN_A_CORE
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      3/7: TPN not infused overnight since implanted port was used for antibiotics, now pending PICC line for TPN.  will order PPN for today (3/7).    *current status: PPN is being infused through implanted port as approved by MD, will change PPN to TPN today.  Per pt, pending surgery tomorrow to remove obstruction.      *labs reviewed; 03-08: lytes WNL; was supplemented on 3/7 with Kphos, however, was not receiving PN until 10pm on 3/7.  bilirubin total WNL.  triglycerides from 3/5 WNL, will order new triglyceride level.  POCT WNL.  rec'd obtain vitamin D level and supplement prn.    138  |  108  |  13  ----------------------------<  192<H>  3.7   |  26  |  0.52    Ca    8.4<L>      08 Mar 2022 06:13  Phos  3.1     03-08  Mg     2.3     03-08    TPro  5.1<L>  /  Alb  2.5<L>  /  TBili  0.4  /  DBili  x   /  AST  129<H>  /  ALT  180<H>  /  AlkPhos  318<H>  03-08      Lipid Panel: Date/Time: 03-05-22 @ 05:24  Triglycerides, Serum: 57    POCT Blood Glucose.: 192 mg/dL (08 Mar 2022 06:28)  POCT Blood Glucose.: 171 mg/dL (07 Mar 2022 23:57)  POCT Blood Glucose.: 156 mg/dL (07 Mar 2022 16:37)  POCT Blood Glucose.: 148 mg/dL (07 Mar 2022 11:49)        *pertinent meds: melatonin, MVI, pantoprazole, dexamethazone, octreotide, phosnak, morphine, zofran    *I&O's Detail    07 Mar 2022 07:01  -  08 Mar 2022 07:00  --------------------------------------------------------  IN:    Fat Emulsion (Fish Oil &amp; Plant Based) 20% Infusion: 186.3 mL    Oral Fluid: 240 mL    PPN (Peripheral Parenteral Nutrition): 747 mL  Total IN: 1173.3 mL    OUT:    Chest Tube (mL): 340 mL    Voided (mL): 350 mL  Total OUT: 690 mL    Total NET: 483.3 mL    *positive fluid status: BM (+) on 3/7 x 2, abd distended.  pt not on bowel regimen.  *NPO x 3 days. clear liquids x 2 day with minimal PO intake.    *Gurpreet Score of  19; no PU documented. (+2) Lt/Rt leg/ankle edema documented.    *severe malnutrition in acute on chronic illness r/t decreased PO intake 2/2 CA and altered GI function AEB meeting <50% of ENN x5 dys and mild muscle/fat wasting, 4.5% wt loss x 5 days    ESTIMATED NUTR NEEDS: based on 73Kg admit wt  1825-2190Kcal (25-30Kcal/Kg)  110-146g protein (1.5-2 g/Kg)  1825-2190mL (25-30mL/Kg)      Weight History:  69.8Kg (3/8) wt loss of 3.3Kg in 5 days (4.5%), clinically significant.  Height (cm): 167.6 (03-03-22 @ 22:52)  Weight (kg): 73.1 (03-03-22 @ 22:52)  BMI (kg/m2): 26 (03-03-22 @ 22:52)  BSA (m2): 1.82 (03-03-22 @ 22:52)  IBW: 59Kg      *will continue to monitor and adjust PN prn*    TPN Recommendations: via implanted port    Total Volume:  110 g  Amino Acids  125 g Dextrose (titrate by 25-50g/day to goal rate of 285g of dextrose)  50 g Lipids 20%  127 mEq Sodium Chloride  0 mEq Sodium Acetate  20 mmol Sodium Phosphate  25 mEq Potassium Chloride  0 mEq Potassium Acetate  0 mmol Potassium Phosphate  0 mEq Calcium Gluconate  8 mEq Magnesium Sulfate  100 mg Thiamine  9 units Regular Insulin  1 ml Trace Elements   10 ml MVI    Total Calories     1365       (   Meets   75%  of  Estimated Energy needs  and    100 %  Protein needs)    Additional Recommendations:    1) Daily weights  2) Strict I & O's  3) Daily lyte checks  4) Weekly triglycerides/LFT checks  5) POCT q6hrs; maintain POCT of 140-180mg/dL    *will monitor and adjust treatement plan prn*  Tiki Ascencio MA, RDN, CDN, CNSC [phone]

## 2022-03-08 NOTE — PROGRESS NOTE ADULT - ASSESSMENT
60 yo F with pmhx GEJ cancer s/p esophagectomy, [place] tumors s/p BSO presents with left sided pleural effusion   XR barium enema shows strictures in proximal and mid sigmoid colon    Plan:   -limited cleaer  -cont TPN  -NGT offered pt refused  - Pain control   -GI/DVT prop  -hospitalist consult  -daily labs  -f/u CT surgery reccs-no pleaurx, possible remove pigtail prior to DC      Plan d/w Attending

## 2022-03-08 NOTE — PROGRESS NOTE ADULT - SUBJECTIVE AND OBJECTIVE BOX
SURGERY DAILY PROGRESS NOTE:     Subjective:  Patient seen and examined at bedside during am rounds reports doing well, tolerating CLD and reports passing flatus and liquid stools. Pt is on TPN. AVSS. Denies any fevers, chills, n/v/ chest pain or shortness of breath. pt had barium enema xray shows strictures in proximal and mid sigmoid colon.     Objective:    MEDICATIONS  (STANDING):  dexAMETHasone  IVPB 10 milliGRAM(s) IV Intermittent every 6 hours  enoxaparin Injectable 40 milliGRAM(s) SubCutaneous every 24 hours  fat emulsion (Fish Oil and Plant Based) 20% Infusion 0.68 Gm/kG/Day (20.7 mL/Hr) IV Continuous <Continuous>  melatonin 5 milliGRAM(s) Oral at bedtime  multivitamin 1 Tablet(s) Oral daily  octreotide  Injectable 100 MICROGram(s) IV Push every 8 hours  pantoprazole  Injectable 40 milliGRAM(s) IV Push every 12 hours  Parenteral Nutrition - Adult 1 Each (83 mL/Hr) TPN Continuous <Continuous>  potassium phosphate / sodium phosphate Powder (PHOS-NaK) 1 Packet(s) Oral daily  topiramate 25 milliGRAM(s) Oral every 12 hours    MEDICATIONS  (PRN):  acetaminophen   IVPB .. 1000 milliGRAM(s) IV Intermittent every 6 hours PRN Mild Pain (1 - 3)  ALBUTerol    90 MICROgram(s) HFA Inhaler 2 Puff(s) Inhalation every 6 hours PRN Shortness of Breath  metoprolol tartrate Injectable 5 milliGRAM(s) IV Push every 6 hours PRN SBP > 140  morphine  - Injectable 2 milliGRAM(s) IV Push every 4 hours PRN Severe Pain (7 - 10)  ondansetron Injectable 4 milliGRAM(s) IV Push every 6 hours PRN Nausea      Vital Signs Last 24 Hrs  T(C): 36.4 (08 Mar 2022 06:35), Max: 36.7 (07 Mar 2022 21:11)  T(F): 97.6 (08 Mar 2022 06:35), Max: 98.1 (07 Mar 2022 21:11)  HR: 72 (08 Mar 2022 06:35) (62 - 72)  BP: 142/73 (08 Mar 2022 06:35) (138/63 - 148/71)  BP(mean): --  RR: 18 (08 Mar 2022 06:35) (18 - 18)  SpO2: 99% (08 Mar 2022 06:35) (95% - 100%)      PHYSICAL EXAM   Gen: well-appearing, in no acute distress  CV: pulse regularly present   Resp: airway patent, non-labored breathing  Abd: soft, non-tender, non-distended,   ext.  no cyanosis or edema      I&O's Detail    07 Mar 2022 07:01  -  08 Mar 2022 07:00  --------------------------------------------------------  IN:    Fat Emulsion (Fish Oil &amp; Plant Based) 20% Infusion: 186.3 mL    Oral Fluid: 240 mL    PPN (Peripheral Parenteral Nutrition): 747 mL  Total IN: 1173.3 mL    OUT:    Chest Tube (mL): 340 mL    Voided (mL): 350 mL  Total OUT: 690 mL    Total NET: 483.3 mL          Daily     Daily Weight in k.8 (08 Mar 2022 06:26)    LABS:                        12.0   11.71 )-----------( 236      ( 08 Mar 2022 06:13 )             36.6     03-08    138  |  108  |  13  ----------------------------<  192<H>  3.7   |  26  |  0.52    Ca    8.4<L>      08 Mar 2022 06:13  Phos  3.1     03-08  Mg     2.3     03-08    TPro  5.1<L>  /  Alb  2.5<L>  /  TBili  0.4  /  DBili  x   /  AST  129<H>  /  ALT  180<H>  /  AlkPhos  318<H>  03-08

## 2022-03-08 NOTE — PROGRESS NOTE ADULT - ASSESSMENT
A/P  Worsening shortness of breath due to left pleural effusion s/p chest tube   recurrent partial SBO   abnormal LFTs - liver mets? drug s/e - immunotherapy? from PPN?  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 Shunt no longer goes to the peritoneum but to the spine and has been problem-free for many years     Plan:  s/p chest tube on this admission, follow-up cytology - will be low yield - likely malignant eff  encourage IS OOB   diet/dexamethasone/octreotide per primary team   dc crestor  cont topamax for neuropathy  cont IV PPI   cont IV BB   cont IV pain meds  TPN per primary team     PREOP EVAL:  61 yr old female with no known h/o MI or CVA   Ambulates, able to climb up a flight of stairs and walk on a flat surface without chest pain as recently as a few days ago  Quit TOB use in 2018   pre-op EKG done - rev by me - sinus, qtc wnl, no acute st/tw changes   Patient has no active cardiac or pulmonary complaints since placement of chest tube  Malik perioperative risk for MI/CA is 0.8%, no further testing required at this time for intestinal surgery   discussed with French Dan and Ayaka    At this time there are no plans for Taxol - she has received it in the past but has significant neuropathy from it     VTE Px - lovenox     Pls call with q  [phone]    A/P  Worsening shortness of breath due to left pleural effusion s/p chest tube   recurrent partial SBO; strictures noted on barium enema - prox and sigmoid colon  abnormal LFTs - from PPN  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   Shunt no longer goes to the peritoneum but to the spine and has been problem-free for many years     Plan:  s/p chest tube on this admission, likely malignant eff  NPO P MN for OR tmr; xray barium enema noted   dexamethasone dced   octreotide per primary team   dc crestor  cont topamax for neuropathy  cont IV PPI   cont IV BB   cont IV pain meds  TPN per primary team     PREOP EVAL:  61 yr old female with no known h/o MI or CVA   Ambulates, able to climb up a flight of stairs and walk on a flat surface without chest pain as recently as a few days ago  Quit TOB use in 2018   pre-op EKG done - rev by me - sinus, qtc wnl, no acute st/tw changes   Patient has no active cardiac or pulmonary complaints since placement of chest tube  Malik perioperative risk for MI/CA is 0.8%, no further testing required at this time for intestinal surgery   discussed with French Sahu and Ayaka    VTE Px - HOLD LOVENOX FOR OR tmr     Pls call with q  [phone]

## 2022-03-08 NOTE — DIETITIAN NUTRITION RISK NOTIFICATION - TREATMENT: THE FOLLOWING DIET HAS BEEN RECOMMENDED
Diet, GARIMA (03-03-22 @ 23:57) [Active]      
Diet, Clear Liquid (03-06-22 @ 17:13) [Active]      fat emulsion (Fish Oil and Plant Based) 20% Infusion 0.68 Gm/kG/Day (20.7 mL/Hr) IV Continuous <Continuous>, 03-08-22 @ 17:00, 17:00, Stop order after: 12 Hours  fat emulsion (Fish Oil and Plant Based) 20% Infusion 0.68 Gm/kG/Day (20.7 mL/Hr) IV Continuous <Continuous>, 03-07-22 @ 22:00, 22:00, Stop order after: 12 Hours  Parenteral Nutrition - Adult 1 Each (83 mL/Hr) TPN Continuous <Continuous>, 03-07-22 @ 22:00, 22:00, Stop order after: 1 Days  Parenteral Nutrition - Adult 1 Each (83 mL/Hr) TPN Continuous <Continuous>, 03-08-22 @ 17:00, 17:00, Stop order after: 1 Days

## 2022-03-09 ENCOUNTER — APPOINTMENT (OUTPATIENT)
Dept: SURGICAL ONCOLOGY | Facility: HOSPITAL | Age: 62
End: 2022-03-09

## 2022-03-09 ENCOUNTER — RESULT REVIEW (OUTPATIENT)
Age: 62
End: 2022-03-09

## 2022-03-09 LAB
ALBUMIN SERPL ELPH-MCNC: 2.2 G/DL — LOW (ref 3.3–5)
ALP SERPL-CCNC: 260 U/L — HIGH (ref 40–120)
ALT FLD-CCNC: 176 U/L — HIGH (ref 12–78)
ANION GAP SERPL CALC-SCNC: 5 MMOL/L — SIGNIFICANT CHANGE UP (ref 5–17)
ANION GAP SERPL CALC-SCNC: 6 MMOL/L — SIGNIFICANT CHANGE UP (ref 5–17)
APTT BLD: 26.9 SEC — LOW (ref 27.5–35.5)
AST SERPL-CCNC: 106 U/L — HIGH (ref 15–37)
BILIRUB SERPL-MCNC: 0.5 MG/DL — SIGNIFICANT CHANGE UP (ref 0.2–1.2)
BUN SERPL-MCNC: 20 MG/DL — SIGNIFICANT CHANGE UP (ref 7–23)
BUN SERPL-MCNC: 29 MG/DL — HIGH (ref 7–23)
CALCIUM SERPL-MCNC: 7.3 MG/DL — LOW (ref 8.5–10.1)
CALCIUM SERPL-MCNC: 7.7 MG/DL — LOW (ref 8.5–10.1)
CHLORIDE SERPL-SCNC: 107 MMOL/L — SIGNIFICANT CHANGE UP (ref 96–108)
CHLORIDE SERPL-SCNC: 110 MMOL/L — HIGH (ref 96–108)
CO2 SERPL-SCNC: 19 MMOL/L — LOW (ref 22–31)
CO2 SERPL-SCNC: 26 MMOL/L — SIGNIFICANT CHANGE UP (ref 22–31)
CREAT SERPL-MCNC: 0.41 MG/DL — LOW (ref 0.5–1.3)
CREAT SERPL-MCNC: 0.95 MG/DL — SIGNIFICANT CHANGE UP (ref 0.5–1.3)
CULTURE RESULTS: SIGNIFICANT CHANGE UP
EGFR: 110 ML/MIN/1.73M2 — SIGNIFICANT CHANGE UP
EGFR: 67 ML/MIN/1.73M2 — SIGNIFICANT CHANGE UP
GLUCOSE SERPL-MCNC: 175 MG/DL — HIGH (ref 70–99)
GLUCOSE SERPL-MCNC: 220 MG/DL — HIGH (ref 70–99)
HCT VFR BLD CALC: 34.2 % — LOW (ref 34.5–45)
HGB BLD-MCNC: 11.4 G/DL — LOW (ref 11.5–15.5)
INR BLD: 1.06 RATIO — SIGNIFICANT CHANGE UP (ref 0.88–1.16)
MAGNESIUM SERPL-MCNC: 2.3 MG/DL — SIGNIFICANT CHANGE UP (ref 1.6–2.6)
MCHC RBC-ENTMCNC: 32.9 PG — SIGNIFICANT CHANGE UP (ref 27–34)
MCHC RBC-ENTMCNC: 33.3 GM/DL — SIGNIFICANT CHANGE UP (ref 32–36)
MCV RBC AUTO: 98.8 FL — SIGNIFICANT CHANGE UP (ref 80–100)
PLATELET # BLD AUTO: 230 K/UL — SIGNIFICANT CHANGE UP (ref 150–400)
POTASSIUM SERPL-MCNC: 2.8 MMOL/L — CRITICAL LOW (ref 3.5–5.3)
POTASSIUM SERPL-MCNC: 4.1 MMOL/L — SIGNIFICANT CHANGE UP (ref 3.5–5.3)
POTASSIUM SERPL-MCNC: 4.2 MMOL/L — SIGNIFICANT CHANGE UP (ref 3.5–5.3)
POTASSIUM SERPL-SCNC: 2.8 MMOL/L — CRITICAL LOW (ref 3.5–5.3)
POTASSIUM SERPL-SCNC: 4.1 MMOL/L — SIGNIFICANT CHANGE UP (ref 3.5–5.3)
POTASSIUM SERPL-SCNC: 4.2 MMOL/L — SIGNIFICANT CHANGE UP (ref 3.5–5.3)
PROT SERPL-MCNC: 4.5 GM/DL — LOW (ref 6–8.3)
PROTHROM AB SERPL-ACNC: 12.3 SEC — SIGNIFICANT CHANGE UP (ref 10.5–13.4)
RBC # BLD: 3.46 M/UL — LOW (ref 3.8–5.2)
RBC # FLD: 14 % — SIGNIFICANT CHANGE UP (ref 10.3–14.5)
SODIUM SERPL-SCNC: 134 MMOL/L — LOW (ref 135–145)
SODIUM SERPL-SCNC: 139 MMOL/L — SIGNIFICANT CHANGE UP (ref 135–145)
SPECIMEN SOURCE: SIGNIFICANT CHANGE UP
TRIGL SERPL-MCNC: 207 MG/DL — HIGH
WBC # BLD: 11.74 K/UL — HIGH (ref 3.8–10.5)
WBC # FLD AUTO: 11.74 K/UL — HIGH (ref 3.8–10.5)

## 2022-03-09 PROCEDURE — 71045 X-RAY EXAM CHEST 1 VIEW: CPT | Mod: 26

## 2022-03-09 PROCEDURE — 88307 TISSUE EXAM BY PATHOLOGIST: CPT | Mod: 26

## 2022-03-09 PROCEDURE — 74018 RADEX ABDOMEN 1 VIEW: CPT | Mod: 26

## 2022-03-09 PROCEDURE — 49203: CPT

## 2022-03-09 PROCEDURE — 44125 REMOVAL OF SMALL INTESTINE: CPT

## 2022-03-09 PROCEDURE — 99232 SBSQ HOSP IP/OBS MODERATE 35: CPT

## 2022-03-09 PROCEDURE — 99231 SBSQ HOSP IP/OBS SF/LOW 25: CPT

## 2022-03-09 RX ORDER — SODIUM CHLORIDE 9 MG/ML
1000 INJECTION, SOLUTION INTRAVENOUS
Refills: 0 | Status: DISCONTINUED | OUTPATIENT
Start: 2022-03-09 | End: 2022-03-24

## 2022-03-09 RX ORDER — ALBUTEROL 90 UG/1
2 AEROSOL, METERED ORAL EVERY 6 HOURS
Refills: 0 | Status: DISCONTINUED | OUTPATIENT
Start: 2022-03-09 | End: 2022-03-25

## 2022-03-09 RX ORDER — ELECTROLYTE SOLUTION,INJ
1 VIAL (ML) INTRAVENOUS
Refills: 0 | Status: DISCONTINUED | OUTPATIENT
Start: 2022-03-09 | End: 2022-03-09

## 2022-03-09 RX ORDER — POTASSIUM CHLORIDE 20 MEQ
10 PACKET (EA) ORAL ONCE
Refills: 0 | Status: DISCONTINUED | OUTPATIENT
Start: 2022-03-09 | End: 2022-03-09

## 2022-03-09 RX ORDER — SODIUM CHLORIDE 9 MG/ML
1000 INJECTION, SOLUTION INTRAVENOUS ONCE
Refills: 0 | Status: COMPLETED | OUTPATIENT
Start: 2022-03-09 | End: 2022-03-09

## 2022-03-09 RX ORDER — ENOXAPARIN SODIUM 100 MG/ML
40 INJECTION SUBCUTANEOUS EVERY 24 HOURS
Refills: 0 | Status: DISCONTINUED | OUTPATIENT
Start: 2022-03-10 | End: 2022-03-25

## 2022-03-09 RX ORDER — ACETAMINOPHEN 500 MG
1000 TABLET ORAL EVERY 6 HOURS
Refills: 0 | Status: DISCONTINUED | OUTPATIENT
Start: 2022-03-09 | End: 2022-03-25

## 2022-03-09 RX ORDER — GLUCAGON INJECTION, SOLUTION 0.5 MG/.1ML
1 INJECTION, SOLUTION SUBCUTANEOUS ONCE
Refills: 0 | Status: DISCONTINUED | OUTPATIENT
Start: 2022-03-09 | End: 2022-03-24

## 2022-03-09 RX ORDER — POTASSIUM CHLORIDE 20 MEQ
20 PACKET (EA) ORAL ONCE
Refills: 0 | Status: DISCONTINUED | OUTPATIENT
Start: 2022-03-09 | End: 2022-03-09

## 2022-03-09 RX ORDER — SODIUM CHLORIDE 9 MG/ML
1000 INJECTION, SOLUTION INTRAVENOUS
Refills: 0 | Status: DISCONTINUED | OUTPATIENT
Start: 2022-03-09 | End: 2022-03-09

## 2022-03-09 RX ORDER — HYDROMORPHONE HYDROCHLORIDE 2 MG/ML
30 INJECTION INTRAMUSCULAR; INTRAVENOUS; SUBCUTANEOUS
Refills: 0 | Status: DISCONTINUED | OUTPATIENT
Start: 2022-03-09 | End: 2022-03-10

## 2022-03-09 RX ORDER — OCTREOTIDE ACETATE 200 UG/ML
100 INJECTION, SOLUTION INTRAVENOUS; SUBCUTANEOUS EVERY 8 HOURS
Refills: 0 | Status: DISCONTINUED | OUTPATIENT
Start: 2022-03-09 | End: 2022-03-25

## 2022-03-09 RX ORDER — FENTANYL CITRATE 50 UG/ML
50 INJECTION INTRAVENOUS
Refills: 0 | Status: DISCONTINUED | OUTPATIENT
Start: 2022-03-09 | End: 2022-03-09

## 2022-03-09 RX ORDER — DEXTROSE 50 % IN WATER 50 %
25 SYRINGE (ML) INTRAVENOUS ONCE
Refills: 0 | Status: DISCONTINUED | OUTPATIENT
Start: 2022-03-09 | End: 2022-03-24

## 2022-03-09 RX ORDER — SODIUM CHLORIDE 9 MG/ML
400 INJECTION, SOLUTION INTRAVENOUS ONCE
Refills: 0 | Status: COMPLETED | OUTPATIENT
Start: 2022-03-09 | End: 2022-03-12

## 2022-03-09 RX ORDER — METOPROLOL TARTRATE 50 MG
5 TABLET ORAL EVERY 6 HOURS
Refills: 0 | Status: DISCONTINUED | OUTPATIENT
Start: 2022-03-09 | End: 2022-03-25

## 2022-03-09 RX ORDER — SODIUM CHLORIDE 9 MG/ML
1000 INJECTION, SOLUTION INTRAVENOUS
Refills: 0 | Status: DISCONTINUED | OUTPATIENT
Start: 2022-03-09 | End: 2022-03-10

## 2022-03-09 RX ORDER — PANTOPRAZOLE SODIUM 20 MG/1
40 TABLET, DELAYED RELEASE ORAL EVERY 12 HOURS
Refills: 0 | Status: DISCONTINUED | OUTPATIENT
Start: 2022-03-09 | End: 2022-03-25

## 2022-03-09 RX ORDER — ONDANSETRON 8 MG/1
4 TABLET, FILM COATED ORAL EVERY 6 HOURS
Refills: 0 | Status: DISCONTINUED | OUTPATIENT
Start: 2022-03-09 | End: 2022-03-10

## 2022-03-09 RX ORDER — NALOXONE HYDROCHLORIDE 4 MG/.1ML
0.1 SPRAY NASAL
Refills: 0 | Status: DISCONTINUED | OUTPATIENT
Start: 2022-03-09 | End: 2022-03-25

## 2022-03-09 RX ORDER — ONDANSETRON 8 MG/1
4 TABLET, FILM COATED ORAL ONCE
Refills: 0 | Status: DISCONTINUED | OUTPATIENT
Start: 2022-03-09 | End: 2022-03-09

## 2022-03-09 RX ORDER — DEXTROSE 50 % IN WATER 50 %
12.5 SYRINGE (ML) INTRAVENOUS ONCE
Refills: 0 | Status: DISCONTINUED | OUTPATIENT
Start: 2022-03-09 | End: 2022-03-24

## 2022-03-09 RX ORDER — I.V. FAT EMULSION 20 G/100ML
0.68 EMULSION INTRAVENOUS
Qty: 50 | Refills: 0 | Status: DISCONTINUED | OUTPATIENT
Start: 2022-03-09 | End: 2022-03-09

## 2022-03-09 RX ORDER — INSULIN LISPRO 100/ML
VIAL (ML) SUBCUTANEOUS
Refills: 0 | Status: DISCONTINUED | OUTPATIENT
Start: 2022-03-09 | End: 2022-03-24

## 2022-03-09 RX ORDER — POTASSIUM CHLORIDE 20 MEQ
10 PACKET (EA) ORAL
Refills: 0 | Status: COMPLETED | OUTPATIENT
Start: 2022-03-09 | End: 2022-03-09

## 2022-03-09 RX ORDER — HYDROMORPHONE HYDROCHLORIDE 2 MG/ML
0.5 INJECTION INTRAMUSCULAR; INTRAVENOUS; SUBCUTANEOUS
Refills: 0 | Status: DISCONTINUED | OUTPATIENT
Start: 2022-03-09 | End: 2022-03-09

## 2022-03-09 RX ORDER — DEXTROSE 50 % IN WATER 50 %
15 SYRINGE (ML) INTRAVENOUS ONCE
Refills: 0 | Status: DISCONTINUED | OUTPATIENT
Start: 2022-03-09 | End: 2022-03-24

## 2022-03-09 RX ADMIN — SODIUM CHLORIDE 20 MILLILITER(S): 9 INJECTION, SOLUTION INTRAVENOUS at 04:27

## 2022-03-09 RX ADMIN — Medication 100 MILLIEQUIVALENT(S): at 04:28

## 2022-03-09 RX ADMIN — I.V. FAT EMULSION 20.7 GM/KG/DAY: 20 EMULSION INTRAVENOUS at 00:02

## 2022-03-09 RX ADMIN — Medication 1 EACH: at 00:01

## 2022-03-09 RX ADMIN — Medication 1 EACH: at 23:24

## 2022-03-09 RX ADMIN — Medication 100 MILLIEQUIVALENT(S): at 05:50

## 2022-03-09 RX ADMIN — I.V. FAT EMULSION 20.83 GM/KG/DAY: 20 EMULSION INTRAVENOUS at 23:22

## 2022-03-09 RX ADMIN — SODIUM CHLORIDE 1000 MILLILITER(S): 9 INJECTION, SOLUTION INTRAVENOUS at 16:18

## 2022-03-09 RX ADMIN — Medication 2: at 17:30

## 2022-03-09 RX ADMIN — ONDANSETRON 4 MILLIGRAM(S): 8 TABLET, FILM COATED ORAL at 06:01

## 2022-03-09 RX ADMIN — HYDROMORPHONE HYDROCHLORIDE 30 MILLILITER(S): 2 INJECTION INTRAMUSCULAR; INTRAVENOUS; SUBCUTANEOUS at 12:44

## 2022-03-09 RX ADMIN — Medication 100 MILLIEQUIVALENT(S): at 06:54

## 2022-03-09 RX ADMIN — OCTREOTIDE ACETATE 100 MICROGRAM(S): 200 INJECTION, SOLUTION INTRAVENOUS; SUBCUTANEOUS at 21:11

## 2022-03-09 RX ADMIN — OCTREOTIDE ACETATE 100 MICROGRAM(S): 200 INJECTION, SOLUTION INTRAVENOUS; SUBCUTANEOUS at 05:52

## 2022-03-09 RX ADMIN — ONDANSETRON 4 MILLIGRAM(S): 8 TABLET, FILM COATED ORAL at 21:17

## 2022-03-09 RX ADMIN — SODIUM CHLORIDE 1000 MILLILITER(S): 9 INJECTION, SOLUTION INTRAVENOUS at 21:19

## 2022-03-09 RX ADMIN — PANTOPRAZOLE SODIUM 40 MILLIGRAM(S): 20 TABLET, DELAYED RELEASE ORAL at 21:11

## 2022-03-09 NOTE — CHART NOTE - NSCHARTNOTEFT_GEN_A_CORE
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      3/7: TPN not infused overnight since implanted port was used for antibiotics, now pending PICC line for TPN.  will order PPN for today (3/7).    3/8: PPN is being infused through implanted port as approved by MD, will change PPN to TPN today.  Per pt, pending surgery tomorrow to remove obstruction.      *current status: TPN infusing through implanted port as approved by MD.  pt pending surgery today to remove obstruction.  to c/w TPN.    *labs reviewed; 03-09: hypokalemia, receiving repletion outside of PN bag during day on 3/9, will increase in PN bag for tonight to ensure does not continue to drop.  other lytes WNL.  corrected Ca WNL, rec'd add 10mEq of Calcium Gluconate outside of PN bag to ensure calcium does not become low.  bilirubin total WNL.  triglycerides remains within limits for lipids.    139  |  107  |  20  ----------------------------<  175<H>  2.8<LL>   |  26  |  0.41<L>    Ca    7.7<L>      09 Mar 2022 01:25  Phos  3.1     03-08  Mg     2.3     03-09    TPro  4.5<L>  /  Alb  2.2<L>  /  TBili  0.5  /  DBili  x   /  AST  106<H>  /  ALT  176<H>  /  AlkPhos  260<H>  03-09    Lipid Panel: Date/Time: 03-08-22 @ 06:13  Triglycerides, Serum: 189      *pertinent meds: LR IVF, melatonin, protonix, oxtreotide, KCl, phosnak, morphine, zofran    *I&O's Detail    07 Mar 2022 07:01  -  08 Mar 2022 07:00  --------------------------------------------------------  IN:    Fat Emulsion (Fish Oil &amp; Plant Based) 20% Infusion: 186.3 mL    Oral Fluid: 240 mL    PPN (Peripheral Parenteral Nutrition): 747 mL  Total IN: 1173.3 mL    OUT:    Chest Tube (mL): 340 mL    Voided (mL): 350 mL  Total OUT: 690 mL    Total NET: 483.3 mL      *positive fluid status: BM (+) on 3/8 (+) flatus.  pt not on bowel regimen.  *NPO x 3 days. clear liquids x 3 day with minimal PO intake.    *Gurpreet Score of  19; no PU documented. (+2) Lt/Rt leg edema documented.    *severe malnutrition in acute on chronic illness r/t decreased PO intake 2/2 CA and altered GI function AEB meeting <50% of ENN x5 dys and mild muscle/fat wasting, 4.5% wt loss x 5 days    ESTIMATED NUTR NEEDS: based on 73Kg admit wt  1825-2190Kcal (25-30Kcal/Kg)  110-146g protein (1.5-2 g/Kg)  1825-2190mL (25-30mL/Kg)      Weight History:  69.8Kg (3/8) wt loss of 3.3Kg in 5 days (4.5%), clinically significant.  Height (cm): 167.6 (03-03-22 @ 22:52)  Weight (kg): 73.1 (03-03-22 @ 22:52)  BMI (kg/m2): 26 (03-03-22 @ 22:52)  BSA (m2): 1.82 (03-03-22 @ 22:52)  IBW: 59Kg      *will continue to monitor and adjust PN prn*    TPN Recommendations: via implanted port    Total Volume:  110 g  Amino Acids  150 g Dextrose (titrate by 25-50g/day to goal rate of 285g of dextrose)  50 g Lipids 20%  127 mEq Sodium Chloride  0 mEq Sodium Acetate  20 mmol Sodium Phosphate  40 mEq Potassium Chloride  0 mEq Potassium Acetate  0 mmol Potassium Phosphate  0 mEq Calcium Gluconate  8 mEq Magnesium Sulfate  100 mg Thiamine  11 units Regular Insulin  1 ml Trace Elements   10 ml MVI    Total Calories     1440       (   Meets   79%  of  Estimated Energy needs  and    100 %  Protein needs)    Additional Recommendations:    1) Daily weights  2) Strict I & O's  3) Daily lyte checks  4) Weekly triglycerides/LFT checks  5) POCT q6hrs; maintain POCT of 140-180mg/dL; add sliding scale insulin for additional coverage prn  6) add calcium gluconate 10 mEq outside of PN bag to ensure patient doesn't go low in calcium    *will monitor and adjust treatement plan prn*  Tiki Ascencio MA, RDN, CDN, CNSC [phone]

## 2022-03-09 NOTE — PROGRESS NOTE ADULT - ASSESSMENT
60 yo F with pmhx GEJ cancer s/p esophagectomy, [place] tumors s/p BSO presents with left sided pleural effusion   XR barium enema shows strictures in proximal and mid sigmoid colon    Plan:   -NPO  -cont TPN  -NGT offered pt refused  -Pain control   -GI/DVT prop  -hospitalist consult  -daily labs  -f/u CT surgery reccs-no pleaurx, possible remove pigtail prior to DC  -for OR today    Plan discussed with Dr. Sahu

## 2022-03-09 NOTE — PROGRESS NOTE ADULT - ASSESSMENT
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   Shunt no longer goes to the peritoneum but to the spine and has been problem-free for many years     Plan:  Seen in PACU - to go to SICU for tele monitoring   SBO - On 3/922 s/p Bypass, ileum, small bowel to small bowel bypass Closure, enterotomy, small intestine and Open enterolysis  DIlaudid PCA, on IVFs   s/p chest tube on this admission, likely malignant eff  dexamethasone dced   octreotide per primary team   cont topamax for neuropathy  cont IV PPI   cont IV BB   TPN/monitor LFTs/DC statin    VTE Px - RESUME LOVENOX WHEN OKAY WITH SURGERY   discussed with PACU RN

## 2022-03-09 NOTE — PROGRESS NOTE ADULT - SUBJECTIVE AND OBJECTIVE BOX
Subjective:  Pt seen in PACU, s/p Bypass, ileum, small bowel to small bowel bypass Closure, enterotomy, small intestine and Open enterolysis. Pt resting comfortably    Vital Signs:  Vital Signs Last 24 Hrs  T(C): 36.1 (03-09-22 @ 12:30), Max: 36.7 (03-09-22 @ 05:00)  T(F): 97 (03-09-22 @ 12:30), Max: 98 (03-09-22 @ 05:00)  HR: 97 (03-09-22 @ 14:45) (57 - 99)  BP: 100/53 (03-09-22 @ 14:45) (97/61 - 130/66)  RR: 18 (03-09-22 @ 14:45) (10 - 22)  SpO2: 99% (03-09-22 @ 14:45) (95% - 100%) on (O2)    Telemetry/Alarms:    Relevant labs, radiology and Medications reviewed                        14.7   17.55 )-----------( 268      ( 09 Mar 2022 14:44 )             46.1     03-09    134<L>  |  110<H>  |  29<H>  ----------------------------<  220<H>  4.2   |  19<L>  |  0.95    Ca    7.3<L>      09 Mar 2022 13:55  Phos  3.1     03-08  Mg     2.3     03-09    TPro  4.5<L>  /  Alb  2.2<L>  /  TBili  0.5  /  DBili  x   /  AST  106<H>  /  ALT  176<H>  /  AlkPhos  260<H>  03-09    PT/INR - ( 09 Mar 2022 01:25 )   PT: 12.3 sec;   INR: 1.06 ratio         PTT - ( 09 Mar 2022 01:25 )  PTT:26.9 sec  MEDICATIONS  (STANDING):  dextrose 40% Gel 15 Gram(s) Oral once  dextrose 5%. 1000 milliLiter(s) (50 mL/Hr) IV Continuous <Continuous>  dextrose 5%. 1000 milliLiter(s) (100 mL/Hr) IV Continuous <Continuous>  dextrose 50% Injectable 25 Gram(s) IV Push once  dextrose 50% Injectable 12.5 Gram(s) IV Push once  dextrose 50% Injectable 25 Gram(s) IV Push once  fat emulsion (Fish Oil and Plant Based) 20% Infusion 0.68 Gm/kG/Day (20.83 mL/Hr) IV Continuous <Continuous>  fat emulsion (Fish Oil and Plant Based) 20% Infusion 0.68 Gm/kG/Day (20.7 mL/Hr) IV Continuous <Continuous>  glucagon  Injectable 1 milliGRAM(s) IntraMuscular once  HYDROmorphone PCA (1 mG/mL) 30 milliLiter(s) PCA Continuous PCA Continuous  insulin lispro (ADMELOG) corrective regimen sliding scale   SubCutaneous three times a day before meals  lactated ringers Bolus 400 milliLiter(s) IV Bolus once  lactated ringers. 1000 milliLiter(s) (20 mL/Hr) IV Continuous <Continuous>  octreotide  Injectable 100 MICROGram(s) IV Push every 8 hours  pantoprazole  Injectable 40 milliGRAM(s) IV Push every 12 hours  Parenteral Nutrition - Adult 1 Each (83 mL/Hr) TPN Continuous <Continuous>  Parenteral Nutrition - Adult 1 Each (83 mL/Hr) TPN Continuous <Continuous>    MEDICATIONS  (PRN):  acetaminophen   IVPB .. 1000 milliGRAM(s) IV Intermittent every 6 hours PRN Mild Pain (1 - 3)  ALBUTerol    90 MICROgram(s) HFA Inhaler 2 Puff(s) Inhalation every 6 hours PRN Shortness of Breath  fentaNYL    Injectable 50 MICROGram(s) IV Push every 5 minutes PRN Severe Pain (7 - 10)  HYDROmorphone  Injectable 0.5 milliGRAM(s) IV Push every 10 minutes PRN Moderate Pain (4 - 6)  metoprolol tartrate Injectable 5 milliGRAM(s) IV Push every 6 hours PRN SBP > 140  naloxone Injectable 0.1 milliGRAM(s) IV Push every 3 minutes PRN For ANY of the following changes in patient status:  A. RR LESS THAN 10 breaths per minute, B. Oxygen saturation LESS THAN 90%, C. Sedation score of 6  ondansetron Injectable 4 milliGRAM(s) IV Push every 6 hours PRN Nausea  ondansetron Injectable 4 milliGRAM(s) IV Push every 6 hours PRN Nausea  ondansetron Injectable 4 milliGRAM(s) IV Push once PRN Nausea and/or Vomiting      Physical exam  Neuro: Alert Awake NAD  Pulm: decreased  at bases + Left Pigtail , drained 160cc last 24 hours  CV: RRR S1 S2  Abd:  Soft ,  laparotomy incision w vac in place   Extremities: 1+ edema     I&O's Summary    08 Mar 2022 07:01  -  09 Mar 2022 07:00  --------------------------------------------------------  IN: 60 mL / OUT: 510 mL / NET: -450 mL    09 Mar 2022 07:01  -  09 Mar 2022 15:10  --------------------------------------------------------  IN: 1625 mL / OUT: 130 mL / NET: 1495 mL        Assessment  61y Female  w/ PAST MEDICAL & SURGICAL HISTORY:  Essential hypertension    Gastritis    HLD (hyperlipidemia)    GERD (gastroesophageal reflux disease)    Intracranial shunt    S/P cholecystectomy    History of esophageal surgery    admitted with complaints of Patient is a 61y old  Female who presents with a chief complaint of pleural effusion and partial SBO (09 Mar 2022 09:34)  .  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 Patient underwent PET CT roughly one month ago which showed small amount of left sided pleural effusion, as per patient. 2 days ago, patient saw her oncologist who ordered a CXR which showed marked increase in pleural effusion.  Pt had CT + partial bowel obstruction.  Plan for OR next week.    Consulted for Lt Pleural effusion.  3/4 s/p Left Pigtail 1.5 L Drained Exudative effusion, cyto negative. POD 0 s/p Bypass, ileum, small bowel to small bowel bypass Closure, enterotomy, small intestine and Open enterolysis.    cont chest tube to waterseal  daily CXR    Discussed with Cardiothoracic Team at AM rounds.

## 2022-03-09 NOTE — PROGRESS NOTE ADULT - SUBJECTIVE AND OBJECTIVE BOX
CC: 61 yr old female with small left pleural effusion now increasing in size.  Pt symptomatic with shortness of breath on exertion, warranting admission, is now s/p chest tube on 3/4/22.  She has a h/o Gastroesophageal cancer with omental metastases.  She had a partial SBO one month ago and once again has not been passing gas or  had BMs since 3/3/22. CT abdo noted.   Pt states her abdo pain is improving, no nausea/vomiting - is drinking water. Poor po intake and weight loss noted in the past few weeks.  No ha lightheadedness cp palps or sob since chest tube was inserted. No tingling or numbness anywhere.    3/8 - denies ha cp palps sob lightheadedness, ambulating in hallway, on CLD   3/9 - seen post-op, easily arousable, knows her name, recognized me, pain under control at this time, no tingling numbness anywhere, denies cp.  mild sinus tachy 105-109 - getting IVFs     Vital Signs Last 24 Hrs  T(F): 97.7 (09 Mar 2022 17:00), Max: 98 (09 Mar 2022 05:00)  HR: 92 (09 Mar 2022 17:00) (57 - 102)  BP: 117/62 (09 Mar 2022 17:00) (87/62 - 130/66)  RR: 20 (09 Mar 2022 17:00) (10 - 22)  SpO2: 97% (09 Mar 2022 17:00) (95% - 100%)  Constitutional: NAD, awake and alert  HEENT: PERR, EOMI  Neck: Soft and supple,  No JVD  Respiratory: Breath sounds are clear bilaterally, No wheezing, rales or rhonchi; left chest tube   Cardiovascular: S1 and S2, regular rate and rhythm, no Murmurs  Gastrointestinal: post-op   Extremities: No peripheral edema  Vascular: 2+ peripheral pulses  Neurological: A/O to person place circumstance, no focal deficits, followed all simple commands consistently, moving all extremities   Musculoskeletal: 5/5 strength b/l upper and lower extremities  Skin: No rashes  left chest tube     RADIOLOGY/EKG:  < from: Xray Chest 1 View- PORTABLE-Routine (Xray Chest 1 View- PORTABLE-Routine in AM.) (03.06.22 @ 09:39) >  Impression: Status post left chest pigtail catheter. Status post Mediport   placement. Heart size unremarkable. Lungs are clear. No pneumothorax. No   pleural effusion.      CTA/P  IMPRESSION:  1. A previous esophagectomy, a gastric pull-through.  2. A nodularity thyroid gland, a dominant left nodule with calcifications  measures 15 mm, recommend ultrasound correlation.  3. A prominent low-density lymph node within the mediastinum measures 11   mm.  4. A large left pleural effusion, underlying atelectasis.  5. An indeterminate nodule within the left adrenal gland measures 2.3 cm   no  significant interval change.  6. An indeterminate nodule within the right adrenal gland measures 2.3   cm, no  significant interval change.  7. Small ascites. Enlarging anterior peritoneal implants and perihepatic   implants.  8. Worsening small bowel obstruction with a transition zone in the mid to   lower abdomen deep to the abdominal wall where there is soft tissue   thickening of a collapsed small bowel loop, increasing with increasing   surrounding peritoneal soft tissue implants.  9. Small enhancing nodules along the periphery of the right hepatic lobe   are suspicious for metastases.    < from: Xray Barium Enema Single Contrast (Xray Barium Enema Single Contrast .) (03.07.22 @ 15:12) >  Moderate to severe strictures in the proximal and mid sigmoid colon which   are likely due to serosal implants.      LABS: All Labs Reviewed:                        14.7   17.55 )-----------( 268      ( 09 Mar 2022 14:44 )             46.1   134<L>  |  110<H>  |  29<H>  ----------------------------<  220<H>  4.2   |  19<L>  |  0.95    Ca    7.3<L>      09 Mar 2022 13:55  Phos  3.1     03-08  Mg     2.3     03-09  TPro  4.5<L>  /  Alb  2.2<L>  /  TBili  0.5  /  DBili  x   /  AST  106<H>  /  ALT  176<H>  /  AlkPhos  260<H>  03-09  PT/INR - ( 09 Mar 2022 01:25 )   PT: 12.3 sec;   INR: 1.06 ratio    PTT - ( 09 Mar 2022 01:25 )  PTT:26.9 sec        MEDS:   acetaminophen   IVPB .. 1000 milliGRAM(s) IV Intermittent every 6 hours PRN  ALBUTerol    90 MICROgram(s) HFA Inhaler 2 Puff(s) Inhalation every 6 hours PRN  fat emulsion (Fish Oil and Plant Based) 20% Infusion 0.68 Gm/kG/Day IV Continuous <Continuous>  HYDROmorphone PCA (1 mG/mL) 30 milliLiter(s) PCA Continuous PCA Continuous  insulin lispro (ADMELOG) corrective regimen sliding scale   SubCutaneous three times a day before meals  lactated ringers Bolus 400 milliLiter(s) IV Bolus once  lactated ringers. 1000 milliLiter(s) IV Continuous <Continuous>  metoprolol tartrate Injectable 5 milliGRAM(s) IV Push every 6 hours PRN  naloxone Injectable 0.1 milliGRAM(s) IV Push every 3 minutes PRN  octreotide  Injectable 100 MICROGram(s) IV Push every 8 hours  ondansetron Injectable 4 milliGRAM(s) IV Push every 6 hours PRN  ondansetron Injectable 4 milliGRAM(s) IV Push every 6 hours PRN  pantoprazole  Injectable 40 milliGRAM(s) IV Push every 12 hours  Parenteral Nutrition - Adult 1 Each TPN Continuous <Continuous>  Parenteral Nutrition - Adult 1 Each TPN Continuous <Continuous>

## 2022-03-09 NOTE — PROGRESS NOTE ADULT - SUBJECTIVE AND OBJECTIVE BOX
SURGERY DAILY PROGRESS NOTE:     Subjective:  Patient seen and examined at bedside during am rounds reports doing well. No new complaints at this time. Pt is on TPN. AVSS. Denies any fevers, chills, n/v/ chest pain or shortness of breath. pt had barium enema xray shows strictures in proximal and mid sigmoid colon.     Objective:    MEDICATIONS  (STANDING):  dexAMETHasone  IVPB 10 milliGRAM(s) IV Intermittent every 6 hours  enoxaparin Injectable 40 milliGRAM(s) SubCutaneous every 24 hours  fat emulsion (Fish Oil and Plant Based) 20% Infusion 0.68 Gm/kG/Day (20.7 mL/Hr) IV Continuous <Continuous>  melatonin 5 milliGRAM(s) Oral at bedtime  multivitamin 1 Tablet(s) Oral daily  octreotide  Injectable 100 MICROGram(s) IV Push every 8 hours  pantoprazole  Injectable 40 milliGRAM(s) IV Push every 12 hours  Parenteral Nutrition - Adult 1 Each (83 mL/Hr) TPN Continuous <Continuous>  potassium phosphate / sodium phosphate Powder (PHOS-NaK) 1 Packet(s) Oral daily  topiramate 25 milliGRAM(s) Oral every 12 hours    MEDICATIONS  (PRN):  acetaminophen   IVPB .. 1000 milliGRAM(s) IV Intermittent every 6 hours PRN Mild Pain (1 - 3)  ALBUTerol    90 MICROgram(s) HFA Inhaler 2 Puff(s) Inhalation every 6 hours PRN Shortness of Breath  metoprolol tartrate Injectable 5 milliGRAM(s) IV Push every 6 hours PRN SBP > 140  morphine  - Injectable 2 milliGRAM(s) IV Push every 4 hours PRN Severe Pain (7 - 10)  ondansetron Injectable 4 milliGRAM(s) IV Push every 6 hours PRN Nausea      Vital Signs Last 24 Hrs  T(C): 36.7 (09 Mar 2022 05:00), Max: 36.9 (08 Mar 2022 15:00)  T(F): 98 (09 Mar 2022 05:00), Max: 98.4 (08 Mar 2022 15:00)  HR: 57 (09 Mar 2022 05:00) (57 - 67)  BP: 130/66 (09 Mar 2022 05:00) (126/70 - 130/66)  BP(mean): --  RR: 18 (09 Mar 2022 05:00) (18 - 18)  SpO2: 97% (09 Mar 2022 05:00) (97% - 98%)      PHYSICAL EXAM   Gen: well-appearing, in no acute distress  CV: pulse regularly present   Resp: airway patent, non-labored breathing  Abd: soft, non-tender, non-distended,   ext.  no cyanosis or edema      I&O's Detail    08 Mar 2022 07:01  -  09 Mar 2022 07:00  --------------------------------------------------------  IN:    Lactated Ringers: 60 mL  Total IN: 60 mL    OUT:    Chest Tube (mL): 160 mL    Voided (mL): 350 mL  Total OUT: 510 mL    Total NET: -450 mL          Daily     Daily Weight in k.8 (08 Mar 2022 06:26)    LABS:                        12.0   11.71 )-----------( 236      ( 08 Mar 2022 06:13 )             36.6     03-08    138  |  108  |  13  ----------------------------<  192<H>  3.7   |  26  |  0.52    Ca    8.4<L>      08 Mar 2022 06:13  Phos  3.1     03-08  Mg     2.3     -08    TPro  5.1<L>  /  Alb  2.5<L>  /  TBili  0.4  /  DBili  x   /  AST  129<H>  /  ALT  180<H>  /  AlkPhos  318<H>  -08

## 2022-03-09 NOTE — BRIEF OPERATIVE NOTE - NSICDXBRIEFPROCEDURE_GEN_ALL_CORE_FT
PROCEDURES:  Bypass, ileum 09-Mar-2022 12:42:20 small bolwe to small bowel bypass DAISY AGUILAR  Closure, enterotomy, small intestine 09-Mar-2022 12:42:33  DAISY AGUILAR  Open enterolysis 09-Mar-2022 12:42:52  DAISY AGUILAR

## 2022-03-09 NOTE — BRIEF OPERATIVE NOTE - OPERATION/FINDINGS
multiple loops small bowel adhered to the anterior abdominal wall   large bowel stenosis from the carcinomatosis

## 2022-03-10 LAB
24R-OH-CALCIDIOL SERPL-MCNC: 34.2 NG/ML — SIGNIFICANT CHANGE UP (ref 30–80)
A1C WITH ESTIMATED AVERAGE GLUCOSE RESULT: 6.3 % — HIGH (ref 4–5.6)
ANION GAP SERPL CALC-SCNC: 6 MMOL/L — SIGNIFICANT CHANGE UP (ref 5–17)
BASOPHILS # BLD AUTO: 0.05 K/UL — SIGNIFICANT CHANGE UP (ref 0–0.2)
BASOPHILS NFR BLD AUTO: 0.3 % — SIGNIFICANT CHANGE UP (ref 0–2)
BUN SERPL-MCNC: 37 MG/DL — HIGH (ref 7–23)
CALCIUM SERPL-MCNC: 7.4 MG/DL — LOW (ref 8.5–10.1)
CHLORIDE SERPL-SCNC: 107 MMOL/L — SIGNIFICANT CHANGE UP (ref 96–108)
CO2 SERPL-SCNC: 22 MMOL/L — SIGNIFICANT CHANGE UP (ref 22–31)
CREAT SERPL-MCNC: 0.48 MG/DL — LOW (ref 0.5–1.3)
EGFR: 106 ML/MIN/1.73M2 — SIGNIFICANT CHANGE UP
EOSINOPHIL # BLD AUTO: 0.11 K/UL — SIGNIFICANT CHANGE UP (ref 0–0.5)
EOSINOPHIL NFR BLD AUTO: 0.6 % — SIGNIFICANT CHANGE UP (ref 0–6)
ESTIMATED AVERAGE GLUCOSE: 134 MG/DL — HIGH (ref 68–114)
GLUCOSE SERPL-MCNC: 176 MG/DL — HIGH (ref 70–99)
HCT VFR BLD CALC: 39.5 % — SIGNIFICANT CHANGE UP (ref 34.5–45)
HGB BLD-MCNC: 12.8 G/DL — SIGNIFICANT CHANGE UP (ref 11.5–15.5)
IMM GRANULOCYTES NFR BLD AUTO: 0.9 % — SIGNIFICANT CHANGE UP (ref 0–1.5)
LYMPHOCYTES # BLD AUTO: 1.13 K/UL — SIGNIFICANT CHANGE UP (ref 1–3.3)
LYMPHOCYTES # BLD AUTO: 5.9 % — LOW (ref 13–44)
MAGNESIUM SERPL-MCNC: 2.1 MG/DL — SIGNIFICANT CHANGE UP (ref 1.6–2.6)
MCHC RBC-ENTMCNC: 32.3 PG — SIGNIFICANT CHANGE UP (ref 27–34)
MCHC RBC-ENTMCNC: 32.4 GM/DL — SIGNIFICANT CHANGE UP (ref 32–36)
MCV RBC AUTO: 99.7 FL — SIGNIFICANT CHANGE UP (ref 80–100)
MONOCYTES # BLD AUTO: 0.73 K/UL — SIGNIFICANT CHANGE UP (ref 0–0.9)
MONOCYTES NFR BLD AUTO: 3.8 % — SIGNIFICANT CHANGE UP (ref 2–14)
NEUTROPHILS # BLD AUTO: 17.12 K/UL — HIGH (ref 1.8–7.4)
NEUTROPHILS NFR BLD AUTO: 88.5 % — HIGH (ref 43–77)
PHOSPHATE SERPL-MCNC: 3.2 MG/DL — SIGNIFICANT CHANGE UP (ref 2.5–4.5)
PLATELET # BLD AUTO: 162 K/UL — SIGNIFICANT CHANGE UP (ref 150–400)
POTASSIUM SERPL-MCNC: 3.8 MMOL/L — SIGNIFICANT CHANGE UP (ref 3.5–5.3)
POTASSIUM SERPL-SCNC: 3.8 MMOL/L — SIGNIFICANT CHANGE UP (ref 3.5–5.3)
RBC # BLD: 3.96 M/UL — SIGNIFICANT CHANGE UP (ref 3.8–5.2)
RBC # FLD: 14.1 % — SIGNIFICANT CHANGE UP (ref 10.3–14.5)
SODIUM SERPL-SCNC: 135 MMOL/L — SIGNIFICANT CHANGE UP (ref 135–145)
WBC # BLD: 19.31 K/UL — HIGH (ref 3.8–10.5)
WBC # FLD AUTO: 19.31 K/UL — HIGH (ref 3.8–10.5)

## 2022-03-10 PROCEDURE — 71045 X-RAY EXAM CHEST 1 VIEW: CPT | Mod: 26

## 2022-03-10 PROCEDURE — 99232 SBSQ HOSP IP/OBS MODERATE 35: CPT

## 2022-03-10 PROCEDURE — 99231 SBSQ HOSP IP/OBS SF/LOW 25: CPT

## 2022-03-10 RX ORDER — SODIUM CHLORIDE 9 MG/ML
500 INJECTION INTRAMUSCULAR; INTRAVENOUS; SUBCUTANEOUS ONCE
Refills: 0 | Status: COMPLETED | OUTPATIENT
Start: 2022-03-10 | End: 2022-03-10

## 2022-03-10 RX ORDER — PROCHLORPERAZINE MALEATE 5 MG
5 TABLET ORAL EVERY 6 HOURS
Refills: 0 | Status: DISCONTINUED | OUTPATIENT
Start: 2022-03-10 | End: 2022-03-25

## 2022-03-10 RX ORDER — ONDANSETRON 8 MG/1
4 TABLET, FILM COATED ORAL EVERY 6 HOURS
Refills: 0 | Status: DISCONTINUED | OUTPATIENT
Start: 2022-03-10 | End: 2022-03-25

## 2022-03-10 RX ORDER — KETOROLAC TROMETHAMINE 30 MG/ML
15 SYRINGE (ML) INJECTION EVERY 6 HOURS
Refills: 0 | Status: DISCONTINUED | OUTPATIENT
Start: 2022-03-10 | End: 2022-03-14

## 2022-03-10 RX ORDER — HYDROMORPHONE HYDROCHLORIDE 2 MG/ML
0.5 INJECTION INTRAMUSCULAR; INTRAVENOUS; SUBCUTANEOUS
Refills: 0 | Status: DISCONTINUED | OUTPATIENT
Start: 2022-03-10 | End: 2022-03-17

## 2022-03-10 RX ORDER — SODIUM CHLORIDE 9 MG/ML
1000 INJECTION, SOLUTION INTRAVENOUS ONCE
Refills: 0 | Status: COMPLETED | OUTPATIENT
Start: 2022-03-10 | End: 2022-03-10

## 2022-03-10 RX ORDER — DEXTROSE MONOHYDRATE, SODIUM CHLORIDE, AND POTASSIUM CHLORIDE 50; .745; 4.5 G/1000ML; G/1000ML; G/1000ML
1000 INJECTION, SOLUTION INTRAVENOUS
Refills: 0 | Status: DISCONTINUED | OUTPATIENT
Start: 2022-03-10 | End: 2022-03-11

## 2022-03-10 RX ORDER — NALOXONE HYDROCHLORIDE 4 MG/.1ML
0.1 SPRAY NASAL
Refills: 0 | Status: DISCONTINUED | OUTPATIENT
Start: 2022-03-10 | End: 2022-03-25

## 2022-03-10 RX ORDER — HYDROMORPHONE HYDROCHLORIDE 2 MG/ML
30 INJECTION INTRAMUSCULAR; INTRAVENOUS; SUBCUTANEOUS
Refills: 0 | Status: DISCONTINUED | OUTPATIENT
Start: 2022-03-10 | End: 2022-03-17

## 2022-03-10 RX ORDER — CEFOTETAN DISODIUM 1 G
1 VIAL (EA) INJECTION EVERY 12 HOURS
Refills: 0 | Status: COMPLETED | OUTPATIENT
Start: 2022-03-10 | End: 2022-03-11

## 2022-03-10 RX ORDER — I.V. FAT EMULSION 20 G/100ML
0.68 EMULSION INTRAVENOUS
Qty: 50 | Refills: 0 | Status: DISCONTINUED | OUTPATIENT
Start: 2022-03-10 | End: 2022-03-10

## 2022-03-10 RX ORDER — ELECTROLYTE SOLUTION,INJ
1 VIAL (ML) INTRAVENOUS
Refills: 0 | Status: DISCONTINUED | OUTPATIENT
Start: 2022-03-10 | End: 2022-03-10

## 2022-03-10 RX ORDER — SODIUM CHLORIDE 9 MG/ML
1000 INJECTION INTRAMUSCULAR; INTRAVENOUS; SUBCUTANEOUS ONCE
Refills: 0 | Status: COMPLETED | OUTPATIENT
Start: 2022-03-10 | End: 2022-03-10

## 2022-03-10 RX ADMIN — Medication 2: at 11:14

## 2022-03-10 RX ADMIN — Medication 5 MILLIGRAM(S): at 11:31

## 2022-03-10 RX ADMIN — OCTREOTIDE ACETATE 100 MICROGRAM(S): 200 INJECTION, SOLUTION INTRAVENOUS; SUBCUTANEOUS at 22:12

## 2022-03-10 RX ADMIN — Medication 15 MILLIGRAM(S): at 11:18

## 2022-03-10 RX ADMIN — SODIUM CHLORIDE 1000 MILLILITER(S): 9 INJECTION, SOLUTION INTRAVENOUS at 04:06

## 2022-03-10 RX ADMIN — SODIUM CHLORIDE 50 MILLILITER(S): 9 INJECTION, SOLUTION INTRAVENOUS at 14:36

## 2022-03-10 RX ADMIN — ENOXAPARIN SODIUM 40 MILLIGRAM(S): 100 INJECTION SUBCUTANEOUS at 15:13

## 2022-03-10 RX ADMIN — Medication 100 GRAM(S): at 11:13

## 2022-03-10 RX ADMIN — Medication 15 MILLIGRAM(S): at 11:40

## 2022-03-10 RX ADMIN — PANTOPRAZOLE SODIUM 40 MILLIGRAM(S): 20 TABLET, DELAYED RELEASE ORAL at 11:15

## 2022-03-10 RX ADMIN — I.V. FAT EMULSION 20.83 GM/KG/DAY: 20 EMULSION INTRAVENOUS at 22:36

## 2022-03-10 RX ADMIN — Medication 15 MILLIGRAM(S): at 18:13

## 2022-03-10 RX ADMIN — Medication 100 GRAM(S): at 22:12

## 2022-03-10 RX ADMIN — OCTREOTIDE ACETATE 100 MICROGRAM(S): 200 INJECTION, SOLUTION INTRAVENOUS; SUBCUTANEOUS at 15:13

## 2022-03-10 RX ADMIN — DEXTROSE MONOHYDRATE, SODIUM CHLORIDE, AND POTASSIUM CHLORIDE 80 MILLILITER(S): 50; .745; 4.5 INJECTION, SOLUTION INTRAVENOUS at 18:12

## 2022-03-10 RX ADMIN — PANTOPRAZOLE SODIUM 40 MILLIGRAM(S): 20 TABLET, DELAYED RELEASE ORAL at 22:12

## 2022-03-10 RX ADMIN — Medication 15 MILLIGRAM(S): at 18:39

## 2022-03-10 RX ADMIN — SODIUM CHLORIDE 1000 MILLILITER(S): 9 INJECTION INTRAMUSCULAR; INTRAVENOUS; SUBCUTANEOUS at 14:35

## 2022-03-10 RX ADMIN — SODIUM CHLORIDE 500 MILLILITER(S): 9 INJECTION INTRAMUSCULAR; INTRAVENOUS; SUBCUTANEOUS at 18:12

## 2022-03-10 RX ADMIN — OCTREOTIDE ACETATE 100 MICROGRAM(S): 200 INJECTION, SOLUTION INTRAVENOUS; SUBCUTANEOUS at 05:00

## 2022-03-10 RX ADMIN — Medication 1 EACH: at 22:33

## 2022-03-10 NOTE — PROGRESS NOTE ADULT - ASSESSMENT
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 Patient underwent PET CT roughly one month ago which showed small amount of left sided pleural effusion, as per patient. 2 days ago, patient saw her oncologist who ordered a CXR which showed marked increase in pleural effusion.  Pt had CT + partial bowel obstruction.  Plan for OR next week.    Consulted for Lt Pleural effusion.  3/4 s/p Left Pigtail 1.5 L Drained Exudative effusion, cyto negative. POD 0 s/p Bypass, ileum, small bowel to small bowel bypass Closure, enterotomy, small intestine and Open enterolysis.      Plan   cont chest tube to waterseal  daily CXR   will d/c tube before discharge    Discussed with Cardiothoracic Team at AM rounds.

## 2022-03-10 NOTE — PROGRESS NOTE ADULT - ASSESSMENT
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   Shunt no longer goes to the peritoneum but to the spine and has been problem-free for many years     Plan:  Seen in PACU - to go to SICU for tele monitoring   SBO - On 3/922 s/p Bypass, ileum, small bowel to small bowel bypass Closure, enterotomy, small intestine and Open enterolysis  DIlaudid PCA, on IVFs   s/p chest tube on this admission, likely malignant eff  dexamethasone dced   octreotide per primary team   cont topamax for neuropathy  cont IV PPI   cont IV BB   TPN/monitor LFTs/DC statin    VTE Px - RESUME LOVENOX WHEN OKAY WITH SURGERY   discussed with PACU RN        A/P  Worsening shortness of breath due to left pleural effusion s/p chest tube   recurrent partial SBO; strictures noted on barium enema - prox and sigmoid colon  abnormal LFTs - from PPN  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   Shunt no longer goes to the peritoneum but to the spine and has been problem-free for many years     Plan:  SICU for tele monitoring   SBO - On 3/922 s/p Bypass, ileum, small bowel to small bowel bypass Closure, enterotomy, small intestine and Open enterolysis  DIlaudid PCA, IVF, IV ABX per Sx   s/p chest tube on this admission, likely malignant eff  pt refused pleurx and therefore will likely dc prior to discharge home  octreotide per primary team   cont topamax for neuropathy  cont IV PPI   cont IV BB   TPN/monitor LFTs/DC statin    VTE Px - LOVENOX 40  discussed with Sx and CTSx    Pls call with naz

## 2022-03-10 NOTE — PROGRESS NOTE ADULT - SUBJECTIVE AND OBJECTIVE BOX
Subjective:  Pt in bed. resting.  minimal output from pigtail     T(C): 36.5 (03-10-22 @ 04:59), Max: 36.5 (03-09-22 @ 17:00)  HR: 87 (03-10-22 @ 08:00) (86 - 102)  BP: 101/67 (03-10-22 @ 08:00) (83/68 - 119/62)  ABP: --  ABP(mean): --  RR: 9 (03-10-22 @ 08:00) (8 - 22)  SpO2: 95% (03-10-22 @ 07:00) (93% - 100%) 2 L NC   Wt(kg): --  CVP(mm Hg): --  CO: --  CI: --  PA: --                                              Tele: SR     CHEST TUBE: 0cc                              OUTPUT:     per 24 hours    AIR LEAKS:  [ ] YES [x ] NO          03-10    135  |  107  |  37<H>  ----------------------------<  176<H>  3.8   |  22  |  0.48<L>    Ca    7.4<L>      10 Mar 2022 06:22  Phos  3.2     03-10  Mg     2.1     03-10    TPro  4.5<L>  /  Alb  2.2<L>  /  TBili  0.5  /  DBili  x   /  AST  106<H>  /  ALT  176<H>  /  AlkPhos  260<H>  03-09                               12.8   19.31 )-----------( 162      ( 10 Mar 2022 06:22 )             39.5        PT/INR - ( 09 Mar 2022 01:25 )   PT: 12.3 sec;   INR: 1.06 ratio         PTT - ( 09 Mar 2022 01:25 )  PTT:26.9 sec         CAPILLARY BLOOD GLUCOSE      POCT Blood Glucose.: 151 mg/dL (10 Mar 2022 05:24)  POCT Blood Glucose.: 150 mg/dL (09 Mar 2022 23:02)  POCT Blood Glucose.: 189 mg/dL (09 Mar 2022 17:02)  POCT Blood Glucose.: 206 mg/dL (09 Mar 2022 12:42)  POCT Blood Glucose.: 167 mg/dL (09 Mar 2022 09:55)           CXR: < from: Xray Chest 1 View- PORTABLE-Routine (Xray Chest 1 View- PORTABLE-Routine in AM.) (03.09.22 @ 08:12) >  Portable view of the chest is compared to 3/8/2022 and demonstrates a   left chest tube in place with no pneumothorax or effusion, unchanged.    Cardiac silhouette and pulmonary vasculature are within normal limits   with no consolidation.    Right-sided [id number] with the tip in the right atrium, unchanged.    IMPRESSION:  Left chest tube in place with no pneumothorax or effusion    < end of copied text >        Exam  Neuro:  resting , but alert   Pulm:  decreased at bases b/l + Lt pigtail   CV: RRR   Abd: + midline abd dressing c/d/i   Extremities:  warm         Assessment:  61yFemale    with PAST MEDICAL & SURGICAL HISTORY:  Essential hypertension    Gastritis    HLD (hyperlipidemia)    GERD (gastroesophageal reflux disease)    Intracranial shunt    S/P cholecystectomy    History of esophageal surgery          Plan:

## 2022-03-10 NOTE — PROGRESS NOTE ADULT - SUBJECTIVE AND OBJECTIVE BOX
CC: 61 yr old female with small left pleural effusion now increasing in size.  Pt symptomatic with shortness of breath on exertion, warranting admission, is now s/p chest tube on 3/4/22.  She has a h/o Gastroesophageal cancer with omental metastases.  She had a partial SBO one month ago and once again has not been passing gas or  had BMs since 3/3/22. CT abdo noted.   Pt states her abdo pain is improving, no nausea/vomiting - is drinking water. Poor po intake and weight loss noted in the past few weeks.  No ha lightheadedness cp palps or sob since chest tube was inserted. No tingling or numbness anywhere.    3/8 - denies ha cp palps sob lightheadedness, ambulating in hallway, on CLD   3/9 - seen post-op, easily arousable, knows her name, recognized me, pain under control at this time, no tingling numbness anywhere, denies cp.  mild sinus tachy 105-109 - getting IVFs     Vital Signs Last 24 Hrs  T(F): 97.7 (09 Mar 2022 17:00), Max: 98 (09 Mar 2022 05:00)  HR: 92 (09 Mar 2022 17:00) (57 - 102)  BP: 117/62 (09 Mar 2022 17:00) (87/62 - 130/66)  RR: 20 (09 Mar 2022 17:00) (10 - 22)  SpO2: 97% (09 Mar 2022 17:00) (95% - 100%)  Constitutional: NAD, awake and alert  HEENT: PERR, EOMI  Neck: Soft and supple,  No JVD  Respiratory: Breath sounds are clear bilaterally, No wheezing, rales or rhonchi; left chest tube   Cardiovascular: S1 and S2, regular rate and rhythm, no Murmurs  Gastrointestinal: post-op   Extremities: No peripheral edema  Vascular: 2+ peripheral pulses  Neurological: A/O to person place circumstance, no focal deficits, followed all simple commands consistently, moving all extremities   Musculoskeletal: 5/5 strength b/l upper and lower extremities  Skin: No rashes  left chest tube     RADIOLOGY/EKG:  < from: Xray Chest 1 View- PORTABLE-Routine (Xray Chest 1 View- PORTABLE-Routine in AM.) (03.06.22 @ 09:39) >  Impression: Status post left chest pigtail catheter. Status post Mediport   placement. Heart size unremarkable. Lungs are clear. No pneumothorax. No   pleural effusion.      CTA/P  IMPRESSION:  1. A previous esophagectomy, a gastric pull-through.  2. A nodularity thyroid gland, a dominant left nodule with calcifications  measures 15 mm, recommend ultrasound correlation.  3. A prominent low-density lymph node within the mediastinum measures 11   mm.  4. A large left pleural effusion, underlying atelectasis.  5. An indeterminate nodule within the left adrenal gland measures 2.3 cm   no  significant interval change.  6. An indeterminate nodule within the right adrenal gland measures 2.3   cm, no  significant interval change.  7. Small ascites. Enlarging anterior peritoneal implants and perihepatic   implants.  8. Worsening small bowel obstruction with a transition zone in the mid to   lower abdomen deep to the abdominal wall where there is soft tissue   thickening of a collapsed small bowel loop, increasing with increasing   surrounding peritoneal soft tissue implants.  9. Small enhancing nodules along the periphery of the right hepatic lobe   are suspicious for metastases.    < from: Xray Barium Enema Single Contrast (Xray Barium Enema Single Contrast .) (03.07.22 @ 15:12) >  Moderate to severe strictures in the proximal and mid sigmoid colon which   are likely due to serosal implants.      LABS: All Labs Reviewed:                        14.7   17.55 )-----------( 268      ( 09 Mar 2022 14:44 )             46.1   134<L>  |  110<H>  |  29<H>  ----------------------------<  220<H>  4.2   |  19<L>  |  0.95    Ca    7.3<L>      09 Mar 2022 13:55  Phos  3.1     03-08  Mg     2.3     03-09  TPro  4.5<L>  /  Alb  2.2<L>  /  TBili  0.5  /  DBili  x   /  AST  106<H>  /  ALT  176<H>  /  AlkPhos  260<H>  03-09  PT/INR - ( 09 Mar 2022 01:25 )   PT: 12.3 sec;   INR: 1.06 ratio    PTT - ( 09 Mar 2022 01:25 )  PTT:26.9 sec        MEDS:   acetaminophen   IVPB .. 1000 milliGRAM(s) IV Intermittent every 6 hours PRN  ALBUTerol    90 MICROgram(s) HFA Inhaler 2 Puff(s) Inhalation every 6 hours PRN  fat emulsion (Fish Oil and Plant Based) 20% Infusion 0.68 Gm/kG/Day IV Continuous <Continuous>  HYDROmorphone PCA (1 mG/mL) 30 milliLiter(s) PCA Continuous PCA Continuous  insulin lispro (ADMELOG) corrective regimen sliding scale   SubCutaneous three times a day before meals  lactated ringers Bolus 400 milliLiter(s) IV Bolus once  lactated ringers. 1000 milliLiter(s) IV Continuous <Continuous>  metoprolol tartrate Injectable 5 milliGRAM(s) IV Push every 6 hours PRN  naloxone Injectable 0.1 milliGRAM(s) IV Push every 3 minutes PRN  octreotide  Injectable 100 MICROGram(s) IV Push every 8 hours  ondansetron Injectable 4 milliGRAM(s) IV Push every 6 hours PRN  ondansetron Injectable 4 milliGRAM(s) IV Push every 6 hours PRN  pantoprazole  Injectable 40 milliGRAM(s) IV Push every 12 hours  Parenteral Nutrition - Adult 1 Each TPN Continuous <Continuous>  Parenteral Nutrition - Adult 1 Each TPN Continuous <Continuous>                             CC: 61 yr old female with small left pleural effusion now increasing in size.  Pt symptomatic with shortness of breath on exertion, warranting admission, is now s/p chest tube on 3/4/22.  She has a h/o Gastroesophageal cancer with omental metastases.  She had a partial SBO one month ago and once again has not been passing gas or  had BMs since 3/3/22. CT abdo noted.   Pt states her abdo pain is improving, no nausea/vomiting - is drinking water. Poor po intake and weight loss noted in the past few weeks.  No ha lightheadedness cp palps or sob since chest tube was inserted. No tingling or numbness anywhere.    3/10 - seen post-op, easily arousable, knows her name, recognized me, pain under control at this time, no tingling numbness anywhere, denies cp.  mild sinus tachy 105-109 - getting IVFs     Vital Signs Last 24 Hrs  T(C): 36.5 (10 Mar 2022 04:59), Max: 36.5 (10 Mar 2022 04:59)  HR: 77 (10 Mar 2022 18:00) (71 - 101)  BP: 94/63 (10 Mar 2022 18:00) (73/52 - 119/62)  BP(mean): 73 (10 Mar 2022 18:00) (57 - 79)  RR: 12 (10 Mar 2022 18:00) (7 - 24)  SpO2: 93% (10 Mar 2022 18:00) (92% - 100%)  Constitutional: NAD, awake and alert  HEENT: PERR, EOMI  Neck: Soft and supple,  No JVD  Respiratory: Breath sounds are clear bilaterally, No wheezing, rales or rhonchi; left chest tube   Cardiovascular: S1 and S2, regular rate and rhythm, no Murmurs  Gastrointestinal: post-op   Extremities: No peripheral edema  Vascular: 2+ peripheral pulses  Neurological: A/O to person place circumstance, no focal deficits, followed all simple commands consistently, moving all extremities   Musculoskeletal: 5/5 strength b/l upper and lower extremities  Skin: No rashes  left chest tube     RADIOLOGY/EKG:  < from: Xray Chest 1 View- PORTABLE-Routine (Xray Chest 1 View- PORTABLE-Routine in AM.) (03.06.22 @ 09:39) >  Impression: Status post left chest pigtail catheter. Status post Mediport   placement. Heart size unremarkable. Lungs are clear. No pneumothorax. No   pleural effusion.      CTA/P  IMPRESSION:  1. A previous esophagectomy, a gastric pull-through.  2. A nodularity thyroid gland, a dominant left nodule with calcifications  measures 15 mm, recommend ultrasound correlation.  3. A prominent low-density lymph node within the mediastinum measures 11   mm.  4. A large left pleural effusion, underlying atelectasis.  5. An indeterminate nodule within the left adrenal gland measures 2.3 cm   no  significant interval change.  6. An indeterminate nodule within the right adrenal gland measures 2.3   cm, no  significant interval change.  7. Small ascites. Enlarging anterior peritoneal implants and perihepatic   implants.  8. Worsening small bowel obstruction with a transition zone in the mid to   lower abdomen deep to the abdominal wall where there is soft tissue   thickening of a collapsed small bowel loop, increasing with increasing   surrounding peritoneal soft tissue implants.  9. Small enhancing nodules along the periphery of the right hepatic lobe   are suspicious for metastases.    < from: Xray Barium Enema Single Contrast (Xray Barium Enema Single Contrast .) (03.07.22 @ 15:12) >  Moderate to severe strictures in the proximal and mid sigmoid colon which   are likely due to serosal implants.      LABS: All Labs Reviewed:                        12.8   19.31 )-----------( 162      ( 10 Mar 2022 06:22 )             39.5     135  |  107  |  37<H>  ----------------------------<  176<H>  3.8   |  22  |  0.48<L>    Ca    7.4<L>      10 Mar 2022 06:22  Phos  3.2     03-10  Mg     2.1     03-10  TPro  4.5<L>  /  Alb  2.2<L>  /  TBili  0.5  /  DBili  x   /  AST  106<H>  /  ALT  176<H>  /  AlkPhos  260<H>  03-09  PT/INR - ( 09 Mar 2022 01:25 )   PT: 12.3 sec;   INR: 1.06 ratio    PTT - ( 09 Mar 2022 01:25 )  PTT:26.9 sec        acetaminophen   IVPB .. 1000 milliGRAM(s) IV Intermittent every 6 hours PRN  ALBUTerol    90 MICROgram(s) HFA Inhaler 2 Puff(s) Inhalation every 6 hours PRN  cefoTEtan  IVPB 1 Gram(s) IV Intermittent every 12 hours  enoxaparin Injectable 40 milliGRAM(s) SubCutaneous every 24 hours  fat emulsion (Fish Oil and Plant Based) 20% Infusion 0.68 Gm/kG/Day IV Continuous <Continuous>  HYDROmorphone PCA (1 mG/mL) 30 milliLiter(s) PCA Continuous PCA Continuous  HYDROmorphone PCA (1 mG/mL) Rescue Clinician Bolus 0.5 milliGRAM(s) IV Push every 15 minutes PRN  insulin lispro (ADMELOG) corrective regimen sliding scale   SubCutaneous three times a day before meals  ketorolac   Injectable 15 milliGRAM(s) IV Push every 6 hours  lactated ringers Bolus 400 milliLiter(s) IV Bolus once  metoprolol tartrate Injectable 5 milliGRAM(s) IV Push every 6 hours PRN  naloxone Injectable 0.1 milliGRAM(s) IV Push every 3 minutes PRN  naloxone Injectable 0.1 milliGRAM(s) IV Push every 3 minutes PRN  octreotide  Injectable 100 MICROGram(s) IV Push every 8 hours  ondansetron Injectable 4 milliGRAM(s) IV Push every 6 hours PRN  pantoprazole  Injectable 40 milliGRAM(s) IV Push every 12 hours  Parenteral Nutrition - Adult 1 Each TPN Continuous <Continuous>  Parenteral Nutrition - Adult 1 Each TPN Continuous <Continuous>  prochlorperazine   Injectable 5 milliGRAM(s) IV Push every 6 hours PRN

## 2022-03-10 NOTE — CHART NOTE - NSCHARTNOTEFT_GEN_A_CORE
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      3/7: TPN not infused overnight since implanted port was used for antibiotics, now pending PICC line for TPN.  will order PPN for today (3/7).    3/8: PPN is being infused through implanted port as approved by MD, will change PPN to TPN today.  Per pt, pending surgery tomorrow to remove obstruction.      *current status: TPN infusing through implanted port as approved by MD.  TPN held yesterday for surgery. s/p Bypass, ileum, small bowel to small bowel bypass Closure, enterotomy, small intestine and Open enterolysis.    *labs reviewed; hypokalemia, receiving repletion outside of PN bag during day on 3/9, will increase in PN bag for tonight to ensure does not continue to drop.  other lytes WNL.  corrected Ca WNL, rec'd add 10mEq of Calcium Gluconate outside of PN bag to ensure calcium does not become low.  bilirubin total WNL.  triglycerides remains within limits for lipids.        Lipid Panel: Date/Time: 03-08-22 @ 06:13  Triglycerides, Serum: 189      *pertinent meds: LR IVF, melatonin, protonix, oxtreotide, KCl, phosnak, morphine, zofran    *I&O's Detail    07 Mar 2022 07:01  -  08 Mar 2022 07:00  --------------------------------------------------------  IN:    Fat Emulsion (Fish Oil &amp; Plant Based) 20% Infusion: 186.3 mL    Oral Fluid: 240 mL    PPN (Peripheral Parenteral Nutrition): 747 mL  Total IN: 1173.3 mL    OUT:    Chest Tube (mL): 340 mL    Voided (mL): 350 mL  Total OUT: 690 mL    Total NET: 483.3 mL      *positive fluid status: BM (+) on 3/8 (+) flatus.  pt not on bowel regimen.  *NPO x 3 days. clear liquids x 3 day with minimal PO intake.    *Gurpreet Score of  19; no PU documented. (+2) Lt/Rt leg edema documented.    *severe malnutrition in acute on chronic illness r/t decreased PO intake 2/2 CA and altered GI function AEB meeting <50% of ENN x5 dys and mild muscle/fat wasting, 4.5% wt loss x 5 days    ESTIMATED NUTR NEEDS: based on 73Kg admit wt  1825-2190Kcal (25-30Kcal/Kg)  110-146g protein (1.5-2 g/Kg)  1825-2190mL (25-30mL/Kg)      Weight History:  69.8Kg (3/8) wt loss of 3.3Kg in 5 days (4.5%), clinically significant.  Height (cm): 167.6 (03-03-22 @ 22:52)  Weight (kg): 73.1 (03-03-22 @ 22:52)  BMI (kg/m2): 26 (03-03-22 @ 22:52)  BSA (m2): 1.82 (03-03-22 @ 22:52)  IBW: 59Kg      *will continue to monitor and adjust PN prn*    TPN Recommendations: via implanted port    Total Volume:  110 g  Amino Acids  150 g Dextrose (titrate by 25-50g/day to goal rate of 285g of dextrose)  50 g Lipids 20%  127 mEq Sodium Chloride  0 mEq Sodium Acetate  20 mmol Sodium Phosphate  40 mEq Potassium Chloride  0 mEq Potassium Acetate  0 mmol Potassium Phosphate  0 mEq Calcium Gluconate  8 mEq Magnesium Sulfate  100 mg Thiamine  11 units Regular Insulin  1 ml Trace Elements   10 ml MVI    Total Calories     1440       (   Meets   79%  of  Estimated Energy needs  and    100 %  Protein needs)    Additional Recommendations:    1) Daily weights  2) Strict I & O's  3) Daily lyte checks  4) Weekly triglycerides/LFT checks  5) POCT q6hrs; maintain POCT of 140-180mg/dL; add sliding scale insulin for additional coverage prn  6) add calcium gluconate 10 mEq outside of PN bag to ensure patient doesn't go low in calcium    *will monitor and adjust treatement plan prn*  Tiki Ascencio MA, RDN, CDN, CNSC [phone] Clinical Nutrition PN Follow Up Note    *60yo female admitted for worsening SOB due to Lt pleural effusion s/p chest tube placement, with recurrent partial SBO, abnormal LFT's (liver mets?), 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      3/7: TPN not infused overnight since implanted port was used for antibiotics, now pending PICC line for TPN.  will order PPN for today (3/7).  3/8: PPN is being infused through implanted port as approved by MD, will change PPN to TPN today.  Per pt, pending surgery tomorrow to remove obstruction.      *current status: TPN infusing through implanted port as approved by MD.  TPN held yesterday 3/9 for surgery. s/p Bypass, ileum, small bowel to small bowel bypass Closure, enterotomy, small intestine and Open enterolysis.    *labs reviewed; K at low-end of normal, received repletion outside of PN bag during day on 3/9, will increase in PN bag to ensure K remains WNL.  Na at low-end of normal, will increase in bag.   Mg and Phos WNL.   corrected Ca remains WNL, rec'd add 10mEq of Calcium Gluconate outside of PN bag to ensure calcium does not become low.  bilirubin total WNL.  New triglyceride level remains within limits for lipids.   Dextrose titrated up 50g, insulin added to bag as POCT high-end normal.    03-10    135  |  107  |  37<H>  ----------------------------<  176<H>  3.8   |  22  |  0.48<L>    Ca    7.4<L>      10 Mar 2022 06:22  Phos  3.2     03-10  Mg     2.1     03-10    TPro  4.5<L>  /  Alb  2.2<L>  /  TBili  0.5  /  DBili  x   /  AST  106<H>  /  ALT  176<H>  /  AlkPhos  260<H>  03-09      Lipid Panel: Date/Time: 03-08-22 @ 06:13  Triglycerides, Serum: 207 (3/9)    *pertinent meds: LR IVF, protonix, octreotide, zofran, admelog (received 2 units in last 24 hrs), metoprolol      *I&O's Detail    09 Mar 2022 07:01  -  10 Mar 2022 07:00  --------------------------------------------------------  IN:    Fat Emulsion (Fish Oil &amp; Plant Based) 20% Infusion: 120 mL    Lactated Ringers: 25 mL    Lactated Ringers: 2185 mL    Lactated Ringers Bolus: 1000 mL    Other (mL): 1600 mL    PPN (Peripheral Parenteral Nutrition): 1389 mL  Total IN: 6319 mL    OUT:    Chest Tube (mL): 0 mL    Indwelling Catheter - Urethral (mL): 934 mL    Nasogastric/Oral tube (mL): 0 mL  Total OUT: 934 mL    Total NET: 5385 mL    *positive fluid status; would consider d/c-ing LR  *BM (+) on 3/8 (+) flatus.  pt not on bowel regimen.  *NPO x 3 days. clear liquids x 3 day with minimal PO intake.    *Gurpreet Score of 19; no PU documented. (+1) L/ R arm; (+2) Lt/Rt leg edema documented.    *severe malnutrition in acute on chronic illness r/t decreased PO intake 2/2 CA and altered GI function AEB meeting <50% of ENN x5 dys and mild muscle/fat wasting, 4.5% wt loss x 5 days    ESTIMATED NUTR NEEDS: based on 73Kg admit wt  1825-2190Kcal (25-30Kcal/Kg)  110-146g protein (1.5-2 g/Kg)  1825-2190mL (25-30mL/Kg)      Weight History:  69.8Kg (3/8) wt loss of 3.3Kg in 5 days (4.5%), clinically significant.  Height (cm): 167.6 (03-03-22 @ 22:52)  Weight (kg): 73.1 (03-03-22 @ 22:52)  BMI (kg/m2): 26 (03-03-22 @ 22:52)  BSA (m2): 1.82 (03-03-22 @ 22:52)  IBW: 59Kg      TPN Recommendations: via implanted port    Total Volume: 2000 mL  110 g  Amino Acids  200 g Dextrose (titrate by 25-50 g/day to goal rate of 285g of dextrose)  50 g Lipids 20%  137 mEq Sodium Chloride  0 mEq Sodium Acetate  20 mmol Sodium Phosphate  50 mEq Potassium Chloride  0 mEq Potassium Acetate  0 mmol Potassium Phosphate  0 mEq Calcium Gluconate  8 mEq Magnesium Sulfate  100 mg Thiamine  15 units Regular Insulin  1 ml Trace Elements   10 ml MVI    Total Calories     1620       (Meets   88%  of  Estimated Energy needs  and    100 %  Protein needs)    Additional Recommendations:  1) Daily weights  2) Strict I & O's  3) Daily lyte checks  4) Weekly triglycerides/LFT checks  5) POCT q6hrs; maintain POCT of 140-180mg/dL; add sliding scale insulin for additional coverage prn  6) add calcium gluconate 10 mEq outside of PN bag to ensure patient doesn't go low in calcium    *will monitor and adjust treatement plan prn*  Izzy Hernandez, MS, RDN, [phone]

## 2022-03-10 NOTE — PROVIDER CONTACT NOTE (OTHER) - BACKGROUND
Pt admit with SBO and L. pleural effusion. PMH of peritoneal carcinomatosis. Pt started on PPN overnight.
total out Chest tube: 790  Ann: 290
sob for 1.5 weeks duration, + SBO with with small left Pleural effusions, hx with peritoneal carcinomatosis

## 2022-03-11 LAB
ALBUMIN SERPL ELPH-MCNC: 1.1 G/DL — LOW (ref 3.3–5)
ALP SERPL-CCNC: 139 U/L — HIGH (ref 40–120)
ALT FLD-CCNC: 189 U/L — HIGH (ref 12–78)
ANION GAP SERPL CALC-SCNC: 4 MMOL/L — LOW (ref 5–17)
AST SERPL-CCNC: 93 U/L — HIGH (ref 15–37)
BILIRUB SERPL-MCNC: 0.4 MG/DL — SIGNIFICANT CHANGE UP (ref 0.2–1.2)
BUN SERPL-MCNC: 29 MG/DL — HIGH (ref 7–23)
CALCIUM SERPL-MCNC: 7.3 MG/DL — SIGNIFICANT CHANGE UP (ref 8.5–10.1)
CHLORIDE SERPL-SCNC: 111 MMOL/L — HIGH (ref 96–108)
CO2 SERPL-SCNC: 21 MMOL/L — LOW (ref 22–31)
CREAT SERPL-MCNC: 0.44 MG/DL — LOW (ref 0.5–1.3)
EGFR: 109 ML/MIN/1.73M2 — SIGNIFICANT CHANGE UP
GLUCOSE SERPL-MCNC: 151 MG/DL — HIGH (ref 70–99)
MAGNESIUM SERPL-MCNC: 2 MG/DL — SIGNIFICANT CHANGE UP (ref 1.6–2.6)
PHOSPHATE SERPL-MCNC: 1.7 MG/DL — LOW (ref 2.5–4.5)
POTASSIUM SERPL-MCNC: 3.9 MMOL/L — SIGNIFICANT CHANGE UP (ref 3.5–5.3)
POTASSIUM SERPL-SCNC: 3.9 MMOL/L — SIGNIFICANT CHANGE UP (ref 3.5–5.3)
PROT SERPL-MCNC: 3.5 GM/DL — LOW (ref 6–8.3)
SODIUM SERPL-SCNC: 136 MMOL/L — SIGNIFICANT CHANGE UP (ref 135–145)
VIT D25+D1,25 OH+D1,25 PNL SERPL-MCNC: 30.1 PG/ML — SIGNIFICANT CHANGE UP (ref 19.9–79.3)

## 2022-03-11 PROCEDURE — 99231 SBSQ HOSP IP/OBS SF/LOW 25: CPT

## 2022-03-11 PROCEDURE — 71045 X-RAY EXAM CHEST 1 VIEW: CPT | Mod: 26

## 2022-03-11 PROCEDURE — 99232 SBSQ HOSP IP/OBS MODERATE 35: CPT

## 2022-03-11 RX ORDER — SODIUM CHLORIDE 9 MG/ML
500 INJECTION, SOLUTION INTRAVENOUS ONCE
Refills: 0 | Status: COMPLETED | OUTPATIENT
Start: 2022-03-11 | End: 2022-03-11

## 2022-03-11 RX ORDER — I.V. FAT EMULSION 20 G/100ML
0.68 EMULSION INTRAVENOUS
Qty: 50 | Refills: 0 | Status: DISCONTINUED | OUTPATIENT
Start: 2022-03-11 | End: 2022-03-11

## 2022-03-11 RX ORDER — ELECTROLYTE SOLUTION,INJ
1 VIAL (ML) INTRAVENOUS
Refills: 0 | Status: DISCONTINUED | OUTPATIENT
Start: 2022-03-11 | End: 2022-03-11

## 2022-03-11 RX ADMIN — Medication 15 MILLIGRAM(S): at 22:03

## 2022-03-11 RX ADMIN — ONDANSETRON 4 MILLIGRAM(S): 8 TABLET, FILM COATED ORAL at 16:28

## 2022-03-11 RX ADMIN — Medication 15 MILLIGRAM(S): at 16:34

## 2022-03-11 RX ADMIN — Medication 1 EACH: at 22:39

## 2022-03-11 RX ADMIN — Medication 15 MILLIGRAM(S): at 00:53

## 2022-03-11 RX ADMIN — I.V. FAT EMULSION 20.83 GM/KG/DAY: 20 EMULSION INTRAVENOUS at 22:45

## 2022-03-11 RX ADMIN — Medication 15 MILLIGRAM(S): at 06:45

## 2022-03-11 RX ADMIN — SODIUM CHLORIDE 500 MILLILITER(S): 9 INJECTION, SOLUTION INTRAVENOUS at 23:00

## 2022-03-11 RX ADMIN — Medication 15 MILLIGRAM(S): at 05:42

## 2022-03-11 RX ADMIN — Medication 85 MILLIMOLE(S): at 09:04

## 2022-03-11 RX ADMIN — Medication 15 MILLIGRAM(S): at 01:45

## 2022-03-11 RX ADMIN — Medication 100 GRAM(S): at 12:14

## 2022-03-11 RX ADMIN — OCTREOTIDE ACETATE 100 MICROGRAM(S): 200 INJECTION, SOLUTION INTRAVENOUS; SUBCUTANEOUS at 16:11

## 2022-03-11 RX ADMIN — Medication 15 MILLIGRAM(S): at 14:50

## 2022-03-11 RX ADMIN — ENOXAPARIN SODIUM 40 MILLIGRAM(S): 100 INJECTION SUBCUTANEOUS at 14:50

## 2022-03-11 RX ADMIN — PANTOPRAZOLE SODIUM 40 MILLIGRAM(S): 20 TABLET, DELAYED RELEASE ORAL at 10:53

## 2022-03-11 RX ADMIN — Medication 100 GRAM(S): at 22:03

## 2022-03-11 RX ADMIN — OCTREOTIDE ACETATE 100 MICROGRAM(S): 200 INJECTION, SOLUTION INTRAVENOUS; SUBCUTANEOUS at 05:42

## 2022-03-11 RX ADMIN — Medication 15 MILLIGRAM(S): at 22:50

## 2022-03-11 RX ADMIN — PANTOPRAZOLE SODIUM 40 MILLIGRAM(S): 20 TABLET, DELAYED RELEASE ORAL at 22:03

## 2022-03-11 RX ADMIN — DEXTROSE MONOHYDRATE, SODIUM CHLORIDE, AND POTASSIUM CHLORIDE 80 MILLILITER(S): 50; .745; 4.5 INJECTION, SOLUTION INTRAVENOUS at 05:43

## 2022-03-11 NOTE — PROGRESS NOTE ADULT - ASSESSMENT
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   Shunt no longer goes to the peritoneum but to the spine and has been problem-free for many years     Plan:  SICU for tele monitoring   SBO - On 3/922 s/p Bypass, ileum, small bowel to small bowel bypass Closure, enterotomy, small intestine and Open enterolysis  DIlaudid PCA, IVF, IV ABX per Sx   s/p chest tube on this admission, likely malignant eff  pt refused pleurx and therefore will likely dc prior to discharge home  octreotide per primary team   cont topamax for neuropathy  cont IV PPI   cont IV BB   TPN/monitor LFTs/DC statin    VTE Px - LOVENOX 40  discussed with Sx and CTSx    Pls call with naz        A/P  Worsening shortness of breath due to left pleural effusion s/p chest tube   recurrent partial SBO; strictures noted on barium enema - prox and sigmoid colon  abnormal LFTs - from PPN  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   Shunt no longer goes to the peritoneum but to the spine and has been problem-free for many years     Plan:  SICU for tele monitoring   SBO - On 3/922 s/p Bypass, ileum, small bowel to small bowel bypass Closure, enterotomy, small intestine and Open enterolysis  DIlaudid PCA, IVF, IV ABX per Sx   s/p chest tube on this admission, likely malignant eff  pt refused pleurx and therefore will likely dc prior to discharge home  octreotide per primary team   cont topamax for neuropathy  cont IV PPI   cont IV BB   TPN/monitor LFTs/DC statin    VTE Px - LOVENOX 40  discussed with RN   Patient needs a PCP - can see Dr Mckeon or other on DC     Pls call with naz        A/P  Worsening shortness of breath due to left pleural effusion s/p chest tube   recurrent partial SBO; strictures noted on barium enema - prox and sigmoid colon  abnormal LFTs - from PPN  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   Shunt no longer goes to the peritoneum but to the spine and has been problem-free for many years     Plan:  SICU for tele monitoring   SBO - On 3/922 s/p Bypass, ileum, small bowel to small bowel bypass Closure, enterotomy, small intestine and Open enterolysis  DIlaudid PCA, IVF, IV ABX per Sx   Drop in Hb noted - likely hemodilutional - pt without symptoms    s/p chest tube on this admission, likely malignant eff  pt refused pleurx and therefore will likely dc prior to discharge home  Noted heme-onc recs     octreotide per primary team   cont topamax for neuropathy    cont IV PPI   cont IV BB as BP allows     TPN/monitor LFTs/DC statin    VTE Px - LOVENOX 40    discussed with RN   Pls call with naz

## 2022-03-11 NOTE — PROGRESS NOTE ADULT - ASSESSMENT
Metastatic gastroesophageal carcinoma  Omental metastases  Small bowel obstruction status post bypass surgery  Left pleural effusion status post chest tube drainage.  Negative cytology  Hypoalbuminemia    Plan will be to allow patient to recover over the next 2 weeks.  If her performance status remains stable/optimal recommend a salvage therapy with paclitaxel plus/minus ramucirumab  Patient has severely low serum albumin.  Will require nutritional consultation.  Check urine for protein  For recurrent pleural effusion consider a Pleurx catheter

## 2022-03-11 NOTE — CHART NOTE - NSCHARTNOTEFT_GEN_A_CORE
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      3/7: TPN not infused overnight since implanted port was used for antibiotics, now pending PICC line for TPN.  will order PPN for today (3/7).  3/8: PPN is being infused through implanted port as approved by MD, will change PPN to TPN today.  Per pt, pending surgery tomorrow to remove obstruction.      *current status: TPN infusing through implanted port as approved by MD. s/p Bypass, ileum, small bowel to small bowel bypass Closure, enterotomy, small intestine and Open enterolysis on 3/9. Was hypotensive yesterday.    *labs reviewed; K remains at low-end of normal, will increase in PN bag to ensure K remains WNL.  Na at remains low-end of normal, will increase in bag.   Mg WNL.  Hypophosphatemia, will increase in bag.    corrected Ca remains WNL, rec'd add 10mEq of Calcium Gluconate outside of PN bag to ensure calcium does not become low.  bilirubin total WNL.  New triglyceride level remains within limits for lipids.   Dextrose titrated up 50g, more insulin added to bag to compensate for increased dextrose and remain POCT WNL.    03-11    136  |  111<H>  |  29<H>  ----------------------------<  151<H>  3.9   |  21<L>  |  0.44<L>    Ca    7.3      11 Mar 2022 06:41  Phos  1.7     03-11  Mg     2.0     03-11    TPro  3.5<L>  /  Alb  1.1<L>  /  TBili  0.4  /  DBili  x   /  AST  93<H>  /  ALT  189<H>  /  AlkPhos  139<H>  03-11      Vitamin D, 25-Hydroxy: 34.2 ng/mL (03-10-22 @ 11:47)   - low-end of normal, would supplement 1000 units daily.   Vitamin D, 1, 25-Dihydroxy: 30.1 pg/mL (03-10-22 @ 11:47)    Lipid Panel: Date/Time: 03-08-22 @ 06:13  Triglycerides, Serum: 207 (3/9)    POCT Blood Glucose.: 119 mg/dL (10 Mar 2022 17:54)  POCT Blood Glucose.: 161 mg/dL (10 Mar 2022 10:29)      *pertinent meds: LR IVF, protonix, octreotide, zofran, admelog (received 2 units in last 24 hrs), metoprolol, acetaminophen, prochlorperazine      *I&O's Detail    10 Mar 2022 07:01  -  11 Mar 2022 07:00  --------------------------------------------------------  IN:    dextrose 5% + sodium chloride 0.45% w/ Additives: 40 mL    Fat Emulsion (Fish Oil &amp; Plant Based) 20% Infusion: 80 mL    IV PiggyBack: 1000 mL    Lactated Ringers: 320 mL    PPN (Peripheral Parenteral Nutrition): 1018 mL    Sodium Chloride 0.9% Bolus: 500 mL  Total IN: 2958 mL    OUT:    Chest Tube (mL): 840 mL    Indwelling Catheter - Urethral (mL): 1090 mL  Total OUT: 1930 mL    Total NET: 1028 mL      *positive fluid status  *BM (+) on 3/8 (+) flatus.  pt not on bowel regimen.  *Currently NPO    *Gurpreet Score of 15; no PU documented. (+1) L/ R arm; (+2) L/R leg edema documented.    *severe malnutrition in acute on chronic illness r/t decreased PO intake 2/2 CA and altered GI function AEB meeting <50% of ENN x5 dys and mild muscle/fat wasting, 4.5% wt loss x 5 days    ESTIMATED NUTR NEEDS: based on 73Kg admit wt  1825-2190Kcal (25-30Kcal/Kg)  110-146g protein (1.5-2 g/Kg)  1825-2190mL (25-30mL/Kg)      Weight History:  69.8Kg (3/8) wt loss of 3.3Kg in 5 days (4.5%), clinically significant.  Height (cm): 167.6 (03-03-22 @ 22:52)  Weight (kg): 73.1 (03-03-22 @ 22:52)  BMI (kg/m2): 26 (03-03-22 @ 22:52)  BSA (m2): 1.82 (03-03-22 @ 22:52)  IBW: 59Kg      TPN Recommendations: via implanted port    Total Volume: 2000 mL  110 g  Amino Acids  250 g Dextrose (titrate by 25-50 g/day to goal rate of 285g of dextrose)  50 g Lipids 20%  143 mEq Sodium Chloride  0 mEq Sodium Acetate  20 mmol Sodium Phosphate  30 mEq Potassium Chloride  10 mEq Potassium Acetate  10 mmol Potassium Phosphate  0 mEq Calcium Gluconate  8 mEq Magnesium Sulfate  100 mg Thiamine  18 units Regular Insulin  1 ml Trace Elements   10 ml MVI    Total Calories     1790       (Meets   98%  of  Estimated Energy needs  and    100 %  Protein needs)    Additional Recommendations:  1) Daily weights  2) Strict I & O's  3) Daily lyte checks  4) Weekly triglycerides/LFT checks  5) POCT q6hrs; maintain POCT of 140-180mg/dL; add sliding scale insulin for additional coverage prn  6) add calcium gluconate 10 mEq outside of PN bag to ensure patient doesn't go low in calcium  7) Consider supplementing vitamin D 1000 units daily    *will monitor and adjust treatement plan prn*  Izzy Hernandez MS, RDN, [phone]

## 2022-03-11 NOTE — PROGRESS NOTE ADULT - ASSESSMENT
60 yo F with pmhx GEJ cancer s/p esophagectomy, [place] tumors s/p SBO presents with left sided pleural effusion   XR barium enema shows strictures in proximal and mid sigmoid colon  s/p palliative bypass    Plan:   -NPO  -cont TPN  -Pain control   -GI/DVT prop  -DC link today, void check  -monitor bowel function  -hospitalist consult  -daily labs  -f/u CT surgery reccs-no pleaurx, possible remove pigtail prior to DC    Plan discussed with Dr. Sahu

## 2022-03-11 NOTE — PROGRESS NOTE ADULT - SUBJECTIVE AND OBJECTIVE BOX
SURGERY DAILY PROGRESS NOTE:     Subjective:  Patient seen and examined at bedside during am rounds reports doing well. No new complaints at this time. Pt is on TPN. Yesterday afternoon/evening, pt had a decrease in UOP and had soft BPs, was bolused 1.5L, UOP picked up and BP now within normal limits. AVSS. Denies any fevers, chills, n/v/ chest pain or shortness of breath.       MEDICATIONS  (STANDING):  dexAMETHasone  IVPB 10 milliGRAM(s) IV Intermittent every 6 hours  enoxaparin Injectable 40 milliGRAM(s) SubCutaneous every 24 hours  fat emulsion (Fish Oil and Plant Based) 20% Infusion 0.68 Gm/kG/Day (20.7 mL/Hr) IV Continuous <Continuous>  melatonin 5 milliGRAM(s) Oral at bedtime  multivitamin 1 Tablet(s) Oral daily  octreotide  Injectable 100 MICROGram(s) IV Push every 8 hours  pantoprazole  Injectable 40 milliGRAM(s) IV Push every 12 hours  Parenteral Nutrition - Adult 1 Each (83 mL/Hr) TPN Continuous <Continuous>  potassium phosphate / sodium phosphate Powder (PHOS-NaK) 1 Packet(s) Oral daily  topiramate 25 milliGRAM(s) Oral every 12 hours    MEDICATIONS  (PRN):  acetaminophen   IVPB .. 1000 milliGRAM(s) IV Intermittent every 6 hours PRN Mild Pain (1 - 3)  ALBUTerol    90 MICROgram(s) HFA Inhaler 2 Puff(s) Inhalation every 6 hours PRN Shortness of Breath  metoprolol tartrate Injectable 5 milliGRAM(s) IV Push every 6 hours PRN SBP > 140  morphine  - Injectable 2 milliGRAM(s) IV Push every 4 hours PRN Severe Pain (7 - 10)  ondansetron Injectable 4 milliGRAM(s) IV Push every 6 hours PRN Nausea      Vital Signs Last 24 Hrs  T(C): 36.9 (11 Mar 2022 06:32), Max: 36.9 (11 Mar 2022 06:32)  T(F): 98.5 (11 Mar 2022 06:32), Max: 98.5 (11 Mar 2022 06:32)  HR: 72 (11 Mar 2022 08:00) (68 - 98)  BP: 129/52 (11 Mar 2022 08:00) (73/52 - 129/52)  BP(mean): 75 (11 Mar 2022 08:00) (57 - 79)  RR: 15 (11 Mar 2022 08:00) (7 - 31)  SpO2: 94% (11 Mar 2022 08:00) (92% - 98%)      PHYSICAL EXAM   Gen: well-appearing, in no acute distress  CV: pulse regularly present   Resp: airway patent, non-labored breathing  Abd: soft, non-tender, non-distended, dressing c/d/i  ext.  no cyanosis or edema      I&O's Detail    10 Mar 2022 07:01  -  11 Mar 2022 07:00  --------------------------------------------------------  IN:    dextrose 5% + sodium chloride 0.45% w/ Additives: 40 mL    Fat Emulsion (Fish Oil &amp; Plant Based) 20% Infusion: 80 mL    IV PiggyBack: 1000 mL    Lactated Ringers: 320 mL    PPN (Peripheral Parenteral Nutrition): 1018 mL    Sodium Chloride 0.9% Bolus: 500 mL  Total IN: 2958 mL    OUT:    Chest Tube (mL): 840 mL    Indwelling Catheter - Urethral (mL): 1090 mL  Total OUT: 1930 mL    Total NET: 1028 mL      11 Mar 2022 07:01  -  11 Mar 2022 09:10  --------------------------------------------------------  IN:    dextrose 5% + sodium chloride 0.45% w/ Additives: 1058 mL  Total IN: 1058 mL    OUT:  Total OUT: 0 mL    Total NET: 1058 mL              Daily     Daily Weight in k.8 (08 Mar 2022 06:26)    LABS:                                   10.7   15.94 )-----------( 119      ( 11 Mar 2022 07:54 )             31.9   03-11    136  |  111<H>  |  29<H>  ----------------------------<  151<H>  3.9   |  21<L>  |  0.44<L>    Ca    7.3      11 Mar 2022 06:41  Phos  1.7       Mg     2.0         TPro  3.5<L>  /  Alb  1.1<L>  /  TBili  0.4  /  DBili  x   /  AST  93<H>  /  ALT  189<H>  /  AlkPhos  139<H>

## 2022-03-11 NOTE — PROGRESS NOTE ADULT - SUBJECTIVE AND OBJECTIVE BOX
CC: 61 yr old female with small left pleural effusion now increasing in size.  Pt symptomatic with shortness of breath on exertion, warranting admission, is now s/p chest tube on 3/4/22.  She has a h/o Gastroesophageal cancer with omental metastases.  She had a partial SBO one month ago and once again has not been passing gas or  had BMs since 3/3/22. CT abdo noted.   Pt states her abdo pain is improving, no nausea/vomiting - is drinking water. Poor po intake and weight loss noted in the past few weeks.  No ha lightheadedness cp palps or sob since chest tube was inserted. No tingling or numbness anywhere.    3/10 - seen post-op, easily arousable, knows her name, recognized me, pain under control at this time, no tingling numbness anywhere, denies cp.  mild sinus tachy 105-109 - getting IV    T(F): 97.9 (11 Mar 2022 09:11), Max: 98.5 (11 Mar 2022 06:32)  HR: 78 (11 Mar 2022 10:00) (68 - 90)  BP: 108/78 (11 Mar 2022 10:00) (73/52 - 131/62)  BP(mean): 85 (11 Mar 2022 10:00) (57 - 85)  RR: 17 (11 Mar 2022 10:00) (7 - 31)  SpO2: 95% (11 Mar 2022 10:00) (92% - 98%)  Constitutional: NAD, awake and alert  HEENT: PERR, EOMI  Neck: Soft and supple,  No JVD  Respiratory: Breath sounds are clear bilaterally, No wheezing, rales or rhonchi; left chest tube   Cardiovascular: S1 and S2, regular rate and rhythm, no Murmurs  Gastrointestinal: post-op   Extremities: No peripheral edema  Vascular: 2+ peripheral pulses  Neurological: A/O to person place circumstance, no focal deficits, followed all simple commands consistently, moving all extremities   Musculoskeletal: 5/5 strength b/l upper and lower extremities  Skin: No rashes  left chest tube     RADIOLOGY/EKG:  < from: Xray Chest 1 View- PORTABLE-Routine (Xray Chest 1 View- PORTABLE-Routine in AM.) (03.06.22 @ 09:39) >  Impression: Status post left chest pigtail catheter. Status post Mediport   placement. Heart size unremarkable. Lungs are clear. No pneumothorax. No   pleural effusion.      CTA/P  IMPRESSION:  1. A previous esophagectomy, a gastric pull-through.  2. A nodularity thyroid gland, a dominant left nodule with calcifications  measures 15 mm, recommend ultrasound correlation.  3. A prominent low-density lymph node within the mediastinum measures 11   mm.  4. A large left pleural effusion, underlying atelectasis.  5. An indeterminate nodule within the left adrenal gland measures 2.3 cm   no  significant interval change.  6. An indeterminate nodule within the right adrenal gland measures 2.3   cm, no  significant interval change.  7. Small ascites. Enlarging anterior peritoneal implants and perihepatic   implants.  8. Worsening small bowel obstruction with a transition zone in the mid to   lower abdomen deep to the abdominal wall where there is soft tissue   thickening of a collapsed small bowel loop, increasing with increasing   surrounding peritoneal soft tissue implants.  9. Small enhancing nodules along the periphery of the right hepatic lobe   are suspicious for metastases.    < from: Xray Barium Enema Single Contrast (Xray Barium Enema Single Contrast .) (03.07.22 @ 15:12) >  Moderate to severe strictures in the proximal and mid sigmoid colon which   are likely due to serosal implants.      LABS: All Labs Reviewed:                        10.7   15.94 )-----------( 119      ( 11 Mar 2022 07:54 )             31.9     03-11    136  |  111<H>  |  29<H>  ----------------------------<  151<H>  3.9   |  21<L>  |  0.44<L>    Ca    7.3      11 Mar 2022 06:41  Phos  1.7     03-11  Mg     2.0     03-11    TPro  3.5<L>  /  Alb  1.1<L>  /  TBili  0.4  /  DBili  x   /  AST  93<H>  /  ALT  189<H>  /  AlkPhos  139<H>  03-11      MEDS:   acetaminophen   IVPB .. 1000 milliGRAM(s) IV Intermittent every 6 hours PRN  ALBUTerol    90 MICROgram(s) HFA Inhaler 2 Puff(s) Inhalation every 6 hours PRN  cefoTEtan  IVPB 1 Gram(s) IV Intermittent every 12 hours  dextrose 40% Gel 15 Gram(s) Oral once  dextrose 5%. 1000 milliLiter(s) IV Continuous <Continuous>  dextrose 5%. 1000 milliLiter(s) IV Continuous <Continuous>  dextrose 50% Injectable 25 Gram(s) IV Push once  dextrose 50% Injectable 12.5 Gram(s) IV Push once  dextrose 50% Injectable 25 Gram(s) IV Push once  enoxaparin Injectable 40 milliGRAM(s) SubCutaneous every 24 hours  fat emulsion (Fish Oil and Plant Based) 20% Infusion 0.68 Gm/kG/Day IV Continuous <Continuous>  fat emulsion (Fish Oil and Plant Based) 20% Infusion 0.68 Gm/kG/Day IV Continuous <Continuous>  glucagon  Injectable 1 milliGRAM(s) IntraMuscular once  HYDROmorphone PCA (1 mG/mL) 30 milliLiter(s) PCA Continuous PCA Continuous  HYDROmorphone PCA (1 mG/mL) Rescue Clinician Bolus 0.5 milliGRAM(s) IV Push every 15 minutes PRN  insulin lispro (ADMELOG) corrective regimen sliding scale   SubCutaneous three times a day before meals  ketorolac   Injectable 15 milliGRAM(s) IV Push every 6 hours  lactated ringers Bolus 400 milliLiter(s) IV Bolus once  metoprolol tartrate Injectable 5 milliGRAM(s) IV Push every 6 hours PRN  naloxone Injectable 0.1 milliGRAM(s) IV Push every 3 minutes PRN  naloxone Injectable 0.1 milliGRAM(s) IV Push every 3 minutes PRN  octreotide  Injectable 100 MICROGram(s) IV Push every 8 hours  ondansetron Injectable 4 milliGRAM(s) IV Push every 6 hours PRN  pantoprazole  Injectable 40 milliGRAM(s) IV Push every 12 hours  Parenteral Nutrition - Adult 1 Each TPN Continuous <Continuous>  Parenteral Nutrition - Adult 1 Each TPN Continuous <Continuous>  prochlorperazine   Injectable 5 milliGRAM(s) IV Push every 6 hours PRN                                   CC: 61 yr old female with small left pleural effusion now increasing in size.  Pt symptomatic with shortness of breath on exertion, warranting admission, is now s/p chest tube on 3/4/22.  She has a h/o Gastroesophageal cancer with omental metastases.  She had a partial SBO one month ago and once again has not been passing gas or  had BMs since 3/3/22. CT abdo noted.   Pt states her abdo pain is improving, no nausea/vomiting - is drinking water. Poor po intake and weight loss noted in the past few weeks.  No ha lightheadedness cp palps or sob since chest tube was inserted. No tingling or numbness anywhere.    3/10 - seen post-op, easily arousable, knows her name, recognized me, pain under control at this time, no tingling numbness anywhere, denies cp.  mild sinus tachy 105-109 - getting IV  3/11 - sitting up in chair, conversant - no cp palps sob lightheadedness - hasn't passed gas yet     T(F): 97.9 (11 Mar 2022 09:11), Max: 98.5 (11 Mar 2022 06:32)  HR: 78 (11 Mar 2022 10:00) (68 - 90)  BP: 108/78 (11 Mar 2022 10:00) (73/52 - 131/62)  BP(mean): 85 (11 Mar 2022 10:00) (57 - 85)  RR: 17 (11 Mar 2022 10:00) (7 - 31)  SpO2: 95% (11 Mar 2022 10:00) (92% - 98%)  Constitutional: NAD, awake and alert  HEENT: PERR, EOMI  Neck: Soft and supple,  No JVD  Respiratory: Breath sounds are clear bilaterally, No wheezing, rales or rhonchi; left chest tube   Cardiovascular: S1 and S2, regular rate and rhythm, no Murmurs  Gastrointestinal: abdo is post-op   Extremities: No peripheral edema  Vascular: 2+ peripheral pulses  Neurological: A/O to person place circumstance, no focal deficits, followed all simple commands consistently, moving all extremities   Musculoskeletal: 5/5 strength b/l upper and lower extremities  Skin: No rashes  left chest tube     RADIOLOGY/EKG:  < from: Xray Chest 1 View- PORTABLE-Routine (Xray Chest 1 View- PORTABLE-Routine in AM.) (03.06.22 @ 09:39) >  Impression: Status post left chest pigtail catheter. Status post Mediport   placement. Heart size unremarkable. Lungs are clear. No pneumothorax. No   pleural effusion.      CTA/P  IMPRESSION:  1. A previous esophagectomy, a gastric pull-through.  2. A nodularity thyroid gland, a dominant left nodule with calcifications  measures 15 mm, recommend ultrasound correlation.  3. A prominent low-density lymph node within the mediastinum measures 11   mm.  4. A large left pleural effusion, underlying atelectasis.  5. An indeterminate nodule within the left adrenal gland measures 2.3 cm   no  significant interval change.  6. An indeterminate nodule within the right adrenal gland measures 2.3   cm, no  significant interval change.  7. Small ascites. Enlarging anterior peritoneal implants and perihepatic   implants.  8. Worsening small bowel obstruction with a transition zone in the mid to   lower abdomen deep to the abdominal wall where there is soft tissue   thickening of a collapsed small bowel loop, increasing with increasing   surrounding peritoneal soft tissue implants.  9. Small enhancing nodules along the periphery of the right hepatic lobe   are suspicious for metastases.    < from: Xray Barium Enema Single Contrast (Xray Barium Enema Single Contrast .) (03.07.22 @ 15:12) >  Moderate to severe strictures in the proximal and mid sigmoid colon which   are likely due to serosal implants.      LABS: All Labs Reviewed:                        10.7   15.94 )-----------( 119      ( 11 Mar 2022 07:54 )             31.9     03-11    136  |  111<H>  |  29<H>  ----------------------------<  151<H>  3.9   |  21<L>  |  0.44<L>    Ca    7.3      11 Mar 2022 06:41  Phos  1.7     03-11  Mg     2.0     03-11    TPro  3.5<L>  /  Alb  1.1<L>  /  TBili  0.4  /  DBili  x   /  AST  93<H>  /  ALT  189<H>  /  AlkPhos  139<H>  03-11      MEDS:   acetaminophen   IVPB .. 1000 milliGRAM(s) IV Intermittent every 6 hours PRN  ALBUTerol    90 MICROgram(s) HFA Inhaler 2 Puff(s) Inhalation every 6 hours PRN  cefoTEtan  IVPB 1 Gram(s) IV Intermittent every 12 hours  dextrose 40% Gel 15 Gram(s) Oral once  dextrose 5%. 1000 milliLiter(s) IV Continuous <Continuous>  dextrose 5%. 1000 milliLiter(s) IV Continuous <Continuous>  dextrose 50% Injectable 25 Gram(s) IV Push once  dextrose 50% Injectable 12.5 Gram(s) IV Push once  dextrose 50% Injectable 25 Gram(s) IV Push once  enoxaparin Injectable 40 milliGRAM(s) SubCutaneous every 24 hours  fat emulsion (Fish Oil and Plant Based) 20% Infusion 0.68 Gm/kG/Day IV Continuous <Continuous>  fat emulsion (Fish Oil and Plant Based) 20% Infusion 0.68 Gm/kG/Day IV Continuous <Continuous>  glucagon  Injectable 1 milliGRAM(s) IntraMuscular once  HYDROmorphone PCA (1 mG/mL) 30 milliLiter(s) PCA Continuous PCA Continuous  HYDROmorphone PCA (1 mG/mL) Rescue Clinician Bolus 0.5 milliGRAM(s) IV Push every 15 minutes PRN  insulin lispro (ADMELOG) corrective regimen sliding scale   SubCutaneous three times a day before meals  ketorolac   Injectable 15 milliGRAM(s) IV Push every 6 hours  lactated ringers Bolus 400 milliLiter(s) IV Bolus once  metoprolol tartrate Injectable 5 milliGRAM(s) IV Push every 6 hours PRN  naloxone Injectable 0.1 milliGRAM(s) IV Push every 3 minutes PRN  naloxone Injectable 0.1 milliGRAM(s) IV Push every 3 minutes PRN  octreotide  Injectable 100 MICROGram(s) IV Push every 8 hours  ondansetron Injectable 4 milliGRAM(s) IV Push every 6 hours PRN  pantoprazole  Injectable 40 milliGRAM(s) IV Push every 12 hours  Parenteral Nutrition - Adult 1 Each TPN Continuous <Continuous>  Parenteral Nutrition - Adult 1 Each TPN Continuous <Continuous>  prochlorperazine   Injectable 5 milliGRAM(s) IV Push every 6 hours PRN

## 2022-03-11 NOTE — PROGRESS NOTE ADULT - SUBJECTIVE AND OBJECTIVE BOX
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  Patient had previously been on 5-fluorouracil-based therapy, immunotherapy, and was admitted with bowel obstruction and left pleural effusion.  Status post left tube placement and drainage.  She underwent bypass surgery of the ileum with small bowel to small bowel bypass closure enterotomy.  Presently in the SICU recovering well.      Allergies    latex (Unknown)  sulfa drugs (Unknown)    Intolerances        MEDICATIONS  (STANDING):  cefoTEtan  IVPB 1 Gram(s) IV Intermittent every 12 hours  dextrose 40% Gel 15 Gram(s) Oral once  dextrose 5%. 1000 milliLiter(s) (50 mL/Hr) IV Continuous <Continuous>  dextrose 5%. 1000 milliLiter(s) (100 mL/Hr) IV Continuous <Continuous>  dextrose 50% Injectable 25 Gram(s) IV Push once  dextrose 50% Injectable 12.5 Gram(s) IV Push once  dextrose 50% Injectable 25 Gram(s) IV Push once  enoxaparin Injectable 40 milliGRAM(s) SubCutaneous every 24 hours  fat emulsion (Fish Oil and Plant Based) 20% Infusion 0.68 Gm/kG/Day (20.83 mL/Hr) IV Continuous <Continuous>  fat emulsion (Fish Oil and Plant Based) 20% Infusion 0.68 Gm/kG/Day (20.83 mL/Hr) IV Continuous <Continuous>  glucagon  Injectable 1 milliGRAM(s) IntraMuscular once  HYDROmorphone PCA (1 mG/mL) 30 milliLiter(s) PCA Continuous PCA Continuous  insulin lispro (ADMELOG) corrective regimen sliding scale   SubCutaneous three times a day before meals  ketorolac   Injectable 15 milliGRAM(s) IV Push every 6 hours  lactated ringers Bolus 400 milliLiter(s) IV Bolus once  octreotide  Injectable 100 MICROGram(s) IV Push every 8 hours  pantoprazole  Injectable 40 milliGRAM(s) IV Push every 12 hours  Parenteral Nutrition - Adult 1 Each (83 mL/Hr) TPN Continuous <Continuous>  Parenteral Nutrition - Adult 1 Each (83 mL/Hr) TPN Continuous <Continuous>    MEDICATIONS  (PRN):  acetaminophen   IVPB .. 1000 milliGRAM(s) IV Intermittent every 6 hours PRN Mild Pain (1 - 3)  ALBUTerol    90 MICROgram(s) HFA Inhaler 2 Puff(s) Inhalation every 6 hours PRN Shortness of Breath  HYDROmorphone PCA (1 mG/mL) Rescue Clinician Bolus 0.5 milliGRAM(s) IV Push every 15 minutes PRN for Pain Scale GREATER THAN 6  metoprolol tartrate Injectable 5 milliGRAM(s) IV Push every 6 hours PRN SBP > 140  naloxone Injectable 0.1 milliGRAM(s) IV Push every 3 minutes PRN For ANY of the following changes in patient status:  A. RR LESS THAN 10 breaths per minute, B. Oxygen saturation LESS THAN 90%, C. Sedation score of 6  naloxone Injectable 0.1 milliGRAM(s) IV Push every 3 minutes PRN For ANY of the following changes in patient status:  A. RR LESS THAN 10 breaths per minute, B. Oxygen saturation LESS THAN 90%, C. Sedation score of 6  ondansetron Injectable 4 milliGRAM(s) IV Push every 6 hours PRN Nausea  prochlorperazine   Injectable 5 milliGRAM(s) IV Push every 6 hours PRN nausea and/or vomiting      Vital Signs Last 24 Hrs  T(C): 36.6 (11 Mar 2022 09:11), Max: 36.9 (11 Mar 2022 06:32)  T(F): 97.9 (11 Mar 2022 09:11), Max: 98.5 (11 Mar 2022 06:32)  HR: 78 (11 Mar 2022 10:00) (68 - 90)  BP: 108/78 (11 Mar 2022 10:00) (73/52 - 131/62)  BP(mean): 85 (11 Mar 2022 10:00) (57 - 85)  RR: 17 (11 Mar 2022 10:00) (7 - 31)  SpO2: 95% (11 Mar 2022 10:00) (92% - 98%)    PHYSICAL EXAM:    GENERAL: NAD,   HEAD:  Atraumatic, Normocephalic  EYES: EOMI, PERRLA, conjunctiva and sclera clear    NECK: Supple, No JVD, Normal thyroid  NERVOUS SYSTEM:    CHEST/LUNG: Clear to percussion bilaterally; No rales, rhonchi,   HEART: Regular rate and rhythm;   ABDOMEN: Soft, Nontender.  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  Patient had previously been on 5-fluorouracil-based therapy, immunotherapy, and was admitted with bowel obstruction and left pleural effusion.  Status post left tube placement and drainage.  She underwent bypass surgery of the ileum with small bowel to small bowel bypass closure enterotomy.  Presently in the SICU recovering well. Intraoperatively noted to have omental metastases; The obstruction(s) were secondary to carcinamatosis    Prior therapy    6/18 Initially had GE cancer ; S/P CT/RT neoadjuvant therapy  Surgery Dr Schumacher/ [facility] : path CR  6/19 Recurrent Bilateral [place] tumors/ omental metastases : Had a second surgery / [facility]  Path Adenocarcinoma CPS score 1 ( Low) NGS no target mutations ( Neg TMB, HER, PDL1/ etc)  7/19 FOLFOX salvage therapy   11/19  5FU CI maintenance ( Had Oxaliplatin neuropathy G III   Recent partial SBO/ CT omental nodules/ mets  FOLFIRI chemotherapy : GI symptoms / asthenia/ Fatigue / alopecia   S/P Immunotherapy x 1           Allergies    latex (Unknown)  sulfa drugs (Unknown)    Intolerances        MEDICATIONS  (STANDING):  cefoTEtan  IVPB 1 Gram(s) IV Intermittent every 12 hours  dextrose 40% Gel 15 Gram(s) Oral once  dextrose 5%. 1000 milliLiter(s) (50 mL/Hr) IV Continuous <Continuous>  dextrose 5%. 1000 milliLiter(s) (100 mL/Hr) IV Continuous <Continuous>  dextrose 50% Injectable 25 Gram(s) IV Push once  dextrose 50% Injectable 12.5 Gram(s) IV Push once  dextrose 50% Injectable 25 Gram(s) IV Push once  enoxaparin Injectable 40 milliGRAM(s) SubCutaneous every 24 hours  fat emulsion (Fish Oil and Plant Based) 20% Infusion 0.68 Gm/kG/Day (20.83 mL/Hr) IV Continuous <Continuous>  fat emulsion (Fish Oil and Plant Based) 20% Infusion 0.68 Gm/kG/Day (20.83 mL/Hr) IV Continuous <Continuous>  glucagon  Injectable 1 milliGRAM(s) IntraMuscular once  HYDROmorphone PCA (1 mG/mL) 30 milliLiter(s) PCA Continuous PCA Continuous  insulin lispro (ADMELOG) corrective regimen sliding scale   SubCutaneous three times a day before meals  ketorolac   Injectable 15 milliGRAM(s) IV Push every 6 hours  lactated ringers Bolus 400 milliLiter(s) IV Bolus once  octreotide  Injectable 100 MICROGram(s) IV Push every 8 hours  pantoprazole  Injectable 40 milliGRAM(s) IV Push every 12 hours  Parenteral Nutrition - Adult 1 Each (83 mL/Hr) TPN Continuous <Continuous>  Parenteral Nutrition - Adult 1 Each (83 mL/Hr) TPN Continuous <Continuous>    MEDICATIONS  (PRN):  acetaminophen   IVPB .. 1000 milliGRAM(s) IV Intermittent every 6 hours PRN Mild Pain (1 - 3)  ALBUTerol    90 MICROgram(s) HFA Inhaler 2 Puff(s) Inhalation every 6 hours PRN Shortness of Breath  HYDROmorphone PCA (1 mG/mL) Rescue Clinician Bolus 0.5 milliGRAM(s) IV Push every 15 minutes PRN for Pain Scale GREATER THAN 6  metoprolol tartrate Injectable 5 milliGRAM(s) IV Push every 6 hours PRN SBP > 140  naloxone Injectable 0.1 milliGRAM(s) IV Push every 3 minutes PRN For ANY of the following changes in patient status:  A. RR LESS THAN 10 breaths per minute, B. Oxygen saturation LESS THAN 90%, C. Sedation score of 6  naloxone Injectable 0.1 milliGRAM(s) IV Push every 3 minutes PRN For ANY of the following changes in patient status:  A. RR LESS THAN 10 breaths per minute, B. Oxygen saturation LESS THAN 90%, C. Sedation score of 6  ondansetron Injectable 4 milliGRAM(s) IV Push every 6 hours PRN Nausea  prochlorperazine   Injectable 5 milliGRAM(s) IV Push every 6 hours PRN nausea and/or vomiting      Vital Signs Last 24 Hrs  T(C): 36.6 (11 Mar 2022 09:11), Max: 36.9 (11 Mar 2022 06:32)  T(F): 97.9 (11 Mar 2022 09:11), Max: 98.5 (11 Mar 2022 06:32)  HR: 78 (11 Mar 2022 10:00) (68 - 90)  BP: 108/78 (11 Mar 2022 10:00) (73/52 - 131/62)  BP(mean): 85 (11 Mar 2022 10:00) (57 - 85)  RR: 17 (11 Mar 2022 10:00) (7 - 31)  SpO2: 95% (11 Mar 2022 10:00) (92% - 98%)    PHYSICAL EXAM:    GENERAL: NAD,   HEAD:  Atraumatic, Normocephalic  EYES: EOMI, PERRLA, conjunctiva and sclera clear    NECK: Supple, No JVD, Normal thyroid  NERVOUS SYSTEM:    CHEST/LUNG: Clear to percussion bilaterally; No rales, rhonchi,   HEART: Regular rate and rhythm;   ABDOMEN: Soft, Nontender.  EXTREMITIES:   edema:-  LYMPH: No lymphadenopathy noted        LABS:                        10.7   15.94 )-----------( 119      ( 11 Mar 2022 07:54 )             31.9     03-11    136  |  111<H>  |  29<H>  ----------------------------<  151<H>  3.9   |  21<L>  |  0.44<L>    Ca    7.3      11 Mar 2022 06:41  Phos  1.7     03-11  Mg     2.0     03-11    TPro  3.5<L>  /  Alb  1.1<L>  /  TBili  0.4  /  DBili  x   /  AST  93<H>  /  ALT  189<H>  /  AlkPhos  139<H>  03-11

## 2022-03-11 NOTE — PROGRESS NOTE ADULT - SUBJECTIVE AND OBJECTIVE BOX
Subjective:  Pt in bed resting NAD     T(C): 36.6 (03-11-22 @ 09:11), Max: 36.9 (03-11-22 @ 06:32)  HR: 72 (03-11-22 @ 08:00) (68 - 98)  BP: 129/52 (03-11-22 @ 08:00) (73/52 - 129/52)  ABP: --  ABP(mean): --  RR: 15 (03-11-22 @ 08:00) (7 - 31)  SpO2: 94% (03-11-22 @ 08:00) (92% - 98%) RA   Wt(kg): --  CVP(mm Hg): --  CO: --  CI: --  PA: --                                              Tele: SR     CHEST TUBE:  840cc                               OUTPUT:     per 24 hours    AIR LEAKS:  [ ] YES [x ] NO          03-11    136  |  111<H>  |  29<H>  ----------------------------<  151<H>  3.9   |  21<L>  |  0.44<L>    Ca    7.3      11 Mar 2022 06:41  Phos  1.7     03-11  Mg     2.0     03-11    TPro  3.5<L>  /  Alb  1.1<L>  /  TBili  0.4  /  DBili  x   /  AST  93<H>  /  ALT  189<H>  /  AlkPhos  139<H>  03-11                               10.7   15.94 )-----------( 119      ( 11 Mar 2022 07:54 )             31.9                 CAPILLARY BLOOD GLUCOSE      POCT Blood Glucose.: 119 mg/dL (10 Mar 2022 17:54)  POCT Blood Glucose.: 161 mg/dL (10 Mar 2022 10:29)               exam  Neuro:  resting , but alert   Pulm:  decreased at bases b/l + Lt pigtail   CV: RRR   Abd: + midline abd dressing c/d/i   Extremities:  warm         Assessment:  61yFemale    with PAST MEDICAL & SURGICAL HISTORY:  Essential hypertension    Gastritis    HLD (hyperlipidemia)    GERD (gastroesophageal reflux disease)    Intracranial shunt    S/P cholecystectomy    History of esophageal surgery          Plan:

## 2022-03-12 LAB
ALBUMIN SERPL ELPH-MCNC: 1 G/DL — LOW (ref 3.3–5)
ALBUMIN SERPL ELPH-MCNC: 1 G/DL — LOW (ref 3.3–5)
ALP SERPL-CCNC: 141 U/L — HIGH (ref 40–120)
ALP SERPL-CCNC: 143 U/L — HIGH (ref 40–120)
ALT FLD-CCNC: 111 U/L — HIGH (ref 12–78)
ALT FLD-CCNC: 113 U/L — HIGH (ref 12–78)
ANION GAP SERPL CALC-SCNC: 4 MMOL/L — LOW (ref 5–17)
ANION GAP SERPL CALC-SCNC: 4 MMOL/L — LOW (ref 5–17)
APTT BLD: 25.5 SEC — LOW (ref 27.5–35.5)
AST SERPL-CCNC: 53 U/L — HIGH (ref 15–37)
AST SERPL-CCNC: 53 U/L — HIGH (ref 15–37)
BILIRUB SERPL-MCNC: 0.4 MG/DL — SIGNIFICANT CHANGE UP (ref 0.2–1.2)
BILIRUB SERPL-MCNC: 0.5 MG/DL — SIGNIFICANT CHANGE UP (ref 0.2–1.2)
BUN SERPL-MCNC: 31 MG/DL — HIGH (ref 7–23)
BUN SERPL-MCNC: 35 MG/DL — HIGH (ref 7–23)
CALCIUM SERPL-MCNC: 7.2 MG/DL — LOW (ref 8.5–10.1)
CALCIUM SERPL-MCNC: 8 MG/DL — LOW (ref 8.5–10.1)
CHLORIDE SERPL-SCNC: 110 MMOL/L — HIGH (ref 96–108)
CHLORIDE SERPL-SCNC: 113 MMOL/L — HIGH (ref 96–108)
CO2 SERPL-SCNC: 21 MMOL/L — LOW (ref 22–31)
CO2 SERPL-SCNC: 23 MMOL/L — SIGNIFICANT CHANGE UP (ref 22–31)
CREAT SERPL-MCNC: 0.59 MG/DL — SIGNIFICANT CHANGE UP (ref 0.5–1.3)
CREAT SERPL-MCNC: 0.61 MG/DL — SIGNIFICANT CHANGE UP (ref 0.5–1.3)
EGFR: 100 ML/MIN/1.73M2 — SIGNIFICANT CHANGE UP
EGFR: 101 ML/MIN/1.73M2 — SIGNIFICANT CHANGE UP
GLUCOSE SERPL-MCNC: 143 MG/DL — HIGH (ref 70–99)
GLUCOSE SERPL-MCNC: 158 MG/DL — HIGH (ref 70–99)
HCT VFR BLD CALC: 32.6 % — LOW (ref 34.5–45)
HCT VFR BLD CALC: 34.7 % — SIGNIFICANT CHANGE UP (ref 34.5–45)
HGB BLD-MCNC: 10.7 G/DL — LOW (ref 11.5–15.5)
HGB BLD-MCNC: 11.5 G/DL — SIGNIFICANT CHANGE UP (ref 11.5–15.5)
INR BLD: 0.96 RATIO — SIGNIFICANT CHANGE UP (ref 0.88–1.16)
MAGNESIUM SERPL-MCNC: 2.1 MG/DL — SIGNIFICANT CHANGE UP (ref 1.6–2.6)
MCHC RBC-ENTMCNC: 32.5 PG — SIGNIFICANT CHANGE UP (ref 27–34)
MCHC RBC-ENTMCNC: 32.6 PG — SIGNIFICANT CHANGE UP (ref 27–34)
MCHC RBC-ENTMCNC: 32.8 GM/DL — SIGNIFICANT CHANGE UP (ref 32–36)
MCHC RBC-ENTMCNC: 33.1 GM/DL — SIGNIFICANT CHANGE UP (ref 32–36)
MCV RBC AUTO: 98.3 FL — SIGNIFICANT CHANGE UP (ref 80–100)
MCV RBC AUTO: 99.1 FL — SIGNIFICANT CHANGE UP (ref 80–100)
PHOSPHATE SERPL-MCNC: 2.6 MG/DL — SIGNIFICANT CHANGE UP (ref 2.5–4.5)
PLATELET # BLD AUTO: 129 K/UL — LOW (ref 150–400)
PLATELET # BLD AUTO: 144 K/UL — LOW (ref 150–400)
POTASSIUM SERPL-MCNC: 4.1 MMOL/L — SIGNIFICANT CHANGE UP (ref 3.5–5.3)
POTASSIUM SERPL-MCNC: 4.4 MMOL/L — SIGNIFICANT CHANGE UP (ref 3.5–5.3)
POTASSIUM SERPL-SCNC: 4.1 MMOL/L — SIGNIFICANT CHANGE UP (ref 3.5–5.3)
POTASSIUM SERPL-SCNC: 4.4 MMOL/L — SIGNIFICANT CHANGE UP (ref 3.5–5.3)
PROT SERPL-MCNC: 3.4 GM/DL — LOW (ref 6–8.3)
PROT SERPL-MCNC: 4.1 GM/DL — LOW (ref 6–8.3)
PROTHROM AB SERPL-ACNC: 11.1 SEC — SIGNIFICANT CHANGE UP (ref 10.5–13.4)
RBC # BLD: 3.29 M/UL — LOW (ref 3.8–5.2)
RBC # BLD: 3.53 M/UL — LOW (ref 3.8–5.2)
RBC # FLD: 14.6 % — HIGH (ref 10.3–14.5)
RBC # FLD: 14.7 % — HIGH (ref 10.3–14.5)
SODIUM SERPL-SCNC: 135 MMOL/L — SIGNIFICANT CHANGE UP (ref 135–145)
SODIUM SERPL-SCNC: 140 MMOL/L — SIGNIFICANT CHANGE UP (ref 135–145)
WBC # BLD: 19.64 K/UL — HIGH (ref 3.8–10.5)
WBC # BLD: 21.8 K/UL — HIGH (ref 3.8–10.5)
WBC # FLD AUTO: 19.64 K/UL — HIGH (ref 3.8–10.5)
WBC # FLD AUTO: 21.8 K/UL — HIGH (ref 3.8–10.5)

## 2022-03-12 PROCEDURE — 74177 CT ABD & PELVIS W/CONTRAST: CPT | Mod: 26

## 2022-03-12 PROCEDURE — 99232 SBSQ HOSP IP/OBS MODERATE 35: CPT

## 2022-03-12 RX ORDER — SODIUM CHLORIDE 9 MG/ML
1000 INJECTION, SOLUTION INTRAVENOUS ONCE
Refills: 0 | Status: COMPLETED | OUTPATIENT
Start: 2022-03-12 | End: 2022-03-12

## 2022-03-12 RX ORDER — ELECTROLYTE SOLUTION,INJ
1 VIAL (ML) INTRAVENOUS
Refills: 0 | Status: DISCONTINUED | OUTPATIENT
Start: 2022-03-12 | End: 2022-03-12

## 2022-03-12 RX ORDER — SODIUM CHLORIDE 9 MG/ML
1000 INJECTION, SOLUTION INTRAVENOUS
Refills: 0 | Status: DISCONTINUED | OUTPATIENT
Start: 2022-03-12 | End: 2022-03-15

## 2022-03-12 RX ORDER — PIPERACILLIN AND TAZOBACTAM 4; .5 G/20ML; G/20ML
3.38 INJECTION, POWDER, LYOPHILIZED, FOR SOLUTION INTRAVENOUS ONCE
Refills: 0 | Status: COMPLETED | OUTPATIENT
Start: 2022-03-12 | End: 2022-03-12

## 2022-03-12 RX ORDER — I.V. FAT EMULSION 20 G/100ML
0.69 EMULSION INTRAVENOUS
Qty: 50 | Refills: 0 | Status: DISCONTINUED | OUTPATIENT
Start: 2022-03-12 | End: 2022-03-12

## 2022-03-12 RX ORDER — PIPERACILLIN AND TAZOBACTAM 4; .5 G/20ML; G/20ML
3.38 INJECTION, POWDER, LYOPHILIZED, FOR SOLUTION INTRAVENOUS EVERY 6 HOURS
Refills: 0 | Status: DISCONTINUED | OUTPATIENT
Start: 2022-03-12 | End: 2022-03-21

## 2022-03-12 RX ADMIN — ENOXAPARIN SODIUM 40 MILLIGRAM(S): 100 INJECTION SUBCUTANEOUS at 14:44

## 2022-03-12 RX ADMIN — Medication 2: at 08:30

## 2022-03-12 RX ADMIN — ONDANSETRON 4 MILLIGRAM(S): 8 TABLET, FILM COATED ORAL at 12:09

## 2022-03-12 RX ADMIN — I.V. FAT EMULSION 20.83 GM/KG/DAY: 20 EMULSION INTRAVENOUS at 23:33

## 2022-03-12 RX ADMIN — Medication 15 MILLIGRAM(S): at 13:09

## 2022-03-12 RX ADMIN — PIPERACILLIN AND TAZOBACTAM 25 GRAM(S): 4; .5 INJECTION, POWDER, LYOPHILIZED, FOR SOLUTION INTRAVENOUS at 17:33

## 2022-03-12 RX ADMIN — SODIUM CHLORIDE 1000 MILLILITER(S): 9 INJECTION, SOLUTION INTRAVENOUS at 01:15

## 2022-03-12 RX ADMIN — PIPERACILLIN AND TAZOBACTAM 200 GRAM(S): 4; .5 INJECTION, POWDER, LYOPHILIZED, FOR SOLUTION INTRAVENOUS at 16:49

## 2022-03-12 RX ADMIN — Medication 15 MILLIGRAM(S): at 06:37

## 2022-03-12 RX ADMIN — Medication 2: at 16:49

## 2022-03-12 RX ADMIN — OCTREOTIDE ACETATE 100 MICROGRAM(S): 200 INJECTION, SOLUTION INTRAVENOUS; SUBCUTANEOUS at 09:45

## 2022-03-12 RX ADMIN — OCTREOTIDE ACETATE 100 MICROGRAM(S): 200 INJECTION, SOLUTION INTRAVENOUS; SUBCUTANEOUS at 14:44

## 2022-03-12 RX ADMIN — OCTREOTIDE ACETATE 100 MICROGRAM(S): 200 INJECTION, SOLUTION INTRAVENOUS; SUBCUTANEOUS at 22:25

## 2022-03-12 RX ADMIN — SODIUM CHLORIDE 800 MILLILITER(S): 9 INJECTION, SOLUTION INTRAVENOUS at 01:16

## 2022-03-12 RX ADMIN — PANTOPRAZOLE SODIUM 40 MILLIGRAM(S): 20 TABLET, DELAYED RELEASE ORAL at 23:19

## 2022-03-12 RX ADMIN — OCTREOTIDE ACETATE 100 MICROGRAM(S): 200 INJECTION, SOLUTION INTRAVENOUS; SUBCUTANEOUS at 01:17

## 2022-03-12 RX ADMIN — PIPERACILLIN AND TAZOBACTAM 25 GRAM(S): 4; .5 INJECTION, POWDER, LYOPHILIZED, FOR SOLUTION INTRAVENOUS at 23:58

## 2022-03-12 RX ADMIN — Medication 15 MILLIGRAM(S): at 23:57

## 2022-03-12 RX ADMIN — Medication 15 MILLIGRAM(S): at 06:07

## 2022-03-12 RX ADMIN — PANTOPRAZOLE SODIUM 40 MILLIGRAM(S): 20 TABLET, DELAYED RELEASE ORAL at 09:45

## 2022-03-12 RX ADMIN — Medication 5 MILLIGRAM(S): at 15:25

## 2022-03-12 RX ADMIN — Medication 15 MILLIGRAM(S): at 12:09

## 2022-03-12 RX ADMIN — Medication 1 EACH: at 23:32

## 2022-03-12 NOTE — PHYSICAL THERAPY INITIAL EVALUATION ADULT - PRECAUTIONS/LIMITATIONS, REHAB EVAL
NPO, CT to WS, NGT to sx/fall precautions/surgical precautions NPO, CT/fall precautions/surgical precautions

## 2022-03-12 NOTE — CHART NOTE - NSCHARTNOTEFT_GEN_A_CORE
Called by radiologist to discuss CT abdomen pelvis, concerning for possible bowel perforation. Results below.   Called and updated surgical team about CT results. Spoke with surgical resident, who came by and evaluated the patient.   Preop labs ordered, drains adjusted, Surgery's belief is an anastomosis will form and no interventions will be needed   Plan to monitor the patient for any changes in HD or if she becomes profoundly septic, then will reassess for possible surgical intervention.     IMPRESSION:  1.  Extravasated contrast material seen predominantly in the RIGHT   anterior paracolic gutter and peritoneum consistent with bowel   perforation and intraperitoneal leak. Additional hyperdense collection is   seen in the anterior peritoneum just beneath the midline incision and   likely communicates with the right-sided collection.  2.  Multifocal deep pelvic collections with enhancing walls likely   representing interloop and pelvic abscesses.  3.  LEFTanterior paracolic collection with gas also likely abscess.   Additional intra-abdominal collections/abscesses.  4.  Pneumoperitoneum.  5.  Additional findings described above.  Critical findings were discussed with AMELIA Noel 3/12/2022 8:31 PM   by Dr. Johanne Lowe with read back confirmation.              JOHANNE LOWE MD; Attending Radiologist  This document has been electronically signed. Mar 12 2022  8:36PM    < end of copied text >

## 2022-03-12 NOTE — PROVIDER CONTACT NOTE (OTHER) - REASON
Pt requesting port access
Dr Heriberto Celaya (PCP)
, Serum glucose 255
TPN ordered but pt with no picc line yet, only port which has been used already for iv meds
hypotensive, low urine output
persistent hypotension and MAP lowering
stool leaking from prevena/incision

## 2022-03-12 NOTE — PROGRESS NOTE ADULT - ASSESSMENT
Impression  Metastatic gastroesophageal carcinoma with diffuse involvement of omentum  sp Numerous courses of palliative chemotherapy,Including taxanes      Now sp Surgical intervention/bypass for Bowel obstruction        Of concern is albumin of 1, Her nutritional status is terrible    Overall prognosis is grave as chemotherapeutic options are limited    Patient and her family have been somewhat unrealistic as to her prognosis    Plan  Postoperative management as per surgical oncology  Nutritional supports    I suggested palliative care consultation  Patient is  reluctant to address end-of-life issues  She does acknowledge that help with pain control and social aspects relating to moving forward for both her and her family May be problematic and agrees to palliative care consult

## 2022-03-12 NOTE — PROGRESS NOTE ADULT - SUBJECTIVE AND OBJECTIVE BOX
Patient seen in stepdown unit  In bed, Fatigued  Complains of abdominal pain, anorexia  Acknowledges she is said in reference to her general condition,Is not her usual upbeat self  Had flatus yesterday not today  Sipping some fluids    Lungs clear  Abdomen soft mildly tender no bowel sounds  Extremities without edema    Comprehensive Metabolic Panel in AM (03.12.22 @ 04:57)   Sodium, Serum: 140 mmol/L   Potassium, Serum: 4.4 mmol/L   Chloride, Serum: 113 mmol/L   Carbon Dioxide, Serum: 23 mmol/L   Anion Gap, Serum: 4 mmol/L   Blood Urea Nitrogen, Serum: 31 mg/dL   Creatinine, Serum: 0.61 mg/dL   Glucose, Serum: 143 mg/dL   Calcium, Total Serum: 7.2 mg/dL   Protein Total, Serum: 3.4 gm/dL   Albumin, Serum: 1.0 g/dL   Bilirubin Total, Serum: 0.4 mg/dL   Alkaline Phosphatase, Serum: 141 U/L   Aspartate Aminotransferase (AST/SGOT): 53 U/L   Aminotransferase (ALT/SGPT): 113 U/L       WBC Count: 19.64 K/uL   RBC Count: 3.29 M/uL   Hemoglobin: 10.7 g/dL   Hematocrit: 32.6 %   Mean Cell Volume: 99.1 fl   Mean Cell Hemoglobin: 32.5 pg   Mean Cell Hemoglobin Conc: 32.8 gm/dL   Red Cell Distrib Width: 14.6 %   Platelet Count - Automated: 129 K/uL

## 2022-03-12 NOTE — PROGRESS NOTE ADULT - SUBJECTIVE AND OBJECTIVE BOX
SURGERY DAILY PROGRESS NOTE:     Subjective:  Patient seen and examined at bedside during am rounds reports doing well. No new complaints at this time. Pt is on TPN. Overnight, patient hypotensive, bolused 1.5L, BP now within normal limits. AVSS. Denies any fevers, chills, n/v/ chest pain or shortness of breath. Endorses episode of flatus yesterday.     Vital Signs (24 Hrs):  T(C): 36.8 (03-12-22 @ 09:30), Max: 37.1 (03-11-22 @ 20:41)  HR: 104 (03-12-22 @ 10:00) (74 - 121)  BP: 114/67 (03-12-22 @ 10:00) (70/42 - 119/89)  RR: 19 (03-12-22 @ 10:00) (10 - 24)  SpO2: 94% (03-12-22 @ 10:00) (91% - 99%)  Wt(kg): --  Daily     Daily     I&O's Summary    11 Mar 2022 07:01  -  12 Mar 2022 07:00  --------------------------------------------------------  IN: 2772 mL / OUT: 1290 mL / NET: 1482 mL        PHYSICAL EXAM   Gen: well-appearing, in no acute distress  CV: pulse regularly present   Resp: airway patent, non-labored breathing  Abd: soft, non-tender, non-distended, dressing c/d/i  ext.  no cyanosis or edema        11 Mar 2022 07:01  -  12 Mar 2022 07:00  --------------------------------------------------------  IN:    dextrose 5% + sodium chloride 0.45% w/ Additives: 1058 mL    Fat Emulsion (Fish Oil &amp; Plant Based) 20% Infusion: 251 mL    IV PiggyBack: 550 mL    PPN (Peripheral Parenteral Nutrition): 913 mL  Total IN: 2772 mL    OUT:    Chest Tube (mL): 40 mL    Indwelling Catheter - Urethral (mL): 500 mL    Voided (mL): 750 mL  Total OUT: 1290 mL    Total NET: 1482 mL      .  LABS:                         10.7   19.64 )-----------( 129      ( 12 Mar 2022 04:57 )             32.6     03-12    140  |  113<H>  |  31<H>  ----------------------------<  143<H>  4.4   |  23  |  0.61    Ca    7.2<L>      12 Mar 2022 04:57  Phos  2.6     03-12  Mg     2.1     03-12    TPro  3.4<L>  /  Alb  1.0<L>  /  TBili  0.4  /  DBili  x   /  AST  53<H>  /  ALT  113<H>  /  AlkPhos  141<H>  03-12              RADIOLOGY, EKG & ADDITIONAL TESTS: Reviewed.

## 2022-03-12 NOTE — PROGRESS NOTE ADULT - ASSESSMENT
60 yo F with pmhx GEJ cancer s/p esophagectomy, [place] tumors s/p SBO presents with left sided pleural effusion   XR barium enema shows strictures in proximal and mid sigmoid colon  s/p palliative bypass    Plan:   - CLD  -cont TPN  -Pain control   -GI/DVT prop  -monitor bowel function  -hospitalist consult  -daily labs  -Encourage ambulation  -f/u CT surgery reccs-no pleaurx, possible remove pigtail prior to DC  -If hypotensive tonight, give colloid fluid    Plan discussed with Dr. Sahu

## 2022-03-12 NOTE — PHYSICAL THERAPY INITIAL EVALUATION ADULT - PERTINENT HX OF CURRENT PROBLEM, REHAB EVAL
presents with left sided pleural effusion and recurrent partial SBO. XR barium enema shows strictures in proximal and mid sigmoid colon. S/P palliative bypass 3/9. pigtail placed 3/4. pt on TPN.

## 2022-03-12 NOTE — PROGRESS NOTE ADULT - ASSESSMENT
A:    61F  HD # 10    Thoracic surgery involved for:    1. L sided pleural effusionm s/p pigtail    P:    Maintain pigtail to WS throughout weekend  Monitor output  Interval CXR on M 3/14    Dispo: Cont care per primary team. d/c pigtail prior to d/c home.

## 2022-03-12 NOTE — PHYSICAL THERAPY INITIAL EVALUATION ADULT - GENERAL OBSERVATIONS, REHAB EVAL
pt rec'd supine in bed on SDU, CT, IV, TPN, SCds, PCA, monitors, pervena, VSS. pt c/o some inc lower abdom pain-pt notifying hospitalist, pt requesting bedpan-PT enc'd oob to commode but pt declines

## 2022-03-12 NOTE — PHYSICAL THERAPY INITIAL EVALUATION ADULT - DID THE PATIENT HAVE SURGERY?
s/p Bypass ileum, small bowel to small bowel bypass, Closure enterotomy small intestine and Open enterolysis./yes

## 2022-03-12 NOTE — PROGRESS NOTE ADULT - SUBJECTIVE AND OBJECTIVE BOX
HPI: 61 y.o. female with Gastroesophageal cancer with omental metastases with recurrent SBO with stricture in proximal and mid-sgmoid colon s/p palliative bypass surgery, left pleural effusion s/p thoracocentesis with left pigtail in place    INTERVAL HPI: + abdominal pain, minimal flatus, + belching, persistent hypotension, episodes of diaphoresis  Other ROS reviewed and neg     Vital Signs Last 24 Hrs  T(C): 36.8 (12 Mar 2022 09:30), Max: 37.1 (11 Mar 2022 20:41)  T(F): 98.3 (12 Mar 2022 09:30), Max: 98.8 (11 Mar 2022 20:41)  HR: 85 (12 Mar 2022 12:18) (76 - 121)  BP: 137/96 (12 Mar 2022 12:18) (70/42 - 137/96)  BP(mean): 110 (12 Mar 2022 12:18) (45 - 110)  RR: 14 (12 Mar 2022 12:18) (10 - 24)  SpO2: 94% (12 Mar 2022 12:18) (91% - 99%)  I&O's Detail    11 Mar 2022 07:01  -  12 Mar 2022 07:00  --------------------------------------------------------  IN:    dextrose 5% + sodium chloride 0.45% w/ Additives: 1058 mL    Fat Emulsion (Fish Oil &amp; Plant Based) 20% Infusion: 251 mL    IV PiggyBack: 550 mL    PPN (Peripheral Parenteral Nutrition): 913 mL  Total IN: 2772 mL    OUT:    Chest Tube (mL): 40 mL    Indwelling Catheter - Urethral (mL): 500 mL    Voided (mL): 750 mL  Total OUT: 1290 mL    Total NET: 1482 mL                       10.7   19.64 )-----------( 129      ( 12 Mar 2022 04:57 )             32.6     12 Mar 2022 04:57    140    |  113    |  31     ----------------------------<  143    4.4     |  23     |  0.61     Ca    7.2        12 Mar 2022 04:57  Phos  2.6       12 Mar 2022 04:57  Mg     2.1       12 Mar 2022 04:57    TPro  3.4    /  Alb  1.0    /  TBili  0.4    /  DBili  x      /  AST  53     /  ALT  113    /  AlkPhos  141    12 Mar 2022 04:57    CAPILLARY BLOOD GLUCOSE    POCT Blood Glucose.: 148 mg/dL (12 Mar 2022 12:01)  POCT Blood Glucose.: 180 mg/dL (12 Mar 2022 08:04)  POCT Blood Glucose.: 86 mg/dL (11 Mar 2022 18:55)    LIVER FUNCTIONS - ( 12 Mar 2022 04:57 )  Alb: 1.0 g/dL / Pro: 3.4 gm/dL / ALK PHOS: 141 U/L / ALT: 113 U/L / AST: 53 U/L / GGT: x           MEDICATIONS  (STANDING):  dextrose 40% Gel 15 Gram(s) Oral once  dextrose 5%. 1000 milliLiter(s) (100 mL/Hr) IV Continuous <Continuous>  dextrose 5%. 1000 milliLiter(s) (50 mL/Hr) IV Continuous <Continuous>  dextrose 50% Injectable 25 Gram(s) IV Push once  dextrose 50% Injectable 12.5 Gram(s) IV Push once  dextrose 50% Injectable 25 Gram(s) IV Push once  enoxaparin Injectable 40 milliGRAM(s) SubCutaneous every 24 hours  fat emulsion (Fish Oil and Plant Based) 20% Infusion 0.68 Gm/kG/Day (20.83 mL/Hr) IV Continuous <Continuous>  fat emulsion (Fish Oil and Plant Based) 20% Infusion 0.69 Gm/kG/Day (20.83 mL/Hr) IV Continuous <Continuous>  glucagon  Injectable 1 milliGRAM(s) IntraMuscular once  HYDROmorphone PCA (1 mG/mL) 30 milliLiter(s) PCA Continuous PCA Continuous  insulin lispro (ADMELOG) corrective regimen sliding scale   SubCutaneous three times a day before meals  ketorolac   Injectable 15 milliGRAM(s) IV Push every 6 hours  octreotide  Injectable 100 MICROGram(s) IV Push every 8 hours  pantoprazole  Injectable 40 milliGRAM(s) IV Push every 12 hours  Parenteral Nutrition - Adult 1 Each (83 mL/Hr) TPN Continuous <Continuous>  Parenteral Nutrition - Adult 1 Each (83 mL/Hr) TPN Continuous <Continuous>    MEDICATIONS  (PRN):  acetaminophen   IVPB .. 1000 milliGRAM(s) IV Intermittent every 6 hours PRN Mild Pain (1 - 3)  ALBUTerol    90 MICROgram(s) HFA Inhaler 2 Puff(s) Inhalation every 6 hours PRN Shortness of Breath  HYDROmorphone PCA (1 mG/mL) Rescue Clinician Bolus 0.5 milliGRAM(s) IV Push every 15 minutes PRN for Pain Scale GREATER THAN 6  metoprolol tartrate Injectable 5 milliGRAM(s) IV Push every 6 hours PRN SBP > 140  naloxone Injectable 0.1 milliGRAM(s) IV Push every 3 minutes PRN For ANY of the following changes in patient status:  A. RR LESS THAN 10 breaths per minute, B. Oxygen saturation LESS THAN 90%, C. Sedation score of 6  naloxone Injectable 0.1 milliGRAM(s) IV Push every 3 minutes PRN For ANY of the following changes in patient status:  A. RR LESS THAN 10 breaths per minute, B. Oxygen saturation LESS THAN 90%, C. Sedation score of 6  ondansetron Injectable 4 milliGRAM(s) IV Push every 6 hours PRN Nausea  prochlorperazine   Injectable 5 milliGRAM(s) IV Push every 6 hours PRN nausea and/or vomiting    RADIOLOGY: personally visualized    All available medical records personally reviewed    PE:  Constitutional: frail female in mild distress with abdominal pain  HEENT: PERR, EOMI, dry mucosals  Neck: Soft and supple,  No JVD  Respiratory: decreased BS at left base with left chest tube to LWS   Cardiovascular: S1, S2 reg  Gastrointestinal: soft distended with postp area C/D/I  Extremities: No peripheral edema  Vascular: 2+ peripheral pulses  Neurological: A/O to person place circumstance, no focal deficits, followed all simple commands consistently, moving all extremities   Musculoskeletal: 5/5 strength b/l upper and lower extremities

## 2022-03-12 NOTE — PROGRESS NOTE ADULT - SUBJECTIVE AND OBJECTIVE BOX
ICU Progress Note    HPI:    S:    Pt seen and examined  HD # 10  PMHx pmhx GEJ cancer s/p esophagectomy, [place] tumors s/p SBO presents with left sided pleural effusion   XR barium enema shows strictures in proximal and mid sigmoid colon  s/p palliative bypass  Pigtail in L chest for pleural effusion    3/12 AM: Pigtail to WS, minimal output, no AL. On TPN.    ROS: + pain, well controlled with PCA  Remainder of systems reviewed, negative    Allergies    latex (Unknown)  sulfa drugs (Unknown)    Intolerances    MEDICATIONS  (STANDING):    dextrose 40% Gel 15 Gram(s) Oral once  dextrose 5%. 1000 milliLiter(s) (50 mL/Hr) IV Continuous <Continuous>  dextrose 5%. 1000 milliLiter(s) (100 mL/Hr) IV Continuous <Continuous>  dextrose 50% Injectable 25 Gram(s) IV Push once  dextrose 50% Injectable 12.5 Gram(s) IV Push once  dextrose 50% Injectable 25 Gram(s) IV Push once  enoxaparin Injectable 40 milliGRAM(s) SubCutaneous every 24 hours  fat emulsion (Fish Oil and Plant Based) 20% Infusion 0.68 Gm/kG/Day (20.83 mL/Hr) IV Continuous <Continuous>  glucagon  Injectable 1 milliGRAM(s) IntraMuscular once  HYDROmorphone PCA (1 mG/mL) 30 milliLiter(s) PCA Continuous PCA Continuous  insulin lispro (ADMELOG) corrective regimen sliding scale   SubCutaneous three times a day before meals  ketorolac   Injectable 15 milliGRAM(s) IV Push every 6 hours  octreotide  Injectable 100 MICROGram(s) IV Push every 8 hours  pantoprazole  Injectable 40 milliGRAM(s) IV Push every 12 hours  Parenteral Nutrition - Adult 1 Each (83 mL/Hr) TPN Continuous <Continuous>    MEDICATIONS  (PRN):    acetaminophen   IVPB .. 1000 milliGRAM(s) IV Intermittent every 6 hours PRN Mild Pain (1 - 3)  ALBUTerol    90 MICROgram(s) HFA Inhaler 2 Puff(s) Inhalation every 6 hours PRN Shortness of Breath  HYDROmorphone PCA (1 mG/mL) Rescue Clinician Bolus 0.5 milliGRAM(s) IV Push every 15 minutes PRN for Pain Scale GREATER THAN 6  metoprolol tartrate Injectable 5 milliGRAM(s) IV Push every 6 hours PRN SBP > 140  naloxone Injectable 0.1 milliGRAM(s) IV Push every 3 minutes PRN For ANY of the following changes in patient status:  A. RR LESS THAN 10 breaths per minute, B. Oxygen saturation LESS THAN 90%, C. Sedation score of 6  naloxone Injectable 0.1 milliGRAM(s) IV Push every 3 minutes PRN For ANY of the following changes in patient status:  A. RR LESS THAN 10 breaths per minute, B. Oxygen saturation LESS THAN 90%, C. Sedation score of 6  ondansetron Injectable 4 milliGRAM(s) IV Push every 6 hours PRN Nausea  prochlorperazine   Injectable 5 milliGRAM(s) IV Push every 6 hours PRN nausea and/or vomiting    Drug Dosing Weight    Height (cm): 167.6 (03 Mar 2022 22:52)  Weight (kg): 73.1 (03 Mar 2022 22:52)  BMI (kg/m2): 26 (03 Mar 2022 22:52)  BSA (m2): 1.82 (03 Mar 2022 22:52)    PAST MEDICAL & SURGICAL HISTORY:  Essential hypertension    Gastritis    HLD (hyperlipidemia)    GERD (gastroesophageal reflux disease)    Intracranial shunt    S/P cholecystectomy    History of esophageal surgery      FAMILY HISTORY:    Family history of gastric cancer (Father, Grandparent)    Family history of colon cancer    ROS: See HPI; otherwise, all systems reviewed and negative.    O:    ICU Vital Signs Last 24 Hrs  T(C): 36.7 (12 Mar 2022 06:35), Max: 37.1 (11 Mar 2022 20:41)  T(F): 98 (12 Mar 2022 06:35), Max: 98.8 (11 Mar 2022 20:41)  HR: 83 (12 Mar 2022 06:00) (74 - 121)  BP: 83/45 (12 Mar 2022 06:00) (70/42 - 131/62)  BP(mean): 57 (12 Mar 2022 06:00) (45 - 99)  ABP: --  ABP(mean): --  RR: 12 (12 Mar 2022 06:00) (10 - 24)  SpO2: 92% (12 Mar 2022 06:00) (91% - 99%)    I&O's Detail    11 Mar 2022 07:01  -  12 Mar 2022 07:00  --------------------------------------------------------  IN:    dextrose 5% + sodium chloride 0.45% w/ Additives: 1058 mL    Fat Emulsion (Fish Oil &amp; Plant Based) 20% Infusion: 251 mL    IV PiggyBack: 550 mL    PPN (Peripheral Parenteral Nutrition): 913 mL  Total IN: 2772 mL    OUT:    Chest Tube (mL): 40 mL    Indwelling Catheter - Urethral (mL): 500 mL    Voided (mL): 750 mL  Total OUT: 1290 mL    Total NET: 1482 mL    PE:    Chronically ill F lying in bed  No JVD trachea midline  S1S2+  CTA B/L  1+ anasarca  + RUE PICC  + L sided pigtail to WS  Awake and alert    LABS:    CBC Full  -  ( 12 Mar 2022 04:57 )  WBC Count : 19.64 K/uL  RBC Count : 3.29 M/uL  Hemoglobin : 10.7 g/dL  Hematocrit : 32.6 %  Platelet Count - Automated : 129 K/uL  Mean Cell Volume : 99.1 fl  Mean Cell Hemoglobin : 32.5 pg  Mean Cell Hemoglobin Concentration : 32.8 gm/dL  Auto Neutrophil # : x  Auto Lymphocyte # : x  Auto Monocyte # : x  Auto Eosinophil # : x  Auto Basophil # : x  Auto Neutrophil % : x  Auto Lymphocyte % : x  Auto Monocyte % : x  Auto Eosinophil % : x  Auto Basophil % : x    03-12    140  |  113<H>  |  31<H>  ----------------------------<  143<H>  4.4   |  23  |  0.61    Ca    7.2<L>      12 Mar 2022 04:57  Phos  2.6     03-12  Mg     2.1     03-12    TPro  3.4<L>  /  Alb  1.0<L>  /  TBili  0.4  /  DBili  x   /  AST  53<H>  /  ALT  113<H>  /  AlkPhos  141<H>  03-12        CAPILLARY BLOOD GLUCOSE      POCT Blood Glucose.: 180 mg/dL (12 Mar 2022 08:04)  POCT Blood Glucose.: 86 mg/dL (11 Mar 2022 18:55)  POCT Blood Glucose.: 148 mg/dL (11 Mar 2022 09:20)        LIVER FUNCTIONS - ( 12 Mar 2022 04:57 )  Alb: 1.0 g/dL / Pro: 3.4 gm/dL / ALK PHOS: 141 U/L / ALT: 113 U/L / AST: 53 U/L / GGT: x

## 2022-03-12 NOTE — PROGRESS NOTE ADULT - ASSESSMENT
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   Shunt no longer goes to the peritoneum but to the spine and has been problem-free for many years     Recurrent left pleural effusion s/p thoracocentesis - CT in place, monitor output - if high may need pleurex, otherwise removal    VTE Px - LMWH    Severe protein calorie malnutrition - TPN    Hypotension due to all above in the settings of severe hypoalbuminemia resulting in decreased oncotic pressures and suspected intravascular depletion - would recommend maintenance fluids via IV    discussed with Sx and CTSx, pt and SICU team    Time spent 67 min

## 2022-03-12 NOTE — CHART NOTE - NSCHARTNOTEFT_GEN_A_CORE
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      3/7: TPN not infused overnight since implanted port was used for antibiotics, now pending PICC line for TPN.  will order PPN for today (3/7).  3/8: PPN is being infused through implanted port as approved by MD, will change PPN to TPN today.  Per pt, pending surgery tomorrow to remove obstruction.      *current status: TPN infusing through implanted port as approved by MD. s/p Bypass, ileum, small bowel to small bowel bypass Closure, enterotomy, small intestine and Open enterolysis on 3/9. Minimal output of 40ml from chest tube (Thoracic following). As per Palliative note on 3/11-Pt refused Pleurx and therefore will likely dc prior to discharge home.     *labs reviewed; Will increase Dextrose 35 gm to goal rate of 285gm, however will leave insulin as ordered at 18 units. Will continue to monitor and adjust tomorrow if needed. Potassium WDL-will maintain current potassium (55mEq). Phosphorus on lower range of normal will increase potassium phosphate 5 mmol.     03-12    140  |  113<H>  |  31<H>  ----------------------------<  143<H>  4.4   |  23  |  0.61    Ca    7.2<L>      12 Mar 2022 04:57  Phos  2.6     03-12  Mg     2.1     03-12    TPro  3.4<L>  /  Alb  1.0<L>  /  TBili  0.4  /  DBili  x   /  AST  53<H>  /  ALT  113<H>  /  AlkPhos  141<H>  03-12  BMI: BMI (kg/m2): 26 (03-03-22 @ 22:52)  HbA1c: A1C with Estimated Average Glucose Result: 6.3 % (03-10-22 @ 06:22)    Glucose: POCT Blood Glucose.: 180 mg/dL (03-12-22 @ 08:04)    BP: 119/56 (03-12-22 @ 08:00) (70/42 - 131/62)  Lipid Panel: Date/Time: 03-09-22 @ 01:25  Cholesterol, Serum: --  Direct LDL: --  HDL Cholesterol, Serum: --  Total Cholesterol/HDL Ration Measurement: --  Triglycerides, Serum: 207  CAPILLARY BLOOD GLUCOSE      POCT Blood Glucose.: 180 mg/dL (12 Mar 2022 08:04)  POCT Blood Glucose.: 86 mg/dL (11 Mar 2022 18:55)  POCT Blood Glucose.: 148 mg/dL (11 Mar 2022 09:20)      *pertinent meds: LR IVF, protonix, octreotide, zofran, admelog (received 2 units in last 24 hrs), metoprolol, acetaminophen, prochlorperazine      *I&O's Detail    11 Mar 2022 07:01  -  12 Mar 2022 07:00  --------------------------------------------------------  IN:    dextrose 5% + sodium chloride 0.45% w/ Additives: 1058 mL    Fat Emulsion (Fish Oil &amp; Plant Based) 20% Infusion: 251 mL    IV PiggyBack: 550 mL    PPN (Peripheral Parenteral Nutrition): 913 mL  Total IN: 2772 mL    OUT:    Chest Tube (mL): 40 mL    Indwelling Catheter - Urethral (mL): 500 mL    Voided (mL): 750 mL  Total OUT: 1290 mL    Total NET: 1482 mL          *positive fluid status  *BM (+) on 3/8 (x 3 days ago) (+) flatus.  pt not on bowel regimen.  *Currently NPO    *Gurpreet Score of 16; no PU documented. (+1) L/ R arm; (+2) L/R leg edema documented.    *severe malnutrition in acute on chronic illness r/t decreased PO intake 2/2 CA and altered GI function AEB meeting <50% of ENN x5 dys and mild muscle/fat wasting, 4.5% wt loss x 5 days    ESTIMATED NUTR NEEDS: based on 73Kg admit wt  1825-2190Kcal (25-30Kcal/Kg)  110-146g protein (1.5-2 g/Kg)  1825-2190mL (25-30mL/Kg)      Weight History: No new weights obtained   69.8Kg (3/8) wt loss of 3.3Kg in 5 days (4.5%), clinically significant.  Height (cm): 167.6 (03-03-22 @ 22:52)  Weight (kg): 73.1 (03-03-22 @ 22:52)  BMI (kg/m2): 26 (03-03-22 @ 22:52)  BSA (m2): 1.82 (03-03-22 @ 22:52)  IBW: 59Kg      TPN Recommendations: via implanted port    Total Volume: 2000 mL  110 g  Amino Acids  285 g Dextrose   50 g Lipids 20%  143 mEq Sodium Chloride  0 mEq Sodium Acetate  20 mmol Sodium Phosphate  30 mEq Potassium Chloride  3 mEq Potassium Acetate  15 mmol Potassium Phosphate  0 mEq Calcium Gluconate  8 mEq Magnesium Sulfate  100 mg Thiamine  18 units Regular Insulin  1 ml Trace Elements   10 ml MVI    Total Calories  1909 (Meets  100%  of  Estimated Energy needs  and    100 %  Protein needs)    Additional Recommendations:  1) Daily weights  2) Strict I & O's  3) Daily lyte checks  4) Weekly triglycerides/LFT checks  5) POCT q6hrs; maintain POCT of 140-180mg/dL; add sliding scale insulin for additional coverage prn  6) add calcium gluconate 10 mEq outside of PN bag to ensure patient doesn't go low in calcium  7) Consider supplementing vitamin D 1000 units daily    *will monitor and adjust treatement plan prn*  Angela Toledo RD office: [phone]   Cell# [phone]

## 2022-03-13 LAB
ANION GAP SERPL CALC-SCNC: 4 MMOL/L — LOW (ref 5–17)
BUN SERPL-MCNC: 37 MG/DL — HIGH (ref 7–23)
CALCIUM SERPL-MCNC: 7.9 MG/DL — LOW (ref 8.5–10.1)
CHLORIDE SERPL-SCNC: 111 MMOL/L — HIGH (ref 96–108)
CO2 SERPL-SCNC: 21 MMOL/L — LOW (ref 22–31)
CREAT SERPL-MCNC: 0.58 MG/DL — SIGNIFICANT CHANGE UP (ref 0.5–1.3)
EGFR: 102 ML/MIN/1.73M2 — SIGNIFICANT CHANGE UP
GLUCOSE SERPL-MCNC: 167 MG/DL — HIGH (ref 70–99)
HCT VFR BLD CALC: 32.8 % — LOW (ref 34.5–45)
HGB BLD-MCNC: 10.6 G/DL — LOW (ref 11.5–15.5)
LACTATE SERPL-SCNC: 1.5 MMOL/L — SIGNIFICANT CHANGE UP (ref 0.7–2)
MAGNESIUM SERPL-MCNC: 2.3 MG/DL — SIGNIFICANT CHANGE UP (ref 1.6–2.6)
MCHC RBC-ENTMCNC: 32.3 GM/DL — SIGNIFICANT CHANGE UP (ref 32–36)
MCHC RBC-ENTMCNC: 32.6 PG — SIGNIFICANT CHANGE UP (ref 27–34)
MCV RBC AUTO: 100.9 FL — HIGH (ref 80–100)
PHOSPHATE SERPL-MCNC: 3.5 MG/DL — SIGNIFICANT CHANGE UP (ref 2.5–4.5)
PLATELET # BLD AUTO: 129 K/UL — LOW (ref 150–400)
POTASSIUM SERPL-MCNC: 4.2 MMOL/L — SIGNIFICANT CHANGE UP (ref 3.5–5.3)
POTASSIUM SERPL-SCNC: 4.2 MMOL/L — SIGNIFICANT CHANGE UP (ref 3.5–5.3)
RBC # BLD: 3.25 M/UL — LOW (ref 3.8–5.2)
RBC # FLD: 14.8 % — HIGH (ref 10.3–14.5)
SODIUM SERPL-SCNC: 136 MMOL/L — SIGNIFICANT CHANGE UP (ref 135–145)
WBC # BLD: 17.72 K/UL — HIGH (ref 3.8–10.5)
WBC # FLD AUTO: 17.72 K/UL — HIGH (ref 3.8–10.5)

## 2022-03-13 PROCEDURE — 99233 SBSQ HOSP IP/OBS HIGH 50: CPT

## 2022-03-13 RX ORDER — ALBUMIN HUMAN 25 %
100 VIAL (ML) INTRAVENOUS ONCE
Refills: 0 | Status: COMPLETED | OUTPATIENT
Start: 2022-03-13 | End: 2022-03-13

## 2022-03-13 RX ORDER — I.V. FAT EMULSION 20 G/100ML
0.69 EMULSION INTRAVENOUS
Qty: 50 | Refills: 0 | Status: DISCONTINUED | OUTPATIENT
Start: 2022-03-13 | End: 2022-03-13

## 2022-03-13 RX ORDER — ELECTROLYTE SOLUTION,INJ
1 VIAL (ML) INTRAVENOUS
Refills: 0 | Status: DISCONTINUED | OUTPATIENT
Start: 2022-03-13 | End: 2022-03-13

## 2022-03-13 RX ADMIN — I.V. FAT EMULSION 20.83 GM/KG/DAY: 20 EMULSION INTRAVENOUS at 22:07

## 2022-03-13 RX ADMIN — Medication 15 MILLIGRAM(S): at 12:50

## 2022-03-13 RX ADMIN — Medication 15 MILLIGRAM(S): at 20:00

## 2022-03-13 RX ADMIN — Medication 1 EACH: at 22:07

## 2022-03-13 RX ADMIN — OCTREOTIDE ACETATE 100 MICROGRAM(S): 200 INJECTION, SOLUTION INTRAVENOUS; SUBCUTANEOUS at 06:08

## 2022-03-13 RX ADMIN — ONDANSETRON 4 MILLIGRAM(S): 8 TABLET, FILM COATED ORAL at 14:03

## 2022-03-13 RX ADMIN — Medication 15 MILLIGRAM(S): at 19:01

## 2022-03-13 RX ADMIN — ENOXAPARIN SODIUM 40 MILLIGRAM(S): 100 INJECTION SUBCUTANEOUS at 14:04

## 2022-03-13 RX ADMIN — PIPERACILLIN AND TAZOBACTAM 25 GRAM(S): 4; .5 INJECTION, POWDER, LYOPHILIZED, FOR SOLUTION INTRAVENOUS at 06:09

## 2022-03-13 RX ADMIN — Medication 15 MILLIGRAM(S): at 06:08

## 2022-03-13 RX ADMIN — Medication 15 MILLIGRAM(S): at 12:35

## 2022-03-13 RX ADMIN — Medication 15 MILLIGRAM(S): at 00:31

## 2022-03-13 RX ADMIN — OCTREOTIDE ACETATE 100 MICROGRAM(S): 200 INJECTION, SOLUTION INTRAVENOUS; SUBCUTANEOUS at 14:56

## 2022-03-13 RX ADMIN — PANTOPRAZOLE SODIUM 40 MILLIGRAM(S): 20 TABLET, DELAYED RELEASE ORAL at 21:41

## 2022-03-13 RX ADMIN — Medication 50 MILLILITER(S): at 04:50

## 2022-03-13 RX ADMIN — PIPERACILLIN AND TAZOBACTAM 25 GRAM(S): 4; .5 INJECTION, POWDER, LYOPHILIZED, FOR SOLUTION INTRAVENOUS at 19:01

## 2022-03-13 RX ADMIN — PIPERACILLIN AND TAZOBACTAM 25 GRAM(S): 4; .5 INJECTION, POWDER, LYOPHILIZED, FOR SOLUTION INTRAVENOUS at 12:35

## 2022-03-13 RX ADMIN — Medication 15 MILLIGRAM(S): at 06:55

## 2022-03-13 RX ADMIN — PANTOPRAZOLE SODIUM 40 MILLIGRAM(S): 20 TABLET, DELAYED RELEASE ORAL at 09:43

## 2022-03-13 RX ADMIN — OCTREOTIDE ACETATE 100 MICROGRAM(S): 200 INJECTION, SOLUTION INTRAVENOUS; SUBCUTANEOUS at 21:42

## 2022-03-13 NOTE — PROGRESS NOTE ADULT - SUBJECTIVE AND OBJECTIVE BOX
Pt. seen and examined at bedside this morning. Patient reports pain is well-controlled, she feels better overall. She denies fever, chest pain, SOB, nausea, vomiting. Not passing or having BMs. She had leakage of stool from her incision site 3/12. Patient was also hypotensive for which she received albumin. A dressing was placed below the site of leakage from the umbilicus with an ostomy bag at the leakage site.     Vitals:  T(C): 36.6 ( @ 21:56), Max: 36.8 ( @ 09:30)  HR: 84 ( @ 06:00) (77 - 104)  BP: 93/45 ( @ 06:00) (78/61 - 137/96)  RR: 14 ( @ :00) (9 - 19)  SpO2: 98% ( @ 06:00) (93% - 100%)     @ 06:01  -   @ 07:00  --------------------------------------------------------  IN:  Total IN: 0 mL    OUT:    Chest Tube (mL): 480 mL    Drain (mL): 1300 mL    Voided (mL): 1050 mL  Total OUT: 2830 mL    Total NET: -2830 mL       @ 07:01  -   @ 08:51  --------------------------------------------------------  IN:  Total IN: 0 mL    OUT:    Stool (mL): 100 mL  Total OUT: 100 mL    Total NET: -100 mL          Physical Exam:  General: AAOx3, Well developed, NAD  Chest: Normal respiratory effort  Heart: RRR  Abdomen: dressing is clean, abd soft, nondistended, nontender  Neuro/Psych: No localized deficits. Normal speech, normal tone  Skin: Normal, no rashes, no lesions noted.   Extremities: Warm, well perfused, no edema, Pulses intact     @ 03:49                    10.6  CBC: 17.72>)-------(<129                     32.8                 136 | 111 | 37    CMP:  ----------------------< 167               4.2 | 21 | 0.58                      Ca:7.9  Phos:3.5  M.3               -|      |-        LFTs:  ------|-|-----             -|      |-   @ 22:14                    11.5  CBC: 21.80>)-------(<144                     34.7                 135 | 110 | 35    CMP:  ----------------------< 158               4.1 | 21 | 0.59                      Ca:8.0  Phos:-  Mg:-               0.5|      |53        LFTs:  ------|143|-----             -|      |-   @ 04:57                    10.7  CBC: 19.64>)-------(<129                     32.6                 140 | 113 | 31    CMP:  ----------------------< 143               4.4 | 23 | 0.61                      Ca:7.2  Phos:2.6  M.1               0.4|      |53        LFTs:  ------|141|-----             -|      |-      Current Inpatient Medications:  acetaminophen   IVPB .. 1000 milliGRAM(s) IV Intermittent every 6 hours PRN  ALBUTerol    90 MICROgram(s) HFA Inhaler 2 Puff(s) Inhalation every 6 hours PRN  dextrose 40% Gel 15 Gram(s) Oral once  dextrose 5%. 1000 milliLiter(s) (50 mL/Hr) IV Continuous <Continuous>  dextrose 5%. 1000 milliLiter(s) (100 mL/Hr) IV Continuous <Continuous>  dextrose 50% Injectable 25 Gram(s) IV Push once  dextrose 50% Injectable 12.5 Gram(s) IV Push once  dextrose 50% Injectable 25 Gram(s) IV Push once  enoxaparin Injectable 40 milliGRAM(s) SubCutaneous every 24 hours  fat emulsion (Fish Oil and Plant Based) 20% Infusion 0.69 Gm/kG/Day (21 mL/Hr) IV Continuous <Continuous>  fat emulsion (Fish Oil and Plant Based) 20% Infusion 0.69 Gm/kG/Day (20.83 mL/Hr) IV Continuous <Continuous>  glucagon  Injectable 1 milliGRAM(s) IntraMuscular once  HYDROmorphone PCA (1 mG/mL) 30 milliLiter(s) PCA Continuous PCA Continuous  HYDROmorphone PCA (1 mG/mL) Rescue Clinician Bolus 0.5 milliGRAM(s) IV Push every 15 minutes PRN  insulin lispro (ADMELOG) corrective regimen sliding scale   SubCutaneous three times a day before meals  ketorolac   Injectable 15 milliGRAM(s) IV Push every 6 hours  lactated ringers. 1000 milliLiter(s) (120 mL/Hr) IV Continuous <Continuous>  metoprolol tartrate Injectable 5 milliGRAM(s) IV Push every 6 hours PRN  naloxone Injectable 0.1 milliGRAM(s) IV Push every 3 minutes PRN  naloxone Injectable 0.1 milliGRAM(s) IV Push every 3 minutes PRN  octreotide  Injectable 100 MICROGram(s) IV Push every 8 hours  ondansetron Injectable 4 milliGRAM(s) IV Push every 6 hours PRN  pantoprazole  Injectable 40 milliGRAM(s) IV Push every 12 hours  Parenteral Nutrition - Adult 1 Each (83 mL/Hr) TPN Continuous <Continuous>  Parenteral Nutrition - Adult 1 Each (83 mL/Hr) TPN Continuous <Continuous>  piperacillin/tazobactam IVPB.. 3.375 Gram(s) IV Intermittent every 6 hours  prochlorperazine   Injectable 5 milliGRAM(s) IV Push every 6 hours PRN

## 2022-03-13 NOTE — PROGRESS NOTE ADULT - SUBJECTIVE AND OBJECTIVE BOX
CTS Progress Note    HPI:    S:    Pt seen and examined  HD # 11  PMHx pmhx GEJ cancer s/p esophagectomy, [place] tumors s/p SBO presents with left sided pleural effusion   XR barium enema shows strictures in proximal and mid sigmoid colon  s/p palliative bypass  Pigtail in L chest for pleural effusion    3/12 AM: Pigtail to WS, minimal output, no AL. On TPN.  3/13 AM: Enteric contents coming from wound yesterday; wound vac removed, gauze in place; operative plan per surgery. CT ~ 100cc serous drainage, no AL on WS.    ROS: + pain, well controlled with PCA  Remainder of systems reviewed, negative      Allergies    latex (Unknown)  sulfa drugs (Unknown)    Intolerances        MEDICATIONS  (STANDING):  dextrose 40% Gel 15 Gram(s) Oral once  dextrose 5%. 1000 milliLiter(s) (50 mL/Hr) IV Continuous <Continuous>  dextrose 5%. 1000 milliLiter(s) (100 mL/Hr) IV Continuous <Continuous>  dextrose 50% Injectable 25 Gram(s) IV Push once  dextrose 50% Injectable 12.5 Gram(s) IV Push once  dextrose 50% Injectable 25 Gram(s) IV Push once  enoxaparin Injectable 40 milliGRAM(s) SubCutaneous every 24 hours  fat emulsion (Fish Oil and Plant Based) 20% Infusion 0.69 Gm/kG/Day (20.83 mL/Hr) IV Continuous <Continuous>  fat emulsion (Fish Oil and Plant Based) 20% Infusion 0.69 Gm/kG/Day (20.83 mL/Hr) IV Continuous <Continuous>  glucagon  Injectable 1 milliGRAM(s) IntraMuscular once  HYDROmorphone PCA (1 mG/mL) 30 milliLiter(s) PCA Continuous PCA Continuous  insulin lispro (ADMELOG) corrective regimen sliding scale   SubCutaneous three times a day before meals  ketorolac   Injectable 15 milliGRAM(s) IV Push every 6 hours  lactated ringers. 1000 milliLiter(s) (120 mL/Hr) IV Continuous <Continuous>  octreotide  Injectable 100 MICROGram(s) IV Push every 8 hours  pantoprazole  Injectable 40 milliGRAM(s) IV Push every 12 hours  Parenteral Nutrition - Adult 1 Each (83 mL/Hr) TPN Continuous <Continuous>  Parenteral Nutrition - Adult 1 Each (83 mL/Hr) TPN Continuous <Continuous>  piperacillin/tazobactam IVPB.. 3.375 Gram(s) IV Intermittent every 6 hours    MEDICATIONS  (PRN):    acetaminophen   IVPB .. 1000 milliGRAM(s) IV Intermittent every 6 hours PRN Mild Pain (1 - 3)  ALBUTerol    90 MICROgram(s) HFA Inhaler 2 Puff(s) Inhalation every 6 hours PRN Shortness of Breath  HYDROmorphone PCA (1 mG/mL) Rescue Clinician Bolus 0.5 milliGRAM(s) IV Push every 15 minutes PRN for Pain Scale GREATER THAN 6  metoprolol tartrate Injectable 5 milliGRAM(s) IV Push every 6 hours PRN SBP > 140  naloxone Injectable 0.1 milliGRAM(s) IV Push every 3 minutes PRN For ANY of the following changes in patient status:  A. RR LESS THAN 10 breaths per minute, B. Oxygen saturation LESS THAN 90%, C. Sedation score of 6  naloxone Injectable 0.1 milliGRAM(s) IV Push every 3 minutes PRN For ANY of the following changes in patient status:  A. RR LESS THAN 10 breaths per minute, B. Oxygen saturation LESS THAN 90%, C. Sedation score of 6  ondansetron Injectable 4 milliGRAM(s) IV Push every 6 hours PRN Nausea  prochlorperazine   Injectable 5 milliGRAM(s) IV Push every 6 hours PRN nausea and/or vomiting    Drug Dosing Weight  Height (cm): 167.6 (03 Mar 2022 22:52)  Weight (kg): 73.1 (03 Mar 2022 22:52)  BMI (kg/m2): 26 (03 Mar 2022 22:52)  BSA (m2): 1.82 (03 Mar 2022 22:52)    PAST MEDICAL & SURGICAL HISTORY:  Essential hypertension    Gastritis    HLD (hyperlipidemia)    GERD (gastroesophageal reflux disease)    Intracranial shunt    S/P cholecystectomy    History of esophageal surgery        FAMILY HISTORY:  Family history of gastric cancer (Father, Grandparent)    Family history of colon cancer        ROS: See HPI; otherwise, all systems reviewed and negative.    O:    ICU Vital Signs Last 24 Hrs  T(C): 36.6 (12 Mar 2022 21:56), Max: 36.8 (12 Mar 2022 14:20)  T(F): 97.8 (12 Mar 2022 21:56), Max: 98.3 (12 Mar 2022 14:20)  HR: 84 (13 Mar 2022 06:00) (77 - 99)  BP: 93/45 (13 Mar 2022 06:00) (78/61 - 137/96)  BP(mean): 58 (13 Mar 2022 06:00) (54 - 110)  ABP: --  ABP(mean): --  RR: 14 (13 Mar 2022 06:00) (9 - 18)  SpO2: 98% (13 Mar 2022 06:00) (93% - 100%)          I&O's Detail    12 Mar 2022 06:01  -  13 Mar 2022 07:00  --------------------------------------------------------  IN:  Total IN: 0 mL    OUT:    Chest Tube (mL): 480 mL    Drain (mL): 1300 mL    Voided (mL): 1050 mL  Total OUT: 2830 mL    Total NET: -2830 mL      13 Mar 2022 07:01  -  13 Mar 2022 10:12  --------------------------------------------------------  IN:  Total IN: 0 mL    OUT:    Stool (mL): 100 mL  Total OUT: 100 mL    Total NET: -100 mL    Chronically ill F lying in bed  No JVD trachea midline  S1S2+  CTA B/L  1+ anasarca  + RUE PICC  + L sided pigtail to WS  Sleeping    LABS:    CBC Full  -  ( 13 Mar 2022 03:49 )  WBC Count : 17.72 K/uL  RBC Count : 3.25 M/uL  Hemoglobin : 10.6 g/dL  Hematocrit : 32.8 %  Platelet Count - Automated : 129 K/uL  Mean Cell Volume : 100.9 fl  Mean Cell Hemoglobin : 32.6 pg  Mean Cell Hemoglobin Concentration : 32.3 gm/dL  Auto Neutrophil # : x  Auto Lymphocyte # : x  Auto Monocyte # : x  Auto Eosinophil # : x  Auto Basophil # : x  Auto Neutrophil % : x  Auto Lymphocyte % : x  Auto Monocyte % : x  Auto Eosinophil % : x  Auto Basophil % : x    03-13    136  |  111<H>  |  37<H>  ----------------------------<  167<H>  4.2   |  21<L>  |  0.58    Ca    7.9<L>      13 Mar 2022 03:49  Phos  3.5     03-13  Mg     2.3     03-13    TPro  4.1<L>  /  Alb  1.0<L>  /  TBili  0.5  /  DBili  x   /  AST  53<H>  /  ALT  111<H>  /  AlkPhos  143<H>  03-12    PT/INR - ( 12 Mar 2022 22:14 )   PT: 11.1 sec;   INR: 0.96 ratio         PTT - ( 12 Mar 2022 22:14 )  PTT:25.5 sec    CAPILLARY BLOOD GLUCOSE      POCT Blood Glucose.: 151 mg/dL (13 Mar 2022 05:53)  POCT Blood Glucose.: 157 mg/dL (12 Mar 2022 16:36)  POCT Blood Glucose.: 148 mg/dL (12 Mar 2022 12:01)        LIVER FUNCTIONS - ( 12 Mar 2022 22:14 )  Alb: 1.0 g/dL / Pro: 4.1 gm/dL / ALK PHOS: 143 U/L / ALT: 111 U/L / AST: 53 U/L / GGT: x

## 2022-03-13 NOTE — PROGRESS NOTE ADULT - ASSESSMENT
A:    61F  HD # 11    Thoracic surgery involved for:    1. L sided pleural effusionm s/p pigtail    P:    Maintain pigtail to WS throughout weekend  Monitor output  No plans for pleurex at this time  Interval CXR on M 3/14    Dispo: Cont care per primary team. d/c pigtail prior to d/c home.

## 2022-03-13 NOTE — PROGRESS NOTE ADULT - ASSESSMENT
62 yo F with pmhx GEJ cancer s/p esophagectomy, [place] tumors s/p SBO presents with left sided pleural effusion   XR barium enema shows strictures in proximal and mid sigmoid colon  s/p palliative bypass, now with enterocutaneous fistula. CT of abd appreciateed. AFVSS. No leukocytosis, HH stable.    Plan:  - NPO for now, IVF  - Monitor vitals. Bolus with colloid PRN  - Cont TPN  - Pain and nausea control PRN   - GI/DVT prop  - Monitor bowel function  - Hospitalist consult  - Daily labs  - Encourage ambulation  - CT surgery recs- no pleurx catheter, possible remove pigtail prior to DC    Plan discussed with Dr. Sahu. 62 yo F with pmhx GEJ cancer s/p esophagectomy, krun[place] tumors s/p SBO presents with left sided pleural effusion   XR barium enema shows strictures in proximal and mid sigmoid colon  s/p palliative bypass, now with enterocutaneous fistula. CT of abd appreciateed. AFVSS. No leukocytosis, HH stable.    Plan:  - NPO for now, IVF  - Monitor vitals. Bolus with colloid PRN  - Continue TPN  - Pain and nausea control PRN   - GI/DVT prop  - Monitor bowel function  - Hospitalist consult  - Daily labs  - Encourage ambulation  - Patient may return to operating room today. Will discuss with patient about management options. Patient will need a link catheter if she does return to the OR.   - CT surgery recs- no pleurx catheter, possible remove pigtail prior to DC    Plan discussed with Dr. Sahu. 62 yo F with pmhx GEJ cancer s/p esophagectomy, [place] tumors s/p SBO presents with left sided pleural effusion   XR barium enema shows strictures in proximal and mid sigmoid colon  s/p palliative bypass, now with enterocutaneous fistula. CT of abd appreciateed. AFVSS. No leukocytosis, HH stable.    Plan:  - NPO for now, IVF  - Monitor vitals. Bolus with colloid PRN  - Continue TPN  - Follow dietician recommendations  - Pain and nausea control PRN   - GI/DVT prop  - Monitor bowel function  - Hospitalist consult  - Daily labs  - Encourage ambulation  - Patient may return to operating room today. Will discuss with patient about management options. Patient will need a link catheter if she does return to the OR.   - CT surgery recs- no pleurx catheter, possible remove pigtail prior to DC    Plan discussed with Dr. Sahu.

## 2022-03-13 NOTE — PROGRESS NOTE ADULT - SUBJECTIVE AND OBJECTIVE BOX
HPI: 61 y.o. female with Gastroesophageal cancer with omental metastases with recurrent SBO with stricture in proximal and mid-sgmoid colon s/p palliative bypass surgery, left pleural effusion s/p thoracocentesis with left pigtail in place    INTERVAL HPI: + abdominal pain, minimal flatus, + belching, persistent hypotension, + fecal discharge in postop area, abn CT results noted  Other ROS reviewed and neg     Vital Signs Last 24 Hrs  T(C): 36.5 (13 Mar 2022 09:10), Max: 36.8 (12 Mar 2022 14:20)  T(F): 97.7 (13 Mar 2022 09:10), Max: 98.3 (12 Mar 2022 14:20)  HR: 90 (13 Mar 2022 10:00) (77 - 99)  BP: 107/47 (13 Mar 2022 10:00) (78/61 - 132/56)  BP(mean): 67 (13 Mar 2022 10:00) (54 - 78)  RR: 20 (13 Mar 2022 10:00) (9 - 23)  SpO2: 96% (13 Mar 2022 10:00) (93% - 100%)  I&O's Detail    12 Mar 2022 06:01  -  13 Mar 2022 07:00  --------------------------------------------------------  IN:  Total IN: 0 mL    OUT:    Chest Tube (mL): 480 mL    Drain (mL): 1300 mL    Voided (mL): 1050 mL  Total OUT: 2830 mL    Total NET: -2830 mL      13 Mar 2022 07:01  -  13 Mar 2022 13:17  --------------------------------------------------------  IN:  Total IN: 0 mL    OUT:    Stool (mL): 100 mL    Voided (mL): 500 mL  Total OUT: 600 mL    Total NET: -600 mL                       10.6   17.72 )-----------( 129      ( 13 Mar 2022 03:49 )             32.8     13 Mar 2022 03:49    136    |  111    |  37     ----------------------------<  167    4.2     |  21     |  0.58     Ca    7.9        13 Mar 2022 03:49  Phos  3.5       13 Mar 2022 03:49  Mg     2.3       13 Mar 2022 03:49    TPro  4.1    /  Alb  1.0    /  TBili  0.5    /  DBili  x      /  AST  53     /  ALT  111    /  AlkPhos  143    12 Mar 2022 22:14    PT/INR - ( 12 Mar 2022 22:14 )   PT: 11.1 sec;   INR: 0.96 ratio         PTT - ( 12 Mar 2022 22:14 )  PTT:25.5 sec  CAPILLARY BLOOD GLUCOSE      POCT Blood Glucose.: 150 mg/dL (13 Mar 2022 12:32)  POCT Blood Glucose.: 151 mg/dL (13 Mar 2022 05:53)  POCT Blood Glucose.: 157 mg/dL (12 Mar 2022 16:36)    LIVER FUNCTIONS - ( 12 Mar 2022 22:14 )  Alb: 1.0 g/dL / Pro: 4.1 gm/dL / ALK PHOS: 143 U/L / ALT: 111 U/L / AST: 53 U/L / GGT: x           MEDICATIONS  (STANDING):  dextrose 40% Gel 15 Gram(s) Oral once  dextrose 5%. 1000 milliLiter(s) (50 mL/Hr) IV Continuous <Continuous>  dextrose 5%. 1000 milliLiter(s) (100 mL/Hr) IV Continuous <Continuous>  dextrose 50% Injectable 25 Gram(s) IV Push once  dextrose 50% Injectable 12.5 Gram(s) IV Push once  dextrose 50% Injectable 25 Gram(s) IV Push once  enoxaparin Injectable 40 milliGRAM(s) SubCutaneous every 24 hours  fat emulsion (Fish Oil and Plant Based) 20% Infusion 0.69 Gm/kG/Day (20.83 mL/Hr) IV Continuous <Continuous>  fat emulsion (Fish Oil and Plant Based) 20% Infusion 0.69 Gm/kG/Day (20.83 mL/Hr) IV Continuous <Continuous>  glucagon  Injectable 1 milliGRAM(s) IntraMuscular once  HYDROmorphone PCA (1 mG/mL) 30 milliLiter(s) PCA Continuous PCA Continuous  insulin lispro (ADMELOG) corrective regimen sliding scale   SubCutaneous three times a day before meals  ketorolac   Injectable 15 milliGRAM(s) IV Push every 6 hours  lactated ringers. 1000 milliLiter(s) (120 mL/Hr) IV Continuous <Continuous>  octreotide  Injectable 100 MICROGram(s) IV Push every 8 hours  pantoprazole  Injectable 40 milliGRAM(s) IV Push every 12 hours  Parenteral Nutrition - Adult 1 Each (83 mL/Hr) TPN Continuous <Continuous>  Parenteral Nutrition - Adult 1 Each (83 mL/Hr) TPN Continuous <Continuous>  piperacillin/tazobactam IVPB.. 3.375 Gram(s) IV Intermittent every 6 hours    MEDICATIONS  (PRN):  acetaminophen   IVPB .. 1000 milliGRAM(s) IV Intermittent every 6 hours PRN Mild Pain (1 - 3)  ALBUTerol    90 MICROgram(s) HFA Inhaler 2 Puff(s) Inhalation every 6 hours PRN Shortness of Breath  HYDROmorphone PCA (1 mG/mL) Rescue Clinician Bolus 0.5 milliGRAM(s) IV Push every 15 minutes PRN for Pain Scale GREATER THAN 6  metoprolol tartrate Injectable 5 milliGRAM(s) IV Push every 6 hours PRN SBP > 140  naloxone Injectable 0.1 milliGRAM(s) IV Push every 3 minutes PRN For ANY of the following changes in patient status:  A. RR LESS THAN 10 breaths per minute, B. Oxygen saturation LESS THAN 90%, C. Sedation score of 6  naloxone Injectable 0.1 milliGRAM(s) IV Push every 3 minutes PRN For ANY of the following changes in patient status:  A. RR LESS THAN 10 breaths per minute, B. Oxygen saturation LESS THAN 90%, C. Sedation score of 6  ondansetron Injectable 4 milliGRAM(s) IV Push every 6 hours PRN Nausea  prochlorperazine   Injectable 5 milliGRAM(s) IV Push every 6 hours PRN nausea and/or vomiting    RADIOLOGY: personally visualized    CT Abdomen and Pelvis w/ Oral Cont and w/ IV Cont (03.12.22 @ 19:14)  IMPRESSION:  1.  Extravasated contrast material seen predominantly in the RIGHT   anterior paracolic gutter and peritoneum consistent with bowel   perforation and intraperitoneal leak. Additional hyperdense collection is   seen in the anterior peritoneum just beneath the midline incision and   likely communicates with the right-sided collection.  2.  Multifocal deep pelvic collections with enhancing walls likely   representing interloop and pelvic abscesses.  3.  LEFTanterior paracolic collection with gas also likely abscess.   Additional intra-abdominal collections/abscesses.  4.  Pneumoperitoneum.  5.  Additional findings described above.    All available medical records personally reviewed    PE:  Constitutional: frail female in mild distress with abdominal pain  HEENT: PERR, EOMI, dry mucosals  Neck: Soft and supple,  No JVD  Respiratory: decreased BS at left base with left chest tube to LWS   Cardiovascular: S1, S2 reg  Gastrointestinal: soft distended with postp area C/D/I with fecal material discharge  Extremities: No peripheral edema  Vascular: 2+ peripheral pulses  Neurological: A/O to person place circumstance, no focal deficits, followed all simple commands consistently, moving all extremities   Musculoskeletal: 5/5 strength b/l upper and lower extremities

## 2022-03-13 NOTE — CHART NOTE - NSCHARTNOTEFT_GEN_A_CORE
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      *Current status:   Findings of current CT scan: Extravasated contrast material seen predominantly in the RIGHT anterior paracolic gutter and peritoneum consistent with bowel perforation and intraperitoneal leak. Additional hyperdense collection is seen in the anterior peritoneum just beneath the midline incision and likely communicates with the right-sided collection. Multifocal deep pelvic collections with enhancing walls likely representing interloop and pelvic abscesses. LEFT anterior paracolic collection with gas also likely abscess. Additional intra-abdominal collections/abscesses. Pneumoperitoneum.  Pt continues to c/o abdominal pain. Only sipping fluids. As per Heme/onc : Overall prognosis is grave as chemotherapeutic options are limited. Patient and her family have been somewhat unrealistic as to her prognosis. Will continue with TPN at this time as discussed with colorectal surgery today.     *labs reviewed; no significant changes in lab values will continue with current TPN order w/o change.     03-13    136  |  111<H>  |  37<H>  ----------------------------<  167<H>  4.2   |  21<L>  |  0.58    Ca    7.9<L>      13 Mar 2022 03:49  Phos  3.5     03-13  Mg     2.3     03-13    TPro  4.1<L>  /  Alb  1.0<L>  /  TBili  0.5  /  DBili  x   /  AST  53<H>  /  ALT  111<H>  /  AlkPhos  143<H>  03-12  BMI: BMI (kg/m2): 26 (03-03-22 @ 22:52)  HbA1c: A1C with Estimated Average Glucose Result: 6.3 % (03-10-22 @ 06:22)    Glucose: POCT Blood Glucose.: 151 mg/dL (03-13-22 @ 05:53)    BP: 93/45 (03-13-22 @ 06:00) (70/42 - 137/96)  Lipid Panel: Date/Time: 03-09-22 @ 01:25  Cholesterol, Serum: --  Direct LDL: --  HDL Cholesterol, Serum: --  Total Cholesterol/HDL Ration Measurement: --  Triglycerides, Serum: 207    POCT Blood Glucose.: 151 mg/dL (13 Mar 2022 05:53)  POCT Blood Glucose.: 157 mg/dL (12 Mar 2022 16:36)  POCT Blood Glucose.: 148 mg/dL (12 Mar 2022 12:01)        *pertinent meds:   lactated ringers. 1000 milliLiter(s) (120 mL/Hr) IV Continuous <Continuous>  octreotide  Injectable 100 MICROGram(s) IV Push every 8 hours  pantoprazole  Injectable 40 milliGRAM(s) IV Push every 12 hours  ondansetron Injectable 4 milliGRAM(s) IV Push every 6 hours PRN Nausea  prochlorperazine   Injectable 5 milliGRAM(s) IV Push every 6 hours PRN nausea and/or vomiting  HYDROmorphone PCA (1 mG/mL) Rescue Clinician Bolus 0.5 milliGRAM(s) IV Push every 15 minutes PRN for       *I&O's Detail    11 Mar 2022 07:01  -  12 Mar 2022 07:00  --------------------------------------------------------  IN:    dextrose 5% + sodium chloride 0.45% w/ Additives: 1058 mL    Fat Emulsion (Fish Oil &amp; Plant Based) 20% Infusion: 251 mL    IV PiggyBack: 550 mL    PPN (Peripheral Parenteral Nutrition): 913 mL  Total IN: 2772 mL    OUT:    Chest Tube (mL): 40 mL    Indwelling Catheter - Urethral (mL): 500 mL    Voided (mL): 750 mL  Total OUT: 1290 mL    Total NET: 1482 mL          *positive fluid status  *BM (+) on 3/8 (x 3 days ago) (+) flatus.  pt not on bowel regimen.  *Currently NPO/sips only    *Gurpreet Score of 16; no PU documented. (+1) L/ R arm; (+2) L/R leg edema documented.    *severe malnutrition in acute on chronic illness r/t decreased PO intake 2/2 CA and altered GI function AEB meeting <50% of ENN x5 dys and mild muscle/fat wasting, 4.5% wt loss x 5 days    ESTIMATED NUTR NEEDS: based on 73Kg admit wt  1825-2190Kcal (25-30Kcal/Kg)  110-146g protein (1.5-2 g/Kg)  1825-2190mL (25-30mL/Kg)      Weight History: No new weights obtained   69.8Kg (3/8) wt loss of 3.3Kg in 5 days (4.5%), clinically significant.  Height (cm): 167.6 (03-03-22 @ 22:52)  Weight (kg): 73.1 (03-03-22 @ 22:52)  BMI (kg/m2): 26 (03-03-22 @ 22:52)  BSA (m2): 1.82 (03-03-22 @ 22:52)  IBW: 59Kg      TPN Recommendations: via implanted port    Total Volume: 2000 mL  110 g  Amino Acids  285 g Dextrose   50 g Lipids 20%  143 mEq Sodium Chloride  0 mEq Sodium Acetate  20 mmol Sodium Phosphate  30 mEq Potassium Chloride  3 mEq Potassium Acetate  15 mmol Potassium Phosphate  0 mEq Calcium Gluconate  8 mEq Magnesium Sulfate  100 mg Thiamine  18 units Regular Insulin  1 ml Trace Elements   10 ml MVI    Total Calories  1909 (Meets  100%  of  Estimated Energy needs  and    100 %  Protein needs)    Additional Recommendations:  1) Daily weights  2) Strict I & O's  3) Daily lyte checks  4) Weekly triglycerides/LFT checks  5) POCT q6hrs; maintain POCT of 140-180mg/dL; add sliding scale insulin for additional coverage prn  6) add calcium gluconate 10 mEq outside of PN bag to ensure patient doesn't go low in calcium  7) Consider supplementing vitamin D 1000 units daily    *will monitor and adjust treatement plan prn*  Angela Toledo RD office: [phone]   Cell# [phone]

## 2022-03-13 NOTE — PROGRESS NOTE ADULT - ASSESSMENT
Recurrent SBO due to colonic strictures and metastatic  Dx s/p palliative bypass now with fluid collections in the abdomen and drainage to the incision - as per surgery possible surgical intervention, but realistically it will not change outcome but will cause increased pain and suffering - pt has poor insight at the moment, comfort care would be more appropriate - palliative care eval    - pain control, NPO    Adenocarcinoma GE Junction s/p CT RT and Sx/[place] Tumor one year after the above s/p Sx/omental mets s/p Folfiri (severe diarrhea) and IT/Peripheral Neuropathy 2/2 Taxol - if pt recovers will be considered for salvage therapy with paclitaxel     H/o head trauma and hydrocephalus in her late 20s with h/o  shunt 27 years ago and recurrent staph infections at that time.   Shunt no longer goes to the peritoneum but to the spine and has been problem-free for many years     Recurrent left pleural effusion s/p thoracocentesis - CT in place, monitor output     VTE Px - LMWH    Severe protein calorie malnutrition - TPN    Hypotension due to all above in the settings of severe hypoalbuminemia - improved with IVF    discussed with Sx and CTSx, pt and SICU team    Time spent 65 min

## 2022-03-14 LAB
ALBUMIN SERPL ELPH-MCNC: 1.3 G/DL — LOW (ref 3.3–5)
ALP SERPL-CCNC: 106 U/L — SIGNIFICANT CHANGE UP (ref 40–120)
ALT FLD-CCNC: 61 U/L — SIGNIFICANT CHANGE UP (ref 12–78)
ANION GAP SERPL CALC-SCNC: 5 MMOL/L — SIGNIFICANT CHANGE UP (ref 5–17)
AST SERPL-CCNC: 34 U/L — SIGNIFICANT CHANGE UP (ref 15–37)
BILIRUB SERPL-MCNC: 0.4 MG/DL — SIGNIFICANT CHANGE UP (ref 0.2–1.2)
BUN SERPL-MCNC: 27 MG/DL — HIGH (ref 7–23)
CALCIUM SERPL-MCNC: 7.6 MG/DL — LOW (ref 8.5–10.1)
CHLORIDE SERPL-SCNC: 112 MMOL/L — HIGH (ref 96–108)
CO2 SERPL-SCNC: 24 MMOL/L — SIGNIFICANT CHANGE UP (ref 22–31)
CREAT SERPL-MCNC: 0.46 MG/DL — LOW (ref 0.5–1.3)
EGFR: 107 ML/MIN/1.73M2 — SIGNIFICANT CHANGE UP
GLUCOSE SERPL-MCNC: 108 MG/DL — HIGH (ref 70–99)
HCT VFR BLD CALC: 25.1 % — LOW (ref 34.5–45)
HCT VFR BLD CALC: 28.3 % — LOW (ref 34.5–45)
HGB BLD-MCNC: 8.5 G/DL — LOW (ref 11.5–15.5)
HGB BLD-MCNC: 9.5 G/DL — LOW (ref 11.5–15.5)
MAGNESIUM SERPL-MCNC: 2.5 MG/DL — SIGNIFICANT CHANGE UP (ref 1.6–2.6)
MCHC RBC-ENTMCNC: 32.3 PG — SIGNIFICANT CHANGE UP (ref 27–34)
MCHC RBC-ENTMCNC: 32.5 PG — SIGNIFICANT CHANGE UP (ref 27–34)
MCHC RBC-ENTMCNC: 33.6 GM/DL — SIGNIFICANT CHANGE UP (ref 32–36)
MCHC RBC-ENTMCNC: 33.9 GM/DL — SIGNIFICANT CHANGE UP (ref 32–36)
MCV RBC AUTO: 95.4 FL — SIGNIFICANT CHANGE UP (ref 80–100)
MCV RBC AUTO: 96.9 FL — SIGNIFICANT CHANGE UP (ref 80–100)
PHOSPHATE SERPL-MCNC: 3.4 MG/DL — SIGNIFICANT CHANGE UP (ref 2.5–4.5)
PLATELET # BLD AUTO: 125 K/UL — LOW (ref 150–400)
PLATELET # BLD AUTO: 162 K/UL — SIGNIFICANT CHANGE UP (ref 150–400)
POTASSIUM SERPL-MCNC: 3.7 MMOL/L — SIGNIFICANT CHANGE UP (ref 3.5–5.3)
POTASSIUM SERPL-SCNC: 3.7 MMOL/L — SIGNIFICANT CHANGE UP (ref 3.5–5.3)
PROT SERPL-MCNC: 3.9 GM/DL — LOW (ref 6–8.3)
RBC # BLD: 2.63 M/UL — LOW (ref 3.8–5.2)
RBC # BLD: 2.92 M/UL — LOW (ref 3.8–5.2)
RBC # FLD: 14.7 % — HIGH (ref 10.3–14.5)
RBC # FLD: 14.8 % — HIGH (ref 10.3–14.5)
SODIUM SERPL-SCNC: 141 MMOL/L — SIGNIFICANT CHANGE UP (ref 135–145)
WBC # BLD: 9.23 K/UL — SIGNIFICANT CHANGE UP (ref 3.8–10.5)
WBC # BLD: 9.26 K/UL — SIGNIFICANT CHANGE UP (ref 3.8–10.5)
WBC # FLD AUTO: 9.23 K/UL — SIGNIFICANT CHANGE UP (ref 3.8–10.5)
WBC # FLD AUTO: 9.26 K/UL — SIGNIFICANT CHANGE UP (ref 3.8–10.5)

## 2022-03-14 PROCEDURE — 71045 X-RAY EXAM CHEST 1 VIEW: CPT | Mod: 26

## 2022-03-14 PROCEDURE — 99233 SBSQ HOSP IP/OBS HIGH 50: CPT

## 2022-03-14 PROCEDURE — 99497 ADVNCD CARE PLAN 30 MIN: CPT

## 2022-03-14 PROCEDURE — 99231 SBSQ HOSP IP/OBS SF/LOW 25: CPT

## 2022-03-14 RX ORDER — ELECTROLYTE SOLUTION,INJ
1 VIAL (ML) INTRAVENOUS
Refills: 0 | Status: DISCONTINUED | OUTPATIENT
Start: 2022-03-14 | End: 2022-03-14

## 2022-03-14 RX ORDER — I.V. FAT EMULSION 20 G/100ML
0.68 EMULSION INTRAVENOUS
Qty: 50 | Refills: 0 | Status: DISCONTINUED | OUTPATIENT
Start: 2022-03-14 | End: 2022-03-14

## 2022-03-14 RX ADMIN — Medication 1 EACH: at 22:53

## 2022-03-14 RX ADMIN — HYDROMORPHONE HYDROCHLORIDE 30 MILLILITER(S): 2 INJECTION INTRAMUSCULAR; INTRAVENOUS; SUBCUTANEOUS at 15:53

## 2022-03-14 RX ADMIN — PIPERACILLIN AND TAZOBACTAM 25 GRAM(S): 4; .5 INJECTION, POWDER, LYOPHILIZED, FOR SOLUTION INTRAVENOUS at 21:53

## 2022-03-14 RX ADMIN — PANTOPRAZOLE SODIUM 40 MILLIGRAM(S): 20 TABLET, DELAYED RELEASE ORAL at 21:53

## 2022-03-14 RX ADMIN — ONDANSETRON 4 MILLIGRAM(S): 8 TABLET, FILM COATED ORAL at 15:02

## 2022-03-14 RX ADMIN — I.V. FAT EMULSION 20.7 GM/KG/DAY: 20 EMULSION INTRAVENOUS at 22:53

## 2022-03-14 RX ADMIN — PANTOPRAZOLE SODIUM 40 MILLIGRAM(S): 20 TABLET, DELAYED RELEASE ORAL at 10:45

## 2022-03-14 RX ADMIN — Medication 15 MILLIGRAM(S): at 00:40

## 2022-03-14 RX ADMIN — OCTREOTIDE ACETATE 100 MICROGRAM(S): 200 INJECTION, SOLUTION INTRAVENOUS; SUBCUTANEOUS at 06:16

## 2022-03-14 RX ADMIN — OCTREOTIDE ACETATE 100 MICROGRAM(S): 200 INJECTION, SOLUTION INTRAVENOUS; SUBCUTANEOUS at 15:31

## 2022-03-14 RX ADMIN — ONDANSETRON 4 MILLIGRAM(S): 8 TABLET, FILM COATED ORAL at 23:41

## 2022-03-14 RX ADMIN — Medication 15 MILLIGRAM(S): at 06:45

## 2022-03-14 RX ADMIN — PIPERACILLIN AND TAZOBACTAM 25 GRAM(S): 4; .5 INJECTION, POWDER, LYOPHILIZED, FOR SOLUTION INTRAVENOUS at 15:02

## 2022-03-14 RX ADMIN — Medication 15 MILLIGRAM(S): at 01:00

## 2022-03-14 RX ADMIN — PIPERACILLIN AND TAZOBACTAM 25 GRAM(S): 4; .5 INJECTION, POWDER, LYOPHILIZED, FOR SOLUTION INTRAVENOUS at 00:39

## 2022-03-14 RX ADMIN — PIPERACILLIN AND TAZOBACTAM 25 GRAM(S): 4; .5 INJECTION, POWDER, LYOPHILIZED, FOR SOLUTION INTRAVENOUS at 06:16

## 2022-03-14 RX ADMIN — Medication 15 MILLIGRAM(S): at 06:16

## 2022-03-14 RX ADMIN — OCTREOTIDE ACETATE 100 MICROGRAM(S): 200 INJECTION, SOLUTION INTRAVENOUS; SUBCUTANEOUS at 23:24

## 2022-03-14 RX ADMIN — ENOXAPARIN SODIUM 40 MILLIGRAM(S): 100 INJECTION SUBCUTANEOUS at 15:02

## 2022-03-14 NOTE — PROGRESS NOTE ADULT - ASSESSMENT
Recurrent SBO due to colonic strictures and metastatic  Dx s/p palliative bypass now with fluid collections in the abdomen and drainage to the incision - as per surgery possible surgical intervention, but realistically it will not change outcome but will cause increased pain and suffering - pt has poor insight at the moment, comfort care would be more appropriate - palliative care eval requested   - pain control, NPO    GOC discussion: pt had unrealistic expectation to improve and be able to function with bag on her abdominal surgery wound and resume chemoTx - after prolonged conversation with her and  over the phone with constant need to reorient her that chemoTx is not an option now nor surgery and likelihood of  tolerating PO low as well as likelihood of worsening off abx and TPN is high - after understanding that comfort care is only option became visibly upset. Discussed with Dr. Sahu and agreed with assessment.  Later pt became upset and did not want to continue realistic conversations.   At this point recommend palliative care evaluation strongly - primary team notified    Adenocarcinoma GE Junction s/p CT RT and Sx/[place] Tumor one year after the above s/p Sx/omental mets s/p Folfiri (severe diarrhea) and IT/Peripheral Neuropathy 2/2 Taxol - if pt recovers will be considered for salvage therapy with paclitaxel - unlikely with all above     H/o head trauma and hydrocephalus in her late 20s with h/o  shunt 27 years ago and recurrent staph infections at that time.   Shunt no longer goes to the peritoneum but to the spine and has been problem-free for many years     Recurrent left pleural effusion s/p thoracocentesis - CT in place, monitor output     VTE Px - LMWH    Severe protein calorie malnutrition - TPN    Hypotension due to all above in the settings of severe hypoalbuminemia - improved with IVF    discussed with Sx and CTSx, pt and SICU team    Time spent 74 min, GOC conversation 34 min

## 2022-03-14 NOTE — PROGRESS NOTE ADULT - SUBJECTIVE AND OBJECTIVE BOX
Subjective:  Pt seen, in bed, on RA, pleasant. No difficulty breathing. C/o abdominal discomfort    Vital Signs:  Vital Signs Last 24 Hrs  T(C): 36.8 (03-14-22 @ 08:25), Max: 37 (03-14-22 @ 05:00)  T(F): 98.2 (03-14-22 @ 08:25), Max: 98.6 (03-14-22 @ 05:00)  HR: 78 (03-14-22 @ 08:00) (74 - 92)  BP: 108/54 (03-14-22 @ 08:00) (93/50 - 124/96)  RR: 25 (03-14-22 @ 08:00) (12 - 25)  SpO2: 95% (03-14-22 @ 08:00) (95% - 100%) on (O2)    Telemetry/Alarms:    Relevant labs, radiology and Medications reviewed                        8.5    9.26  )-----------( 125      ( 14 Mar 2022 07:23 )             25.1     03-14    141  |  112<H>  |  27<H>  ----------------------------<  108<H>  3.7   |  24  |  0.46<L>    Ca    7.6<L>      14 Mar 2022 07:23  Phos  3.4     03-14  Mg     2.5     03-14    TPro  3.9<L>  /  Alb  1.3<L>  /  TBili  0.4  /  DBili  x   /  AST  34  /  ALT  61  /  AlkPhos  106  03-14    PT/INR - ( 12 Mar 2022 22:14 )   PT: 11.1 sec;   INR: 0.96 ratio         PTT - ( 12 Mar 2022 22:14 )  PTT:25.5 sec  MEDICATIONS  (STANDING):  dextrose 40% Gel 15 Gram(s) Oral once  dextrose 5%. 1000 milliLiter(s) (50 mL/Hr) IV Continuous <Continuous>  dextrose 5%. 1000 milliLiter(s) (100 mL/Hr) IV Continuous <Continuous>  dextrose 50% Injectable 25 Gram(s) IV Push once  dextrose 50% Injectable 12.5 Gram(s) IV Push once  dextrose 50% Injectable 25 Gram(s) IV Push once  enoxaparin Injectable 40 milliGRAM(s) SubCutaneous every 24 hours  fat emulsion (Fish Oil and Plant Based) 20% Infusion 0.69 Gm/kG/Day (20.83 mL/Hr) IV Continuous <Continuous>  fat emulsion (Fish Oil and Plant Based) 20% Infusion 0.68 Gm/kG/Day (20.7 mL/Hr) IV Continuous <Continuous>  glucagon  Injectable 1 milliGRAM(s) IntraMuscular once  HYDROmorphone PCA (1 mG/mL) 30 milliLiter(s) PCA Continuous PCA Continuous  insulin lispro (ADMELOG) corrective regimen sliding scale   SubCutaneous three times a day before meals  lactated ringers. 1000 milliLiter(s) (120 mL/Hr) IV Continuous <Continuous>  octreotide  Injectable 100 MICROGram(s) IV Push every 8 hours  pantoprazole  Injectable 40 milliGRAM(s) IV Push every 12 hours  Parenteral Nutrition - Adult 1 Each (83 mL/Hr) TPN Continuous <Continuous>  Parenteral Nutrition - Adult 1 Each (83 mL/Hr) TPN Continuous <Continuous>  piperacillin/tazobactam IVPB.. 3.375 Gram(s) IV Intermittent every 8 hours    MEDICATIONS  (PRN):  acetaminophen   IVPB .. 1000 milliGRAM(s) IV Intermittent every 6 hours PRN Mild Pain (1 - 3)  ALBUTerol    90 MICROgram(s) HFA Inhaler 2 Puff(s) Inhalation every 6 hours PRN Shortness of Breath  HYDROmorphone PCA (1 mG/mL) Rescue Clinician Bolus 0.5 milliGRAM(s) IV Push every 15 minutes PRN for Pain Scale GREATER THAN 6  metoprolol tartrate Injectable 5 milliGRAM(s) IV Push every 6 hours PRN SBP > 140  naloxone Injectable 0.1 milliGRAM(s) IV Push every 3 minutes PRN For ANY of the following changes in patient status:  A. RR LESS THAN 10 breaths per minute, B. Oxygen saturation LESS THAN 90%, C. Sedation score of 6  naloxone Injectable 0.1 milliGRAM(s) IV Push every 3 minutes PRN For ANY of the following changes in patient status:  A. RR LESS THAN 10 breaths per minute, B. Oxygen saturation LESS THAN 90%, C. Sedation score of 6  ondansetron Injectable 4 milliGRAM(s) IV Push every 6 hours PRN Nausea  prochlorperazine   Injectable 5 milliGRAM(s) IV Push every 6 hours PRN nausea and/or vomiting      Physical exam  Neuro: Alert Awake NAD  Pulm: decreased  at bases + Left Pigtail , drained 220cc last 24 hours  CV: RRR S1 S2  Abd:  Soft ,  laparotomy incision w ostomy in place, leaking green bilious drainage from incision.  Extremities: 1+ edema   I&O's Summary    13 Mar 2022 07:01  -  14 Mar 2022 07:00  --------------------------------------------------------  IN: 3950 mL / OUT: 3045 mL / NET: 905 mL    14 Mar 2022 07:01  -  14 Mar 2022 10:53  --------------------------------------------------------  IN: 0 mL / OUT: 400 mL / NET: -400 mL        Assessment  61y Female  w/ PAST MEDICAL & SURGICAL HISTORY:  Essential hypertension    Gastritis    HLD (hyperlipidemia)    GERD (gastroesophageal reflux disease)    Intracranial shunt    S/P cholecystectomy    History of esophageal surgery    admitted with complaints of Patient is a 61y old  Female who presents with a chief complaint of pleural effusion and partial SBO (14 Mar 2022 08:05)  .  .  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 Patient underwent PET CT roughly one month ago which showed small amount of left sided pleural effusion, as per patient. 2 days ago, patient saw her oncologist who ordered a CXR which showed marked increase in pleural effusion.  Pt had CT + partial bowel obstruction.  Plan for OR next week.    Consulted for Lt Pleural effusion.  3/4 s/p Left Pigtail 1.5 L Drained Exudative effusion, cyto negative. 3/9/22 s/p Bypass, ileum, small bowel to small bowel bypass Closure, enterotomy, small intestine and Open enterolysis. Post op course c/b SB enterocutaneous fistula.    cont pigtail to drainage  change dressing q 3 days by RN  flushed w 10cc NS, patent    Discussed with Cardiothoracic Team at AM rounds.

## 2022-03-14 NOTE — PROGRESS NOTE ADULT - SUBJECTIVE AND OBJECTIVE BOX
HPI: 61 y.o. female with Gastroesophageal cancer with omental metastases with recurrent SBO with stricture in proximal and mid-sgmoid colon s/p palliative bypass surgery, left pleural effusion s/p thoracocentesis with left pigtail in place    INTERVAL HPI: + abdominal pain, minimal flatus, + belching, persistent hypotension, + fecal discharge in postop area above wound vac, abn CT results noted, pt is upset after prolonged conversation about GOC  Other ROS reviewed and neg     T(C): 36.8 (03-14-22 @ 17:53), Max: 37 (03-14-22 @ 05:00)  HR: 82 (03-14-22 @ 18:00) (74 - 98)  BP: 137/64 (03-14-22 @ 18:00) (81/50 - 137/64)  RR: 15 (03-14-22 @ 18:00) (8 - 25)  SpO2: 96% (03-14-22 @ 18:00) (95% - 100%)    03-13-22 @ 07:01  -  03-14-22 @ 07:00  --------------------------------------------------------  IN: 3950 mL / OUT: 3045 mL / NET: 905 mL    03-14-22 @ 07:01  -  03-14-22 @ 18:31  --------------------------------------------------------  IN: 1428 mL / OUT: 2050 mL / NET: -622 mL                        9.5    9.23  )-----------( 162      ( 14 Mar 2022 17:00 )             28.3     14 Mar 2022 07:23    141    |  112    |  27     ----------------------------<  108    3.7     |  24     |  0.46     Ca    7.6        14 Mar 2022 07:23  Phos  3.4       14 Mar 2022 07:23  Mg     2.5       14 Mar 2022 07:23    TPro  3.9    /  Alb  1.3    /  TBili  0.4    /  DBili  x      /  AST  34     /  ALT  61     /  AlkPhos  106    14 Mar 2022 07:23    PT/INR - ( 12 Mar 2022 22:14 )   PT: 11.1 sec;   INR: 0.96 ratio         PTT - ( 12 Mar 2022 22:14 )  PTT:25.5 sec  CAPILLARY BLOOD GLUCOSE    POCT Blood Glucose.: 101 mg/dL (14 Mar 2022 18:01)  POCT Blood Glucose.: 106 mg/dL (14 Mar 2022 12:24)  POCT Blood Glucose.: 119 mg/dL (14 Mar 2022 06:47)  POCT Blood Glucose.: 138 mg/dL (14 Mar 2022 01:47)  POCT Blood Glucose.: 130 mg/dL (13 Mar 2022 18:52)    LIVER FUNCTIONS - ( 14 Mar 2022 07:23 )  Alb: 1.3 g/dL / Pro: 3.9 gm/dL / ALK PHOS: 106 U/L / ALT: 61 U/L / AST: 34 U/L / GGT: x           acetaminophen   IVPB .. 1000 milliGRAM(s) IV Intermittent every 6 hours PRN  ALBUTerol    90 MICROgram(s) HFA Inhaler 2 Puff(s) Inhalation every 6 hours PRN  dextrose 40% Gel 15 Gram(s) Oral once  dextrose 5%. 1000 milliLiter(s) IV Continuous <Continuous>  dextrose 5%. 1000 milliLiter(s) IV Continuous <Continuous>  dextrose 50% Injectable 25 Gram(s) IV Push once  dextrose 50% Injectable 12.5 Gram(s) IV Push once  dextrose 50% Injectable 25 Gram(s) IV Push once  enoxaparin Injectable 40 milliGRAM(s) SubCutaneous every 24 hours  fat emulsion (Fish Oil and Plant Based) 20% Infusion 0.68 Gm/kG/Day IV Continuous <Continuous>  glucagon  Injectable 1 milliGRAM(s) IntraMuscular once  HYDROmorphone PCA (1 mG/mL) 30 milliLiter(s) PCA Continuous PCA Continuous  HYDROmorphone PCA (1 mG/mL) Rescue Clinician Bolus 0.5 milliGRAM(s) IV Push every 15 minutes PRN  insulin lispro (ADMELOG) corrective regimen sliding scale   SubCutaneous three times a day before meals  lactated ringers. 1000 milliLiter(s) IV Continuous <Continuous>  metoprolol tartrate Injectable 5 milliGRAM(s) IV Push every 6 hours PRN  naloxone Injectable 0.1 milliGRAM(s) IV Push every 3 minutes PRN  naloxone Injectable 0.1 milliGRAM(s) IV Push every 3 minutes PRN  octreotide  Injectable 100 MICROGram(s) IV Push every 8 hours  ondansetron Injectable 4 milliGRAM(s) IV Push every 6 hours PRN  pantoprazole  Injectable 40 milliGRAM(s) IV Push every 12 hours  Parenteral Nutrition - Adult 1 Each TPN Continuous <Continuous>  Parenteral Nutrition - Adult 1 Each TPN Continuous <Continuous>  piperacillin/tazobactam IVPB.. 3.375 Gram(s) IV Intermittent every 8 hours  prochlorperazine   Injectable 5 milliGRAM(s) IV Push every 6 hours PRN    RADIOLOGY: personally visualized    CT Abdomen and Pelvis w/ Oral Cont and w/ IV Cont (03.12.22 @ 19:14)  IMPRESSION:  1.  Extravasated contrast material seen predominantly in the RIGHT   anterior paracolic gutter and peritoneum consistent with bowel   perforation and intraperitoneal leak. Additional hyperdense collection is   seen in the anterior peritoneum just beneath the midline incision and   likely communicates with the right-sided collection.  2.  Multifocal deep pelvic collections with enhancing walls likely   representing interloop and pelvic abscesses.  3.  LEFTanterior paracolic collection with gas also likely abscess.   Additional intra-abdominal collections/abscesses.  4.  Pneumoperitoneum.  5.  Additional findings described above.    All available medical records personally reviewed    PE:  Constitutional: frail female in mild distress with abdominal pain  HEENT: PERR, EOMI, dry mucosals  Neck: Soft and supple,  No JVD  Respiratory: decreased BS at left base with left chest tube to LWS   Cardiovascular: S1, S2 reg  Gastrointestinal: soft distended with postop area in vac with fecal material discharge above it  Extremities: trace peripheral edema  Vascular: 2+ peripheral pulses  Neurological: A/O to person place circumstance, no focal deficits, followed all simple commands consistently, moving all extremities   Musculoskeletal: 5/5 strength b/l upper and lower extremities

## 2022-03-14 NOTE — PROGRESS NOTE ADULT - ASSESSMENT
60 yo F with pmhx GEJ cancer s/p esophagectomy, [place] tumors s/p SBO presents with left sided pleural effusion   XR barium enema shows strictures in proximal and mid sigmoid colon  s/p palliative bypass, now with contained enterocutaneous fistula. CT of abd appreciated. AFVSS. No leukocytosis, HH trending down.    Plan:  - NPO for now, IVF  - Monitor HH, transfuse PRN  - Monitor vitals. Bolus with colloid PRN  - Continue TPN  - Follow dietician recommendations  - Pain and nausea control PRN   - GI/DVT ppx  - Monitor bowel function  - Hospitalist recs  - Daily labs  - Encourage ambulation  - No surgical intervention at this time.    - CT surgery recs- no pleurx catheter, possible removal pigtail prior to DC    Plan discussed with Dr. Dan. 60 yo F with pmhx GEJ cancer s/p esophagectomy, [place] tumors s/p SBO presents with left sided pleural effusion   XR barium enema shows strictures in proximal and mid sigmoid colon  s/p palliative bypass, now with contained enterocutaneous fistula. CT of abd appreciated. AFVSS. No leukocytosis, HH trending down.    Plan:  - NPO for now, IVF  - Monitor HH, transfuse PRN  - Monitor vitals. Bolus with colloid PRN  - Continue TPN  - Follow dietician recommendations  - Pain and nausea control PRN   - GI/DVT ppx  - Monitor bowel function  - Continue ostomy dressing. Will speak to wound care to look for contraption that will prevent leakage of stool on to the incision  - Hospitalist recs  - Daily labs  - Encourage ambulation  - No surgical intervention at this time.    - CT surgery recs- no pleurx catheter, possible removal pigtail prior to DC    Plan discussed with Dr. Dan.

## 2022-03-14 NOTE — PROGRESS NOTE ADULT - SUBJECTIVE AND OBJECTIVE BOX
INTERVAL HISTORY:    Patient has a history of metastatic gastroesophageal carcinoma to the omentum;  more recently experiencing signs symptoms of  bowel obstruction and noted to have small bowel obstruction with a transition point.  Patient had previously been on 5-fluorouracil-based therapy, immunotherapy, and was admitted with bowel obstruction and left pleural effusion.  Status post left tube placement and drainage.  She underwent bypass surgery of the ileum with small bowel to small bowel bypass closure enterotomy. No diverting ostomy    now has developed multiple intra abdominal collections and a fistula to the skin,   ostomy bag over fistual    no further surgical plans    on antibiotics        Prior therapy    6/18 Initially had GE cancer ; S/P CT/RT neoadjuvant therapy  Surgery Dr Schumacher/ [facility] : path CR  6/19 Recurrent Bilateral [place] tumors/ omental metastases : Had a second surgery / [facility]  Path Adenocarcinoma CPS score 1 ( Low) NGS no target mutations ( Neg TMB, HER, PDL1/ etc)  7/19 FOLFOX salvage therapy   11/19  5FU CI maintenance ( Had Oxaliplatin neuropathy G III   Recent partial SBO/ CT omental nodules/ mets  FOLFIRI chemotherapy : GI symptoms / asthenia/ Fatigue / alopecia   S/P Immunotherapy x 1       MEDICATIONS  (STANDING):  dextrose 40% Gel 15 Gram(s) Oral once  dextrose 5%. 1000 milliLiter(s) (50 mL/Hr) IV Continuous <Continuous>  dextrose 5%. 1000 milliLiter(s) (100 mL/Hr) IV Continuous <Continuous>  dextrose 50% Injectable 25 Gram(s) IV Push once  dextrose 50% Injectable 12.5 Gram(s) IV Push once  dextrose 50% Injectable 25 Gram(s) IV Push once  enoxaparin Injectable 40 milliGRAM(s) SubCutaneous every 24 hours  fat emulsion (Fish Oil and Plant Based) 20% Infusion 0.68 Gm/kG/Day (20.7 mL/Hr) IV Continuous <Continuous>  glucagon  Injectable 1 milliGRAM(s) IntraMuscular once  HYDROmorphone PCA (1 mG/mL) 30 milliLiter(s) PCA Continuous PCA Continuous  insulin lispro (ADMELOG) corrective regimen sliding scale   SubCutaneous three times a day before meals  lactated ringers. 1000 milliLiter(s) (120 mL/Hr) IV Continuous <Continuous>  octreotide  Injectable 100 MICROGram(s) IV Push every 8 hours  pantoprazole  Injectable 40 milliGRAM(s) IV Push every 12 hours  Parenteral Nutrition - Adult 1 Each (83 mL/Hr) TPN Continuous <Continuous>  Parenteral Nutrition - Adult 1 Each (83 mL/Hr) TPN Continuous <Continuous>  piperacillin/tazobactam IVPB.. 3.375 Gram(s) IV Intermittent every 8 hours    MEDICATIONS  (PRN):  acetaminophen   IVPB .. 1000 milliGRAM(s) IV Intermittent every 6 hours PRN Mild Pain (1 - 3)  ALBUTerol    90 MICROgram(s) HFA Inhaler 2 Puff(s) Inhalation every 6 hours PRN Shortness of Breath  HYDROmorphone PCA (1 mG/mL) Rescue Clinician Bolus 0.5 milliGRAM(s) IV Push every 15 minutes PRN for Pain Scale GREATER THAN 6  metoprolol tartrate Injectable 5 milliGRAM(s) IV Push every 6 hours PRN SBP > 140  naloxone Injectable 0.1 milliGRAM(s) IV Push every 3 minutes PRN For ANY of the following changes in patient status:  A. RR LESS THAN 10 breaths per minute, B. Oxygen saturation LESS THAN 90%, C. Sedation score of 6  naloxone Injectable 0.1 milliGRAM(s) IV Push every 3 minutes PRN For ANY of the following changes in patient status:  A. RR LESS THAN 10 breaths per minute, B. Oxygen saturation LESS THAN 90%, C. Sedation score of 6  ondansetron Injectable 4 milliGRAM(s) IV Push every 6 hours PRN Nausea  prochlorperazine   Injectable 5 milliGRAM(s) IV Push every 6 hours PRN nausea and/or vomiting    ROS  draining abdominal wound  no fever  weak    Vital Signs Last 24 Hrs  T(C): 36.8 (14 Mar 2022 17:53), Max: 37 (14 Mar 2022 05:00)  T(F): 98.3 (14 Mar 2022 17:53), Max: 98.6 (14 Mar 2022 05:00)  HR: 78 (14 Mar 2022 22:00) (74 - 98)  BP: 119/54 (14 Mar 2022 22:00) (81/50 - 137/64)  BP(mean): 73 (14 Mar 2022 22:00) (60 - 100)  RR: 8 (14 Mar 2022 22:00) (5 - 25)  SpO2: 95% (14 Mar 2022 22:00) (94% - 98%)    PHYSICAL EXAM:    weak appearing  pale  chest clear  abdomen soft  ostomy bag over surgical wound        LABS:                        10.7   15.94 )-----------( 119      ( 11 Mar 2022 07:54 )             31.9     03-11    136  |  111<H>  |  29<H>  ----------------------------<  151<H>  3.9   |  21<L>  |  0.44<L>    Ca    7.3      11 Mar 2022 06:41  Phos  1.7     03-11  Mg     2.0     03-11    TPro  3.5<L>  /  Alb  1.1<L>  /  TBili  0.4  /  DBili  x   /  AST  93<H>  /  ALT  189<H>  /  AlkPhos  139<H>  03-11      Radiology:    ACC: [id number] EXAM:  CT ABDOMEN AND PELVIS OC IC                          PROCEDURE DATE:  03/12/2022          INTERPRETATION:  CLINICAL INFORMATION: Malignant small bowel obstruction   status post exploratory laparotomy with palliative intestinal bypass.   Wound dehiscent.    COMPARISON: None.    CONTRAST/COMPLICATIONS:  IV Contrast: Omnipaque 350  90 cc administered   10 cc discarded  Oral Contrast: Omnipaque 300 + Fruit 2o  Complications: None reported at time of study completion    PROCEDURE:  CT of the Abdomen and Pelvis was performed.  Sagittal and coronal reformats were performed.    FINDINGS:  LOWER CHEST: Bilateral small pleural effusions with bilateral   subsegmental atelectasis.    LIVER: Hepatic steatosis. Large posterior RIGHT lobe cyst. Previously   identified enhancing lesion in segment 4A is poorly visualized but still   present (2-22). Multiple hepatic capsular implants are also less well   seen than on the prior study but remain present.  BILE DUCTS: Dilated common bile duct measuring 1.7 cm. Mild intrahepatic   biliary ductal dilatation. Appearance is unchanged from prior study.  GALLBLADDER: Cholecystectomy.  SPLEEN: Within normal limits.  PANCREAS: Within normal limits.  ADRENALS: Stable nodular LEFT adrenal gland. Stable large nodule on the   RIGHT adrenal.  KIDNEYS/URETERS: Within normal limits.    BLADDER: Incompletely distended.  REPRODUCTIVE ORGANS: Probable supracervical hysterectomy.    BOWEL: Contrast is seen within the colon. There is significant reduction   in small bowel dilatation with no evidence of obstruction remaining.   Markedly thickened abnormal small bowel loops with wall edema Evidence of   small bowel anastomosis consistent with the given history of bypass.   Appendix is normal.  Esophagectomy with gastric pull-up.  PERITONEUM: There are multifocal intra-abdominal fluid collections with   enhancing walls. This is seen within the deep pelvis with an irregular   configuration. Similar collection is seen in the mid upper pelvis   measuring 4.9 x 2.6 x 4.9 cm (2-88, 601-78). Similar collection is seen   in the anterior LEFT upper quadrant and measures 7.6 x 2.6 x 6.0 cm (2-34   and 602-39). Hyperdense collection with anterior gas is seen in the   anterior peritoneum just below the midline incision and measures 12.1 x   2.3 x 6.2 cm (2-61 and 601-74). Within the RIGHT peritoneal cavity   (2-61), there is a collection of contrast which is not contained within   bowel and is consistent with perforation. Hyperdense contrast material is   also seen more inferiorly slightly dilute. Overall this collection   containing contrast measures 5.2 x 2.4 x 13.3 cm (2-75 and 602-33). The   anterior intraperitoneal hyperdense collection may communicate with this   right-sided collection.  Amorphous collection of contrast within the mid pelvis (2-85 measuring   2.2 x 4.6 cm may be contained within bowel but has a markedly abnormal   configuration.  LEFT anterior paracolic collection measuring 4.9 x 2.7 x 11.5 cm (2-88   and 602-39).  There is postoperative pneumoperitoneum.  VESSELS: Atherosclerotic changes.  RETROPERITONEUM/LYMPH NODES: No lymphadenopathy.  ABDOMINAL WALL: Extensive subcutaneous emphysema along the right-sided   abdominal wall as well as the midline incision.  BONES: Within normal limits.    IMPRESSION:  1.  Extravasated contrast material seen predominantly in the RIGHT   anterior paracolic gutter and peritoneum consistent with bowel   perforation and intraperitoneal leak. Additional hyperdense collection is   seen in the anterior peritoneum just beneath the midline incision and   likely communicates with the right-sided collection.  2.  Multifocal deep pelvic collections with enhancing walls likely   representing interloop and pelvic abscesses.  3.  LEFT anterior paracolic collection with gas also likely abscess.   Additional intra-abdominal collections/abscesses.  4.  Pneumoperitoneum.  5.  Additional findings described above.    Critical findings were discussed with AMELIA Noel 3/12/2022 8:31 PM   by Dr. Johanne Lowe with read back confirmation.    --- End of Report ---            JOHANNE LOWE MD; Attending Radiologist

## 2022-03-14 NOTE — CHART NOTE - NSCHARTNOTEFT_GEN_A_CORE
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      Findings of current CT scan: Extravasated contrast material seen predominantly in the RIGHT anterior paracolic gutter and peritoneum consistent with bowel perforation and intraperitoneal leak. Additional hyperdense collection is seen in the anterior peritoneum just beneath the midline incision and likely communicates with the right-sided collection. Multifocal deep pelvic collections with enhancing walls likely representing interloop and pelvic abscesses. LEFT anterior paracolic collection with gas also likely abscess. Additional intra-abdominal collections/abscesses. Pneumoperitoneum.  3/13: As per Heme/onc : Overall prognosis is grave as chemotherapeutic options are limited. Patient and her family have been somewhat unrealistic as to her prognosis. Will continue with TPN at this time as discussed with colorectal surgery today.     *Current status: having leakage still from the incision even with the ostomy bag. Continues NPO w/ IVF and TPN. Possible removal of pigtail prior to d/c.    *labs reviewed; no significant changes in lab values will continue with current TPN order w/o change. Will monitor Mg as high-normal and uptrending.    03-14    141  |  112<H>  |  27<H>  ----------------------------<  108<H>  3.7   |  24  |  0.46<L>    Ca    7.6<L>      14 Mar 2022 07:23  Phos  3.4     03-14  Mg     2.5     03-14    TPro  3.9<L>  /  Alb  1.3<L>  /  TBili  0.4  /  DBili  x   /  AST  34  /  ALT  61  /  AlkPhos  106  03-14    BMI: BMI (kg/m2): 26 (03-03-22 @ 22:52)  HbA1c: A1C with Estimated Average Glucose Result: 6.3 % (03-10-22 @ 06:22)    BP: 93/45 (03-13-22 @ 06:00) (70/42 - 137/96)  Lipid Panel: Date/Time: 03-09-22 @ 01:25  Cholesterol, Serum: --  Direct LDL: --  HDL Cholesterol, Serum: --  Total Cholesterol/HDL Ration Measurement: --  Triglycerides, Serum: 207    Vitamin D, 25-Hydroxy: 34.2 ng/mL (03-10-22 @ 11:47)  Vitamin D, 1, 25-Dihydroxy: 30.1 pg/mL (03-10-22 @ 11:47)      POCT Blood Glucose.: 119 mg/dL (14 Mar 2022 06:47)  POCT Blood Glucose.: 138 mg/dL (14 Mar 2022 01:47)  POCT Blood Glucose.: 130 mg/dL (13 Mar 2022 18:52)  POCT Blood Glucose.: 150 mg/dL (13 Mar 2022 12:32)        *pertinent meds:  admelog, LR, octreotide, pantoprazole, antibiotics, acetaminophen, metoprolol, zofran, prochlorperazine      *I&O's Detail    13 Mar 2022 07:01  -  14 Mar 2022 07:00  --------------------------------------------------------  IN:    Fat Emulsion (Fish Oil &amp; Plant Based) 20% Infusion: 250 mL    IV PiggyBack: 200 mL    Lactated Ringers: 1200 mL    PPN (Peripheral Parenteral Nutrition): 2300 mL  Total IN: 3950 mL    OUT:    Chest Tube (mL): 220 mL    Colostomy (mL): 375 mL    Stool (mL): 400 mL    Voided (mL): 2050 mL  Total OUT: 3045 mL    Total NET: 905 mL    *positive fluid status  *BM (+) on 3/14; liquid.  pt not on bowel regimen.  *Currently NPO/sips only    *Gurpreet Score of 17; no PU documented. (+1) generalized edema documented - alb 1.3; may be skewing wt/ appearance.    *Continues to meet criteria for severe malnutrition in acute on chronic illness r/t decreased PO intake 2/2 CA and altered GI function AEB meeting <50% of ENN x5 dys and mild muscle/fat wasting, 4.5% wt loss x 5 days    ESTIMATED NUTR NEEDS: based on 73Kg admit wt  3080-7878 Kcal (25-30 Kcal/Kg)  110-146g protein (1.5-2 g/Kg)  1825-2190mL (25-30 mL/Kg)      Weight History: No new weights obtained   69.8Kg (3/8) wt loss of 3.3Kg in 5 days (4.5%), clinically significant.  Height (cm): 167.6 (03-03-22 @ 22:52)  Weight (kg): 73.1 (03-03-22 @ 22:52)  BMI (kg/m2): 26 (03-03-22 @ 22:52)  BSA (m2): 1.82 (03-03-22 @ 22:52)  IBW: 59Kg    **no changes made to TPN bag**  TPN Recommendations: via implanted port    Total Volume: 2000 mL  110 g  Amino Acids  285 g Dextrose   50 g Lipids 20%  143 mEq Sodium Chloride  0 mEq Sodium Acetate  20 mmol Sodium Phosphate  30 mEq Potassium Chloride  3 mEq Potassium Acetate  15 mmol Potassium Phosphate  0 mEq Calcium Gluconate  8 mEq Magnesium Sulfate  100 mg Thiamine  18 units Regular Insulin  1 ml Trace Elements   10 ml MVI    Total Calories  1909 (Meets  100%  of  Estimated Energy needs  and    100 %  Protein needs)    Additional Recommendations:  1) Daily weights  2) Strict I & O's  3) Daily lyte checks  4) Weekly triglycerides/LFT checks  5) POCT q6hrs; maintain POCT of 140-180mg/dL; add sliding scale insulin for additional coverage prn  6) add calcium gluconate 10 mEq outside of PN bag to ensure patient doesn't go low in calcium  7) Consider supplementing vitamin D 1000 units daily  8) Consider giving IV albumin 2/2 low alb (1.3) and edema    *will monitor and adjust treatement plan prn*  Izzy Hernandez MS, RDN, [phone]

## 2022-03-14 NOTE — PROGRESS NOTE ADULT - ASSESSMENT
Metastatic gastroesophageal carcinoma  Omental metastases  Small bowel obstruction status post bypass surgery    clinical course complicated by what appears to be perforation, abdominal abscess and a colocutaneous fistual    the above complications now are contraindications for any chemotherapy or antiangiogenic therapy options  risks >> benefit    recommend :  palliative care evaluation    ROSALIE LOBO  discussed with primary oncologist Dr Whyte

## 2022-03-14 NOTE — PROGRESS NOTE ADULT - SUBJECTIVE AND OBJECTIVE BOX
Pt. seen and examined at bedside this morning. Patient reports no pain at this time. She is having leakage still from the incision.  She denies fever, chest pain, SOB, nausea, vomiting, diarrhea or constipation. No acute events overnight.     Pt. seen and examined at bedside this morning. Patient reports no pain at this time. She is having leakage still from the incision even with the ostomy bag.  She denies fever, chest pain, SOB, nausea, vomiting, diarrhea or constipation. No acute events overnight.     Pt. seen and examined at bedside this morning. Patient reports no pain at this time. She is having leakage still from the incision even with the ostomy bag.  She denies fever, chest pain, SOB, nausea, vomiting, diarrhea or constipation. No acute events overnight.    Vitals:  T(C): 36.8 ( @ 08:25), Max: 37 ( @ 05:00)  HR: 78 ( @ 08:00) (74 - 92)  BP: 108/54 ( @ 08:00) (93/50 - 124/96)  RR: 25 ( @ 08:00) (12 - 25)  SpO2: 95% ( @ 08:00) (95% - 100%)     @ 07:01  -   @ 07:00  --------------------------------------------------------  IN:    Fat Emulsion (Fish Oil &amp; Plant Based) 20% Infusion: 250 mL    IV PiggyBack: 200 mL    Lactated Ringers: 1200 mL    PPN (Peripheral Parenteral Nutrition): 2300 mL  Total IN: 3950 mL    OUT:    Chest Tube (mL): 220 mL    Colostomy (mL): 375 mL    Stool (mL): 400 mL    Voided (mL): 2050 mL  Total OUT: 3045 mL    Total NET: 905 mL       @ 07:01  -   @ 09:59  --------------------------------------------------------  IN:  Total IN: 0 mL    OUT:    Voided (mL): 200 mL  Total OUT: 200 mL    Total NET: -200 mL        Physical Exam:  General: AAOx3, Well developed, NAD  Chest: Normal respiratory effort  Heart: RRR  Abdomen: midline with stool drainage, bag over incision with stool around dressing, abd soft, nondistended, nontender  Neuro/Psych: No localized deficits. Normal speech, normal tone  Skin: Normal, no rashes, no lesions noted.   Extremities: Warm, well perfused, 2+ edema b/l LE, Pulses intact       @ 07:23                    8.5  CBC: 9.26>)-------(<125                     25.1                 141 | 112 | 27    CMP:  ----------------------< 108               3.7 | 24 | 0.46                      Ca:7.6  Phos:3.4  M.5               0.4|      |34        LFTs:  ------|106|-----             -|      |-  0313 @ 03:49                    10.6  CBC: 17.72>)-------(<129                     32.8                 136 | 111 | 37    CMP:  ----------------------< 167               4.2 | 21 | 0.58                      Ca:7.9  Phos:3.5  M.3               -|      |-        LFTs:  ------|-|-----             -|      |-   @ 22:14                    11.5  CBC: 21.80>)-------(<144                     34.7                 135 | 110 | 35    CMP:  ----------------------< 158               4.1 | 21 | 0.59                      Ca:8.0  Phos:-  Mg:-               0.5|      |53        LFTs:  ------|143|-----             -|      |-      Current Inpatient Medications:  acetaminophen   IVPB .. 1000 milliGRAM(s) IV Intermittent every 6 hours PRN  ALBUTerol    90 MICROgram(s) HFA Inhaler 2 Puff(s) Inhalation every 6 hours PRN  dextrose 40% Gel 15 Gram(s) Oral once  dextrose 5%. 1000 milliLiter(s) (50 mL/Hr) IV Continuous <Continuous>  dextrose 5%. 1000 milliLiter(s) (100 mL/Hr) IV Continuous <Continuous>  dextrose 50% Injectable 25 Gram(s) IV Push once  dextrose 50% Injectable 12.5 Gram(s) IV Push once  dextrose 50% Injectable 25 Gram(s) IV Push once  enoxaparin Injectable 40 milliGRAM(s) SubCutaneous every 24 hours  fat emulsion (Fish Oil and Plant Based) 20% Infusion 0.69 Gm/kG/Day (20.83 mL/Hr) IV Continuous <Continuous>  fat emulsion (Fish Oil and Plant Based) 20% Infusion 0.68 Gm/kG/Day (20.7 mL/Hr) IV Continuous <Continuous>  glucagon  Injectable 1 milliGRAM(s) IntraMuscular once  HYDROmorphone PCA (1 mG/mL) 30 milliLiter(s) PCA Continuous PCA Continuous  HYDROmorphone PCA (1 mG/mL) Rescue Clinician Bolus 0.5 milliGRAM(s) IV Push every 15 minutes PRN  insulin lispro (ADMELOG) corrective regimen sliding scale   SubCutaneous three times a day before meals  lactated ringers. 1000 milliLiter(s) (120 mL/Hr) IV Continuous <Continuous>  metoprolol tartrate Injectable 5 milliGRAM(s) IV Push every 6 hours PRN  naloxone Injectable 0.1 milliGRAM(s) IV Push every 3 minutes PRN  naloxone Injectable 0.1 milliGRAM(s) IV Push every 3 minutes PRN  octreotide  Injectable 100 MICROGram(s) IV Push every 8 hours  ondansetron Injectable 4 milliGRAM(s) IV Push every 6 hours PRN  pantoprazole  Injectable 40 milliGRAM(s) IV Push every 12 hours  Parenteral Nutrition - Adult 1 Each (83 mL/Hr) TPN Continuous <Continuous>  Parenteral Nutrition - Adult 1 Each (83 mL/Hr) TPN Continuous <Continuous>  piperacillin/tazobactam IVPB.. 3.375 Gram(s) IV Intermittent every 6 hours  prochlorperazine   Injectable 5 milliGRAM(s) IV Push every 6 hours PRN

## 2022-03-15 LAB
ALBUMIN SERPL ELPH-MCNC: 1.2 G/DL — LOW (ref 3.3–5)
ALP SERPL-CCNC: 125 U/L — HIGH (ref 40–120)
ALT FLD-CCNC: 58 U/L — SIGNIFICANT CHANGE UP (ref 12–78)
ANION GAP SERPL CALC-SCNC: 5 MMOL/L — SIGNIFICANT CHANGE UP (ref 5–17)
AST SERPL-CCNC: 39 U/L — HIGH (ref 15–37)
BASOPHILS # BLD AUTO: 0.08 K/UL — SIGNIFICANT CHANGE UP (ref 0–0.2)
BASOPHILS NFR BLD AUTO: 1 % — SIGNIFICANT CHANGE UP (ref 0–2)
BILIRUB SERPL-MCNC: 0.5 MG/DL — SIGNIFICANT CHANGE UP (ref 0.2–1.2)
BUN SERPL-MCNC: 18 MG/DL — SIGNIFICANT CHANGE UP (ref 7–23)
CALCIUM SERPL-MCNC: 7.3 MG/DL — LOW (ref 8.5–10.1)
CHLORIDE SERPL-SCNC: 107 MMOL/L — SIGNIFICANT CHANGE UP (ref 96–108)
CO2 SERPL-SCNC: 27 MMOL/L — SIGNIFICANT CHANGE UP (ref 22–31)
CREAT SERPL-MCNC: 0.39 MG/DL — LOW (ref 0.5–1.3)
EGFR: 112 ML/MIN/1.73M2 — SIGNIFICANT CHANGE UP
EOSINOPHIL # BLD AUTO: 0.31 K/UL — SIGNIFICANT CHANGE UP (ref 0–0.5)
EOSINOPHIL NFR BLD AUTO: 4 % — SIGNIFICANT CHANGE UP (ref 0–6)
GLUCOSE SERPL-MCNC: 101 MG/DL — HIGH (ref 70–99)
HCT VFR BLD CALC: 27.9 % — LOW (ref 34.5–45)
HGB BLD-MCNC: 9.5 G/DL — LOW (ref 11.5–15.5)
LYMPHOCYTES # BLD AUTO: 1.08 K/UL — SIGNIFICANT CHANGE UP (ref 1–3.3)
LYMPHOCYTES # BLD AUTO: 14 % — SIGNIFICANT CHANGE UP (ref 13–44)
MAGNESIUM SERPL-MCNC: 2 MG/DL — SIGNIFICANT CHANGE UP (ref 1.6–2.6)
MCHC RBC-ENTMCNC: 32.6 PG — SIGNIFICANT CHANGE UP (ref 27–34)
MCHC RBC-ENTMCNC: 34.1 GM/DL — SIGNIFICANT CHANGE UP (ref 32–36)
MCV RBC AUTO: 95.9 FL — SIGNIFICANT CHANGE UP (ref 80–100)
MONOCYTES # BLD AUTO: 0.61 K/UL — SIGNIFICANT CHANGE UP (ref 0–0.9)
MONOCYTES NFR BLD AUTO: 8 % — SIGNIFICANT CHANGE UP (ref 2–14)
NEUTROPHILS # BLD AUTO: 5.45 K/UL — SIGNIFICANT CHANGE UP (ref 1.8–7.4)
NEUTROPHILS NFR BLD AUTO: 64 % — SIGNIFICANT CHANGE UP (ref 43–77)
NRBC # BLD: SIGNIFICANT CHANGE UP /100 WBCS (ref 0–0)
PHOSPHATE SERPL-MCNC: 3.4 MG/DL — SIGNIFICANT CHANGE UP (ref 2.5–4.5)
PLATELET # BLD AUTO: 169 K/UL — SIGNIFICANT CHANGE UP (ref 150–400)
POTASSIUM SERPL-MCNC: 3.5 MMOL/L — SIGNIFICANT CHANGE UP (ref 3.5–5.3)
POTASSIUM SERPL-SCNC: 3.5 MMOL/L — SIGNIFICANT CHANGE UP (ref 3.5–5.3)
PROT SERPL-MCNC: 4.1 GM/DL — LOW (ref 6–8.3)
RBC # BLD: 2.91 M/UL — LOW (ref 3.8–5.2)
RBC # FLD: 14.6 % — HIGH (ref 10.3–14.5)
SARS-COV-2 RNA SPEC QL NAA+PROBE: SIGNIFICANT CHANGE UP
SODIUM SERPL-SCNC: 139 MMOL/L — SIGNIFICANT CHANGE UP (ref 135–145)
TRIGL SERPL-MCNC: 106 MG/DL — SIGNIFICANT CHANGE UP
WBC # BLD: 7.68 K/UL — SIGNIFICANT CHANGE UP (ref 3.8–10.5)
WBC # FLD AUTO: 7.68 K/UL — SIGNIFICANT CHANGE UP (ref 3.8–10.5)

## 2022-03-15 PROCEDURE — 99231 SBSQ HOSP IP/OBS SF/LOW 25: CPT

## 2022-03-15 PROCEDURE — 99253 IP/OBS CNSLTJ NEW/EST LOW 45: CPT

## 2022-03-15 PROCEDURE — 99498 ADVNCD CARE PLAN ADDL 30 MIN: CPT

## 2022-03-15 PROCEDURE — 99497 ADVNCD CARE PLAN 30 MIN: CPT

## 2022-03-15 PROCEDURE — 71045 X-RAY EXAM CHEST 1 VIEW: CPT | Mod: 26

## 2022-03-15 RX ORDER — ELECTROLYTE SOLUTION,INJ
1 VIAL (ML) INTRAVENOUS
Refills: 0 | Status: DISCONTINUED | OUTPATIENT
Start: 2022-03-15 | End: 2022-03-15

## 2022-03-15 RX ORDER — I.V. FAT EMULSION 20 G/100ML
0.68 EMULSION INTRAVENOUS
Qty: 50 | Refills: 0 | Status: DISCONTINUED | OUTPATIENT
Start: 2022-03-15 | End: 2022-03-15

## 2022-03-15 RX ORDER — SODIUM CHLORIDE 9 MG/ML
1000 INJECTION, SOLUTION INTRAVENOUS
Refills: 0 | Status: DISCONTINUED | OUTPATIENT
Start: 2022-03-15 | End: 2022-03-15

## 2022-03-15 RX ADMIN — OCTREOTIDE ACETATE 100 MICROGRAM(S): 200 INJECTION, SOLUTION INTRAVENOUS; SUBCUTANEOUS at 22:43

## 2022-03-15 RX ADMIN — OCTREOTIDE ACETATE 100 MICROGRAM(S): 200 INJECTION, SOLUTION INTRAVENOUS; SUBCUTANEOUS at 16:24

## 2022-03-15 RX ADMIN — PANTOPRAZOLE SODIUM 40 MILLIGRAM(S): 20 TABLET, DELAYED RELEASE ORAL at 22:17

## 2022-03-15 RX ADMIN — Medication 5 MILLIGRAM(S): at 15:11

## 2022-03-15 RX ADMIN — PIPERACILLIN AND TAZOBACTAM 25 GRAM(S): 4; .5 INJECTION, POWDER, LYOPHILIZED, FOR SOLUTION INTRAVENOUS at 22:14

## 2022-03-15 RX ADMIN — PANTOPRAZOLE SODIUM 40 MILLIGRAM(S): 20 TABLET, DELAYED RELEASE ORAL at 11:38

## 2022-03-15 RX ADMIN — Medication 2: at 19:28

## 2022-03-15 RX ADMIN — PIPERACILLIN AND TAZOBACTAM 25 GRAM(S): 4; .5 INJECTION, POWDER, LYOPHILIZED, FOR SOLUTION INTRAVENOUS at 05:38

## 2022-03-15 RX ADMIN — ENOXAPARIN SODIUM 40 MILLIGRAM(S): 100 INJECTION SUBCUTANEOUS at 15:12

## 2022-03-15 RX ADMIN — ONDANSETRON 4 MILLIGRAM(S): 8 TABLET, FILM COATED ORAL at 22:45

## 2022-03-15 RX ADMIN — PIPERACILLIN AND TAZOBACTAM 25 GRAM(S): 4; .5 INJECTION, POWDER, LYOPHILIZED, FOR SOLUTION INTRAVENOUS at 15:11

## 2022-03-15 RX ADMIN — I.V. FAT EMULSION 20.7 GM/KG/DAY: 20 EMULSION INTRAVENOUS at 22:30

## 2022-03-15 RX ADMIN — Medication 1 EACH: at 22:31

## 2022-03-15 RX ADMIN — OCTREOTIDE ACETATE 100 MICROGRAM(S): 200 INJECTION, SOLUTION INTRAVENOUS; SUBCUTANEOUS at 05:41

## 2022-03-15 NOTE — CHART NOTE - NSCHARTNOTEFT_GEN_A_CORE
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      Findings of current CT scan: Extravasated contrast material seen predominantly in the RIGHT anterior paracolic gutter and peritoneum consistent with bowel perforation and intraperitoneal leak. Additional hyperdense collection is seen in the anterior peritoneum just beneath the midline incision and likely communicates with the right-sided collection. Multifocal deep pelvic collections with enhancing walls likely representing interloop and pelvic abscesses. LEFT anterior paracolic collection with gas also likely abscess. Additional intra-abdominal collections/abscesses. Pneumoperitoneum.  3/13: As per Heme/onc : Overall prognosis is grave as chemotherapeutic options are limited. Patient and her family have been somewhat unrealistic as to her prognosis. Will continue with TPN at this time as discussed with colorectal surgery today.     *Current status: having leakage still from the incision even with the ostomy bag. Continues NPO w/ IVF and TPN. Possible removal of pigtail prior to d/c. 3/14: + abdominal pain, minimal flatus, + belching    *labs reviewed; no significant changes in lab values except K low-normal and downtrending, will increase by 10 mEq in bag. All other lytes/ min WNL.  POCT WNL.  Pending new triglyceride level.    03-15    139  |  107  |  18  ----------------------------<  101<H>  3.5   |  27  |  0.39<L>    Ca    7.3<L>      15 Mar 2022 04:40  Phos  3.4     03-15  Mg     2.0     03-15    TPro  4.1<L>  /  Alb  1.2<L>  /  TBili  0.5  /  DBili  x   /  AST  39<H>  /  ALT  58  /  AlkPhos  125<H>  03-15      BMI: BMI (kg/m2): 26 (03-03-22 @ 22:52)  HbA1c: A1C with Estimated Average Glucose Result: 6.3 % (03-10-22 @ 06:22)    BP: 93/45 (03-13-22 @ 06:00) (70/42 - 137/96)  Lipid Panel: Date/Time: 03-09-22 @ 01:25  Triglycerides, Serum: 207    Vitamin D, 25-Hydroxy: 34.2 ng/mL (03-10-22 @ 11:47)  Vitamin D, 1, 25-Dihydroxy: 30.1 pg/mL (03-10-22 @ 11:47)    POCT Blood Glucose.: 102 mg/dL (15 Mar 2022 05:27)  POCT Blood Glucose.: 88 mg/dL (15 Mar 2022 02:41)  POCT Blood Glucose.: 77 mg/dL (14 Mar 2022 23:32)  POCT Blood Glucose.: 101 mg/dL (14 Mar 2022 18:01)  POCT Blood Glucose.: 106 mg/dL (14 Mar 2022 12:24)      *pertinent meds: admelog, LR, octreotide, pantoprazole, antibiotics, acetaminophen, metoprolol, zofran, prochlorperazine      *I&O's Detail    14 Mar 2022 07:01  -  15 Mar 2022 07:00  --------------------------------------------------------  IN:    Fat Emulsion (Fish Oil &amp; Plant Based) 20% Infusion: 250 mL    IV PiggyBack: 218 mL    PPN (Peripheral Parenteral Nutrition): 960 mL  Total IN: 1428 mL    OUT:    Chest Tube (mL): 250 mL    Colostomy (mL): 900 mL    Voided (mL): 2500 mL  Total OUT: 3650 mL    Total NET: -2222 mL      *Negative fluid status; may need to adjust total volume  *BM (+) on 3/14; liquid.  pt not on bowel regimen.  *Currently NPO/sips only    *Gurpreet Score of 17; no PU documented. (+1) generalized edema documented - alb 1.3; may be skewing wt/ appearance.    *Continues to meet criteria for severe malnutrition in acute on chronic illness r/t decreased PO intake 2/2 CA and altered GI function AEB meeting <50% of ENN x5 dys and mild muscle/fat wasting, 4.5% wt loss x 5 days    ESTIMATED NUTR NEEDS: based on 73Kg admit wt  8546-5010 Kcal (25-30 Kcal/Kg)  110-146g protein (1.5-2 g/Kg)  1825-2190mL (25-30 mL/Kg)      Weight History: No new weights obtained   69.8Kg (3/8) wt loss of 3.3Kg in 5 days (4.5%), clinically significant.  Height (cm): 167.6 (03-03-22 @ 22:52)  Weight (kg): 73.1 (03-03-22 @ 22:52)  BMI (kg/m2): 26 (03-03-22 @ 22:52)  BSA (m2): 1.82 (03-03-22 @ 22:52)  IBW: 59Kg    **no changes made to TPN bag**  TPN Recommendations: via implanted port    Total Volume: 2000 mL  110 g  Amino Acids  285 g Dextrose   50 g Lipids 20%  143 mEq Sodium Chloride  0 mEq Sodium Acetate  20 mmol Sodium Phosphate  30 mEq Potassium Chloride  3 mEq Potassium Acetate  15 mmol Potassium Phosphate  0 mEq Calcium Gluconate  8 mEq Magnesium Sulfate  100 mg Thiamine  18 units Regular Insulin  1 ml Trace Elements   10 ml MVI    Total Calories  1909 (Meets  100%  of  Estimated Energy needs  and    100 %  Protein needs)    Additional Recommendations:  1) Daily weights  2) Strict I & O's  3) Daily lyte checks  4) Weekly triglycerides/LFT checks  5) POCT q6hrs; maintain POCT of 140-180mg/dL; add sliding scale insulin for additional coverage prn  6) add calcium gluconate 10 mEq outside of PN bag to ensure patient doesn't go low in calcium  7) Consider supplementing vitamin D 1000 units daily  8) Consider giving IV albumin 2/2 low alb (1.3) and edema    *will monitor and adjust treatement plan prn*  Izzy Hernandez, MS, RDN, [phone] Clinical Nutrition PN Follow Up Note    *60yo female admitted for worsening SOB due to Lt pleural effusion s/p chest tube placement, with recurrent partial SBO, abnormal LFT's (liver mets?), 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      Findings of current CT scan: Extravasated contrast material seen predominantly in the RIGHT anterior paracolic gutter and peritoneum consistent with bowel perforation and intraperitoneal leak. Additional hyperdense collection is seen in the anterior peritoneum just beneath the midline incision and likely communicates with the right-sided collection. Multifocal deep pelvic collections with enhancing walls likely representing interloop and pelvic abscesses. LEFT anterior paracolic collection with gas also likely abscess. Additional intra-abdominal collections/abscesses. Pneumoperitoneum.  3/13: As per Heme/onc : Overall prognosis is grave as chemotherapeutic options are limited. Patient and her family have been somewhat unrealistic as to her prognosis. Will continue with TPN at this time as discussed with colorectal surgery today.     *Current status: having leakage still from the incision even with the ostomy bag. Continues NPO w/ IVF and TPN. Possible removal of pigtail prior to d/c. 3/14: + abdominal pain, minimal flatus, + belching    *labs reviewed; no significant changes in lab values except K low-normal and downtrending, will increase by 10 mEq in bag. All other lytes/ min WNL.  POCT WNL.  Pending new triglyceride level.    03-15    139  |  107  |  18  ----------------------------<  101<H>  3.5   |  27  |  0.39<L>    Ca    7.3<L>      15 Mar 2022 04:40  Phos  3.4     03-15  Mg     2.0     03-15    TPro  4.1<L>  /  Alb  1.2<L>  /  TBili  0.5  /  DBili  x   /  AST  39<H>  /  ALT  58  /  AlkPhos  125<H>  03-15      BMI: BMI (kg/m2): 26 (03-03-22 @ 22:52)  HbA1c: A1C with Estimated Average Glucose Result: 6.3 % (03-10-22 @ 06:22)    BP: 93/45 (03-13-22 @ 06:00) (70/42 - 137/96)  Lipid Panel: Date/Time: 03-09-22 @ 01:25  Triglycerides, Serum: 207    Vitamin D, 25-Hydroxy: 34.2 ng/mL (03-10-22 @ 11:47)  Vitamin D, 1, 25-Dihydroxy: 30.1 pg/mL (03-10-22 @ 11:47)    POCT Blood Glucose.: 102 mg/dL (15 Mar 2022 05:27)  POCT Blood Glucose.: 88 mg/dL (15 Mar 2022 02:41)  POCT Blood Glucose.: 77 mg/dL (14 Mar 2022 23:32)  POCT Blood Glucose.: 101 mg/dL (14 Mar 2022 18:01)  POCT Blood Glucose.: 106 mg/dL (14 Mar 2022 12:24)      *pertinent meds: admelog, LR, octreotide, pantoprazole, antibiotics, acetaminophen, metoprolol, zofran, prochlorperazine      *I&O's Detail    14 Mar 2022 07:01  -  15 Mar 2022 07:00  --------------------------------------------------------  IN:    Fat Emulsion (Fish Oil &amp; Plant Based) 20% Infusion: 250 mL    IV PiggyBack: 218 mL    PPN (Peripheral Parenteral Nutrition): 960 mL  Total IN: 1428 mL    OUT:    Chest Tube (mL): 250 mL    Colostomy (mL): 900 mL    Voided (mL): 2500 mL  Total OUT: 3650 mL    Total NET: -2222 mL      *Negative fluid status; may need to adjust total volume of PN - receiving LR that are not documented  *BM (+) on 3/14; liquid.  pt not on bowel regimen.  *Currently NPO/sips only    *Gurpreet Score of 16; no PU documented. (+1) generalized edema documented - alb 1.2; may be skewing wt/ appearance.    *Continues to meet criteria for severe malnutrition in acute on chronic illness r/t decreased PO intake 2/2 CA and altered GI function AEB meeting <50% of ENN x5 dys and mild muscle/fat wasting, 4.5% wt loss x 5 days    ESTIMATED NUTR NEEDS: based on 73Kg admit wt  3985-5771 Kcal (25-30 Kcal/Kg)  110-146g protein (1.5-2 g/Kg)  1825-2190mL (25-30 mL/Kg)      Weight History: No new weights obtained   69.8Kg (3/8) wt loss of 3.3Kg in 5 days (4.5%), clinically significant.  Height (cm): 167.6 (03-03-22 @ 22:52)  Weight (kg): 73.1 (03-03-22 @ 22:52)  BMI (kg/m2): 26 (03-03-22 @ 22:52)  BSA (m2): 1.82 (03-03-22 @ 22:52)  IBW: 59Kg    **no changes made to TPN bag**  TPN Recommendations: via implanted port    Total Volume: 2000 mL  110 g  Amino Acids  285 g Dextrose   50 g Lipids 20%  143 mEq Sodium Chloride  0 mEq Sodium Acetate  20 mmol Sodium Phosphate  33 mEq Potassium Chloride  10 mEq Potassium Acetate  15 mmol Potassium Phosphate  0 mEq Calcium Gluconate  8 mEq Magnesium Sulfate  100 mg Thiamine  18 units Regular Insulin  1 ml Trace Elements   10 ml MVI    Total Calories  1909 (Meets  100%  of  Estimated Energy needs  and    100 %  Protein needs)    Additional Recommendations:  1) Daily weights  2) Strict I & O's  3) Daily lyte checks  4) Weekly triglycerides/LFT checks  5) POCT q6hrs; maintain POCT of 140-180mg/dL; add sliding scale insulin for additional coverage prn  6) add calcium gluconate 10 mEq outside of PN bag to ensure patient doesn't go low in calcium  7) Consider supplementing vitamin D 1000 units daily  8) Consider giving IV albumin 2/2 low alb (1.2) and edema    *will monitor and adjust treatement plan prn*  Izzy Hernandez, MS, RDN, [phone]

## 2022-03-15 NOTE — PROGRESS NOTE ADULT - ASSESSMENT
62 yo F with pmhx GEJ cancer s/p esophagectomy, [place] tumors s/p SBO presents with left sided pleural effusion   XR barium enema shows strictures in proximal and mid sigmoid colon  s/p palliative bypass, now with contained enterocutaneous fistula. CT of abd appreciated. AFVSS. No leukocytosis    Plan:  - NPO, IVF  - Monitor HH, transfuse PRN  - Monitor vitals. Bolus with colloid PRN  - Continue TPN  - Pain and nausea control PRN   - GI/DVT ppx  - Monitor bowel function  - Continue ostomy dressing for now until Meaghan delivered tomorrow  - Hospitalist recs  - Daily labs  - Encourage ambulation  - CT surgery recs- no pleurx catheter, possible removal pigtail prior to DC    Plan discussed with Dr. Sahu

## 2022-03-15 NOTE — PROGRESS NOTE ADULT - SUBJECTIVE AND OBJECTIVE BOX
Pt. seen and examined at bedside this morning. Patient reports no pain at this time. No Meaghan bag available. She is having leakage from midline incision.  She denies fever, chest pain, SOB, nausea, vomiting, diarrhea or constipation. No acute events overnight.    Vital Signs Last 24 Hrs  T(C): 36.8 (14 Mar 2022 17:53), Max: 36.8 (14 Mar 2022 08:25)  T(F): 98.3 (14 Mar 2022 17:53), Max: 98.3 (14 Mar 2022 17:53)  HR: 90 (15 Mar 2022 04:00) (75 - 99)  BP: 139/60 (15 Mar 2022 04:00) (81/50 - 145/68)  BP(mean): 82 (15 Mar 2022 04:00) (60 - 100)  RR: 9 (15 Mar 2022 04:00) (5 - 25)  SpO2: 95% (15 Mar 2022 04:00) (94% - 98%)    I&O's Detail    13 Mar 2022 07:01  -  14 Mar 2022 07:00  --------------------------------------------------------  IN:    Fat Emulsion (Fish Oil &amp; Plant Based) 20% Infusion: 250 mL    IV PiggyBack: 200 mL    Lactated Ringers: 1200 mL    PPN (Peripheral Parenteral Nutrition): 2300 mL  Total IN: 3950 mL    OUT:    Chest Tube (mL): 220 mL    Colostomy (mL): 375 mL    Stool (mL): 400 mL    Voided (mL): 2050 mL  Total OUT: 3045 mL    Total NET: 905 mL      14 Mar 2022 07:01  -  15 Mar 2022 06:19  --------------------------------------------------------  IN:    Fat Emulsion (Fish Oil &amp; Plant Based) 20% Infusion: 250 mL    IV PiggyBack: 218 mL    PPN (Peripheral Parenteral Nutrition): 960 mL  Total IN: 1428 mL    OUT:    Chest Tube (mL): 250 mL    Colostomy (mL): 600 mL    Voided (mL): 2150 mL  Total OUT: 3000 mL    Total NET: -1572 mL      Physical Exam:  General: AAOx3, Well developed, NAD  Chest: Normal respiratory effort  Heart: RRR  Abdomen: midline with stool drainage, bag over incision with stool around dressing, abd soft, nondistended, nontender  Neuro/Psych: No localized deficits. Normal speech, normal tone  Skin: Normal, no rashes, no lesions noted.   Extremities: Warm, well perfused, 2+ edema b/l LE, Pulses intact    Labs:                        9.5    7.68  )-----------( 169      ( 15 Mar 2022 04:40 )             27.9   03-15    139  |  107  |  18  ----------------------------<  101<H>  3.5   |  27  |  0.39<L>    Ca    7.3<L>      15 Mar 2022 04:40  Phos  3.4     03-15  Mg     2.0     03-15    TPro  4.1<L>  /  Alb  1.2<L>  /  TBili  0.5  /  DBili  x   /  AST  39<H>  /  ALT  58  /  AlkPhos  125<H>  03-15      Current Inpatient Medications:  acetaminophen   IVPB .. 1000 milliGRAM(s) IV Intermittent every 6 hours PRN  ALBUTerol    90 MICROgram(s) HFA Inhaler 2 Puff(s) Inhalation every 6 hours PRN  dextrose 40% Gel 15 Gram(s) Oral once  dextrose 5%. 1000 milliLiter(s) (50 mL/Hr) IV Continuous <Continuous>  dextrose 5%. 1000 milliLiter(s) (100 mL/Hr) IV Continuous <Continuous>  dextrose 50% Injectable 25 Gram(s) IV Push once  dextrose 50% Injectable 12.5 Gram(s) IV Push once  dextrose 50% Injectable 25 Gram(s) IV Push once  enoxaparin Injectable 40 milliGRAM(s) SubCutaneous every 24 hours  fat emulsion (Fish Oil and Plant Based) 20% Infusion 0.69 Gm/kG/Day (20.83 mL/Hr) IV Continuous <Continuous>  fat emulsion (Fish Oil and Plant Based) 20% Infusion 0.68 Gm/kG/Day (20.7 mL/Hr) IV Continuous <Continuous>  glucagon  Injectable 1 milliGRAM(s) IntraMuscular once  HYDROmorphone PCA (1 mG/mL) 30 milliLiter(s) PCA Continuous PCA Continuous  HYDROmorphone PCA (1 mG/mL) Rescue Clinician Bolus 0.5 milliGRAM(s) IV Push every 15 minutes PRN  insulin lispro (ADMELOG) corrective regimen sliding scale   SubCutaneous three times a day before meals  lactated ringers. 1000 milliLiter(s) (120 mL/Hr) IV Continuous <Continuous>  metoprolol tartrate Injectable 5 milliGRAM(s) IV Push every 6 hours PRN  naloxone Injectable 0.1 milliGRAM(s) IV Push every 3 minutes PRN  naloxone Injectable 0.1 milliGRAM(s) IV Push every 3 minutes PRN  octreotide  Injectable 100 MICROGram(s) IV Push every 8 hours  ondansetron Injectable 4 milliGRAM(s) IV Push every 6 hours PRN  pantoprazole  Injectable 40 milliGRAM(s) IV Push every 12 hours  Parenteral Nutrition - Adult 1 Each (83 mL/Hr) TPN Continuous <Continuous>  Parenteral Nutrition - Adult 1 Each (83 mL/Hr) TPN Continuous <Continuous>  piperacillin/tazobactam IVPB.. 3.375 Gram(s) IV Intermittent every 6 hours  prochlorperazine   Injectable 5 milliGRAM(s) IV Push every 6 hours PRN

## 2022-03-15 NOTE — CONSULT NOTE ADULT - SUBJECTIVE AND OBJECTIVE BOX
HPI:       62 yo F with pmhx GEJ cancer s/p esophagectomy, [place] tumors s/p SBO presents with left sided pleural effusion   XR barium enema shows strictures in proximal and mid sigmoid colon  s/p palliative bypass, now with contained enterocutaneous fistula.    PAIN: ( )Yes   ( )No  Level:  Location:  Intensity:    /10  Quality:  Aggravating Factors:  Alleviating Factors:  Radiation:  Duration/Timing:  Impact on ADLs:    DYSPNEA: ( ) Yes  ( ) No  Level:    PAST MEDICAL & SURGICAL HISTORY:  Essential hypertension    Gastritis    HLD (hyperlipidemia)    GERD (gastroesophageal reflux disease)    Intracranial shunt    S/P cholecystectomy    History of esophageal surgery        SOCIAL HX:    Hx opiate tolerance ( )YES  ( )NO    Baseline ADLs  (Prior to Admission)  ( ) Independent   ( )Dependent    FAMILY HISTORY:  Family history of gastric cancer (Father, Grandparent)    Family history of colon cancer        Review of Systems:    Anxiety-  Depression-  Physical Discomfort-  Dyspnea-  Constipation-  Diarrhea-  Nausea-  Vomiting-  Anorexia-  Weight Loss-   Cough-  Secretions-  Fatigue-  Weakness-  Delirium-    All other systems reviewed and negative  Unable to obtain/Limited due to:      PHYSICAL EXAM:    Vital Signs Last 24 Hrs  T(C): 37.4 (15 Mar 2022 09:15), Max: 37.4 (15 Mar 2022 09:15)  T(F): 99.3 (15 Mar 2022 09:15), Max: 99.3 (15 Mar 2022 09:15)  HR: 99 (15 Mar 2022 09:00) (75 - 100)  BP: 163/74 (15 Mar 2022 09:00) (91/78 - 163/74)  BP(mean): 99 (15 Mar 2022 09:00) (72 - 100)  RR: 15 (15 Mar 2022 09:00) (5 - 22)  SpO2: 95% (15 Mar 2022 09:00) (94% - 98%)  Daily     Daily     PPSV2:   %  FAST:    General:  Mental Status:  HEENT:  Lungs:  Cardiac:  GI:  :  Ext:  Neuro:      LABS:                        9.5    7.68  )-----------( 169      ( 15 Mar 2022 04:40 )             27.9     03-15    139  |  107  |  18  ----------------------------<  101<H>  3.5   |  27  |  0.39<L>    Ca    7.3<L>      15 Mar 2022 04:40  Phos  3.4     03-15  Mg     2.0     03-15    TPro  4.1<L>  /  Alb  1.2<L>  /  TBili  0.5  /  DBili  x   /  AST  39<H>  /  ALT  58  /  AlkPhos  125<H>  03-15      Albumin: Albumin, Serum: 1.2 g/dL (03-15 @ 04:40)      Allergies    latex (Unknown)  sulfa drugs (Unknown)    Intolerances      MEDICATIONS  (STANDING):  dextrose 40% Gel 15 Gram(s) Oral once  dextrose 5%. 1000 milliLiter(s) (50 mL/Hr) IV Continuous <Continuous>  dextrose 5%. 1000 milliLiter(s) (100 mL/Hr) IV Continuous <Continuous>  dextrose 50% Injectable 25 Gram(s) IV Push once  dextrose 50% Injectable 12.5 Gram(s) IV Push once  dextrose 50% Injectable 25 Gram(s) IV Push once  enoxaparin Injectable 40 milliGRAM(s) SubCutaneous every 24 hours  fat emulsion (Fish Oil and Plant Based) 20% Infusion 0.68 Gm/kG/Day (20.7 mL/Hr) IV Continuous <Continuous>  fat emulsion (Fish Oil and Plant Based) 20% Infusion 0.68 Gm/kG/Day (20.7 mL/Hr) IV Continuous <Continuous>  glucagon  Injectable 1 milliGRAM(s) IntraMuscular once  HYDROmorphone PCA (1 mG/mL) 30 milliLiter(s) PCA Continuous PCA Continuous  insulin lispro (ADMELOG) corrective regimen sliding scale   SubCutaneous three times a day before meals  octreotide  Injectable 100 MICROGram(s) IV Push every 8 hours  pantoprazole  Injectable 40 milliGRAM(s) IV Push every 12 hours  Parenteral Nutrition - Adult 1 Each (83 mL/Hr) TPN Continuous <Continuous>  Parenteral Nutrition - Adult 1 Each (83 mL/Hr) TPN Continuous <Continuous>  piperacillin/tazobactam IVPB.. 3.375 Gram(s) IV Intermittent every 8 hours    MEDICATIONS  (PRN):  acetaminophen   IVPB .. 1000 milliGRAM(s) IV Intermittent every 6 hours PRN Mild Pain (1 - 3)  ALBUTerol    90 MICROgram(s) HFA Inhaler 2 Puff(s) Inhalation every 6 hours PRN Shortness of Breath  HYDROmorphone PCA (1 mG/mL) Rescue Clinician Bolus 0.5 milliGRAM(s) IV Push every 15 minutes PRN for Pain Scale GREATER THAN 6  metoprolol tartrate Injectable 5 milliGRAM(s) IV Push every 6 hours PRN SBP > 140  naloxone Injectable 0.1 milliGRAM(s) IV Push every 3 minutes PRN For ANY of the following changes in patient status:  A. RR LESS THAN 10 breaths per minute, B. Oxygen saturation LESS THAN 90%, C. Sedation score of 6  naloxone Injectable 0.1 milliGRAM(s) IV Push every 3 minutes PRN For ANY of the following changes in patient status:  A. RR LESS THAN 10 breaths per minute, B. Oxygen saturation LESS THAN 90%, C. Sedation score of 6  ondansetron Injectable 4 milliGRAM(s) IV Push every 6 hours PRN Nausea  prochlorperazine   Injectable 5 milliGRAM(s) IV Push every 6 hours PRN nausea and/or vomiting      RADIOLOGY/ADDITIONAL STUDIES:     HPI:       60 yo F with pmhx GEJ cancer s/p esophagectomy, [place] tumors s/p SBO presents with left sided pleural effusion   XR barium enema shows strictures in proximal and mid sigmoid colon s/p palliative bypass, now with contained enterocutaneous fistula.    3/15/22  pt seen and examined at bedside   Jaquan and son present.     Pt was following outpatient oncology she presented to hospital d/t worsening effusions found to have sbo sp surgical bypass. Unfortunately her hospital stay has been complicated now w/ enterocutaneous fistula (high risk of infection) At this time there is no further surgical intervention being offered and chemotherapy is no longer being offered.     Hospice and palliative differences discussed at length.  Code status and advanced directives reviewed please see [facility]         PAIN: (x )Yes   ( )No  Level: minimal on PCA  Location: abdominal  Intensity:    3/10 at this time  quality: throbing  Aggravating Factors: movment  Alleviating Factors: medication/rest  Radiation: none  Duration/Timing: wax/wane  Impact on ADLs: decreases movment    DYSPNEA: ( ) Yes  (x ) No  Level:    PAST MEDICAL & SURGICAL HISTORY:  Essential hypertension    Gastritis    HLD (hyperlipidemia)    GERD (gastroesophageal reflux disease)    Intracranial shunt    S/P cholecystectomy    History of esophageal surgery        SOCIAL HX:    Hx opiate tolerance ( )YES  ( x)NO    Baseline ADLs  (Prior to Admission)  (xx ) Independent   () Dependent    FAMILY HISTORY:  Family history of gastric cancer (Father, Grandparent)    Family history of colon cancer        Review of Systems:    Anxiety- denies  Depression-denies  Physical Discomfort- feeling ok at this time   Dyspnea-denies  Constipation-denies  Diarrhea-denies  Nausea-denies  Vomiting-denies  Anorexia-denies  Weight Loss-  ++  Cough- denies  Secretions-denies  Fatigue-++   Weakness-++  Delirium- none    All other systems reviewed and negative    PHYSICAL EXAM:    Vital Signs Last 24 Hrs  T(C): 37.4 (15 Mar 2022 09:15), Max: 37.4 (15 Mar 2022 09:15)  T(F): 99.3 (15 Mar 2022 09:15), Max: 99.3 (15 Mar 2022 09:15)  HR: 99 (15 Mar 2022 09:00) (75 - 100)  BP: 163/74 (15 Mar 2022 09:00) (91/78 - 163/74)  BP(mean): 99 (15 Mar 2022 09:00) (72 - 100)  RR: 15 (15 Mar 2022 09:00) (5 - 22)  SpO2: 95% (15 Mar 2022 09:00) (94% - 98%)  Daily     Daily     PPSV2: 45  %  FAST: na    General: calm up in chair in nad  Mental Status: A+Ox3   HEENT: EOMI   Lungs: ctabl no wheezing or rales +CT present  Cardiac: s1s2 tachycardic 102,   GI: soft   : voids  Ext: moves all 4 extremities  Neuro: follows commands      LABS:                        9.5    7.68  )-----------( 169      ( 15 Mar 2022 04:40 )             27.9     03-15    139  |  107  |  18  ----------------------------<  101<H>  3.5   |  27  |  0.39<L>    Ca    7.3<L>      15 Mar 2022 04:40  Phos  3.4     03-15  Mg     2.0     03-15    TPro  4.1<L>  /  Alb  1.2<L>  /  TBili  0.5  /  DBili  x   /  AST  39<H>  /  ALT  58  /  AlkPhos  125<H>  03-15      Albumin: Albumin, Serum: 1.2 g/dL (03-15 @ 04:40)      Allergies    latex (Unknown)  sulfa drugs (Unknown)    Intolerances      MEDICATIONS  (STANDING):  dextrose 40% Gel 15 Gram(s) Oral once  dextrose 5%. 1000 milliLiter(s) (50 mL/Hr) IV Continuous <Continuous>  dextrose 5%. 1000 milliLiter(s) (100 mL/Hr) IV Continuous <Continuous>  dextrose 50% Injectable 25 Gram(s) IV Push once  dextrose 50% Injectable 12.5 Gram(s) IV Push once  dextrose 50% Injectable 25 Gram(s) IV Push once  enoxaparin Injectable 40 milliGRAM(s) SubCutaneous every 24 hours  fat emulsion (Fish Oil and Plant Based) 20% Infusion 0.68 Gm/kG/Day (20.7 mL/Hr) IV Continuous <Continuous>  fat emulsion (Fish Oil and Plant Based) 20% Infusion 0.68 Gm/kG/Day (20.7 mL/Hr) IV Continuous <Continuous>  glucagon  Injectable 1 milliGRAM(s) IntraMuscular once  HYDROmorphone PCA (1 mG/mL) 30 milliLiter(s) PCA Continuous PCA Continuous  insulin lispro (ADMELOG) corrective regimen sliding scale   SubCutaneous three times a day before meals  octreotide  Injectable 100 MICROGram(s) IV Push every 8 hours  pantoprazole  Injectable 40 milliGRAM(s) IV Push every 12 hours  Parenteral Nutrition - Adult 1 Each (83 mL/Hr) TPN Continuous <Continuous>  Parenteral Nutrition - Adult 1 Each (83 mL/Hr) TPN Continuous <Continuous>  piperacillin/tazobactam IVPB.. 3.375 Gram(s) IV Intermittent every 8 hours    MEDICATIONS  (PRN):  acetaminophen   IVPB .. 1000 milliGRAM(s) IV Intermittent every 6 hours PRN Mild Pain (1 - 3)  ALBUTerol    90 MICROgram(s) HFA Inhaler 2 Puff(s) Inhalation every 6 hours PRN Shortness of Breath  HYDROmorphone PCA (1 mG/mL) Rescue Clinician Bolus 0.5 milliGRAM(s) IV Push every 15 minutes PRN for Pain Scale GREATER THAN 6  metoprolol tartrate Injectable 5 milliGRAM(s) IV Push every 6 hours PRN SBP > 140  naloxone Injectable 0.1 milliGRAM(s) IV Push every 3 minutes PRN For ANY of the following changes in patient status:  A. RR LESS THAN 10 breaths per minute, B. Oxygen saturation LESS THAN 90%, C. Sedation score of 6  naloxone Injectable 0.1 milliGRAM(s) IV Push every 3 minutes PRN For ANY of the following changes in patient status:  A. RR LESS THAN 10 breaths per minute, B. Oxygen saturation LESS THAN 90%, C. Sedation score of 6  ondansetron Injectable 4 milliGRAM(s) IV Push every 6 hours PRN Nausea  prochlorperazine   Injectable 5 milliGRAM(s) IV Push every 6 hours PRN nausea and/or vomiting      RADIOLOGY/ADDITIONAL STUDIES:

## 2022-03-15 NOTE — CONSULT NOTE ADULT - ASSESSMENT
Assessment:   62 yo F with pmhx GEJ cancer s/p esophagectomy, krun[place] tumors s/p SBO presents with left sided pleural effusion   XR barium enema shows strictures in proximal and mid sigmoid colon  s/p palliative bypass, now with contained enterocutaneous fistula.    Process of Care  --Reviewed dx/treatment problems and alignment with Goals of Care    Physical Aspects of Care  --Pain  patient denies at this time  c/w current management    --Bowel Regimen  denies constipation  risk for constipation   cw current mmanagement    --Dyspnea  No SOB at this time  comfortable and in NAD    --Nausea Vomiting  denies    --Weakness  PT as tolerated     Psychological and Psychiatric Aspects of Care:   Grief Bereavement emotional support provided  --Hx of psychiatric dx: none  -Pastoral Care Available PRN     Social Aspects of Care  -SW involved     Cultural Aspects  -Primary Language: English    Goals of Care:     We discussed Palliative Care team being a supportive team when a patient has ongoing illnesses.  We also discussed that it is not an end of life care service, but can help navigate symptoms and emotional support througout their hospital stay here.    Hospice was explained as well  as an end of life care philosophy.  When a disease cannot be cured, or family/patient decide the treatment burdens out weigh the risk and one choses to change focus of treatment from cure to quality/comfort.       PLEASE SEE [facility]    Prognosis: Death can occur at anytime, but if disease continues to progress naturally patient likely has days to weeks.    Ethical and Legal Aspects:   NA    Capacity- yes    HCP/Surrogate: galdino ()    Code Status- DNR DNI  MOLST- completed  Dispo Plan- possible home w/ hospice    Discussed With:  case coordinated with attending and SW and RN     Time Spent: 90 minutes including the care, coordination and counseling of this patient, 50% of which was spent coordinating and counseling.

## 2022-03-15 NOTE — PROGRESS NOTE ADULT - SUBJECTIVE AND OBJECTIVE BOX
Subjective:  Pt seen, on PCA. Admits to some abdominal discomfort, denies SOB    Vital Signs:  Vital Signs Last 24 Hrs  T(C): 37.4 (03-15-22 @ 09:15), Max: 37.4 (03-15-22 @ 09:15)  T(F): 99.3 (03-15-22 @ 09:15), Max: 99.3 (03-15-22 @ 09:15)  HR: 98 (03-15-22 @ 07:00) (75 - 100)  BP: 142/67 (03-15-22 @ 07:00) (81/50 - 154/71)  RR: 12 (03-15-22 @ 07:00) (5 - 22)  SpO2: 95% (03-15-22 @ 06:00) (94% - 98%) on (O2)    Telemetry/Alarms:    Relevant labs, radiology and Medications reviewed                        9.5    7.68  )-----------( 169      ( 15 Mar 2022 04:40 )             27.9     03-15    139  |  107  |  18  ----------------------------<  101<H>  3.5   |  27  |  0.39<L>    Ca    7.3<L>      15 Mar 2022 04:40  Phos  3.4     03-15  Mg     2.0     03-15    TPro  4.1<L>  /  Alb  1.2<L>  /  TBili  0.5  /  DBili  x   /  AST  39<H>  /  ALT  58  /  AlkPhos  125<H>  03-15      MEDICATIONS  (STANDING):  dextrose 40% Gel 15 Gram(s) Oral once  dextrose 5% + sodium chloride 0.45% 1000 milliLiter(s) (30 mL/Hr) IV Continuous <Continuous>  dextrose 5%. 1000 milliLiter(s) (50 mL/Hr) IV Continuous <Continuous>  dextrose 5%. 1000 milliLiter(s) (100 mL/Hr) IV Continuous <Continuous>  dextrose 50% Injectable 25 Gram(s) IV Push once  dextrose 50% Injectable 12.5 Gram(s) IV Push once  dextrose 50% Injectable 25 Gram(s) IV Push once  enoxaparin Injectable 40 milliGRAM(s) SubCutaneous every 24 hours  fat emulsion (Fish Oil and Plant Based) 20% Infusion 0.68 Gm/kG/Day (20.7 mL/Hr) IV Continuous <Continuous>  fat emulsion (Fish Oil and Plant Based) 20% Infusion 0.68 Gm/kG/Day (20.7 mL/Hr) IV Continuous <Continuous>  glucagon  Injectable 1 milliGRAM(s) IntraMuscular once  HYDROmorphone PCA (1 mG/mL) 30 milliLiter(s) PCA Continuous PCA Continuous  insulin lispro (ADMELOG) corrective regimen sliding scale   SubCutaneous three times a day before meals  octreotide  Injectable 100 MICROGram(s) IV Push every 8 hours  pantoprazole  Injectable 40 milliGRAM(s) IV Push every 12 hours  Parenteral Nutrition - Adult 1 Each (83 mL/Hr) TPN Continuous <Continuous>  Parenteral Nutrition - Adult 1 Each (83 mL/Hr) TPN Continuous <Continuous>  piperacillin/tazobactam IVPB.. 3.375 Gram(s) IV Intermittent every 8 hours    MEDICATIONS  (PRN):  acetaminophen   IVPB .. 1000 milliGRAM(s) IV Intermittent every 6 hours PRN Mild Pain (1 - 3)  ALBUTerol    90 MICROgram(s) HFA Inhaler 2 Puff(s) Inhalation every 6 hours PRN Shortness of Breath  HYDROmorphone PCA (1 mG/mL) Rescue Clinician Bolus 0.5 milliGRAM(s) IV Push every 15 minutes PRN for Pain Scale GREATER THAN 6  metoprolol tartrate Injectable 5 milliGRAM(s) IV Push every 6 hours PRN SBP > 140  naloxone Injectable 0.1 milliGRAM(s) IV Push every 3 minutes PRN For ANY of the following changes in patient status:  A. RR LESS THAN 10 breaths per minute, B. Oxygen saturation LESS THAN 90%, C. Sedation score of 6  naloxone Injectable 0.1 milliGRAM(s) IV Push every 3 minutes PRN For ANY of the following changes in patient status:  A. RR LESS THAN 10 breaths per minute, B. Oxygen saturation LESS THAN 90%, C. Sedation score of 6  ondansetron Injectable 4 milliGRAM(s) IV Push every 6 hours PRN Nausea  prochlorperazine   Injectable 5 milliGRAM(s) IV Push every 6 hours PRN nausea and/or vomiting      Physical exam  Neuro: Alert Awake NAD  Pulm: decreased  at bases + Left Pigtail , drained 250cc last 24 hours  CV: RRR S1 S2  Abd:  Soft ,  laparotomy incision w ostomy in place, leaking green bilious drainage from incision.  Extremities: 1+ edema     I&O's Summary    14 Mar 2022 07:01  -  15 Mar 2022 07:00  --------------------------------------------------------  IN: 1428 mL / OUT: 3650 mL / NET: -2222 mL        Assessment  61y Female  w/ PAST MEDICAL & SURGICAL HISTORY:  Essential hypertension    Gastritis    HLD (hyperlipidemia)    GERD (gastroesophageal reflux disease)    Intracranial shunt    S/P cholecystectomy    History of esophageal surgery    admitted with complaints of Patient is a 61y old  Female who presents with a chief complaint of pleural effusion and partial SBO (15 Mar 2022 06:17)  .  1F with pmhx GEJ cancer s/p esophagectomy, krun[place] tumors s/p BSO presents to ED with sob for 1.5 weeks duration. Patient underwent PET CT roughly one month ago which showed small amount of left sided pleural effusion, as per patient. 2 days ago, patient saw her oncologist who ordered a CXR which showed marked increase in pleural effusion.  Pt had CT + partial bowel obstruction.  Plan for OR next week.    Consulted for Lt Pleural effusion.  3/4 s/p Left Pigtail 1.5 L Drained Exudative effusion, cyto negative. 3/9/22 s/p Bypass, ileum, small bowel to small bowel bypass Closure, enterotomy, small intestine and Open enterolysis. Post op course c/b SB enterocutaneous fistula.    cont pigtail to drainage  change dressing q 3 days by RN      Discussed with Cardiothoracic Team at AM rounds.

## 2022-03-15 NOTE — CONSULT NOTE ADULT - CONVERSATION DETAILS
We discussed Palliative Medicine team being a supportive team when a patient has ongoing illnesses.  We also discussed that it is not an end of life care service, but can help navigate symptoms and emotional support throughout their hospital stay here.       Hospice was explained as an end of life care philosophy.  When a disease cannot be cured or a family/patient decides the treatment burdens out weigh the benefits and chose to change focus from disease/curative treatment to symptom/comfort treatments.       We discuss at length patients underlying clinical diagnosis. We discussed resuscitation and risk/benefits of CPR.  Risks include broken ribs, lunc puncture, pain and discomfort.      At this time due to patients underlying irreversible diagnosis of  metastatic cancer I would not recommend CPR because it would not reverse current medical condition.  They understand DNR does NOT mean do not treat.     It means NO CPR would be attempted if patients heart stopped. It would allow at that stage, natural death.          We also discussed mechanical ventilation in the event that they could no longer breathing on their own.  Risk and benefits of mechanical ventilation were discussed at length.  At this time, due to patients irreversible medical condition of  metastatic cancer it is of concern that if patient were to be intubated extubation may not be possible.   Intubation would also NOT reverse current medical condition- which if progresses naturally would lead to patients death.     Patient was able to express understanding.       She states she has had A DNR DNI for three years that was discussed at length with her family.  I explained MOLST form and filled one out with  her.   She at this time request DNR DNI NO FEEDING TUBE.     Long discussion with family/patient.  At this stage we are recommending comfort measures as patient continues to decline regardless of maximal support.  We discussed prolonging suffering rather than prolonging life.     We discussed that would mean , no longer doing blood tests, dx imaging, abx, pressors. Comfort medications (opiates, benzos, diuretics etc) would be used to address any symptoms that may arise.  This acosta mean shortened life span, allowing nature take its course but focusing on comfort and quality of  life.   She is open to LEARNING MORE ABOUT HOSPICE w/ a HOSPICE Consult to [facility].   She requested this hospice d/t proximity of her home to their in patient unit.       She is aware physicians are telling her she has progressive incurable cancer and does not have disease oriented treatment options available but shes hoping that she defies odds and improves and if that means starting on hospice and graduating off she seems interested.

## 2022-03-16 LAB
ALBUMIN SERPL ELPH-MCNC: 1.3 G/DL — LOW (ref 3.3–5)
ALP SERPL-CCNC: 130 U/L — HIGH (ref 40–120)
ALT FLD-CCNC: 49 U/L — SIGNIFICANT CHANGE UP (ref 12–78)
ANION GAP SERPL CALC-SCNC: 4 MMOL/L — LOW (ref 5–17)
AST SERPL-CCNC: 30 U/L — SIGNIFICANT CHANGE UP (ref 15–37)
BILIRUB SERPL-MCNC: 0.4 MG/DL — SIGNIFICANT CHANGE UP (ref 0.2–1.2)
BUN SERPL-MCNC: 15 MG/DL — SIGNIFICANT CHANGE UP (ref 7–23)
CALCIUM SERPL-MCNC: 7.6 MG/DL — LOW (ref 8.5–10.1)
CHLORIDE SERPL-SCNC: 106 MMOL/L — SIGNIFICANT CHANGE UP (ref 96–108)
CO2 SERPL-SCNC: 30 MMOL/L — SIGNIFICANT CHANGE UP (ref 22–31)
CREAT SERPL-MCNC: 0.33 MG/DL — LOW (ref 0.5–1.3)
EGFR: 116 ML/MIN/1.73M2 — SIGNIFICANT CHANGE UP
GLUCOSE SERPL-MCNC: 107 MG/DL — HIGH (ref 70–99)
HCT VFR BLD CALC: 28.7 % — LOW (ref 34.5–45)
HGB BLD-MCNC: 9.7 G/DL — LOW (ref 11.5–15.5)
MAGNESIUM SERPL-MCNC: 2.1 MG/DL — SIGNIFICANT CHANGE UP (ref 1.6–2.6)
MCHC RBC-ENTMCNC: 32.4 PG — SIGNIFICANT CHANGE UP (ref 27–34)
MCHC RBC-ENTMCNC: 33.8 GM/DL — SIGNIFICANT CHANGE UP (ref 32–36)
MCV RBC AUTO: 96 FL — SIGNIFICANT CHANGE UP (ref 80–100)
PHOSPHATE SERPL-MCNC: 2.8 MG/DL — SIGNIFICANT CHANGE UP (ref 2.5–4.5)
PLATELET # BLD AUTO: 220 K/UL — SIGNIFICANT CHANGE UP (ref 150–400)
POTASSIUM SERPL-MCNC: 3.7 MMOL/L — SIGNIFICANT CHANGE UP (ref 3.5–5.3)
POTASSIUM SERPL-SCNC: 3.7 MMOL/L — SIGNIFICANT CHANGE UP (ref 3.5–5.3)
PROT SERPL-MCNC: 4.4 GM/DL — LOW (ref 6–8.3)
RBC # BLD: 2.99 M/UL — LOW (ref 3.8–5.2)
RBC # FLD: 14.7 % — HIGH (ref 10.3–14.5)
SODIUM SERPL-SCNC: 140 MMOL/L — SIGNIFICANT CHANGE UP (ref 135–145)
WBC # BLD: 7.9 K/UL — SIGNIFICANT CHANGE UP (ref 3.8–10.5)
WBC # FLD AUTO: 7.9 K/UL — SIGNIFICANT CHANGE UP (ref 3.8–10.5)

## 2022-03-16 PROCEDURE — 99232 SBSQ HOSP IP/OBS MODERATE 35: CPT

## 2022-03-16 PROCEDURE — 99231 SBSQ HOSP IP/OBS SF/LOW 25: CPT

## 2022-03-16 PROCEDURE — 71045 X-RAY EXAM CHEST 1 VIEW: CPT | Mod: 26

## 2022-03-16 RX ORDER — POTASSIUM CHLORIDE 20 MEQ
20 PACKET (EA) ORAL ONCE
Refills: 0 | Status: COMPLETED | OUTPATIENT
Start: 2022-03-16 | End: 2022-03-16

## 2022-03-16 RX ORDER — POTASSIUM CHLORIDE 20 MEQ
10 PACKET (EA) ORAL
Refills: 0 | Status: COMPLETED | OUTPATIENT
Start: 2022-03-16 | End: 2022-03-16

## 2022-03-16 RX ORDER — ELECTROLYTE SOLUTION,INJ
1 VIAL (ML) INTRAVENOUS
Refills: 0 | Status: DISCONTINUED | OUTPATIENT
Start: 2022-03-16 | End: 2022-03-16

## 2022-03-16 RX ORDER — I.V. FAT EMULSION 20 G/100ML
0.68 EMULSION INTRAVENOUS
Qty: 50 | Refills: 0 | Status: DISCONTINUED | OUTPATIENT
Start: 2022-03-16 | End: 2022-03-16

## 2022-03-16 RX ADMIN — OCTREOTIDE ACETATE 100 MICROGRAM(S): 200 INJECTION, SOLUTION INTRAVENOUS; SUBCUTANEOUS at 06:36

## 2022-03-16 RX ADMIN — PANTOPRAZOLE SODIUM 40 MILLIGRAM(S): 20 TABLET, DELAYED RELEASE ORAL at 09:59

## 2022-03-16 RX ADMIN — PIPERACILLIN AND TAZOBACTAM 25 GRAM(S): 4; .5 INJECTION, POWDER, LYOPHILIZED, FOR SOLUTION INTRAVENOUS at 16:01

## 2022-03-16 RX ADMIN — ENOXAPARIN SODIUM 40 MILLIGRAM(S): 100 INJECTION SUBCUTANEOUS at 16:02

## 2022-03-16 RX ADMIN — Medication 100 MILLIEQUIVALENT(S): at 14:26

## 2022-03-16 RX ADMIN — PIPERACILLIN AND TAZOBACTAM 25 GRAM(S): 4; .5 INJECTION, POWDER, LYOPHILIZED, FOR SOLUTION INTRAVENOUS at 21:15

## 2022-03-16 RX ADMIN — Medication 1 EACH: at 22:01

## 2022-03-16 RX ADMIN — PANTOPRAZOLE SODIUM 40 MILLIGRAM(S): 20 TABLET, DELAYED RELEASE ORAL at 21:15

## 2022-03-16 RX ADMIN — OCTREOTIDE ACETATE 100 MICROGRAM(S): 200 INJECTION, SOLUTION INTRAVENOUS; SUBCUTANEOUS at 21:43

## 2022-03-16 RX ADMIN — Medication 100 MILLIEQUIVALENT(S): at 22:37

## 2022-03-16 RX ADMIN — I.V. FAT EMULSION 20.7 GM/KG/DAY: 20 EMULSION INTRAVENOUS at 22:01

## 2022-03-16 RX ADMIN — Medication 5 MILLIGRAM(S): at 14:25

## 2022-03-16 RX ADMIN — Medication 100 MILLIEQUIVALENT(S): at 21:15

## 2022-03-16 RX ADMIN — PIPERACILLIN AND TAZOBACTAM 25 GRAM(S): 4; .5 INJECTION, POWDER, LYOPHILIZED, FOR SOLUTION INTRAVENOUS at 06:22

## 2022-03-16 RX ADMIN — OCTREOTIDE ACETATE 100 MICROGRAM(S): 200 INJECTION, SOLUTION INTRAVENOUS; SUBCUTANEOUS at 17:31

## 2022-03-16 NOTE — PROGRESS NOTE ADULT - ASSESSMENT
Assessment:   60 yo F with pmhx GEJ cancer s/p esophagectomy, krunkenburg tumors s/p SBO presents with left sided pleural effusion   XR barium enema shows strictures in proximal and mid sigmoid colon  s/p palliative bypass, now with contained enterocutaneous fistula.    Process of Care  --Reviewed dx/treatment problems and alignment with Goals of Care    Physical Aspects of Care  --Pain  patient denies at this time  c/w current management    --Bowel Regimen  denies constipation  risk for constipation   cw current mmanagement    --Dyspnea  No SOB at this time  comfortable and in NAD    --Nausea Vomiting  denies    --Weakness  PT as tolerated     Psychological and Psychiatric Aspects of Care:   Grief Bereavement emotional support provided  --Hx of psychiatric dx: none  -Pastoral Care Available PRN     Social Aspects of Care  -SW involved     Cultural Aspects  -Primary Language: English    Goals of Care:     [facility] did not take patients insurance  interested in referral to [facility] for home hospice.   Pt in good spirits and humerus when talking.   She hopes to go home and be comfortable with the hopes that she could 'turn this around' but realistically understands that might not happen.     Prognosis: Death can occur at anytime, but if disease continues to progress naturally patient likely has days to weeks.    Ethical and Legal Aspects:   NA    Capacity- yes  HCP/Surrogate: galdino ()  Code Status- DNR DNI  MOLST- completed  Dispo Plan- possible home w/ hospice    Discussed With:  case coordinated with attending and SW and RN     Time Spent: 45 minutes including the care, coordination and counseling of this patient, 50% of which was spent coordinating and counseling.

## 2022-03-16 NOTE — PROGRESS NOTE ADULT - SUBJECTIVE AND OBJECTIVE BOX
HPI:      60 yo F with pmhx GEJ cancer s/p esophagectomy, [place] tumors s/p SBO presents with left sided pleural effusion   XR barium enema shows strictures in proximal and mid sigmoid colon s/p palliative bypass, now with contained enterocutaneous fistula.    3/15/22  pt seen and examined at bedside   Jaquan and son present.     Pt was following outpatient oncology she presented to hospital d/t worsening effusions found to have sbo sp surgical bypass. Unfortunately her hospital stay has been complicated now w/ enterocutaneous fistula (high risk of infection) At this time there is no further surgical intervention being offered and chemotherapy is no longer being offered.     Hospice and palliative differences discussed at length.  Code status and advanced directives reviewed please see [facility]       3/16  Patient was seen and examined. Denies nausea, vomiting, shortness of breath, chest pain, headaches, abdominal pain and constipation.  pt in good psirits open to hospice   awaiting further list of hospice to see what would work with her health insurance         PAIN: ( )Yes   ( x)No  DYSPNEA: ( ) Yes  (x ) No  Level:          Review of Systems:    Anxiety- denies  Depression-denies  Physical Discomfort- feeling ok at this time   Dyspnea-denies  Constipation-denies  Diarrhea-denies  Nausea-denies  Vomiting-denies  Anorexia-denies  Weight Loss-  ++  Cough- denies  Secretions-denies  Fatigue- impforved   Weakness-++  Delirium- none    All other systems reviewed and negative        PHYSICAL EXAM:    Vital Signs Last 24 Hrs  T(C): 37.2 (17 Mar 2022 12:10), Max: 37.2 (17 Mar 2022 12:10)  T(F): 99 (17 Mar 2022 12:10), Max: 99 (17 Mar 2022 12:10)  HR: 97 (17 Mar 2022 14:00) (83 - 109)  BP: 108/60 (17 Mar 2022 14:00) (108/60 - 147/72)  BP(mean): 75 (17 Mar 2022 14:00) (72 - 103)  RR: 14 (17 Mar 2022 14:00) (8 - 29)  SpO2: 98% (17 Mar 2022 14:00) (94% - 100%)  Daily     Daily       PPSV2: 45  %  FAST: na    General: calm up in chair in NAD  Mental Status: A+Ox3   HEENT: EOMI   Lungs: ctabl no wheezing    Cardiac: s1s2  no mgr  GI: soft   : voids  Ext: moves all 4 extremities  Neuro: follows commands        LABS:                        9.9    9.74  )-----------( 271      ( 17 Mar 2022 06:28 )             30.2     03-17    137  |  104  |  17  ----------------------------<  106<H>  4.0   |  30  |  0.36<L>    Ca    7.2<L>      17 Mar 2022 06:28  Phos  2.8     03-17  Mg     2.4     03-17    TPro  4.5<L>  /  Alb  1.3<L>  /  TBili  0.4  /  DBili  x   /  AST  31  /  ALT  41  /  AlkPhos  144<H>  03-17      Albumin: Albumin, Serum: 1.3 g/dL (03-17 @ 06:28)      Allergies    latex (Unknown)  sulfa drugs (Unknown)    Intolerances      MEDICATIONS  (STANDING):  dextrose 40% Gel 15 Gram(s) Oral once  dextrose 5%. 1000 milliLiter(s) (50 mL/Hr) IV Continuous <Continuous>  dextrose 5%. 1000 milliLiter(s) (100 mL/Hr) IV Continuous <Continuous>  dextrose 50% Injectable 25 Gram(s) IV Push once  dextrose 50% Injectable 12.5 Gram(s) IV Push once  dextrose 50% Injectable 25 Gram(s) IV Push once  enoxaparin Injectable 40 milliGRAM(s) SubCutaneous every 24 hours  fat emulsion (Fish Oil and Plant Based) 20% Infusion 0.68 Gm/kG/Day (20.7 mL/Hr) IV Continuous <Continuous>  glucagon  Injectable 1 milliGRAM(s) IntraMuscular once  insulin lispro (ADMELOG) corrective regimen sliding scale   SubCutaneous three times a day before meals  octreotide  Injectable 100 MICROGram(s) IV Push every 8 hours  pantoprazole  Injectable 40 milliGRAM(s) IV Push every 12 hours  Parenteral Nutrition - Adult 1 Each (83 mL/Hr) TPN Continuous <Continuous>  Parenteral Nutrition - Adult 1 Each (83 mL/Hr) TPN Continuous <Continuous>  piperacillin/tazobactam IVPB.. 3.375 Gram(s) IV Intermittent every 8 hours    MEDICATIONS  (PRN):  acetaminophen   IVPB .. 1000 milliGRAM(s) IV Intermittent every 6 hours PRN Mild Pain (1 - 3)  ALBUTerol    90 MICROgram(s) HFA Inhaler 2 Puff(s) Inhalation every 6 hours PRN Shortness of Breath  ketorolac   Injectable 15 milliGRAM(s) IV Push every 6 hours PRN Moderate Pain (4 - 6)  metoprolol tartrate Injectable 5 milliGRAM(s) IV Push every 6 hours PRN SBP > 140  morphine  - Injectable 4 milliGRAM(s) IV Push every 4 hours PRN Severe Pain (7 - 10)  naloxone Injectable 0.1 milliGRAM(s) IV Push every 3 minutes PRN For ANY of the following changes in patient status:  A. RR LESS THAN 10 breaths per minute, B. Oxygen saturation LESS THAN 90%, C. Sedation score of 6  naloxone Injectable 0.1 milliGRAM(s) IV Push every 3 minutes PRN For ANY of the following changes in patient status:  A. RR LESS THAN 10 breaths per minute, B. Oxygen saturation LESS THAN 90%, C. Sedation score of 6  ondansetron Injectable 4 milliGRAM(s) IV Push every 6 hours PRN Nausea  prochlorperazine   Injectable 5 milliGRAM(s) IV Push every 6 hours PRN nausea and/or vomiting      RADIOLOGY:

## 2022-03-16 NOTE — PROGRESS NOTE ADULT - SUBJECTIVE AND OBJECTIVE BOX
Subjective:  Pt in bed NAD no issues overnight     T(C): 37.2 (03-16-22 @ 08:19), Max: 37.2 (03-16-22 @ 08:19)  HR: 84 (03-16-22 @ 10:00) (78 - 113)  BP: 130/95 (03-16-22 @ 09:00) (97/56 - 143/66)  ABP: --  ABP(mean): --  RR: 19 (03-16-22 @ 10:00) (9 - 26)  SpO2: 96% (03-16-22 @ 10:00) (92% - 98%) RA   Wt(kg): --  CVP(mm Hg): --  CO: --  CI: --  PA: --                                              Tele: SR     CHEST TUBE:  Left 380cc                              OUTPUT:     per 24 hours    AIR LEAKS:  [ ] YES [ x] NO          03-16    140  |  106  |  15  ----------------------------<  107<H>  3.7   |  30  |  0.33<L>    Ca    7.6<L>      16 Mar 2022 05:51  Phos  2.8     03-16  Mg     2.1     03-16    TPro  4.4<L>  /  Alb  1.3<L>  /  TBili  0.4  /  DBili  x   /  AST  30  /  ALT  49  /  AlkPhos  130<H>  03-16                               9.7    7.90  )-----------( 220      ( 16 Mar 2022 05:51 )             28.7                 CAPILLARY BLOOD GLUCOSE      POCT Blood Glucose.: 120 mg/dL (16 Mar 2022 08:17)  POCT Blood Glucose.: 106 mg/dL (16 Mar 2022 01:54)  POCT Blood Glucose.: 173 mg/dL (15 Mar 2022 18:51)  POCT Blood Glucose.: 98 mg/dL (15 Mar 2022 11:35)           CXR: < from: Xray Chest 1 View- PORTABLE-Routine (Xray Chest 1 View- PORTABLE-Routine in AM.) (03.15.22 @ 09:02) >  Heart magnified by technique. Right MediPort and catheter left chest tube   remain.    Rather small left effusion on March 14 diminished. No pneumothorax.    Present film shows a vague density in the right midlung field possibly   extraneous.    IMPRESSION: As above.    < end of copied text >          Exam   Neuro:   Alert Awake NAD  Pulm:  decreased at bases + Lt pigtial   CV: RRR   Abd: soft + Midline incision with partial dehiscence    Extremities: warm          Assessment:  61yFemale    with PAST MEDICAL & SURGICAL HISTORY:  Essential hypertension    Gastritis    HLD (hyperlipidemia)    GERD (gastroesophageal reflux disease)    Intracranial shunt    S/P cholecystectomy    History of esophageal surgery          Plan:

## 2022-03-16 NOTE — PROGRESS NOTE ADULT - ASSESSMENT
Stage IV Gastric cancer  Multiple Peritoneal metastases  Status post surgery forBowel obstruction  Postoperatively wound leak, multiple abscesses on antibiotics    Plan    Continue antibiotics  Nutrition support  No plans for systemic chemotherapy at this time  Palliative care evaluation

## 2022-03-16 NOTE — PROGRESS NOTE ADULT - ASSESSMENT
62 yo F with pmhx GEJ cancer s/p esophagectomy, [place] tumors s/p SBO presents with left sided pleural effusion   XR barium enema shows strictures in proximal and mid sigmoid colon  s/p palliative bypass, now with contained enterocutaneous fistula. CT of abd appreciated. AFVSS.    Plan:  - CLD, IVF  - Monitor HH, transfuse PRN  - Monitor vitals. Bolus with colloid PRN  - Continue TPN  - Pain and nausea control PRN   - GI/DVT ppx  - Monitor bowel function  - Continue ostomy dressing for now until Meaghan delivered  - Hospitalist recs  - Patient can be downgraded  - Palliative medicine recs. Patient is DNR  - Daily labs  - Encourage ambulation  - CT surgery recs- no pleurx catheter, possible removal pigtail prior to DC    Plan discussed with Dr. Sahu 60 yo F with pmhx GEJ cancer s/p esophagectomy, [place] tumors s/p SBO presents with left sided pleural effusion   XR barium enema shows strictures in proximal and mid sigmoid colon  s/p palliative bypass, now with contained enterocutaneous fistula. CT of abd appreciated. AFVSS. Labs stable.    Plan:  - CLD, IVF  - Monitor HH, transfuse PRN  - Monitor vitals. Bolus with colloid PRN  - Continue TPN  - Pain and nausea control PRN   - GI/DVT ppx  - Monitor bowel function  - Continue ostomy dressing for now until Meaghan delivered  - Hospitalist recs  - Patient can be downgraded  - Palliative medicine recs. Patient is DNR  - Daily labs  - Encourage ambulation  - CT surgery recs- no pleurx catheter, possible removal pigtail prior to DC    Plan discussed with Dr. Sahu

## 2022-03-16 NOTE — CHART NOTE - NSCHARTNOTEFT_GEN_A_CORE
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     (04 Mar 2022 00:22)      PERTINENT PMH REVIEWED:  [ X ] YES [ ] NO           Primary Contact:                Pts  surrogate    HCP [  ] Surrogate [ X  ] Guardian [   ]    Mental Status: [ X ] Alert  [ X  ] Oriented [  ] Confused [  ] Lethargic [  ]  Concerns of Depression [  ]- not identified   Anxiety [   ]- not identified   Baseline ADLs (prior to admission):  Independent [ X ] moderately [ ] fully   Dependent   [ ] moderately [ ] fully    Family Meeting: Took place yesterday with Dr. Spencer    Anticipated Grief: Patient [  X ] Family [  ]    Caregiver [place] Assessed: Yes [  X  ] No [  ]    Buddhism: Sikhism     Spiritual Concerns: Not identified     Goals of Care: Comfort [  X  ] Rehabilitation [  ] Curative [  ] Life Prolonging [  ]    ADVANCE DIRECTIVES:  DNR/DNI     Anticipated D/C Plan: Home with hospice [facility] referral (as they accept pts insurance)                      Summary:    This SW met with pt. and her  at bedside to follow up and offer support. Pt. shared how there have been many people in to see her today and she just wants to rest. Pt. did discuss how she is still on the same page with her plan and wants to return home with hospice. She reports she has not changed her mind. Feelings explored and support provided. Pts  has some follow up questions regarding home hospice which were answered. Floor SW placed referral to [facility] as their agency accepts pts insurance. Plan at this time is for pt. to return home with hospice. Emotional support provided. Our team will continue to follow.

## 2022-03-16 NOTE — CHART NOTE - NSCHARTNOTEFT_GEN_A_CORE
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      Findings of current CT scan: Extravasated contrast material seen predominantly in the RIGHT anterior paracolic gutter and peritoneum consistent with bowel perforation and intraperitoneal leak. Additional hyperdense collection is seen in the anterior peritoneum just beneath the midline incision and likely communicates with the right-sided collection. Multifocal deep pelvic collections with enhancing walls likely representing interloop and pelvic abscesses. LEFT anterior paracolic collection with gas also likely abscess. Additional intra-abdominal collections/abscesses. Pneumoperitoneum.  3/13: As per Heme/onc - Overall prognosis is grave as chemotherapeutic options are limited. Patient and her family have been somewhat unrealistic as to her prognosis. Will continue with TPN at this time as discussed with colorectal surgery today.     *Current status: s/p GOC w/ palliative - DNR, DNI, no feeding tube. As per surgery, will continue TPN. Pt deciding on hospice. If pt going to hospice, TPN to be d/c-ed. Advanced to CLD.    *labs reviewed; no significant changes in lab values except K continues to be low-normal and Phos low-normal/ downtrending, will increase KPhos in bag. All other lytes/ min WNL.  POCT WNL.  New triglyceride level WNL to continue lipids.    03-16    140  |  106  |  15  ----------------------------<  107<H>  3.7   |  30  |  0.33<L>    Ca    7.6<L>      16 Mar 2022 05:51  Phos  2.8     03-16  Mg     2.1     03-16    TPro  4.4<L>  /  Alb  1.3<L>  /  TBili  0.4  /  DBili  x   /  AST  30  /  ALT  49  /  AlkPhos  130<H>  03-16    BMI: BMI (kg/m2): 26 (03-03-22 @ 22:52)  HbA1c: A1C with Estimated Average Glucose Result: 6.3 % (03-10-22 @ 06:22)    BP: 93/45 (03-13-22 @ 06:00) (70/42 - 137/96)  Lipid Panel: Date/Time: 03-09-22 @ 01:25  Triglycerides, Serum: 207    Vitamin D, 25-Hydroxy: 34.2 ng/mL (03-10-22 @ 11:47)  Vitamin D, 1, 25-Dihydroxy: 30.1 pg/mL (03-10-22 @ 11:47)      POCT Blood Glucose.: 106 mg/dL (16 Mar 2022 01:54)  POCT Blood Glucose.: 173 mg/dL (15 Mar 2022 18:51)  POCT Blood Glucose.: 98 mg/dL (15 Mar 2022 11:35)      *pertinent meds: admelog, LR, octreotide, pantoprazole, antibiotics, acetaminophen, metoprolol, zofran, prochlorperazine      *I&O's Detail    14 Mar 2022 07:01  -  15 Mar 2022 07:00  --------------------------------------------------------  IN:    Fat Emulsion (Fish Oil &amp; Plant Based) 20% Infusion: 250 mL    IV PiggyBack: 218 mL    PPN (Peripheral Parenteral Nutrition): 960 mL  Total IN: 1428 mL    OUT:    Chest Tube (mL): 250 mL    Colostomy (mL): 900 mL    Voided (mL): 2500 mL  Total OUT: 3650 mL    Total NET: -2222 mL      *Negative fluid status; may need to adjust total volume of PN - receiving LR that are not documented  *BM (+) on 3/14; liquid.  pt not on bowel regimen.  *Currently NPO/sips only    *Gurpreet Score of 16; no PU documented. (+1) generalized edema documented - alb 1.2; may be skewing wt/ appearance.    *Continues to meet criteria for severe malnutrition in acute on chronic illness r/t decreased PO intake 2/2 CA and altered GI function AEB meeting <50% of ENN x5 dys and mild muscle/fat wasting, 4.5% wt loss x 5 days    ESTIMATED NUTR NEEDS: based on 73Kg admit wt  5595-0957 Kcal (25-30 Kcal/Kg)  110-146g protein (1.5-2 g/Kg)  1825-2190mL (25-30 mL/Kg)      Weight History: No new weights obtained   69.8Kg (3/8) wt loss of 3.3Kg in 5 days (4.5%), clinically significant.  Height (cm): 167.6 (03-03-22 @ 22:52)  Weight (kg): 73.1 (03-03-22 @ 22:52)  BMI (kg/m2): 26 (03-03-22 @ 22:52)  BSA (m2): 1.82 (03-03-22 @ 22:52)  IBW: 59Kg    **no changes made to TPN bag**  TPN Recommendations: via implanted port    Total Volume: 2000 mL  110 g  Amino Acids  285 g Dextrose   50 g Lipids 20%  143 mEq Sodium Chloride  0 mEq Sodium Acetate  20 mmol Sodium Phosphate  33 mEq Potassium Chloride  10 mEq Potassium Acetate  15 mmol Potassium Phosphate  0 mEq Calcium Gluconate  8 mEq Magnesium Sulfate  100 mg Thiamine  18 units Regular Insulin  1 ml Trace Elements   10 ml MVI    Total Calories  1909 (Meets  100%  of  Estimated Energy needs  and    100 %  Protein needs)    Additional Recommendations:  1) Daily weights  2) Strict I & O's  3) Daily lyte checks  4) Weekly triglycerides/LFT checks  5) POCT q6hrs; maintain POCT of 140-180mg/dL; add sliding scale insulin for additional coverage prn  6) add calcium gluconate 10 mEq outside of PN bag to ensure patient doesn't go low in calcium  7) Consider supplementing vitamin D 1000 units daily  8) Consider giving IV albumin 2/2 low alb (1.2) and edema    *will monitor and adjust treatement plan prn*  Izzy Hernandez, MS, RDN, [phone] Clinical Nutrition PN Follow Up Note    *62yo female admitted for worsening SOB due to Lt pleural effusion s/p chest tube placement, with recurrent partial SBO, abnormal LFT's (liver mets?), 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      Findings of current CT scan: Extravasated contrast material seen predominantly in the RIGHT anterior paracolic gutter and peritoneum consistent with bowel perforation and intraperitoneal leak. Additional hyperdense collection is seen in the anterior peritoneum just beneath the midline incision and likely communicates with the right-sided collection. Multifocal deep pelvic collections with enhancing walls likely representing interloop and pelvic abscesses. LEFT anterior paracolic collection with gas also likely abscess. Additional intra-abdominal collections/abscesses. Pneumoperitoneum.  3/13: As per Heme/onc - Overall prognosis is grave as chemotherapeutic options are limited. Patient and her family have been somewhat unrealistic as to her prognosis. Will continue with TPN at this time as discussed with colorectal surgery today.     *Current status: s/p GOC w/ palliative - DNR, DNI, no feeding tube. As per surgery, will continue TPN. Pt deciding on hospice. If pt going to hospice, TPN to be d/c-ed. Advanced to CLD.    *labs reviewed; no significant changes in lab values except K continues to be low-normal and Phos low-normal/ downtrending, will increase KPhos in bag. All other lytes/ min WNL.  POCT WNL.  New triglyceride level WNL to continue lipids.    03-16    140  |  106  |  15  ----------------------------<  107<H>  3.7   |  30  |  0.33<L>    Ca    7.6<L>      16 Mar 2022 05:51  Phos  2.8     03-16  Mg     2.1     03-16    TPro  4.4<L>  /  Alb  1.3<L>  /  TBili  0.4  /  DBili  x   /  AST  30  /  ALT  49  /  AlkPhos  130<H>  03-16    BMI: BMI (kg/m2): 26 (03-03-22 @ 22:52)  HbA1c: A1C with Estimated Average Glucose Result: 6.3 % (03-10-22 @ 06:22)    BP: 93/45 (03-13-22 @ 06:00) (70/42 - 137/96)  Lipid Panel: Date/Time: 03-09-22 @ 01:25  Triglycerides, Serum: 207    Vitamin D, 25-Hydroxy: 34.2 ng/mL (03-10-22 @ 11:47)  Vitamin D, 1, 25-Dihydroxy: 30.1 pg/mL (03-10-22 @ 11:47)      POCT Blood Glucose.: 106 mg/dL (16 Mar 2022 01:54)  POCT Blood Glucose.: 173 mg/dL (15 Mar 2022 18:51)  POCT Blood Glucose.: 98 mg/dL (15 Mar 2022 11:35)      *pertinent meds: admelog, LR, octreotide, pantoprazole, antibiotics, acetaminophen, metoprolol, zofran, prochlorperazine      *I&O's Detail    15 Mar 2022 07:01  -  16 Mar 2022 07:00  --------------------------------------------------------  IN:    Fat Emulsion (Fish Oil &amp; Plant Based) 20% Infusion: 260 mL    IV PiggyBack: 396 mL    PPN (Peripheral Parenteral Nutrition): 1015 mL  Total IN: 1671 mL    OUT:    Chest Tube (mL): 380 mL    Colostomy (mL): 575 mL    Voided (mL): 2425 mL  Total OUT: 3380 mL    Total NET: -1709 mL      *Negative fluid status; may need to adjust total volume of PN - receiving LR that are not documented  *BM (+) on 3/14; liquid.  pt not on bowel regimen. Now unable to pass flatus  *Advanced to CLD 3/15    *Gurpreet Score of 15; no PU documented. (+1) L/R foot, ankle edema documented - alb 1.3; may be skewing wt/ appearance.    *Continues to meet criteria for severe malnutrition in acute on chronic illness r/t decreased PO intake 2/2 CA and altered GI function AEB meeting <50% of ENN x5 dys and mild muscle/fat wasting, 4.5% wt loss x 5 days    ESTIMATED NUTR NEEDS: based on 73Kg admit wt  9834-2923 Kcal (25-30 Kcal/Kg)  110-146g protein (1.5-2 g/Kg)  1825-2190mL (25-30 mL/Kg)      Weight History: No new weights obtained   69.8Kg (3/8) wt loss of 3.3Kg in 5 days (4.5%), clinically significant.  Height (cm): 167.6 (03-03-22 @ 22:52)  Weight (kg): 73.1 (03-03-22 @ 22:52)  BMI (kg/m2): 26 (03-03-22 @ 22:52)  BSA (m2): 1.82 (03-03-22 @ 22:52)  IBW: 59Kg      TPN Recommendations: via implanted port    Total Volume: 2000 mL  110 g  Amino Acids  285 g Dextrose   50 g Lipids 20%  143 mEq Sodium Chloride  0 mEq Sodium Acetate  20 mmol Sodium Phosphate  33 mEq Potassium Chloride  10 mEq Potassium Acetate  20 mmol Potassium Phosphate  0 mEq Calcium Gluconate  8 mEq Magnesium Sulfate  100 mg Thiamine  18 units Regular Insulin  1 ml Trace Elements   10 ml MVI    Total Calories  1909 (Meets  100%  of  Estimated Energy needs  and    100 %  Protein needs)    Additional Recommendations:  1) Daily weights  2) Strict I & O's  3) Daily lyte checks  4) Weekly triglycerides/LFT checks  5) POCT q6hrs; maintain POCT of 140-180mg/dL; add sliding scale insulin for additional coverage prn  6) add calcium gluconate 10 mEq outside of PN bag to ensure patient doesn't go low in calcium  7) Consider supplementing vitamin D 1000 units daily  8) Consider giving IV albumin 2/2 low alb (<1.5) and edema    *will monitor and adjust treatement plan prn*  Izzy Hernandez, MS, RDN, [phone]

## 2022-03-16 NOTE — PROGRESS NOTE ADULT - SUBJECTIVE AND OBJECTIVE BOX
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 Patient underwent PET CT roughly one month ago which showed small amount of left sided pleural effusion, as per patient. 2 days ago, patient saw her oncologist who ordered a CXR which showed marked increase in pleural effusion.  Patient saw Dr. Sahu as outpatient today and was direct admit to .  Of note, patient was admitted few months ago for abdominal pain and partial SBO.  PAtient endorses last BM this am.  Denies fever, chills, chest pain, abdominal pain, n/v/d. Patient underwent surgery, found to have omental metastases/bowel obstruction.  An end-to-end anastomosis to bypass obstruction.  Patient did not want a colostomy.  Postoperatively patient had a leak currently with intra-abdominal abscesses and on antibiotics.      Patient in the SICU.  On IV antibiotics.  Appears a well this morning    Denies any pain      < from: CT Abdomen and Pelvis w/ Oral Cont and w/ IV Cont (03.12.22 @ 19:14) >  INTERPRETATION:  CLINICAL INFORMATION: Malignant small bowel obstruction   status post exploratory laparotomy with palliative intestinal bypass.   Wound dehiscent.    COMPARISON: None.    CONTRAST/COMPLICATIONS:  IV Contrast: Omnipaque 350  90 cc administered   10 cc discarded  Oral Contrast: Omnipaque 300 + Fruit 2o  Complications: None reported at time of study completion    PROCEDURE:  CT of the Abdomen and Pelvis was performed.  Sagittal and coronal reformats were performed.    FINDINGS:  LOWER CHEST: Bilateral small pleural effusions with bilateral   subsegmental atelectasis.    LIVER: Hepatic steatosis. Large posterior RIGHT lobe cyst. Previously   identified enhancing lesion in segment 4A is poorly visualized but still   present (2-22). Multiple hepatic capsular implants are also less well   seen than on the prior study but remain present.  BILE DUCTS: Dilated common bile duct measuring 1.7 cm. Mild intrahepatic   biliary ductal dilatation. Appearance is unchanged from prior study.  GALLBLADDER: Cholecystectomy.  SPLEEN: Within normal limits.  PANCREAS: Within normal limits.  ADRENALS: Stable nodular LEFT adrenal gland. Stable large nodule on the   RIGHT adrenal.  KIDNEYS/URETERS: Within normal limits.    BLADDER: Incompletely distended.  REPRODUCTIVE ORGANS: Probable supracervical hysterectomy.    BOWEL: Contrast is seen within the colon. There is significant reduction   in small bowel dilatation with no evidence of obstruction remaining.   Markedly thickened abnormal small bowel loops with wall edema Evidence of   small bowel anastomosis consistent with the given history of bypass.   Appendix is normal.  Esophagectomy with gastric pull-up.  PERITONEUM: There are multifocal intra-abdominal fluid collections with   enhancing walls. This is seen within the deep pelvis with an irregular   configuration. Similar collection is seen in the mid upper pelvis   measuring 4.9 x 2.6 x 4.9 cm (2-88, 601-78). Similar collection is seen   in the anterior LEFT upper quadrant and measures 7.6 x 2.6 x 6.0 cm (2-34   and 602-39). Hyperdense collection with anterior gas is seen in the   anterior peritoneum just below the midline incision and measures 12.1 x   2.3 x 6.2 cm (2-61 and 601-74). Within the RIGHT peritoneal cavity   (2-61), there is a collection of contrast which is not contained within   bowel and is consistent with perforation. Hyperdense contrast material is   also seen more inferiorly slightly dilute. Overall this collection   containing contrast measures 5.2 x 2.4 x 13.3 cm (2-75 and 602-33). The   anterior intraperitoneal hyperdense collection may communicate with this   right-sided collection.  Amorphous collection of contrast within the mid pelvis (2-85 measuring   2.2 x 4.6 cm may be contained within bowel but has a markedly abnormal   configuration.  LEFT anterior paracolic collection measuring 4.9 x 2.7 x 11.5 cm (2-88   and 602-39).  There is postoperative pneumoperitoneum.  VESSELS: Atherosclerotic changes.  RETROPERITONEUM/LYMPH NODES: No lymphadenopathy.  ABDOMINAL WALL: Extensive subcutaneous emphysema along the right-sided   abdominal wall as well as the midline incision.  BONES: Within normal limits.    IMPRESSION:  1.  Extravasated contrast material seen predominantly in the RIGHT   anterior paracolic gutter and peritoneum consistent with bowel   perforation and intraperitoneal leak. Additional hyperdense collection is   seen in the anterior peritoneum just beneath the midline incision and   likely communicates with the right-sided collection.  2.  Multifocal deep pelvic collections with enhancing walls likely   representing interloop and pelvic abscesses.  3.  LEFTanterior paracolic collection with gas also likely abscess.   Additional intra-abdominal collections/abscesses.  4.  Pneumoperitoneum.  5.  Additional findings described above.    Critical findings were discussed with AMELIA Noel 3/12/2022 8:31 PM   by Dr. Vitaly Esquivel with read back confirmation.    --- End of Report ---    < end of copied text >          PAST MEDICAL & SURGICAL HISTORY:  Essential hypertension    Gastritis    HLD (hyperlipidemia)    GERD (gastroesophageal reflux disease)    Intracranial shunt    S/P cholecystectomy    History of esophageal surgery        MEDICATIONS  (STANDING):  dextrose 40% Gel 15 Gram(s) Oral once  dextrose 5%. 1000 milliLiter(s) (50 mL/Hr) IV Continuous <Continuous>  dextrose 5%. 1000 milliLiter(s) (100 mL/Hr) IV Continuous <Continuous>  dextrose 50% Injectable 25 Gram(s) IV Push once  dextrose 50% Injectable 12.5 Gram(s) IV Push once  dextrose 50% Injectable 25 Gram(s) IV Push once  enoxaparin Injectable 40 milliGRAM(s) SubCutaneous every 24 hours  fat emulsion (Fish Oil and Plant Based) 20% Infusion 0.68 Gm/kG/Day (20.7 mL/Hr) IV Continuous <Continuous>  fat emulsion (Fish Oil and Plant Based) 20% Infusion 0.68 Gm/kG/Day (20.7 mL/Hr) IV Continuous <Continuous>  glucagon  Injectable 1 milliGRAM(s) IntraMuscular once  HYDROmorphone PCA (1 mG/mL) 30 milliLiter(s) PCA Continuous PCA Continuous  insulin lispro (ADMELOG) corrective regimen sliding scale   SubCutaneous three times a day before meals  octreotide  Injectable 100 MICROGram(s) IV Push every 8 hours  pantoprazole  Injectable 40 milliGRAM(s) IV Push every 12 hours  Parenteral Nutrition - Adult 1 Each (83 mL/Hr) TPN Continuous <Continuous>  Parenteral Nutrition - Adult 1 Each (83 mL/Hr) TPN Continuous <Continuous>  piperacillin/tazobactam IVPB.. 3.375 Gram(s) IV Intermittent every 8 hours  potassium chloride  10 mEq/100 mL IVPB 10 milliEquivalent(s) IV Intermittent every 1 hour    MEDICATIONS  (PRN):  acetaminophen   IVPB .. 1000 milliGRAM(s) IV Intermittent every 6 hours PRN Mild Pain (1 - 3)  ALBUTerol    90 MICROgram(s) HFA Inhaler 2 Puff(s) Inhalation every 6 hours PRN Shortness of Breath  HYDROmorphone PCA (1 mG/mL) Rescue Clinician Bolus 0.5 milliGRAM(s) IV Push every 15 minutes PRN for Pain Scale GREATER THAN 6  metoprolol tartrate Injectable 5 milliGRAM(s) IV Push every 6 hours PRN SBP > 140  naloxone Injectable 0.1 milliGRAM(s) IV Push every 3 minutes PRN For ANY of the following changes in patient status:  A. RR LESS THAN 10 breaths per minute, B. Oxygen saturation LESS THAN 90%, C. Sedation score of 6  naloxone Injectable 0.1 milliGRAM(s) IV Push every 3 minutes PRN For ANY of the following changes in patient status:  A. RR LESS THAN 10 breaths per minute, B. Oxygen saturation LESS THAN 90%, C. Sedation score of 6  ondansetron Injectable 4 milliGRAM(s) IV Push every 6 hours PRN Nausea  prochlorperazine   Injectable 5 milliGRAM(s) IV Push every 6 hours PRN nausea and/or vomiting      Allergies    latex (Unknown)  sulfa drugs (Unknown)    Intolerances        SOCIAL HISTORY:    FAMILY HISTORY:  Family history of gastric cancer (Father, Grandparent)    Family history of colon cancer        Vital Signs Last 24 Hrs  T(C): 37.2 (16 Mar 2022 08:19), Max: 37.2 (16 Mar 2022 08:19)  T(F): 98.9 (16 Mar 2022 08:19), Max: 98.9 (16 Mar 2022 08:19)  HR: 89 (16 Mar 2022 14:00) (78 - 108)  BP: 141/57 (16 Mar 2022 12:00) (97/56 - 141/57)  BP(mean): 82 (16 Mar 2022 12:00) (68 - 103)  RR: 18 (16 Mar 2022 14:00) (9 - 26)  SpO2: 98% (16 Mar 2022 14:00) (93% - 98%)      LABS:                        9.7    7.90  )-----------( 220      ( 16 Mar 2022 05:51 )             28.7     03-16    140  |  106  |  15  ----------------------------<  107<H>  3.7   |  30  |  0.33<L>    Ca    7.6<L>      16 Mar 2022 05:51  Phos  2.8     03-16  Mg     2.1     03-16    TPro  4.4<L>  /  Alb  1.3<L>  /  TBili  0.4  /  DBili  x   /  AST  30  /  ALT  49  /  AlkPhos  130<H>  03-16

## 2022-03-16 NOTE — PROGRESS NOTE ADULT - SUBJECTIVE AND OBJECTIVE BOX
Pt. seen and examined at bedside this morning. Patient reports no pain at this time. No Meaghan bag available. She is having leakage from midline incision.  She denies fever, chest pain, SOB, nausea, vomiting, diarrhea or constipation. No acute events overnight.    Vitals:  T(C): 36.8 (03-15 @ 21:30), Max: 37.4 (03-15 @ 09:15)  HR: 78 ( @ 04:00) (78 - 113)  BP: 105/52 ( @ 04:00) (97/56 - 163/74)  RR: 12 ( @ 04:00) (9 - 26)  SpO2: 95% ( @ 04:00) (92% - 98%)     @ 07:01  -  03-15 @ 07:00  --------------------------------------------------------  IN:    Fat Emulsion (Fish Oil &amp; Plant Based) 20% Infusion: 250 mL    IV PiggyBack: 218 mL    PPN (Peripheral Parenteral Nutrition): 960 mL  Total IN: 1428 mL    OUT:    Chest Tube (mL): 250 mL    Colostomy (mL): 900 mL    Voided (mL): 2500 mL  Total OUT: 3650 mL    Total NET: -2222 mL      03-15 @ 07:01  -  16 @ 05:57  --------------------------------------------------------  IN:    Fat Emulsion (Fish Oil &amp; Plant Based) 20% Infusion: 260 mL    IV PiggyBack: 396 mL    PPN (Peripheral Parenteral Nutrition): 1015 mL  Total IN: 1671 mL    OUT:    Chest Tube (mL): 380 mL    Colostomy (mL): 425 mL    Voided (mL): 1825 mL  Total OUT: 2630 mL    Total NET: -959 mL    Physical Exam:  General: AAOx3, Well developed, NAD  Chest: Normal respiratory effort, left chest tube in place, output SS  Heart: RRR  Abdomen: midline with stool drainage, bag over incision with stool around dressing, abd soft, nondistended, nontender  Neuro/Psych: No localized deficits. Normal speech, normal tone  Skin: Normal, no rashes, no lesions noted.   Extremities: Warm, well perfused, 2+ edema left foot, Pulses intact    03-15 @ 04:40                    9.5  CBC: 7.68>)-------(<169                     27.9                 139 | 107 | 18    CMP:  ----------------------< 101               3.5 | 27 | 0.39                      Ca:7.3  Phos:3.4  M.0               0.5|      |39        LFTs:  ------|125|-----             -|      |-      Current Inpatient Medications:  acetaminophen   IVPB .. 1000 milliGRAM(s) IV Intermittent every 6 hours PRN  ALBUTerol    90 MICROgram(s) HFA Inhaler 2 Puff(s) Inhalation every 6 hours PRN  dextrose 40% Gel 15 Gram(s) Oral once  dextrose 5%. 1000 milliLiter(s) (50 mL/Hr) IV Continuous <Continuous>  dextrose 5%. 1000 milliLiter(s) (100 mL/Hr) IV Continuous <Continuous>  dextrose 50% Injectable 25 Gram(s) IV Push once  dextrose 50% Injectable 12.5 Gram(s) IV Push once  dextrose 50% Injectable 25 Gram(s) IV Push once  enoxaparin Injectable 40 milliGRAM(s) SubCutaneous every 24 hours  fat emulsion (Fish Oil and Plant Based) 20% Infusion 0.68 Gm/kG/Day (20.7 mL/Hr) IV Continuous <Continuous>  glucagon  Injectable 1 milliGRAM(s) IntraMuscular once  HYDROmorphone PCA (1 mG/mL) 30 milliLiter(s) PCA Continuous PCA Continuous  HYDROmorphone PCA (1 mG/mL) Rescue Clinician Bolus 0.5 milliGRAM(s) IV Push every 15 minutes PRN  insulin lispro (ADMELOG) corrective regimen sliding scale   SubCutaneous three times a day before meals  metoprolol tartrate Injectable 5 milliGRAM(s) IV Push every 6 hours PRN  naloxone Injectable 0.1 milliGRAM(s) IV Push every 3 minutes PRN  naloxone Injectable 0.1 milliGRAM(s) IV Push every 3 minutes PRN  octreotide  Injectable 100 MICROGram(s) IV Push every 8 hours  ondansetron Injectable 4 milliGRAM(s) IV Push every 6 hours PRN  pantoprazole  Injectable 40 milliGRAM(s) IV Push every 12 hours  Parenteral Nutrition - Adult 1 Each (83 mL/Hr) TPN Continuous <Continuous>  piperacillin/tazobactam IVPB.. 3.375 Gram(s) IV Intermittent every 8 hours  prochlorperazine   Injectable 5 milliGRAM(s) IV Push every 6 hours PRN

## 2022-03-16 NOTE — PROGRESS NOTE ADULT - ASSESSMENT
71F with pmhx GEJ cancer s/p esophagectomy, [place] tumors s/p BSO presents to ED with sob for 1.5 weeks duration. Patient underwent PET CT roughly one month ago which showed small amount of left sided pleural effusion, as per patient. 2 days ago, patient saw her oncologist who ordered a CXR which showed marked increase in pleural effusion.  Pt had CT + partial bowel obstruction.  Plan for OR next week.    Consulted for Lt Pleural effusion.  3/4 s/p Left Pigtail 1.5 L Drained Exudative effusion, cyto negative. 3/9/22 s/p Bypass, ileum, small bowel to small bowel bypass Closure, enterotomy, small intestine and Open enterolysis. Post op course c/b SB enterocutaneous fistula.    Plan   cont pigtail to drainage  change dressing q 3 days by RN  no need for CXR daily   cont care as per primary team       Discussed with Cardiothoracic Team at AM rounds.

## 2022-03-17 LAB
ALBUMIN SERPL ELPH-MCNC: 1.3 G/DL — LOW (ref 3.3–5)
ALP SERPL-CCNC: 144 U/L — HIGH (ref 40–120)
ALT FLD-CCNC: 41 U/L — SIGNIFICANT CHANGE UP (ref 12–78)
ANION GAP SERPL CALC-SCNC: 3 MMOL/L — LOW (ref 5–17)
AST SERPL-CCNC: 31 U/L — SIGNIFICANT CHANGE UP (ref 15–37)
BILIRUB SERPL-MCNC: 0.4 MG/DL — SIGNIFICANT CHANGE UP (ref 0.2–1.2)
BUN SERPL-MCNC: 17 MG/DL — SIGNIFICANT CHANGE UP (ref 7–23)
CALCIUM SERPL-MCNC: 7.2 MG/DL — LOW (ref 8.5–10.1)
CHLORIDE SERPL-SCNC: 104 MMOL/L — SIGNIFICANT CHANGE UP (ref 96–108)
CO2 SERPL-SCNC: 30 MMOL/L — SIGNIFICANT CHANGE UP (ref 22–31)
CREAT SERPL-MCNC: 0.36 MG/DL — LOW (ref 0.5–1.3)
EGFR: 114 ML/MIN/1.73M2 — SIGNIFICANT CHANGE UP
GLUCOSE SERPL-MCNC: 106 MG/DL — HIGH (ref 70–99)
HCT VFR BLD CALC: 30.2 % — LOW (ref 34.5–45)
HGB BLD-MCNC: 9.9 G/DL — LOW (ref 11.5–15.5)
MAGNESIUM SERPL-MCNC: 2.4 MG/DL — SIGNIFICANT CHANGE UP (ref 1.6–2.6)
MCHC RBC-ENTMCNC: 31.9 PG — SIGNIFICANT CHANGE UP (ref 27–34)
MCHC RBC-ENTMCNC: 32.8 GM/DL — SIGNIFICANT CHANGE UP (ref 32–36)
MCV RBC AUTO: 97.4 FL — SIGNIFICANT CHANGE UP (ref 80–100)
PHOSPHATE SERPL-MCNC: 2.8 MG/DL — SIGNIFICANT CHANGE UP (ref 2.5–4.5)
PLATELET # BLD AUTO: 271 K/UL — SIGNIFICANT CHANGE UP (ref 150–400)
POTASSIUM SERPL-MCNC: 4 MMOL/L — SIGNIFICANT CHANGE UP (ref 3.5–5.3)
POTASSIUM SERPL-SCNC: 4 MMOL/L — SIGNIFICANT CHANGE UP (ref 3.5–5.3)
PROT SERPL-MCNC: 4.5 GM/DL — LOW (ref 6–8.3)
RBC # BLD: 3.1 M/UL — LOW (ref 3.8–5.2)
RBC # FLD: 15 % — HIGH (ref 10.3–14.5)
SODIUM SERPL-SCNC: 137 MMOL/L — SIGNIFICANT CHANGE UP (ref 135–145)
WBC # BLD: 9.74 K/UL — SIGNIFICANT CHANGE UP (ref 3.8–10.5)
WBC # FLD AUTO: 9.74 K/UL — SIGNIFICANT CHANGE UP (ref 3.8–10.5)

## 2022-03-17 PROCEDURE — 99233 SBSQ HOSP IP/OBS HIGH 50: CPT

## 2022-03-17 PROCEDURE — 99024 POSTOP FOLLOW-UP VISIT: CPT

## 2022-03-17 PROCEDURE — 99231 SBSQ HOSP IP/OBS SF/LOW 25: CPT

## 2022-03-17 PROCEDURE — 99232 SBSQ HOSP IP/OBS MODERATE 35: CPT

## 2022-03-17 RX ORDER — I.V. FAT EMULSION 20 G/100ML
0.68 EMULSION INTRAVENOUS
Qty: 50 | Refills: 0 | Status: DISCONTINUED | OUTPATIENT
Start: 2022-03-17 | End: 2022-03-17

## 2022-03-17 RX ORDER — MORPHINE SULFATE 50 MG/1
4 CAPSULE, EXTENDED RELEASE ORAL EVERY 4 HOURS
Refills: 0 | Status: ACTIVE | OUTPATIENT
Start: 2022-03-17 | End: 2022-03-24

## 2022-03-17 RX ORDER — ELECTROLYTE SOLUTION,INJ
1 VIAL (ML) INTRAVENOUS
Refills: 0 | Status: DISCONTINUED | OUTPATIENT
Start: 2022-03-17 | End: 2022-03-17

## 2022-03-17 RX ORDER — KETOROLAC TROMETHAMINE 30 MG/ML
15 SYRINGE (ML) INJECTION EVERY 6 HOURS
Refills: 0 | Status: DISCONTINUED | OUTPATIENT
Start: 2022-03-17 | End: 2022-03-21

## 2022-03-17 RX ADMIN — Medication 15 MILLIGRAM(S): at 22:12

## 2022-03-17 RX ADMIN — OCTREOTIDE ACETATE 100 MICROGRAM(S): 200 INJECTION, SOLUTION INTRAVENOUS; SUBCUTANEOUS at 05:15

## 2022-03-17 RX ADMIN — PIPERACILLIN AND TAZOBACTAM 25 GRAM(S): 4; .5 INJECTION, POWDER, LYOPHILIZED, FOR SOLUTION INTRAVENOUS at 22:13

## 2022-03-17 RX ADMIN — PANTOPRAZOLE SODIUM 40 MILLIGRAM(S): 20 TABLET, DELAYED RELEASE ORAL at 22:12

## 2022-03-17 RX ADMIN — ONDANSETRON 4 MILLIGRAM(S): 8 TABLET, FILM COATED ORAL at 14:12

## 2022-03-17 RX ADMIN — I.V. FAT EMULSION 20.7 GM/KG/DAY: 20 EMULSION INTRAVENOUS at 22:57

## 2022-03-17 RX ADMIN — Medication 1 EACH: at 22:56

## 2022-03-17 RX ADMIN — ENOXAPARIN SODIUM 40 MILLIGRAM(S): 100 INJECTION SUBCUTANEOUS at 14:12

## 2022-03-17 RX ADMIN — OCTREOTIDE ACETATE 100 MICROGRAM(S): 200 INJECTION, SOLUTION INTRAVENOUS; SUBCUTANEOUS at 22:18

## 2022-03-17 RX ADMIN — PIPERACILLIN AND TAZOBACTAM 25 GRAM(S): 4; .5 INJECTION, POWDER, LYOPHILIZED, FOR SOLUTION INTRAVENOUS at 14:12

## 2022-03-17 RX ADMIN — MORPHINE SULFATE 4 MILLIGRAM(S): 50 CAPSULE, EXTENDED RELEASE ORAL at 21:04

## 2022-03-17 RX ADMIN — MORPHINE SULFATE 4 MILLIGRAM(S): 50 CAPSULE, EXTENDED RELEASE ORAL at 20:40

## 2022-03-17 RX ADMIN — PANTOPRAZOLE SODIUM 40 MILLIGRAM(S): 20 TABLET, DELAYED RELEASE ORAL at 09:37

## 2022-03-17 RX ADMIN — OCTREOTIDE ACETATE 100 MICROGRAM(S): 200 INJECTION, SOLUTION INTRAVENOUS; SUBCUTANEOUS at 14:51

## 2022-03-17 RX ADMIN — Medication 15 MILLIGRAM(S): at 22:27

## 2022-03-17 RX ADMIN — PIPERACILLIN AND TAZOBACTAM 25 GRAM(S): 4; .5 INJECTION, POWDER, LYOPHILIZED, FOR SOLUTION INTRAVENOUS at 05:16

## 2022-03-17 NOTE — PROGRESS NOTE ADULT - SUBJECTIVE AND OBJECTIVE BOX
Subjective: PT in bed NAD no issues overnight.  completely dehiscence  of abdominal wound     VITAL SIGNS  T(C): 36.3 (03-17-22 @ 08:36), Max: 36.8 (03-16-22 @ 21:14)  HR: 95 (03-17-22 @ 08:00) (81 - 108)  BP: 117/72 (03-17-22 @ 08:00) (110/58 - 147/72)  RR: 29 (03-17-22 @ 08:00) (8 - 29)  SpO2: 97% (03-17-22 @ 08:00) (94% - 100%) RA   Wt(kg): --   on (O2)            Daily     Daily   Admit Wt: Drug Dosing Weight  Height (cm): 167.6 (03 Mar 2022 22:52)  Weight (kg): 73.1 (03 Mar 2022 22:52)  BMI (kg/m2): 26 (03 Mar 2022 22:52)  BSA (m2): 1.82 (03 Mar 2022 22:52)  Telemetry:    LVEF:     MEDICATIONS  acetaminophen   IVPB .. 1000 milliGRAM(s) IV Intermittent every 6 hours PRN  ALBUTerol    90 MICROgram(s) HFA Inhaler 2 Puff(s) Inhalation every 6 hours PRN  dextrose 40% Gel 15 Gram(s) Oral once  dextrose 5%. 1000 milliLiter(s) IV Continuous <Continuous>  dextrose 5%. 1000 milliLiter(s) IV Continuous <Continuous>  dextrose 50% Injectable 25 Gram(s) IV Push once  dextrose 50% Injectable 12.5 Gram(s) IV Push once  dextrose 50% Injectable 25 Gram(s) IV Push once  enoxaparin Injectable 40 milliGRAM(s) SubCutaneous every 24 hours  fat emulsion (Fish Oil and Plant Based) 20% Infusion 0.68 Gm/kG/Day IV Continuous <Continuous>  fat emulsion (Fish Oil and Plant Based) 20% Infusion 0.68 Gm/kG/Day IV Continuous <Continuous>  glucagon  Injectable 1 milliGRAM(s) IntraMuscular once  HYDROmorphone PCA (1 mG/mL) 30 milliLiter(s) PCA Continuous PCA Continuous  HYDROmorphone PCA (1 mG/mL) Rescue Clinician Bolus 0.5 milliGRAM(s) IV Push every 15 minutes PRN  insulin lispro (ADMELOG) corrective regimen sliding scale   SubCutaneous three times a day before meals  metoprolol tartrate Injectable 5 milliGRAM(s) IV Push every 6 hours PRN  naloxone Injectable 0.1 milliGRAM(s) IV Push every 3 minutes PRN  naloxone Injectable 0.1 milliGRAM(s) IV Push every 3 minutes PRN  octreotide  Injectable 100 MICROGram(s) IV Push every 8 hours  ondansetron Injectable 4 milliGRAM(s) IV Push every 6 hours PRN  pantoprazole  Injectable 40 milliGRAM(s) IV Push every 12 hours  Parenteral Nutrition - Adult 1 Each TPN Continuous <Continuous>  Parenteral Nutrition - Adult 1 Each TPN Continuous <Continuous>  piperacillin/tazobactam IVPB.. 3.375 Gram(s) IV Intermittent every 8 hours  prochlorperazine   Injectable 5 milliGRAM(s) IV Push every 6 hours PRN    MEDICATIONS  (PRN):  acetaminophen   IVPB .. 1000 milliGRAM(s) IV Intermittent every 6 hours PRN Mild Pain (1 - 3)  ALBUTerol    90 MICROgram(s) HFA Inhaler 2 Puff(s) Inhalation every 6 hours PRN Shortness of Breath  HYDROmorphone PCA (1 mG/mL) Rescue Clinician Bolus 0.5 milliGRAM(s) IV Push every 15 minutes PRN for Pain Scale GREATER THAN 6  metoprolol tartrate Injectable 5 milliGRAM(s) IV Push every 6 hours PRN SBP > 140  naloxone Injectable 0.1 milliGRAM(s) IV Push every 3 minutes PRN For ANY of the following changes in patient status:  A. RR LESS THAN 10 breaths per minute, B. Oxygen saturation LESS THAN 90%, C. Sedation score of 6  naloxone Injectable 0.1 milliGRAM(s) IV Push every 3 minutes PRN For ANY of the following changes in patient status:  A. RR LESS THAN 10 breaths per minute, B. Oxygen saturation LESS THAN 90%, C. Sedation score of 6  ondansetron Injectable 4 milliGRAM(s) IV Push every 6 hours PRN Nausea  prochlorperazine   Injectable 5 milliGRAM(s) IV Push every 6 hours PRN nausea and/or vomiting    Exam   Neuro:   Alert Awake NAD  Pulm:  decreased at bases + Lt pigtial   CV: RRR   Abd: soft + Midline incision with  full dehiscence    Extremities: warm            Chest tubes: Left Pigtail 100cc x 24 hours       I&O's Detail    16 Mar 2022 07:01  -  17 Mar 2022 07:00  --------------------------------------------------------  IN:    Fat Emulsion (Fish Oil &amp; Plant Based) 20% Infusion: 417 mL    IV PiggyBack: 762 mL    Oral Fluid: 960 mL    PPN (Peripheral Parenteral Nutrition): 852 mL  Total IN: 2991 mL    OUT:    Chest Tube (mL): 102 mL    Colostomy (mL): 200 mL    Voided (mL): 2300 mL  Total OUT: 2602 mL    Total NET: 389 mL          LABS  03-17    137  |  104  |  17  ----------------------------<  106<H>  4.0   |  30  |  0.36<L>    Ca    7.2<L>      17 Mar 2022 06:28  Phos  2.8     03-17  Mg     2.4     03-17    TPro  4.5<L>  /  Alb  1.3<L>  /  TBili  0.4  /  DBili  x   /  AST  31  /  ALT  41  /  AlkPhos  144<H>  03-17                                 9.9    9.74  )-----------( 271      ( 17 Mar 2022 06:28 )             30.2                LIVER FUNCTIONS - ( 17 Mar 2022 06:28 )  Alb: 1.3 g/dL / Pro: 4.5 gm/dL / ALK PHOS: 144 U/L / ALT: 41 U/L / AST: 31 U/L / GGT: x              CAPILLARY BLOOD GLUCOSE      POCT Blood Glucose.: 112 mg/dL (17 Mar 2022 05:23)  POCT Blood Glucose.: 135 mg/dL (17 Mar 2022 00:12)  POCT Blood Glucose.: 136 mg/dL (16 Mar 2022 17:31)  POCT Blood Glucose.: 100 mg/dL (16 Mar 2022 12:40)           Today's CXR:  < from: Xray Chest 1 View- PORTABLE-Routine (Xray Chest 1 View- PORTABLE-Routine in AM.) (03.16.22 @ 08:12) >  Frontal expiratory view of the chest shows the heart to be similar in   size. Right chest port and left pigtail catheter are again noted.    The lungs show clear left lung with reduction of left effusion and there   is no evidence of pneumothorax nor right pleural effusion.    IMPRESSION:  No pneumothorax.        Thank you for the courtesy of this referral.    < end of copied text >      PAST MEDICAL & SURGICAL HISTORY:  Essential hypertension    Gastritis    HLD (hyperlipidemia)    GERD (gastroesophageal reflux disease)    Intracranial shunt    S/P cholecystectomy    History of esophageal surgery         ASSESSMENT  61y Female

## 2022-03-17 NOTE — PROGRESS NOTE ADULT - SUBJECTIVE AND OBJECTIVE BOX
Pt. seen and examined at bedside this morning. Patient reports no pain at this time. Full sized Lee pouch placed over midline wound yesterday, no leakage.  Patient tolerated more CLD yesterday without nausea or vomiting.  Endorses passing some flatus.  She denies fever, chills, chest pain, SOB, nausea, vomiting, diarrhea or constipation. No acute events overnight.    Vital Signs (24 Hrs):  T(C): 36.3 (03-17-22 @ 08:36), Max: 36.8 (03-16-22 @ 21:14)  HR: 95 (03-17-22 @ 08:00) (81 - 108)  BP: 117/72 (03-17-22 @ 08:00) (110/58 - 147/72)  RR: 29 (03-17-22 @ 08:00) (8 - 29)  SpO2: 97% (03-17-22 @ 08:00) (94% - 100%)  Wt(kg): --  Daily     Daily     I&O's Summary    16 Mar 2022 07:01  -  17 Mar 2022 07:00  --------------------------------------------------------  IN: 2991 mL / OUT: 2602 mL / NET: 389 mL      16 Mar 2022 07:01  -  17 Mar 2022 07:00  --------------------------------------------------------  IN:    Fat Emulsion (Fish Oil &amp; Plant Based) 20% Infusion: 417 mL    IV PiggyBack: 762 mL    Oral Fluid: 960 mL    PPN (Peripheral Parenteral Nutrition): 852 mL  Total IN: 2991 mL    OUT:    Chest Tube (mL): 102 mL    Colostomy (mL): 200 mL    Voided (mL): 2300 mL  Total OUT: 2602 mL    Total NET: 389 mL          Physical Exam:  General: AAOx3, Well developed, NAD  Chest: Normal respiratory effort, left chest tube in place, output SS  Heart: RRR  Abdomen: midline with stool drainage, lee pouch over incision with stool in bag, no leakage, abd soft, nondistended, minimally TTP right hemiabdomen along incision.  Neuro/Psych: No localized deficits. Normal speech, normal tone  Skin: Normal, no rashes, no lesions noted.   Extremities: Warm, well perfused, 2+ edema left foot, Pulses intact

## 2022-03-17 NOTE — PROGRESS NOTE ADULT - ASSESSMENT
60 yo F with pmhx GEJ cancer s/p esophagectomy, [place] tumors s/p SBO presents with left sided pleural effusion   XR barium enema shows strictures in proximal and mid sigmoid colon  s/p palliative bypass, now with contained enterocutaneous fistula. CT of abd appreciated. AFVSS. Labs stable.    Plan:  - CLD, possible advance to FLD  - Monitor HH, transfuse PRN  - Monitor vitals. Bolus with colloid PRN  - Continue TPN  - Pain and nausea control PRN   - GI/DVT ppx  - Monitor bowel function  - Monitor stool output within Meaghan pouch  - Hospitalist recs  - Patient can be downgraded to   - Palliative medicine recs. Patient is DNR/DNI  - Daily labs  - Encourage ambulation  - CT surgery recs- no pleurx catheter, possible removal pigtail prior to DC    Plan discussed with Surgical oncology attending

## 2022-03-17 NOTE — PROGRESS NOTE ADULT - ATTENDING COMMENTS
Patient seen and examined. 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 She is s/p laparotomy with primary anastomosis of bowel with anastomotic leak. She appears to have poor insight on what is currently going on and the extent of the metastatic disease. No plans on placing a catheter, will defer for now.     Plan to remove the tube closer to discharge to avoid multiple invasive procedures.
Patient seen and examined. Cytology of pleural fluid negative for cancer. Patient would like to hold of on pleurX catheter at this point. Will continue to drain and plan to remove drain closer to discharge. Patient states shes had a  shunt in the past and has had a lot of infection issues with that. She understands that cytology has a accuracy rate of about 30% and unfortunately this does not rule out metastatic disease in the left chest.
Patient seen and examined. Denies any difficulty breathing. I asked if she wanted the chest tube removed so it will be easier for her to ambulate since output is low. Patient preferring to keep chest tube in place at this time and wants it to be removed on discharge. Will remove tube on day of discharge.

## 2022-03-17 NOTE — CHART NOTE - NSCHARTNOTEFT_GEN_A_CORE
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      Findings of current CT scan: Extravasated contrast material seen predominantly in the RIGHT anterior paracolic gutter and peritoneum consistent with bowel perforation and intraperitoneal leak. Additional hyperdense collection is seen in the anterior peritoneum just beneath the midline incision and likely communicates with the right-sided collection. Multifocal deep pelvic collections with enhancing walls likely representing interloop and pelvic abscesses. LEFT anterior paracolic collection with gas also likely abscess. Additional intra-abdominal collections/abscesses. Pneumoperitoneum.  3/13: As per Heme/onc - Overall prognosis is grave as chemotherapeutic options are limited. Patient and her family have been somewhat unrealistic as to her prognosis. Will continue with TPN at this time as discussed with colorectal surgery today.     *Current status: s/p GOC w/ palliative 3/15 - DNR, DNI, no feeding tube. As per surgery, will continue TPN. Pt deciding on hospice. If pt going to hospice, TPN to be d/c-ed. Advanced to CLD 3/15 - Full sized Meaghan pouch placed over midline wound yesterday, no leakage.  Patient tolerated CLD yesterday without nausea or vomiting.    *labs reviewed; no significant changes in lab values - no changes to TPN bag today.    03-17    137  |  104  |  17  ----------------------------<  106<H>  4.0   |  30  |  0.36<L>    Ca    7.2<L>      17 Mar 2022 06:28  Phos  2.8     03-17  Mg     2.4     03-17    TPro  4.5<L>  /  Alb  1.3<L>  /  TBili  0.4  /  DBili  x   /  AST  31  /  ALT  41  /  AlkPhos  144<H>  03-17      BMI: BMI (kg/m2): 26 (03-03-22 @ 22:52)  HbA1c: A1C with Estimated Average Glucose Result: 6.3 % (03-10-22 @ 06:22)    BP: 93/45 (03-13-22 @ 06:00) (70/42 - 137/96)  Lipid Panel: Date/Time: 03-09-22 @ 01:25  Triglycerides, Serum: 207    Vitamin D, 25-Hydroxy: 34.2 ng/mL (03-10-22 @ 11:47)  Vitamin D, 1, 25-Dihydroxy: 30.1 pg/mL (03-10-22 @ 11:47)    POCT Blood Glucose.: 112 mg/dL (17 Mar 2022 05:23)  POCT Blood Glucose.: 135 mg/dL (17 Mar 2022 00:12)  POCT Blood Glucose.: 136 mg/dL (16 Mar 2022 17:31)  POCT Blood Glucose.: 100 mg/dL (16 Mar 2022 12:40)      *pertinent meds: admelog, LR, octreotide, pantoprazole, antibiotics, acetaminophen, metoprolol, zofran, prochlorperazine      *I&O's Detail    16 Mar 2022 07:01  -  17 Mar 2022 07:00  --------------------------------------------------------  IN:    Fat Emulsion (Fish Oil &amp; Plant Based) 20% Infusion: 417 mL    IV PiggyBack: 762 mL    Oral Fluid: 960 mL    PPN (Peripheral Parenteral Nutrition): 852 mL  Total IN: 2991 mL    OUT:    Chest Tube (mL): 102 mL    Colostomy (mL): 200 mL    Voided (mL): 2300 mL  Total OUT: 2602 mL    Total NET: 389 mL      *Positive fluid status  *BM (+) on 3/16; liquid.  pt not on bowel regimen  *Advanced to CLD 3/15    *Gurpreet Score of 16; no PU documented. (+1) generalized, (2+) R/ L ankle edema documented - alb 1.3; may be skewing wt/ appearance.    *Continues to meet criteria for severe malnutrition in acute on chronic illness r/t decreased PO intake 2/2 CA and altered GI function AEB meeting <50% of ENN x5 dys and mild muscle/fat wasting, 4.5% wt loss x 5 days    ESTIMATED NUTR NEEDS: based on 73Kg admit wt  9408-1932 Kcal (25-30 Kcal/Kg)  110-146g protein (1.5-2 g/Kg)  1825-2190mL (25-30 mL/Kg)      Weight History: No new weights obtained   69.8Kg (3/8) wt loss of 3.3Kg in 5 days (4.5%), clinically significant.  Height (cm): 167.6 (03-03-22 @ 22:52)  Weight (kg): 73.1 (03-03-22 @ 22:52)  BMI (kg/m2): 26 (03-03-22 @ 22:52)  BSA (m2): 1.82 (03-03-22 @ 22:52)  IBW: 59Kg    **No changes to TPN bag**  TPN Recommendations: via implanted port    Total Volume: 2000 mL  110 g  Amino Acids  285 g Dextrose   50 g Lipids 20%  143 mEq Sodium Chloride  0 mEq Sodium Acetate  20 mmol Sodium Phosphate  33 mEq Potassium Chloride  10 mEq Potassium Acetate  20 mmol Potassium Phosphate  0 mEq Calcium Gluconate  8 mEq Magnesium Sulfate  100 mg Thiamine  18 units Regular Insulin  1 ml Trace Elements   10 ml MVI    Total Calories  1909 (Meets  100%  of  Estimated Energy needs  and    100 %  Protein needs)    Additional Recommendations:  1) Daily weights  2) Strict I & O's  3) Daily lyte checks  4) Weekly triglycerides/LFT checks  5) POCT q6hrs; maintain POCT of 140-180mg/dL; add sliding scale insulin for additional coverage prn  6) add calcium gluconate 10 mEq outside of PN bag to ensure patient doesn't go low in calcium  7) Consider supplementing vitamin D 1000 units daily  8) Consider giving IV albumin 2/2 low alb (<1.5) and edema    *will monitor and adjust treatement plan prn*  Izzy Hernandez, MS, RDN, [phone] Clinical Nutrition PN Follow Up Note    *62yo female admitted for worsening SOB due to Lt pleural effusion s/p chest tube placement, with recurrent partial SBO, abnormal LFT's (liver mets?), 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      Findings of current CT scan: Extravasated contrast material seen predominantly in the RIGHT anterior paracolic gutter and peritoneum consistent with bowel perforation and intraperitoneal leak. Additional hyperdense collection is seen in the anterior peritoneum just beneath the midline incision and likely communicates with the right-sided collection. Multifocal deep pelvic collections with enhancing walls likely representing interloop and pelvic abscesses. LEFT anterior paracolic collection with gas also likely abscess. Additional intra-abdominal collections/abscesses. Pneumoperitoneum.  3/13: As per Heme/onc - Overall prognosis is grave as chemotherapeutic options are limited. Patient and her family have been somewhat unrealistic as to her prognosis. Will continue with TPN at this time as discussed with colorectal surgery today.     *Current status: s/p GOC w/ palliative 3/15 - DNR, DNI, no feeding tube. As per surgery, will continue TPN. Pt deciding on hospice. If pt going to hospice, TPN to be d/c-ed. Advanced to CLD 3/15 - Full sized Meaghan pouch placed over midline wound yesterday, no leakage.  Pt tolerated CLD yesterday without nausea or vomiting. Now advanced to soft and bite-sized diet w/ ensure clear TID.    *labs reviewed; no significant changes in lab values - no changes to TPN bag today.    03-17    137  |  104  |  17  ----------------------------<  106<H>  4.0   |  30  |  0.36<L>    Ca    7.2<L>      17 Mar 2022 06:28  Phos  2.8     03-17  Mg     2.4     03-17    TPro  4.5<L>  /  Alb  1.3<L>  /  TBili  0.4  /  DBili  x   /  AST  31  /  ALT  41  /  AlkPhos  144<H>  03-17      BMI: BMI (kg/m2): 26 (03-03-22 @ 22:52)  HbA1c: A1C with Estimated Average Glucose Result: 6.3 % (03-10-22 @ 06:22)    BP: 93/45 (03-13-22 @ 06:00) (70/42 - 137/96)  Lipid Panel: Date/Time: 03-09-22 @ 01:25  Triglycerides, Serum: 207    Vitamin D, 25-Hydroxy: 34.2 ng/mL (03-10-22 @ 11:47)  Vitamin D, 1, 25-Dihydroxy: 30.1 pg/mL (03-10-22 @ 11:47)    POCT Blood Glucose.: 112 mg/dL (17 Mar 2022 05:23)  POCT Blood Glucose.: 135 mg/dL (17 Mar 2022 00:12)  POCT Blood Glucose.: 136 mg/dL (16 Mar 2022 17:31)  POCT Blood Glucose.: 100 mg/dL (16 Mar 2022 12:40)      *pertinent meds: admelog, LR, octreotide, pantoprazole, antibiotics, acetaminophen, metoprolol, zofran, prochlorperazine      *I&O's Detail    16 Mar 2022 07:01  -  17 Mar 2022 07:00  --------------------------------------------------------  IN:    Fat Emulsion (Fish Oil &amp; Plant Based) 20% Infusion: 417 mL    IV PiggyBack: 762 mL    Oral Fluid: 960 mL    PPN (Peripheral Parenteral Nutrition): 852 mL  Total IN: 2991 mL    OUT:    Chest Tube (mL): 102 mL    Colostomy (mL): 200 mL    Voided (mL): 2300 mL  Total OUT: 2602 mL    Total NET: 389 mL    *Positive fluid status  *BM (+) on 3/16; liquid.  pt not on bowel regimen    *Gurpreet Score of 16; no PU documented. (+1) generalized, (2+) R/ L ankle edema documented - alb 1.3; may be skewing wt/ appearance.    *Continues to meet criteria for severe malnutrition in acute on chronic illness r/t decreased PO intake 2/2 CA and altered GI function AEB meeting <50% of ENN x5 dys and mild muscle/fat wasting, 4.5% wt loss x 5 days    ESTIMATED NUTR NEEDS: based on 73Kg admit wt  6910-3336 Kcal (25-30 Kcal/Kg)  110-146g protein (1.5-2 g/Kg)  1825-2190mL (25-30 mL/Kg)      Weight History: No new weights obtained   69.8Kg (3/8) wt loss of 3.3Kg in 5 days (4.5%), clinically significant.  Height (cm): 167.6 (03-03-22 @ 22:52)  Weight (kg): 73.1 (03-03-22 @ 22:52)  BMI (kg/m2): 26 (03-03-22 @ 22:52)  BSA (m2): 1.82 (03-03-22 @ 22:52)  IBW: 59Kg    **No changes to TPN bag**  TPN Recommendations: via implanted port    Total Volume: 2000 mL  110 g  Amino Acids  285 g Dextrose   50 g Lipids 20%  143 mEq Sodium Chloride  0 mEq Sodium Acetate  20 mmol Sodium Phosphate  33 mEq Potassium Chloride  10 mEq Potassium Acetate  20 mmol Potassium Phosphate  0 mEq Calcium Gluconate  8 mEq Magnesium Sulfate  100 mg Thiamine  18 units Regular Insulin  1 ml Trace Elements   10 ml MVI    Total Calories  1909 (Meets  100%  of  Estimated Energy needs  and    100 %  Protein needs)    Additional Recommendations:  1) Daily weights  2) Strict I & O's  3) Daily lyte checks  4) Weekly triglycerides/LFT checks  5) POCT q6hrs; maintain POCT of 140-180mg/dL; add sliding scale insulin for additional coverage prn  6) add calcium gluconate 10 mEq outside of PN bag to ensure patient doesn't go low in calcium  7) Consider supplementing vitamin D 1000 units daily  8) Consider giving IV albumin 2/2 low alb (<1.5) and edema    *will monitor and adjust treatement plan prn*  Izzy Hernandez, MS, RDN, [phone]

## 2022-03-17 NOTE — CONSULT NOTE ADULT - REASON FOR ADMISSION
pleural effusion and partial SBO
Partial SBO
pleural effusion and partial SBO

## 2022-03-17 NOTE — CONSULT NOTE ADULT - SUBJECTIVE AND OBJECTIVE BOX
Chief Complaint: Dyspnea, abdominal discomfort    Interval History: Patient seen today. Ambulating steadily in SICU hallway. Pain fairly well controlled. Rather rare use of her PCA. Chest tube still in place. Meaghan bag in place without leakage. No new complaints. No fevers or rigors. Tolerating clears. Passing some flatus.    ROS: Multisystem review is comprehensively negative x 10 systems except as above    Physical Exam:  T(F): 98.3 (17 Mar 2022 16:45), Max: 99 (17 Mar 2022 12:10)  HR: 97 (17 Mar 2022 14:00) (83 - 109)  BP: 108/60 (17 Mar 2022 14:00) (108/60 - 147/72)  RR: 14 (17 Mar 2022 14:00) (8 - 29)  SpO2: 98% (17 Mar 2022 14:00) (94% - 100%)         General: No acute distress   HEENT: NCAT PERRL EOMI  Chest: Normal respiratory effort, left chest tube in place, output serosanginous  CVS: S1 S2 normal, regular, natalee ~90 bpm  Abdomen: Midline with stool drainage to Meaghan pouch over incision with stool in bag, no leakage, abd soft, nondistended, minimally TTP right hemiabdomen along incision.  Extremities: Warm, well perfused  Neuro: A+Ox3, no focal deficits    Labs/Imaging:  Reviewed                       9.9    9.74  )--------( 271             30.2       137  |  104  |  17  ---------------------< 106  4.0   |   30   |  0.36    Ca 7.2 (WNL when corrected for albumin)  Phos 2.8  Mg 2.4    TPro  4.5  /  Alb  1.3  /  TBili  0.4  /  DBili  x   /  AST  31  /  ALT  41  /  AlkPhos  144    Meds:  MEDICATIONS  (STANDING):  enoxaparin Injectable 40 milliGRAM(s) SubCutaneous every 24 hours  fat emulsion (Fish Oil and Plant Based) 20% Infusion 0.68 Gm/kG/Day (20.7 mL/Hr) IV Continuous <Continuous>  insulin lispro (ADMELOG) corrective regimen sliding scale   SubCutaneous three times a day before meals  octreotide  Injectable 100 MICROGram(s) IV Push every 8 hours  pantoprazole  Injectable 40 milliGRAM(s) IV Push every 12 hours  Parenteral Nutrition - Adult 1 Each (83 mL/Hr) TPN Continuous <Continuous>  piperacillin/tazobactam IVPB.. 3.375 Gram(s) IV Intermittent every 8 hours    MEDICATIONS  (PRN):  acetaminophen   IVPB .. 1000 milliGRAM(s) IV Intermittent every 6 hours PRN Mild Pain (1 - 3)  ALBUTerol    90 MICROgram(s) HFA Inhaler 2 Puff(s) Inhalation every 6 hours PRN Shortness of Breath  ketorolac   Injectable 15 milliGRAM(s) IV Push every 6 hours PRN Moderate Pain (4 - 6)  metoprolol tartrate Injectable 5 milliGRAM(s) IV Push every 6 hours PRN SBP > 140  morphine  - Injectable 4 milliGRAM(s) IV Push every 4 hours PRN Severe Pain (7 - 10)  naloxone Injectable 0.1 milliGRAM(s) IV Push every 3 minutes PRN For ANY of the following changes in patient status:  A. RR LESS THAN 10 breaths per minute, B. Oxygen saturation LESS THAN 90%, C. Sedation score of 6  naloxone Injectable 0.1 milliGRAM(s) IV Push every 3 minutes PRN For ANY of the following changes in patient status:  A. RR LESS THAN 10 breaths per minute, B. Oxygen saturation LESS THAN 90%, C. Sedation score of 6  ondansetron Injectable 4 milliGRAM(s) IV Push every 6 hours PRN Nausea  prochlorperazine   Injectable 5 milliGRAM(s) IV Push every 6 hours PRN nausea and/or vomiting

## 2022-03-17 NOTE — CONSULT NOTE ADULT - ASSESSMENT
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 Patient underwent L chest pigtail catheter placement by Thoracic Surgery on 3/4 with 1.5L of pleural fluid drained, exudative, cyto negative. 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 She is on nutritional support for severe protein calorie malnutrition. Medicine was consulted to provide co-management.     S/P palliative bypass, contained enterocutaneous fistula  Surgery primary team. On Zosyn. Strict Is and Os. Monitor Meaghan pouch.     Severe protein calorie malnutrition  Followed by Nutritional Support Team. Continue TPN. Clears as tolerated.     L pleural effusion s/ pigtail catheter placement  Followed by Thoracic Surgery. Management per their team.     Physical debility and deconditioning  Encourage daily ambulation. PT.     Goals of Care - DNR, DNI. Patient considering home possibly with transition to home hospice once medically and surgically cleared, possibly Monday per Dr Sahu.

## 2022-03-18 LAB
ALBUMIN SERPL ELPH-MCNC: 1.2 G/DL — LOW (ref 3.3–5)
ALP SERPL-CCNC: 122 U/L — HIGH (ref 40–120)
ALT FLD-CCNC: 32 U/L — SIGNIFICANT CHANGE UP (ref 12–78)
ANION GAP SERPL CALC-SCNC: 3 MMOL/L — LOW (ref 5–17)
AST SERPL-CCNC: 26 U/L — SIGNIFICANT CHANGE UP (ref 15–37)
BASOPHILS # BLD AUTO: 0.1 K/UL — SIGNIFICANT CHANGE UP (ref 0–0.2)
BASOPHILS NFR BLD AUTO: 1 % — SIGNIFICANT CHANGE UP (ref 0–2)
BILIRUB SERPL-MCNC: 0.4 MG/DL — SIGNIFICANT CHANGE UP (ref 0.2–1.2)
BUN SERPL-MCNC: 18 MG/DL — SIGNIFICANT CHANGE UP (ref 7–23)
CALCIUM SERPL-MCNC: 7.6 MG/DL — LOW (ref 8.5–10.1)
CHLORIDE SERPL-SCNC: 107 MMOL/L — SIGNIFICANT CHANGE UP (ref 96–108)
CO2 SERPL-SCNC: 29 MMOL/L — SIGNIFICANT CHANGE UP (ref 22–31)
CREAT SERPL-MCNC: 0.36 MG/DL — LOW (ref 0.5–1.3)
EGFR: 114 ML/MIN/1.73M2 — SIGNIFICANT CHANGE UP
EOSINOPHIL # BLD AUTO: 0.31 K/UL — SIGNIFICANT CHANGE UP (ref 0–0.5)
EOSINOPHIL NFR BLD AUTO: 3 % — SIGNIFICANT CHANGE UP (ref 0–6)
GLUCOSE SERPL-MCNC: 118 MG/DL — HIGH (ref 70–99)
HCT VFR BLD CALC: 26.7 % — LOW (ref 34.5–45)
HGB BLD-MCNC: 9 G/DL — LOW (ref 11.5–15.5)
LYMPHOCYTES # BLD AUTO: 0.41 K/UL — LOW (ref 1–3.3)
LYMPHOCYTES # BLD AUTO: 4 % — LOW (ref 13–44)
MAGNESIUM SERPL-MCNC: 2.3 MG/DL — SIGNIFICANT CHANGE UP (ref 1.6–2.6)
MCHC RBC-ENTMCNC: 32.8 PG — SIGNIFICANT CHANGE UP (ref 27–34)
MCHC RBC-ENTMCNC: 33.7 GM/DL — SIGNIFICANT CHANGE UP (ref 32–36)
MCV RBC AUTO: 97.4 FL — SIGNIFICANT CHANGE UP (ref 80–100)
MONOCYTES # BLD AUTO: 0.41 K/UL — SIGNIFICANT CHANGE UP (ref 0–0.9)
MONOCYTES NFR BLD AUTO: 4 % — SIGNIFICANT CHANGE UP (ref 2–14)
NEUTROPHILS # BLD AUTO: 8.99 K/UL — HIGH (ref 1.8–7.4)
NEUTROPHILS NFR BLD AUTO: 85 % — HIGH (ref 43–77)
NRBC # BLD: SIGNIFICANT CHANGE UP /100 WBCS (ref 0–0)
PHOSPHATE SERPL-MCNC: 2.9 MG/DL — SIGNIFICANT CHANGE UP (ref 2.5–4.5)
PLATELET # BLD AUTO: 287 K/UL — SIGNIFICANT CHANGE UP (ref 150–400)
POTASSIUM SERPL-MCNC: 4 MMOL/L — SIGNIFICANT CHANGE UP (ref 3.5–5.3)
POTASSIUM SERPL-SCNC: 4 MMOL/L — SIGNIFICANT CHANGE UP (ref 3.5–5.3)
PROT SERPL-MCNC: 4 GM/DL — LOW (ref 6–8.3)
RBC # BLD: 2.74 M/UL — LOW (ref 3.8–5.2)
RBC # FLD: 14.8 % — HIGH (ref 10.3–14.5)
SODIUM SERPL-SCNC: 139 MMOL/L — SIGNIFICANT CHANGE UP (ref 135–145)
WBC # BLD: 10.22 K/UL — SIGNIFICANT CHANGE UP (ref 3.8–10.5)
WBC # FLD AUTO: 10.22 K/UL — SIGNIFICANT CHANGE UP (ref 3.8–10.5)

## 2022-03-18 PROCEDURE — 99232 SBSQ HOSP IP/OBS MODERATE 35: CPT

## 2022-03-18 PROCEDURE — 74177 CT ABD & PELVIS W/CONTRAST: CPT | Mod: 26

## 2022-03-18 PROCEDURE — 99024 POSTOP FOLLOW-UP VISIT: CPT

## 2022-03-18 RX ORDER — CLOTRIMAZOLE 10 MG
1 TROCHE MUCOUS MEMBRANE
Refills: 0 | Status: DISCONTINUED | OUTPATIENT
Start: 2022-03-18 | End: 2022-03-25

## 2022-03-18 RX ORDER — DIPHENHYDRAMINE HYDROCHLORIDE AND LIDOCAINE HYDROCHLORIDE AND ALUMINUM HYDROXIDE AND MAGNESIUM HYDRO
5 KIT EVERY 4 HOURS
Refills: 0 | Status: DISCONTINUED | OUTPATIENT
Start: 2022-03-18 | End: 2022-03-25

## 2022-03-18 RX ORDER — BENZOCAINE AND MENTHOL 5; 1 G/100ML; G/100ML
1 LIQUID ORAL ONCE
Refills: 0 | Status: COMPLETED | OUTPATIENT
Start: 2022-03-18 | End: 2022-03-18

## 2022-03-18 RX ORDER — PSYLLIUM SEED (WITH DEXTROSE)
1 POWDER (GRAM) ORAL THREE TIMES A DAY
Refills: 0 | Status: DISCONTINUED | OUTPATIENT
Start: 2022-03-18 | End: 2022-03-25

## 2022-03-18 RX ADMIN — OCTREOTIDE ACETATE 100 MICROGRAM(S): 200 INJECTION, SOLUTION INTRAVENOUS; SUBCUTANEOUS at 06:06

## 2022-03-18 RX ADMIN — PANTOPRAZOLE SODIUM 40 MILLIGRAM(S): 20 TABLET, DELAYED RELEASE ORAL at 11:40

## 2022-03-18 RX ADMIN — BENZOCAINE AND MENTHOL 1 LOZENGE: 5; 1 LIQUID ORAL at 03:04

## 2022-03-18 RX ADMIN — PIPERACILLIN AND TAZOBACTAM 25 GRAM(S): 4; .5 INJECTION, POWDER, LYOPHILIZED, FOR SOLUTION INTRAVENOUS at 15:42

## 2022-03-18 RX ADMIN — PIPERACILLIN AND TAZOBACTAM 25 GRAM(S): 4; .5 INJECTION, POWDER, LYOPHILIZED, FOR SOLUTION INTRAVENOUS at 22:20

## 2022-03-18 RX ADMIN — PIPERACILLIN AND TAZOBACTAM 25 GRAM(S): 4; .5 INJECTION, POWDER, LYOPHILIZED, FOR SOLUTION INTRAVENOUS at 05:37

## 2022-03-18 RX ADMIN — ONDANSETRON 4 MILLIGRAM(S): 8 TABLET, FILM COATED ORAL at 22:20

## 2022-03-18 RX ADMIN — Medication 1 PACKET(S): at 22:24

## 2022-03-18 RX ADMIN — Medication 1 LOZENGE: at 22:22

## 2022-03-18 RX ADMIN — MORPHINE SULFATE 4 MILLIGRAM(S): 50 CAPSULE, EXTENDED RELEASE ORAL at 23:27

## 2022-03-18 RX ADMIN — Medication 15 MILLIGRAM(S): at 05:36

## 2022-03-18 RX ADMIN — Medication 15 MILLIGRAM(S): at 13:02

## 2022-03-18 RX ADMIN — ENOXAPARIN SODIUM 40 MILLIGRAM(S): 100 INJECTION SUBCUTANEOUS at 17:30

## 2022-03-18 RX ADMIN — Medication 15 MILLIGRAM(S): at 20:03

## 2022-03-18 RX ADMIN — Medication 15 MILLIGRAM(S): at 13:17

## 2022-03-18 RX ADMIN — OCTREOTIDE ACETATE 100 MICROGRAM(S): 200 INJECTION, SOLUTION INTRAVENOUS; SUBCUTANEOUS at 23:27

## 2022-03-18 RX ADMIN — Medication 1 PACKET(S): at 15:43

## 2022-03-18 RX ADMIN — Medication 15 MILLIGRAM(S): at 05:50

## 2022-03-18 RX ADMIN — Medication 1 LOZENGE: at 13:06

## 2022-03-18 RX ADMIN — ALBUTEROL 2 PUFF(S): 90 AEROSOL, METERED ORAL at 21:44

## 2022-03-18 RX ADMIN — PANTOPRAZOLE SODIUM 40 MILLIGRAM(S): 20 TABLET, DELAYED RELEASE ORAL at 22:22

## 2022-03-18 RX ADMIN — OCTREOTIDE ACETATE 100 MICROGRAM(S): 200 INJECTION, SOLUTION INTRAVENOUS; SUBCUTANEOUS at 15:43

## 2022-03-18 NOTE — CHART NOTE - NSCHARTNOTEFT_GEN_A_CORE
71F with pmhx GEJ cancer s/p esophagectomy, [place] tumors s/p BSO presents to ED with sob for 1.5 weeks duration. Patient underwent PET CT roughly one month ago which showed small amount of left sided pleural effusion, as per patient. 2 days ago, patient saw her oncologist who ordered a CXR which showed marked increase in pleural effusion.  Pt had CT + partial bowel obstruction.  Plan for OR next week.    Consulted for Lt Pleural effusion.  3/4 s/p Left Pigtail 1.5 L Drained Exudative effusion, cyto negative. 3/9/22 s/p Bypass, ileum, small bowel to small bowel bypass Closure, enterotomy, small intestine and Open enterolysis. Post op course c/b SB enterocutaneous fistula.    Will d/c pigtail upon  discharge Monday   no CXR needed this weekend     DW Thoracic team in am rounds

## 2022-03-18 NOTE — PROGRESS NOTE ADULT - SUBJECTIVE AND OBJECTIVE BOX
Pt. seen and examined at bedside this morning. Patient reports intermittent sharp pain to the right of the umbilicus. Tolerated soft diet without nausea or vomiting. Endorses passing some flatus.  She denies fever, chills, chest pain, SOB, nausea, vomiting, diarrhea or constipation. No acute events overnight. Was downgraded from SICU yesterday.    Vital Signs Last 24 Hrs  T(C): 36.7 (17 Mar 2022 23:33), Max: 37.2 (17 Mar 2022 12:10)  T(F): 98.1 (17 Mar 2022 23:33), Max: 99 (17 Mar 2022 12:10)  HR: 84 (17 Mar 2022 23:33) (84 - 109)  BP: 118/57 (17 Mar 2022 23:33) (102/58 - 122/59)  BP(mean): 79 (17 Mar 2022 20:00) (72 - 103)  RR: 18 (17 Mar 2022 23:33) (14 - 23)  SpO2: 95% (17 Mar 2022 23:33) (94% - 98%)    I&O's Detail    17 Mar 2022 07:01  -  18 Mar 2022 07:00  --------------------------------------------------------  IN:    Fat Emulsion (Fish Oil &amp; Plant Based) 20% Infusion: 250 mL    Fat Emulsion (Fish Oil &amp; Plant Based) 20% Infusion: 200 mL    IV PiggyBack: 490 mL    PPN (Peripheral Parenteral Nutrition): 2072 mL  Total IN: 3012 mL    OUT:    Chest Tube (mL): 60 mL    Stool (mL): 625 mL    Voided (mL): 700 mL  Total OUT: 1385 mL    Total NET: 1627 mL        Physical Exam:  General: AAOx3, Well developed, NAD  Chest: Normal respiratory effort, left chest tube in place, output SS  Heart: RRR  Abdomen: midline with stool drainage, lee pouch over incision with stool in bag, no leakage, abd soft, nondistended, minimally TTP right hemiabdomen along incision.  Neuro/Psych: No localized deficits. Normal speech, normal tone  Skin: Normal, no rashes, no lesions noted.   Extremities: Warm, well perfused, 2+ edema left foot, Pulses intact

## 2022-03-18 NOTE — PROGRESS NOTE ADULT - ASSESSMENT
Gastric cancer / stage IV  Omental metastases  SBO s/p Surgery  Intraabdominal abbesses/ on antibiotics    A/P    No plans for chemotherapy given infection  Continue antibiotics  Palliative care

## 2022-03-18 NOTE — PROGRESS NOTE ADULT - SUBJECTIVE AND OBJECTIVE BOX
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 Patient underwent L chest pigtail catheter placement by Thoracic Surgery on 3/4 with 1.5L of pleural fluid drained, exudative, cyto negative. 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 She is on nutritional support for severe protein calorie malnutrition. Medicine was consulted to provide co-management.     3/18 - no cp palps sob. abdo pain under control. walked a lot yesterday and feels tired today     Vital Signs Last 24 Hrs  T(F): 98.3 (18 Mar 2022 15:50), Max: 99.3 (18 Mar 2022 09:48)  HR: 83 (18 Mar 2022 15:50) (83 - 96)  BP: 112/51 (18 Mar 2022 15:50) (112/51 - 122/59)  RR: 18 (18 Mar 2022 15:50) (17 - 18)  SpO2: 96% (18 Mar 2022 15:50) (95% - 97%)  PHYSICAL EXAM:  Constitutional: NAD, easily arousable, pleasant, cooperative   HEENT: PERR, EOMI  Neck: Soft and supple,  No JVD  Respiratory: Breath sounds are clear bilaterally, No wheezing, rales or rhonchi  Cardiovascular: S1 and S2, regular rate and rhythm, no Murmurs  Gastrointestinal: appropriately tender post-op, eakins pouch contains stool  Extremities: mild bl pedal edema  Vascular: 2+ peripheral pulses  Neurological: A/O x 3, no focal deficits  Musculoskeletal: 5/5 strength b/l upper and lower extremities  Skin: No rashes    MEDICATIONS:  MEDICATIONS  (STANDING):  clotrimazole Lozenge 1 Lozenge Oral <User Schedule>  enoxaparin Injectable 40 milliGRAM(s) SubCutaneous every 24 hours  glucagon  Injectable 1 milliGRAM(s) IntraMuscular once  insulin lispro (ADMELOG) corrective regimen sliding scale   SubCutaneous three times a day before meals  octreotide  Injectable 100 MICROGram(s) IV Push every 8 hours  pantoprazole  Injectable 40 milliGRAM(s) IV Push every 12 hours  Parenteral Nutrition - Adult 1 Each (83 mL/Hr) TPN Continuous <Continuous>  piperacillin/tazobactam IVPB.. 3.375 Gram(s) IV Intermittent every 8 hours  psyllium Powder 1 Packet(s) Oral three times a day      LABS: All Labs Reviewed:                     9.0    10.22 )-----------( 287      ( 18 Mar 2022 05:21 )             26.7   139  |  107  |  18  ----------------------------<  118<H>  4.0   |  29  |  0.36<L>  Ca    7.6<L>      18 Mar 2022 05:21  Phos  2.9     03-18  Mg     2.3     03-18  TPro  4.0<L>  /  Alb  1.2<L>  /  TBili  0.4  /  DBili  x   /  AST  26  /  ALT  32  /  AlkPhos  122<H>  03-18    RADIOLOGY/EKG:  < from: CT Abdomen and Pelvis w/ Oral Cont and w/ IV Cont (03.18.22 @ 15:36) >  Multiple rim-enhancing peritoneal collections, several of which contain   foci of air compatible with abscesses, overall slightly decreased   compared to prior.  Extravasated contrast seen within the inferior midline abdominal   extending into the ostomy bag, compatible with enterocutaneous fistula.  Dilated distal small bowel loop in the pelvis, nonspecific.

## 2022-03-18 NOTE — PROGRESS NOTE ADULT - ASSESSMENT
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 Patient underwent L chest pigtail catheter placement by Thoracic Surgery on 3/4 with 1.5L of pleural fluid drained, exudative, cyto negative. 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 She is on nutritional support for severe protein calorie malnutrition. Medicine was consulted to provide co-management.     S/P palliative bypass, contained enterocutaneous fistula  PIP TAZO   PPI  Octreotide  noted CT imaging with peritoneal collections/abscesses. EC fistula   further mx per primary     Severe protein calorie malnutrition  Followed by Nutritional Support Team. Continue TPN. Clears as tolerated.     Thrush  Clotrimazole lozenges     Peripheral Neuropathy 2/2 Taxol   stable    L pleural effusion s/ pigtail catheter placement  Followed by Thoracic Surgery.     Physical debility and deconditioning  Encourage daily ambulation. PT.     VTE Px - lovenox    Pls call with q

## 2022-03-18 NOTE — PROGRESS NOTE ADULT - SUBJECTIVE AND OBJECTIVE BOX
HPI:      62 yo F with pmhx GEJ cancer s/p esophagectomy, [place] tumors s/p SBO presents with left sided pleural effusion   XR barium enema shows strictures in proximal and mid sigmoid colon s/p palliative bypass, now with contained enterocutaneous fistula.    3/15/22  pt seen and examined at bedside   Jaquan and son present.     Pt was following outpatient oncology she presented to hospital d/t worsening effusions found to have sbo sp surgical bypass. Unfortunately her hospital stay has been complicated now w/ enterocutaneous fistula (high risk of infection) At this time there is no further surgical intervention being offered and chemotherapy is no longer being offered.     Hospice and palliative differences discussed at length.  Code status and advanced directives reviewed please see [facility]       3/16  Patient was seen and examined. Denies nausea, vomiting, shortness of breath, chest pain, headaches, abdominal pain and constipation.  pt in good psirits open to hospice   awaiting further list of hospice to see what would work with her health insurance     3/18/22  pt in NAD comfortable resting   has no complaints  referral sent to [facility] for home hospice  symptoms currently managed      PAIN: ( )Yes   ( x)No  DYSPNEA: ( ) Yes  (x ) No  Level:          Review of Systems:    Anxiety- denies  Depression-denies  Physical Discomfort- denies  Dyspnea-denies  Constipation-denies  Diarrhea-denies  Nausea-denies  Vomiting-denies  Anorexia-denies  Weight Loss-  ++  Cough- denies  Secretions-denies  Fatigue- improved   Weakness-++  Delirium- none    All other systems reviewed and negative          PHYSICAL EXAM:    Vital Signs Last 24 Hrs  T(C): 37.4 (18 Mar 2022 09:48), Max: 37.4 (18 Mar 2022 09:48)  T(F): 99.3 (18 Mar 2022 09:48), Max: 99.3 (18 Mar 2022 09:48)  HR: 89 (18 Mar 2022 09:48) (84 - 100)  BP: 120/56 (18 Mar 2022 09:48) (102/58 - 122/59)  BP(mean): 79 (17 Mar 2022 20:00) (72 - 79)  RR: 18 (18 Mar 2022 09:48) (14 - 22)  SpO2: 95% (18 Mar 2022 09:48) (95% - 98%)  Daily     Daily       PPSV2: 45  %  FAST: na    General: calm NAD in bed  Mental Status: A+Ox3   HEENT: EOMI   Lungs: ctabl no wheezing  or rales pigtailcath.   Cardiac: s1s2  no mgr  GI: soft   : voids  Ext: moves all 4 extremities  Neuro: follows commands    LABS:                        9.0    10.22 )-----------( 287      ( 18 Mar 2022 05:21 )             26.7     03-18    139  |  107  |  18  ----------------------------<  118<H>  4.0   |  29  |  0.36<L>    Ca    7.6<L>      18 Mar 2022 05:21  Phos  2.9     03-18  Mg     2.3     03-18    TPro  4.0<L>  /  Alb  1.2<L>  /  TBili  0.4  /  DBili  x   /  AST  26  /  ALT  32  /  AlkPhos  122<H>  03-18      Albumin: Albumin, Serum: 1.2 g/dL (03-18 @ 05:21)      Allergies    latex (Unknown)  sulfa drugs (Unknown)    Intolerances      MEDICATIONS  (STANDING):  clotrimazole Lozenge 1 Lozenge Oral <User Schedule>  dextrose 40% Gel 15 Gram(s) Oral once  dextrose 5%. 1000 milliLiter(s) (100 mL/Hr) IV Continuous <Continuous>  dextrose 5%. 1000 milliLiter(s) (50 mL/Hr) IV Continuous <Continuous>  dextrose 50% Injectable 25 Gram(s) IV Push once  dextrose 50% Injectable 12.5 Gram(s) IV Push once  dextrose 50% Injectable 25 Gram(s) IV Push once  enoxaparin Injectable 40 milliGRAM(s) SubCutaneous every 24 hours  fat emulsion (Fish Oil and Plant Based) 20% Infusion 0.68 Gm/kG/Day (20.7 mL/Hr) IV Continuous <Continuous>  glucagon  Injectable 1 milliGRAM(s) IntraMuscular once  insulin lispro (ADMELOG) corrective regimen sliding scale   SubCutaneous three times a day before meals  octreotide  Injectable 100 MICROGram(s) IV Push every 8 hours  pantoprazole  Injectable 40 milliGRAM(s) IV Push every 12 hours  Parenteral Nutrition - Adult 1 Each (83 mL/Hr) TPN Continuous <Continuous>  piperacillin/tazobactam IVPB.. 3.375 Gram(s) IV Intermittent every 8 hours  psyllium Powder 1 Packet(s) Oral three times a day    MEDICATIONS  (PRN):  acetaminophen   IVPB .. 1000 milliGRAM(s) IV Intermittent every 6 hours PRN Mild Pain (1 - 3)  ALBUTerol    90 MICROgram(s) HFA Inhaler 2 Puff(s) Inhalation every 6 hours PRN Shortness of Breath  FIRST- Mouthwash  BLM 5 milliLiter(s) Swish and Spit every 4 hours PRN Mouth Care  ketorolac   Injectable 15 milliGRAM(s) IV Push every 6 hours PRN Moderate Pain (4 - 6)  metoprolol tartrate Injectable 5 milliGRAM(s) IV Push every 6 hours PRN SBP > 140  morphine  - Injectable 4 milliGRAM(s) IV Push every 4 hours PRN Severe Pain (7 - 10)  naloxone Injectable 0.1 milliGRAM(s) IV Push every 3 minutes PRN For ANY of the following changes in patient status:  A. RR LESS THAN 10 breaths per minute, B. Oxygen saturation LESS THAN 90%, C. Sedation score of 6  naloxone Injectable 0.1 milliGRAM(s) IV Push every 3 minutes PRN For ANY of the following changes in patient status:  A. RR LESS THAN 10 breaths per minute, B. Oxygen saturation LESS THAN 90%, C. Sedation score of 6  ondansetron Injectable 4 milliGRAM(s) IV Push every 6 hours PRN Nausea  prochlorperazine   Injectable 5 milliGRAM(s) IV Push every 6 hours PRN nausea and/or vomiting      RADIOLOGY:

## 2022-03-18 NOTE — CHART NOTE - NSCHARTNOTEFT_GEN_A_CORE
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      Findings of CT scan: Extravasated contrast material seen predominantly in the RIGHT anterior paracolic gutter and peritoneum consistent with bowel perforation and intraperitoneal leak. Multifocal deep pelvic collections with enhancing walls likely representing interloop and pelvic abscesses. LEFT anterior paracolic collection with gas also likely abscess. Additional intra-abdominal collections/abscesses. Pneumoperitoneum.  3/13: As per Heme/onc - Overall prognosis is grave as chemotherapeutic options are limited. Patient and her family have been somewhat unrealistic as to her prognosis.    GOC w/ palliative 3/15 - DNR, DNI, no feeding tube. As per surgery, will continue TPN. Pt deciding on hospice. If pt going to hospice, TPN to be d/c-ed.    *Current status: Full sized Meaghan pouch placed over midline wound 3/16, no leakage.  Pt tolerated CLD yesterday without nausea or vomiting. Advanced to soft and bite-sized diet w/ ensure clear TID 3/17. TPN to be possibly d/c-ed today IF pt is tolerating diet well and consuming >/= 80% ENN as per surg res. Rec'd to continue TPN if PO intake not meeting at least 80% ENN 2/2 malnutrition. Plan for possible d/c Monday as per Dr. Sahu noted in hospitalist note on 3/17.     *labs reviewed; no significant changes in lab values - no changes to TPN bag today.    03-18    139  |  107  |  18  ----------------------------<  118<H>  4.0   |  29  |  0.36<L>    Ca    7.6<L>      18 Mar 2022 05:21  Phos  2.9     03-18  Mg     2.3     03-18    TPro  4.0<L>  /  Alb  1.2<L>  /  TBili  0.4  /  DBili  x   /  AST  26  /  ALT  32  /  AlkPhos  122<H>  03-18        BMI: BMI (kg/m2): 26 (03-03-22 @ 22:52)  HbA1c: A1C with Estimated Average Glucose Result: 6.3 % (03-10-22 @ 06:22)    BP: 93/45 (03-13-22 @ 06:00) (70/42 - 137/96)  Lipid Panel: Date/Time: 03-09-22 @ 01:25  Triglycerides, Serum: 207    Vitamin D, 25-Hydroxy: 34.2 ng/mL (03-10-22 @ 11:47)  Vitamin D, 1, 25-Dihydroxy: 30.1 pg/mL (03-10-22 @ 11:47)    POCT Blood Glucose.: 119 mg/dL (18 Mar 2022 08:26)  POCT Blood Glucose.: 100 mg/dL (17 Mar 2022 22:56)  POCT Blood Glucose.: 127 mg/dL (17 Mar 2022 17:06)  POCT Blood Glucose.: 129 mg/dL (17 Mar 2022 12:19)      *pertinent meds: admelog, LR, octreotide, pantoprazole, antibiotics, psyllium powder, acetaminophen, metoprolol, zofran, prochlorperazine, morphine      *I&O's Detail    17 Mar 2022 07:01  -  18 Mar 2022 07:00  --------------------------------------------------------  IN:    Fat Emulsion (Fish Oil &amp; Plant Based) 20% Infusion: 250 mL    Fat Emulsion (Fish Oil &amp; Plant Based) 20% Infusion: 200 mL    IV PiggyBack: 490 mL    PPN (Peripheral Parenteral Nutrition): 2072 mL  Total IN: 3012 mL    OUT:    Chest Tube (mL): 60 mL    Stool (mL): 625 mL    Voided (mL): 700 mL  Total OUT: 1385 mL    Total NET: 1627 mL    *Positive fluid status  *BM (+) on 3/16; liquid.  pt not on bowel regimen    *Gurpreet Score of 17; no PU documented. Non-pitting edema on R/ L leg edema documented - alb 1.2; may be skewing wt/ appearance.    *Continues to meet criteria for severe malnutrition in acute on chronic illness r/t decreased PO intake 2/2 CA and altered GI function AEB meeting <50% of ENN x5 dys and mild muscle/fat wasting, 4.5% wt loss x 5 days    ESTIMATED NUTR NEEDS: based on 73Kg admit wt  9944-8615 Kcal (25-30 Kcal/Kg)  110-146g protein (1.5-2 g/Kg)  1825-2190mL (25-30 mL/Kg)    Weight History: No new weights obtained   69.8Kg (3/8) wt loss of 3.3Kg in 5 days (4.5%), clinically significant.  Height (cm): 167.6 (03-03-22 @ 22:52)  Weight (kg): 73.1 (03-03-22 @ 22:52)  BMI (kg/m2): 26 (03-03-22 @ 22:52)  BSA (m2): 1.82 (03-03-22 @ 22:52)  IBW: 59Kg    **No changes to TPN bag**  TPN Recommendations: via implanted port    Total Volume: 2000 mL  110 g  Amino Acids  285 g Dextrose   50 g Lipids 20%  143 mEq Sodium Chloride  0 mEq Sodium Acetate  20 mmol Sodium Phosphate  33 mEq Potassium Chloride  10 mEq Potassium Acetate  20 mmol Potassium Phosphate  0 mEq Calcium Gluconate  8 mEq Magnesium Sulfate  100 mg Thiamine  18 units Regular Insulin  1 ml Trace Elements   10 ml MVI    Total Calories  1909 (Meets  100%  of  Estimated Energy needs  and  100 %  Protein needs)    Additional Recommendations:  1) Daily weights - rec'd obtain new wt when able  2) Strict I & O's  3) Daily lyte checks  4) Weekly triglycerides/LFT checks  5) POCT q6hrs; maintain POCT of 140-180mg/dL; add sliding scale insulin for additional coverage prn  6) Add calcium gluconate 10 mEq outside of PN bag to ensure patient doesn't go low in calcium  7) Consider supplementing vitamin D 1000 units daily  8) Consider giving IV albumin 2/2 low alb (<1.5) and edema    *will monitor and adjust treatement plan prn*  Izzy Hernandez, MS, RDN, [phone] Clinical Nutrition PN Follow Up Note    *60yo female admitted for worsening SOB due to Lt pleural effusion s/p chest tube placement, with recurrent partial SBO, abnormal LFT's (liver mets?), 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      Findings of CT scan: Extravasated contrast material seen predominantly in the RIGHT anterior paracolic gutter and peritoneum consistent with bowel perforation and intraperitoneal leak. Multifocal deep pelvic collections with enhancing walls likely representing interloop and pelvic abscesses. LEFT anterior paracolic collection with gas also likely abscess. Additional intra-abdominal collections/abscesses. Pneumoperitoneum.  3/13: As per Heme/onc - Overall prognosis is grave as chemotherapeutic options are limited. Patient and her family have been somewhat unrealistic as to her prognosis.    GOC w/ palliative 3/15 - DNR, DNI, no feeding tube. As per surgery, will continue TPN. Pt deciding on hospice. If pt going to hospice, TPN to be d/c-ed.    *Current status: Full sized Meaghan pouch placed over midline wound 3/16, no leakage.  Pt tolerated CLD yesterday without nausea or vomiting. Advanced to soft and bite-sized diet w/ ensure clear TID 3/17. TPN to be possibly d/c-ed today IF pt is tolerating diet well and consuming >/= 80% ENN as per surg res. Rec'd to continue TPN if PO intake not meeting at least 80% ENN 2/2 malnutrition. Plan for possible d/c Monday as per Dr. Sahu noted in hospitalist note on 3/17.     *labs reviewed; no significant changes in lab values - no changes to TPN bag today.    03-18    139  |  107  |  18  ----------------------------<  118<H>  4.0   |  29  |  0.36<L>    Ca    7.6<L>      18 Mar 2022 05:21  Phos  2.9     03-18  Mg     2.3     03-18    TPro  4.0<L>  /  Alb  1.2<L>  /  TBili  0.4  /  DBili  x   /  AST  26  /  ALT  32  /  AlkPhos  122<H>  03-18        BMI: BMI (kg/m2): 26 (03-03-22 @ 22:52)  HbA1c: A1C with Estimated Average Glucose Result: 6.3 % (03-10-22 @ 06:22)    BP: 93/45 (03-13-22 @ 06:00) (70/42 - 137/96)  Lipid Panel: Date/Time: 03-09-22 @ 01:25  Triglycerides, Serum: 207    Vitamin D, 25-Hydroxy: 34.2 ng/mL (03-10-22 @ 11:47)  Vitamin D, 1, 25-Dihydroxy: 30.1 pg/mL (03-10-22 @ 11:47)    POCT Blood Glucose.: 119 mg/dL (18 Mar 2022 08:26)  POCT Blood Glucose.: 100 mg/dL (17 Mar 2022 22:56)  POCT Blood Glucose.: 127 mg/dL (17 Mar 2022 17:06)  POCT Blood Glucose.: 129 mg/dL (17 Mar 2022 12:19)      *pertinent meds: admelog, LR, octreotide, pantoprazole, antibiotics, psyllium powder, acetaminophen, metoprolol, zofran, prochlorperazine, morphine      *I&O's Detail    17 Mar 2022 07:01  -  18 Mar 2022 07:00  --------------------------------------------------------  IN:    Fat Emulsion (Fish Oil &amp; Plant Based) 20% Infusion: 250 mL    Fat Emulsion (Fish Oil &amp; Plant Based) 20% Infusion: 200 mL    IV PiggyBack: 490 mL    PPN (Peripheral Parenteral Nutrition): 2072 mL  Total IN: 3012 mL    OUT:    Chest Tube (mL): 60 mL    Stool (mL): 625 mL    Voided (mL): 700 mL  Total OUT: 1385 mL    Total NET: 1627 mL    *Positive fluid status  *BM (+) on 3/17 x 2; liquid, loose/ creamy.  pt not on bowel regimen, now on psyllium powder    *Gurpreet Score of 17; no PU documented. Non-pitting edema on R/ L leg edema documented - alb 1.2; may be skewing wt/ appearance.    *Continues to meet criteria for severe malnutrition in acute on chronic illness r/t decreased PO intake 2/2 CA and altered GI function AEB meeting <50% of ENN x5 dys and mild muscle/fat wasting, 4.5% wt loss x 5 days    ESTIMATED NUTR NEEDS: based on 73Kg admit wt  3906-4723 Kcal (25-30 Kcal/Kg)  110-146g protein (1.5-2 g/Kg)  1825-2190mL (25-30 mL/Kg)    Weight History: No new weights obtained   69.8Kg (3/8) wt loss of 3.3Kg in 5 days (4.5%), clinically significant.  Height (cm): 167.6 (03-03-22 @ 22:52)  Weight (kg): 73.1 (03-03-22 @ 22:52)  BMI (kg/m2): 26 (03-03-22 @ 22:52)  BSA (m2): 1.82 (03-03-22 @ 22:52)  IBW: 59Kg    **No changes to TPN bag**  TPN Recommendations: via implanted port    Total Volume: 2000 mL  110 g  Amino Acids  285 g Dextrose   50 g Lipids 20%  143 mEq Sodium Chloride  0 mEq Sodium Acetate  20 mmol Sodium Phosphate  33 mEq Potassium Chloride  10 mEq Potassium Acetate  20 mmol Potassium Phosphate  0 mEq Calcium Gluconate  8 mEq Magnesium Sulfate  100 mg Thiamine  18 units Regular Insulin  1 ml Trace Elements   10 ml MVI    Total Calories  1909 (Meets  100%  of  Estimated Energy needs  and  100 %  Protein needs)    Additional Recommendations:  1) Daily weights - rec'd obtain new wt when able  2) Strict I & O's  3) Daily lyte checks  4) Weekly triglycerides/LFT checks  5) POCT q6hrs; maintain POCT of 140-180mg/dL; add sliding scale insulin for additional coverage prn  6) Add calcium gluconate 10 mEq outside of PN bag to ensure patient doesn't go low in calcium  7) Consider supplementing vitamin D 1000 units daily  8) Consider giving IV albumin 2/2 low alb (<1.5) and edema    *will monitor and adjust treatement plan prn*  Izzy Hernandez MS, RDN, [phone]

## 2022-03-18 NOTE — PROGRESS NOTE ADULT - ASSESSMENT
60 yo F with pmhx GEJ cancer s/p esophagectomy, [place] tumors s/p SBO presents with left sided pleural effusion   XR barium enema shows strictures in proximal and mid sigmoid colon  s/p palliative bypass, now with contained enterocutaneous fistula. CT of abd appreciated. AFVSS. Labs stable.    Plan:  - Soft diet  - Wean PPN, will not need PPN for discharge  - Pain and nausea control PRN   - GI/DVT ppx  - Monitor bowel function  - Metamucil  - Continue octreotide - will need for discharge  - Monitor stool output within Meaghan pouch  - Will obtain CT A/P PO/IV contrast to r/o distal obstruction today  - Hospitalist recs  - Palliative medicine recs. Patient is DNR/DNI  - Encourage ambulation  - CT surgery recs- no pleurx catheter, possible removal pigtail prior to DC  -  for dispo planning    Plan discussed with Dr. Sahu

## 2022-03-18 NOTE — PROGRESS NOTE ADULT - ASSESSMENT
Assessment:   60 yo F with pmhx GEJ cancer s/p esophagectomy, [place] tumors s/p SBO presents with left sided pleural effusion   XR barium enema shows strictures in proximal and mid sigmoid colon  s/p palliative bypass, now with contained enterocutaneous fistula.    Process of Care  --Reviewed dx/treatment problems and alignment with Goals of Care    Physical Aspects of Care  --Pain  patient denies at this time  c/w current management    --Bowel Regimen  denies constipation  risk for constipation   cw current mmanagement    --Dyspnea  No SOB at this time  comfortable and in NAD    --Nausea Vomiting  denies    --Weakness  PT as tolerated     Psychological and Psychiatric Aspects of Care:   Grief Bereavement emotional support provided  --Hx of psychiatric dx: none  -Pastoral Care Available PRN     Social Aspects of Care  -SW involved     Cultural Aspects  -Primary Language: English    Goals of Care:     [facility] did not take patients insurance  interested in referral to [facility] for home hospice.   Pt in good spirits and humerus when talking.   She hopes to go home and be comfortable with the hopes that she could 'turn this around' but realistically understands that might not happen.     [facility] was sent referral.   as per chart possible DC monday.       Prognosis: Death can occur at anytime, but if disease continues to progress naturally patient likely has days to weeks.    Ethical and Legal Aspects:   NA    Capacity- yes  HCP/Surrogate: galdino ()  Code Status- DNR DNI  MOLST- completed  Dispo Plan- possible home w/ hospice    Discussed With:  case coordinated with attending and SW and RN     Time Spent: 45 minutes including the care, coordination and counseling of this patient, 50% of which was spent coordinating and counseling.

## 2022-03-18 NOTE — PROGRESS NOTE ADULT - SUBJECTIVE AND OBJECTIVE BOX
INTERVAL HISTORY:    Patient with stage IV gastric cancer/ omental mets/ SBO s/p surgery   Had leak/ intraabdominal infection/ abscess/ on antibiotics  Feels well this AM       Allergies    latex (Unknown)  sulfa drugs (Unknown)    Intolerances        MEDICATIONS  (STANDING):  dextrose 40% Gel 15 Gram(s) Oral once  dextrose 5%. 1000 milliLiter(s) (100 mL/Hr) IV Continuous <Continuous>  dextrose 5%. 1000 milliLiter(s) (50 mL/Hr) IV Continuous <Continuous>  dextrose 50% Injectable 25 Gram(s) IV Push once  dextrose 50% Injectable 12.5 Gram(s) IV Push once  dextrose 50% Injectable 25 Gram(s) IV Push once  enoxaparin Injectable 40 milliGRAM(s) SubCutaneous every 24 hours  fat emulsion (Fish Oil and Plant Based) 20% Infusion 0.68 Gm/kG/Day (20.7 mL/Hr) IV Continuous <Continuous>  glucagon  Injectable 1 milliGRAM(s) IntraMuscular once  insulin lispro (ADMELOG) corrective regimen sliding scale   SubCutaneous three times a day before meals  octreotide  Injectable 100 MICROGram(s) IV Push every 8 hours  pantoprazole  Injectable 40 milliGRAM(s) IV Push every 12 hours  Parenteral Nutrition - Adult 1 Each (83 mL/Hr) TPN Continuous <Continuous>  piperacillin/tazobactam IVPB.. 3.375 Gram(s) IV Intermittent every 8 hours  psyllium Powder 1 Packet(s) Oral three times a day    MEDICATIONS  (PRN):  acetaminophen   IVPB .. 1000 milliGRAM(s) IV Intermittent every 6 hours PRN Mild Pain (1 - 3)  ALBUTerol    90 MICROgram(s) HFA Inhaler 2 Puff(s) Inhalation every 6 hours PRN Shortness of Breath  FIRST- Mouthwash  BLM 5 milliLiter(s) Swish and Spit every 4 hours PRN Mouth Care  ketorolac   Injectable 15 milliGRAM(s) IV Push every 6 hours PRN Moderate Pain (4 - 6)  metoprolol tartrate Injectable 5 milliGRAM(s) IV Push every 6 hours PRN SBP > 140  morphine  - Injectable 4 milliGRAM(s) IV Push every 4 hours PRN Severe Pain (7 - 10)  naloxone Injectable 0.1 milliGRAM(s) IV Push every 3 minutes PRN For ANY of the following changes in patient status:  A. RR LESS THAN 10 breaths per minute, B. Oxygen saturation LESS THAN 90%, C. Sedation score of 6  naloxone Injectable 0.1 milliGRAM(s) IV Push every 3 minutes PRN For ANY of the following changes in patient status:  A. RR LESS THAN 10 breaths per minute, B. Oxygen saturation LESS THAN 90%, C. Sedation score of 6  ondansetron Injectable 4 milliGRAM(s) IV Push every 6 hours PRN Nausea  prochlorperazine   Injectable 5 milliGRAM(s) IV Push every 6 hours PRN nausea and/or vomiting      Vital Signs Last 24 Hrs  T(C): 37.4 (18 Mar 2022 09:48), Max: 37.4 (18 Mar 2022 09:48)  T(F): 99.3 (18 Mar 2022 09:48), Max: 99.3 (18 Mar 2022 09:48)  HR: 89 (18 Mar 2022 09:48) (84 - 109)  BP: 120/56 (18 Mar 2022 09:48) (102/58 - 122/59)  BP(mean): 79 (17 Mar 2022 20:00) (72 - 80)  RR: 18 (18 Mar 2022 09:48) (14 - 22)  SpO2: 95% (18 Mar 2022 09:48) (95% - 98%)    PHYSICAL EXAM:    GENERAL: NAD,   HEAD:  Atraumatic, Normocephalic  EYES: EOMI, PERRLA, conjunctiva and sclera clear    NECK: Supple, No JVD, Normal thyroid  NERVOUS SYSTEM:    CHEST/LUNG: Clear   HEART: Regular rate and rhythm;   ABDOMEN: Soft, Nontender.  EXTREMITIES:   edema:-  LYMPH: No lymphadenopathy noted        LABS:                        9.0    10.22 )-----------( 287      ( 18 Mar 2022 05:21 )             26.7     03-18    139  |  107  |  18  ----------------------------<  118<H>  4.0   |  29  |  0.36<L>    Ca    7.6<L>      18 Mar 2022 05:21  Phos  2.9     03-18  Mg     2.3     03-18    TPro  4.0<L>  /  Alb  1.2<L>  /  TBili  0.4  /  DBili  x   /  AST  26  /  ALT  32  /  AlkPhos  122<H>  03-18            RADIOLOGY & ADDITIONAL STUDIES:    PATHOLOGY:

## 2022-03-19 LAB
ALBUMIN SERPL ELPH-MCNC: 1.3 G/DL — LOW (ref 3.3–5)
ALP SERPL-CCNC: 142 U/L — HIGH (ref 40–120)
ALT FLD-CCNC: 31 U/L — SIGNIFICANT CHANGE UP (ref 12–78)
ANION GAP SERPL CALC-SCNC: 3 MMOL/L — LOW (ref 5–17)
AST SERPL-CCNC: 28 U/L — SIGNIFICANT CHANGE UP (ref 15–37)
BASOPHILS # BLD AUTO: 0.09 K/UL — SIGNIFICANT CHANGE UP (ref 0–0.2)
BASOPHILS NFR BLD AUTO: 0.7 % — SIGNIFICANT CHANGE UP (ref 0–2)
BILIRUB SERPL-MCNC: 0.5 MG/DL — SIGNIFICANT CHANGE UP (ref 0.2–1.2)
BUN SERPL-MCNC: 16 MG/DL — SIGNIFICANT CHANGE UP (ref 7–23)
CALCIUM SERPL-MCNC: 7.8 MG/DL — LOW (ref 8.5–10.1)
CHLORIDE SERPL-SCNC: 105 MMOL/L — SIGNIFICANT CHANGE UP (ref 96–108)
CO2 SERPL-SCNC: 29 MMOL/L — SIGNIFICANT CHANGE UP (ref 22–31)
CREAT SERPL-MCNC: 0.4 MG/DL — LOW (ref 0.5–1.3)
EGFR: 111 ML/MIN/1.73M2 — SIGNIFICANT CHANGE UP
EOSINOPHIL # BLD AUTO: 0.48 K/UL — SIGNIFICANT CHANGE UP (ref 0–0.5)
EOSINOPHIL NFR BLD AUTO: 3.8 % — SIGNIFICANT CHANGE UP (ref 0–6)
GLUCOSE SERPL-MCNC: 109 MG/DL — HIGH (ref 70–99)
HCT VFR BLD CALC: 26.4 % — LOW (ref 34.5–45)
HGB BLD-MCNC: 8.7 G/DL — LOW (ref 11.5–15.5)
IMM GRANULOCYTES NFR BLD AUTO: 1.2 % — SIGNIFICANT CHANGE UP (ref 0–1.5)
LYMPHOCYTES # BLD AUTO: 0.95 K/UL — LOW (ref 1–3.3)
LYMPHOCYTES # BLD AUTO: 7.5 % — LOW (ref 13–44)
MAGNESIUM SERPL-MCNC: 2.1 MG/DL — SIGNIFICANT CHANGE UP (ref 1.6–2.6)
MCHC RBC-ENTMCNC: 32.1 PG — SIGNIFICANT CHANGE UP (ref 27–34)
MCHC RBC-ENTMCNC: 33 GM/DL — SIGNIFICANT CHANGE UP (ref 32–36)
MCV RBC AUTO: 97.4 FL — SIGNIFICANT CHANGE UP (ref 80–100)
MONOCYTES # BLD AUTO: 0.74 K/UL — SIGNIFICANT CHANGE UP (ref 0–0.9)
MONOCYTES NFR BLD AUTO: 5.8 % — SIGNIFICANT CHANGE UP (ref 2–14)
NEUTROPHILS # BLD AUTO: 10.29 K/UL — HIGH (ref 1.8–7.4)
NEUTROPHILS NFR BLD AUTO: 81 % — HIGH (ref 43–77)
PHOSPHATE SERPL-MCNC: 3 MG/DL — SIGNIFICANT CHANGE UP (ref 2.5–4.5)
PLATELET # BLD AUTO: 385 K/UL — SIGNIFICANT CHANGE UP (ref 150–400)
POTASSIUM SERPL-MCNC: 4.2 MMOL/L — SIGNIFICANT CHANGE UP (ref 3.5–5.3)
POTASSIUM SERPL-SCNC: 4.2 MMOL/L — SIGNIFICANT CHANGE UP (ref 3.5–5.3)
PROT SERPL-MCNC: 4.4 GM/DL — LOW (ref 6–8.3)
RBC # BLD: 2.71 M/UL — LOW (ref 3.8–5.2)
RBC # FLD: 15.2 % — HIGH (ref 10.3–14.5)
SODIUM SERPL-SCNC: 137 MMOL/L — SIGNIFICANT CHANGE UP (ref 135–145)
WBC # BLD: 12.7 K/UL — HIGH (ref 3.8–10.5)
WBC # FLD AUTO: 12.7 K/UL — HIGH (ref 3.8–10.5)

## 2022-03-19 PROCEDURE — 99232 SBSQ HOSP IP/OBS MODERATE 35: CPT

## 2022-03-19 RX ORDER — LORATADINE 10 MG/1
10 TABLET ORAL ONCE
Refills: 0 | Status: COMPLETED | OUTPATIENT
Start: 2022-03-19 | End: 2022-03-19

## 2022-03-19 RX ORDER — NYSTATIN 500MM UNIT
500000 POWDER (EA) MISCELLANEOUS ONCE
Refills: 0 | Status: COMPLETED | OUTPATIENT
Start: 2022-03-19 | End: 2022-03-19

## 2022-03-19 RX ADMIN — Medication 15 MILLIGRAM(S): at 06:36

## 2022-03-19 RX ADMIN — PANTOPRAZOLE SODIUM 40 MILLIGRAM(S): 20 TABLET, DELAYED RELEASE ORAL at 10:16

## 2022-03-19 RX ADMIN — Medication 1 PACKET(S): at 08:33

## 2022-03-19 RX ADMIN — PIPERACILLIN AND TAZOBACTAM 25 GRAM(S): 4; .5 INJECTION, POWDER, LYOPHILIZED, FOR SOLUTION INTRAVENOUS at 13:49

## 2022-03-19 RX ADMIN — PIPERACILLIN AND TAZOBACTAM 25 GRAM(S): 4; .5 INJECTION, POWDER, LYOPHILIZED, FOR SOLUTION INTRAVENOUS at 22:32

## 2022-03-19 RX ADMIN — PANTOPRAZOLE SODIUM 40 MILLIGRAM(S): 20 TABLET, DELAYED RELEASE ORAL at 22:31

## 2022-03-19 RX ADMIN — MORPHINE SULFATE 4 MILLIGRAM(S): 50 CAPSULE, EXTENDED RELEASE ORAL at 22:31

## 2022-03-19 RX ADMIN — LORATADINE 10 MILLIGRAM(S): 10 TABLET ORAL at 22:31

## 2022-03-19 RX ADMIN — ONDANSETRON 4 MILLIGRAM(S): 8 TABLET, FILM COATED ORAL at 05:47

## 2022-03-19 RX ADMIN — Medication 1 PACKET(S): at 22:33

## 2022-03-19 RX ADMIN — ENOXAPARIN SODIUM 40 MILLIGRAM(S): 100 INJECTION SUBCUTANEOUS at 14:23

## 2022-03-19 RX ADMIN — Medication 1 PACKET(S): at 14:19

## 2022-03-19 RX ADMIN — ONDANSETRON 4 MILLIGRAM(S): 8 TABLET, FILM COATED ORAL at 22:30

## 2022-03-19 RX ADMIN — Medication 1 LOZENGE: at 08:32

## 2022-03-19 RX ADMIN — Medication 1 LOZENGE: at 13:47

## 2022-03-19 RX ADMIN — Medication 500000 UNIT(S): at 22:32

## 2022-03-19 RX ADMIN — MORPHINE SULFATE 4 MILLIGRAM(S): 50 CAPSULE, EXTENDED RELEASE ORAL at 17:35

## 2022-03-19 RX ADMIN — MORPHINE SULFATE 4 MILLIGRAM(S): 50 CAPSULE, EXTENDED RELEASE ORAL at 18:05

## 2022-03-19 RX ADMIN — Medication 1 LOZENGE: at 22:32

## 2022-03-19 RX ADMIN — ONDANSETRON 4 MILLIGRAM(S): 8 TABLET, FILM COATED ORAL at 13:50

## 2022-03-19 RX ADMIN — PIPERACILLIN AND TAZOBACTAM 25 GRAM(S): 4; .5 INJECTION, POWDER, LYOPHILIZED, FOR SOLUTION INTRAVENOUS at 05:47

## 2022-03-19 RX ADMIN — ALBUTEROL 2 PUFF(S): 90 AEROSOL, METERED ORAL at 22:48

## 2022-03-19 RX ADMIN — OCTREOTIDE ACETATE 100 MICROGRAM(S): 200 INJECTION, SOLUTION INTRAVENOUS; SUBCUTANEOUS at 22:58

## 2022-03-19 RX ADMIN — MORPHINE SULFATE 4 MILLIGRAM(S): 50 CAPSULE, EXTENDED RELEASE ORAL at 23:06

## 2022-03-19 RX ADMIN — Medication 15 MILLIGRAM(S): at 07:00

## 2022-03-19 RX ADMIN — OCTREOTIDE ACETATE 100 MICROGRAM(S): 200 INJECTION, SOLUTION INTRAVENOUS; SUBCUTANEOUS at 14:23

## 2022-03-19 RX ADMIN — OCTREOTIDE ACETATE 100 MICROGRAM(S): 200 INJECTION, SOLUTION INTRAVENOUS; SUBCUTANEOUS at 06:36

## 2022-03-19 NOTE — PROGRESS NOTE ADULT - ASSESSMENT
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 Patient underwent L chest pigtail catheter placement by Thoracic Surgery on 3/4 with 1.5L of pleural fluid drained, exudative, cyto negative. 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 She is on nutritional support for severe protein calorie malnutrition. Medicine was consulted to provide co-management.     S/P palliative bypass, contained enterocutaneous fistula  PIP TAZO   PPI  Octreotide  noted CT imaging with peritoneal collections/abscesses. EC fistula   further mx per primary     Severe protein calorie malnutrition  Followed by Nutritional Support Team. Continue TPN. Clears as tolerated.     Thrush  Clotrimazole lozenges     Peripheral Neuropathy 2/2 Taxol   stable    L pleural effusion s/ pigtail catheter placement  Followed by Thoracic Surgery.     Physical debility and deconditioning  Encourage daily ambulation. PT.     VTE Px - lovenox    Pls call with q    61 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 Patient underwent L chest pigtail catheter placement by Thoracic Surgery on 3/4 with 1.5L of pleural fluid drained, exudative, cyto negative. 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 She is on nutritional support for severe protein calorie malnutrition. Medicine was consulted to provide co-management.     S/P palliative bypass, contained enterocutaneous fistula  PIP TAZO   PPI  Octreotide  noted CT imaging with peritoneal collections/abscesses. EC fistula   further mx per primary     Severe protein calorie malnutrition  Followed by Nutritional Support Team. Continue TPN. Clears as tolerated.     Thrush  Clotrimazole lozenges     Peripheral Neuropathy 2/2 Taxol   stable    L pleural effusion s/ pigtail catheter placement  Followed by Thoracic Surgery.     Physical debility and deconditioning  Encourage daily ambulation. PT.     VTE Px - lovenox    Pls call with q   discussed with Sx and  at bedside

## 2022-03-19 NOTE — PROGRESS NOTE ADULT - SUBJECTIVE AND OBJECTIVE BOX
INTERVAL HISTORY:    Patient with stage IV gastric cancer/ omental mets/ SBO s/p surgery   Had leak/ intraabdominal infection/ abscess/ on antibiotics  Feels well this AM   tolerating food              MEDICATIONS  (STANDING):  clotrimazole Lozenge 1 Lozenge Oral <User Schedule>  dextrose 40% Gel 15 Gram(s) Oral once  dextrose 5%. 1000 milliLiter(s) (100 mL/Hr) IV Continuous <Continuous>  dextrose 5%. 1000 milliLiter(s) (50 mL/Hr) IV Continuous <Continuous>  dextrose 50% Injectable 25 Gram(s) IV Push once  dextrose 50% Injectable 12.5 Gram(s) IV Push once  dextrose 50% Injectable 25 Gram(s) IV Push once  enoxaparin Injectable 40 milliGRAM(s) SubCutaneous every 24 hours  glucagon  Injectable 1 milliGRAM(s) IntraMuscular once  insulin lispro (ADMELOG) corrective regimen sliding scale   SubCutaneous three times a day before meals  octreotide  Injectable 100 MICROGram(s) IV Push every 8 hours  pantoprazole  Injectable 40 milliGRAM(s) IV Push every 12 hours  piperacillin/tazobactam IVPB.. 3.375 Gram(s) IV Intermittent every 8 hours  psyllium Powder 1 Packet(s) Oral three times a day    MEDICATIONS  (PRN):  acetaminophen   IVPB .. 1000 milliGRAM(s) IV Intermittent every 6 hours PRN Mild Pain (1 - 3)  ALBUTerol    90 MICROgram(s) HFA Inhaler 2 Puff(s) Inhalation every 6 hours PRN Shortness of Breath  FIRST- Mouthwash  BLM 5 milliLiter(s) Swish and Spit every 4 hours PRN Mouth Care  ketorolac   Injectable 15 milliGRAM(s) IV Push every 6 hours PRN Moderate Pain (4 - 6)  metoprolol tartrate Injectable 5 milliGRAM(s) IV Push every 6 hours PRN SBP > 140  morphine  - Injectable 4 milliGRAM(s) IV Push every 4 hours PRN Severe Pain (7 - 10)  naloxone Injectable 0.1 milliGRAM(s) IV Push every 3 minutes PRN For ANY of the following changes in patient status:  A. RR LESS THAN 10 breaths per minute, B. Oxygen saturation LESS THAN 90%, C. Sedation score of 6  naloxone Injectable 0.1 milliGRAM(s) IV Push every 3 minutes PRN For ANY of the following changes in patient status:  A. RR LESS THAN 10 breaths per minute, B. Oxygen saturation LESS THAN 90%, C. Sedation score of 6  ondansetron Injectable 4 milliGRAM(s) IV Push every 6 hours PRN Nausea  prochlorperazine   Injectable 5 milliGRAM(s) IV Push every 6 hours PRN nausea and/or vomiting    Vital Signs Last 24 Hrs  T(C): 36.4 (19 Mar 2022 09:36), Max: 36.9 (18 Mar 2022 21:13)  T(F): 97.6 (19 Mar 2022 09:36), Max: 98.4 (18 Mar 2022 21:13)  HR: 86 (19 Mar 2022 09:36) (83 - 87)  BP: 114/63 (19 Mar 2022 09:36) (112/51 - 125/26)  BP(mean): --  RR: 18 (19 Mar 2022 09:36) (18 - 18)  SpO2: 96% (19 Mar 2022 09:36) (96% - 97%)    PHYSICAL EXAM:    GENERAL: NAD,   HEAD:  Atraumatic, Normocephalic  EYES: EOMI, PERRLA, conjunctiva and sclera clear    NECK: Supple, No JVD, Normal thyroid  NERVOUS SYSTEM:    CHEST/LUNG: Clear   HEART: Regular rate and rhythm;   ABDOMEN: Soft, Nontender.  fistula draining in bag  EXTREMITIES:   edema:-  LYMPH: No lymphadenopathy noted                              8.7    12.70 )-----------( 385      ( 19 Mar 2022 05:30 )             26.4     03-19    137  |  105  |  16  ----------------------------<  109<H>  4.2   |  29  |  0.40<L>    Ca    7.8<L>      19 Mar 2022 05:30  Phos  3.0     03-19  Mg     2.1     03-19    TPro  4.4<L>  /  Alb  1.3<L>  /  TBili  0.5  /  DBili  x   /  AST  28  /  ALT  31  /  AlkPhos  142<H>  03-19    LIVER FUNCTIONS - ( 19 Mar 2022 05:30 )  Alb: 1.3 g/dL / Pro: 4.4 gm/dL / ALK PHOS: 142 U/L / ALT: 31 U/L / AST: 28 U/L / GGT: x                     RADIOLOGY & ADDITIONAL STUDIES:      ACC: [id number] EXAM:  CT ABDOMEN AND PELVIS OC IC                          PROCEDURE DATE:  03/18/2022          INTERPRETATION:  CLINICAL INFORMATION: History of SBO status post bypass   with fistula    COMPARISON: 3/4/2022, 3/12/2022.    CONTRAST/COMPLICATIONS:  IV Contrast: Omnipaque 350  90 cc administered   10 cc discarded  Oral Contrast: Omnipaque 300  Complications: None reported at time of study completion    PROCEDURE:  CT of the Abdomen and Pelvis was performed.  Sagittal and coronal reformats were performed.    FINDINGS:  LOWER CHEST: Status post gastric pull-through. Trace bilateral effusions   with associated compressive atelectasis. Left-sided pleural catheter.    LIVER: Steatosis. Large posterior right hepatic lobe cyst. Previously   seen enhancing lesion in left hepatic lobe is poorly visualized. Hepatic   capsular implants are faintly visualized.  BILE DUCTS: Mild/moderate intra and extrahepatic biliary ductal   dilatation. The common bile duct is also dilated up to 1.7 cm, unchanged.  GALLBLADDER: Cholecystectomy.  SPLEEN: Within normal limits.  PANCREAS: Within normal limits.  ADRENALS: Bilateral adrenal gland nodularity, without significant change.  KIDNEYS/URETERS: Mild cortical scarring involving the upper pole the left   kidney. No hydronephrosis.    BLADDER: Within normal limits.  REPRODUCTIVE ORGANS: Small, heterogeneous uterus.    BOWEL: Status post gastric pull-through. Thickening versus   underdistention of the descending and sigmoid colon. Small bowel   anastomoses without evidence of obstruction. Dilated distal small bowel   within the pelvis. Appendix is not definitely visualized.  PERITONEUM: Multiple rim-enhancing peritoneal collections.  A collection   in the mid upper pelvis measures 4.2 x 3.2 x 4.2 cm (2:85; previously 4.9   x 2.6 x 4.9 cm). Anterior abdominal collection containing oral contrast   measures 11.1 x 1.9 x 4.1 cm (2:84; previously 12.1 x 2.3 x 6.2 cm). The   collection in the left lower quadrant measures 4.7 x 4.2 x 6.3 cm (2:91;   previously 4.9 x 2.7 x11.5 cm). Scattered small foci of air within the   abdominal collections. Trace pneumoperitoneum. Multiple peritoneal   implants.  VESSELS: Atherosclerotic changes.  RETROPERITONEUM/LYMPH NODES: No lymphadenopathy.  ABDOMINAL WALL: Postsurgical changes. Extravasated contrast seen within   the inferior midline abdominal extending into the midline ostomy bag.   Extensive subcutaneous edema, slightly decreased compared to prior.  BONES: Within normal limits.    IMPRESSION:  Multiple rim-enhancing peritoneal collections, several of which contain   foci of air compatible with abscesses, overall slightly decreased   compared to prior.    Extravasated contrast seen within the inferior midline abdominal   extending into the ostomy bag, compatible with enterocutaneous fistula.    Dilated distal small bowel loop in the pelvis, nonspecific.    Additional findings as above.            --- End of Report ---            JONATAN RUTLEDGE MD; Attending Radiologist          PATHOLOGY:

## 2022-03-19 NOTE — PROGRESS NOTE ADULT - SUBJECTIVE AND OBJECTIVE BOX
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 Patient underwent L chest pigtail catheter placement by Thoracic Surgery on 3/4 with 1.5L of pleural fluid drained, exudative, cyto negative. 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 She is on nutritional support for severe protein calorie malnutrition. Medicine was consulted to provide co-management.     3/18 - no cp palps sob. abdo pain under control. walked a lot yesterday and feels tired today       PHYSICAL EXAM:  Constitutional: NAD, easily arousable, pleasant, cooperative   HEENT: PERR, EOMI  Neck: Soft and supple,  No JVD  Respiratory: Breath sounds are clear bilaterally, No wheezing, rales or rhonchi  Cardiovascular: S1 and S2, regular rate and rhythm, no Murmurs  Gastrointestinal: appropriately tender post-op, eakins pouch contains stool  Extremities: mild bl pedal edema  Vascular: 2+ peripheral pulses  Neurological: A/O x 3, no focal deficits  Musculoskeletal: 5/5 strength b/l upper and lower extremities  Skin: No rashes    MEDICATIONS:  MEDICATIONS  (STANDING):  clotrimazole Lozenge 1 Lozenge Oral <User Schedule>  enoxaparin Injectable 40 milliGRAM(s) SubCutaneous every 24 hours  glucagon  Injectable 1 milliGRAM(s) IntraMuscular once  insulin lispro (ADMELOG) corrective regimen sliding scale   SubCutaneous three times a day before meals  octreotide  Injectable 100 MICROGram(s) IV Push every 8 hours  pantoprazole  Injectable 40 milliGRAM(s) IV Push every 12 hours  Parenteral Nutrition - Adult 1 Each (83 mL/Hr) TPN Continuous <Continuous>  piperacillin/tazobactam IVPB.. 3.375 Gram(s) IV Intermittent every 8 hours  psyllium Powder 1 Packet(s) Oral three times a day      LABS: All Labs Reviewed:                     9.0    10.22 )-----------( 287      ( 18 Mar 2022 05:21 )             26.7   139  |  107  |  18  ----------------------------<  118<H>  4.0   |  29  |  0.36<L>  Ca    7.6<L>      18 Mar 2022 05:21  Phos  2.9     03-18  Mg     2.3     03-18  TPro  4.0<L>  /  Alb  1.2<L>  /  TBili  0.4  /  DBili  x   /  AST  26  /  ALT  32  /  AlkPhos  122<H>  03-18    RADIOLOGY/EKG:  < from: CT Abdomen and Pelvis w/ Oral Cont and w/ IV Cont (03.18.22 @ 15:36) >  Multiple rim-enhancing peritoneal collections, several of which contain   foci of air compatible with abscesses, overall slightly decreased   compared to prior.  Extravasated contrast seen within the inferior midline abdominal   extending into the ostomy bag, compatible with enterocutaneous fistula.  Dilated distal small bowel loop in the pelvis, nonspecific.       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 Patient underwent L chest pigtail catheter placement by Thoracic Surgery on 3/4 with 1.5L of pleural fluid drained, exudative, cyto negative. 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 She is on nutritional support for severe protein calorie malnutrition. Medicine was consulted to provide co-management.     3/19 - no cp palps sob. abdo pain under control. tolerating po diet     PHYSICAL EXAM:  T(F): 98.6 (19 Mar 2022 15:27), Max: 98.6 (19 Mar 2022 15:27)  HR: 78 (19 Mar 2022 15:27) (78 - 87)  BP: 111/65 (19 Mar 2022 15:27) (111/65 - 125/26)  RR: 18 (19 Mar 2022 15:27) (18 - 18)  SpO2: 96% (19 Mar 2022 15:27) (96% - 97%)  Constitutional: NAD, sitting up in bed, pleasant, cooperative   HEENT: PERR, EOMI  Neck: Soft and supple,  No JVD  Respiratory: Breath sounds are clear bilaterally, No wheezing, rales or rhonchi  Cardiovascular: S1 and S2, regular rate and rhythm, no Murmurs  Gastrointestinal: appropriately tender post-op, eakins pouch contains stool  Extremities: mild bl pedal edema  Vascular: 2+ peripheral pulses  Neurological: A/O x 3, no focal deficits  Musculoskeletal: 5/5 strength b/l upper and lower extremities  Skin: No rashes    RADIOLOGY/EKG:  < from: CT Abdomen and Pelvis w/ Oral Cont and w/ IV Cont (03.18.22 @ 15:36) >  Multiple rim-enhancing peritoneal collections, several of which contain   foci of air compatible with abscesses, overall slightly decreased   compared to prior.  Extravasated contrast seen within the inferior midline abdominal   extending into the ostomy bag, compatible with enterocutaneous fistula.  Dilated distal small bowel loop in the pelvis, nonspecific.    LABS: All Labs Reviewed:                     8.7    12.70 )-----------( 385      ( 19 Mar 2022 05:30 )             26.4   137  |  105  |  16  ----------------------------<  109<H>  4.2   |  29  |  0.40<L>    MEDS:   acetaminophen   IVPB .. 1000 milliGRAM(s) IV Intermittent every 6 hours PRN  ALBUTerol    90 MICROgram(s) HFA Inhaler 2 Puff(s) Inhalation every 6 hours PRN  clotrimazole Lozenge 1 Lozenge Oral <User Schedule>  enoxaparin Injectable 40 milliGRAM(s) SubCutaneous every 24 hours  FIRST- Mouthwash  BLM 5 milliLiter(s) Swish and Spit every 4 hours PRN  insulin lispro (ADMELOG) corrective regimen sliding scale   SubCutaneous three times a day before meals  ketorolac   Injectable 15 milliGRAM(s) IV Push every 6 hours PRN  metoprolol tartrate Injectable 5 milliGRAM(s) IV Push every 6 hours PRN  morphine  - Injectable 4 milliGRAM(s) IV Push every 4 hours PRN  naloxone Injectable 0.1 milliGRAM(s) IV Push every 3 minutes PRN  naloxone Injectable 0.1 milliGRAM(s) IV Push every 3 minutes PRN  octreotide  Injectable 100 MICROGram(s) IV Push every 8 hours  ondansetron Injectable 4 milliGRAM(s) IV Push every 6 hours PRN  pantoprazole  Injectable 40 milliGRAM(s) IV Push every 12 hours  piperacillin/tazobactam IVPB.. 3.375 Gram(s) IV Intermittent every 8 hours  prochlorperazine   Injectable 5 milliGRAM(s) IV Push every 6 hours PRN  psyllium Powder 1 Packet(s) Oral three times a day

## 2022-03-19 NOTE — PROGRESS NOTE ADULT - SUBJECTIVE AND OBJECTIVE BOX
Patient is a 61y old  Female who presents with a chief complaint of pleural effusion and partial SBO (19 Mar 2022 10:16)    HPI:  71F with pmhx GEJ cancer s/p esophagectomy, [place] tumors s/p BSO presents to ED with sob for 1.5 weeks duration. Patient underwent PET CT roughly one month ago which showed small amount of left sided pleural effusion, as per patient. 2 days ago, patient saw her oncologist who ordered a CXR which showed marked increase in pleural effusion.  Pt had CT + partial bowel obstruction.  Plan for OR next week.    Consulted for Lt Pleural effusion.  3/4 s/p Left Pigtail 1.5 L Drained Exudative effusion, cyto negative. 3/9/22 s/p Bypass, ileum, small bowel to small bowel bypass Closure, enterotomy, small intestine and Open enterolysis. Post op course c/b SB enterocutaneous fistula.    At bedside left pigtail catheter in place. Serous fluid noted. Admits breathing feels well     (04 Mar 2022 00:22)    Allergies: latex  sulfa drugs    PAST MEDICAL & SURGICAL HISTORY:  Essential hypertension    Gastritis    HLD (hyperlipidemia)    GERD (gastroesophageal reflux disease)    Intracranial shunt    S/P cholecystectomy    History of esophageal surgery      FAMILY HISTORY:  Family history of gastric cancer (Father, Grandparent)    Family history of colon cancer      SOCIAL HISTORY:    Home Medications:    Review of Systems:  ROS as noted above, all others negative    T(F): 97.6 (03-19-22 @ 09:36), Max: 98.4 (03-18-22 @ 21:13)  HR: 86 (03-19-22 @ 09:36) (83 - 87)  BP: 114/63 (03-19-22 @ 09:36) (112/51 - 125/26)  RR: 18 (03-19-22 @ 09:36) (18 - 18)  SpO2: 96% (03-19-22 @ 09:36)  Wt(kg): --    CAPILLARY BLOOD GLUCOSE      POCT Blood Glucose.: 104 mg/dL (19 Mar 2022 08:53)    I&O's Summary    18 Mar 2022 07:01  -  19 Mar 2022 07:00  --------------------------------------------------------  IN: 0 mL / OUT: 995 mL / NET: -995 mL    19 Mar 2022 07:01  -  19 Mar 2022 10:50  --------------------------------------------------------  IN: 0 mL / OUT: 215 mL / NET: -215 mL        Physical Exam:     Gen: chronically ill appearing  Neuro: awake/alert  CVS: +S1s2  Resp: CTA; left chest tube; serous drainage  Ext: well perfused    Meds:  clotrimazole Lozenge 1 Lozenge Oral <User Schedule>  piperacillin/tazobactam IVPB.. 3.375 Gram(s) IV Intermittent every 8 hours    metoprolol tartrate Injectable 5 milliGRAM(s) IV Push every 6 hours PRN     dextrose 40% Gel 15 Gram(s) Oral once  dextrose 50% Injectable 25 Gram(s) IV Push once  dextrose 50% Injectable 12.5 Gram(s) IV Push once  dextrose 50% Injectable 25 Gram(s) IV Push once  glucagon  Injectable 1 milliGRAM(s) IntraMuscular once  insulin lispro (ADMELOG) corrective regimen sliding scale   SubCutaneous three times a day before meals  octreotide  Injectable 100 MICROGram(s) IV Push every 8 hours     ALBUTerol    90 MICROgram(s) HFA Inhaler 2 Puff(s) Inhalation every 6 hours PRN     acetaminophen   IVPB .. 1000 milliGRAM(s) IV Intermittent every 6 hours PRN  ketorolac   Injectable 15 milliGRAM(s) IV Push every 6 hours PRN  morphine  - Injectable 4 milliGRAM(s) IV Push every 4 hours PRN  ondansetron Injectable 4 milliGRAM(s) IV Push every 6 hours PRN  prochlorperazine   Injectable 5 milliGRAM(s) IV Push every 6 hours PRN        enoxaparin Injectable 40 milliGRAM(s) SubCutaneous every 24 hours     pantoprazole  Injectable 40 milliGRAM(s) IV Push every 12 hours  psyllium Powder 1 Packet(s) Oral three times a day        dextrose 5%. 1000 milliLiter(s) IV Continuous <Continuous>  dextrose 5%. 1000 milliLiter(s) IV Continuous <Continuous>        FIRST- Mouthwash  BLM 5 milliLiter(s) Swish and Spit every 4 hours PRN     naloxone Injectable 0.1 milliGRAM(s) IV Push every 3 minutes PRN  naloxone Injectable 0.1 milliGRAM(s) IV Push every 3 minutes PRN                           8.7    12.70 )-----------( 385      ( 19 Mar 2022 05:30 )             26.4       03-19    137  |  105  |  16  ----------------------------<  109<H>  4.2   |  29  |  0.40<L>    Ca    7.8<L>      19 Mar 2022 05:30  Phos  3.0     03-19  Mg     2.1     03-19    TPro  4.4<L>  /  Alb  1.3<L>  /  TBili  0.5  /  DBili  x   /  AST  28  /  ALT  31  /  AlkPhos  142<H>  03-19                      Radiology:   < from: CT Abdomen and Pelvis w/ Oral Cont and w/ IV Cont (03.18.22 @ 15:36) >    ACC: [id number] EXAM:  CT ABDOMEN AND PELVIS OC IC                          PROCEDURE DATE:  03/18/2022          INTERPRETATION:  CLINICAL INFORMATION: History of SBO status post bypass   with fistula    COMPARISON: 3/4/2022, 3/12/2022.    CONTRAST/COMPLICATIONS:  IV Contrast: Omnipaque 350  90 cc administered   10 cc discarded  Oral Contrast: Omnipaque 300  Complications: None reported at time of study completion    PROCEDURE:  CT of the Abdomen and Pelvis was performed.  Sagittal and coronal reformats were performed.    FINDINGS:  LOWER CHEST: Status post gastric pull-through. Trace bilateral effusions   with associated compressive atelectasis. Left-sided pleural catheter.    LIVER: Steatosis. Large posterior right hepatic lobe cyst. Previously   seen enhancing lesion in left hepatic lobe is poorly visualized. Hepatic   capsular implants are faintly visualized.  BILE DUCTS: Mild/moderate intra and extrahepatic biliary ductal   dilatation. The common bile duct is also dilated up to 1.7 cm, unchanged.  GALLBLADDER: Cholecystectomy.  SPLEEN: Within normal limits.  PANCREAS: Within normal limits.  ADRENALS: Bilateral adrenal gland nodularity, without significant change.  KIDNEYS/URETERS: Mild cortical scarring involving the upper pole the left   kidney. No hydronephrosis.    BLADDER: Within normal limits.  REPRODUCTIVE ORGANS: Small, heterogeneous uterus.    BOWEL: Status post gastric pull-through. Thickening versus   underdistention of the descending and sigmoid colon. Small bowel   anastomoses without evidence of obstruction. Dilated distal small bowel   within the pelvis. Appendix is not definitely visualized.  PERITONEUM: Multiple rim-enhancing peritoneal collections.  A collection   in the mid upper pelvis measures 4.2 x 3.2 x 4.2 cm (2:85; previously 4.9   x 2.6 x 4.9 cm). Anterior abdominal collection containing oral contrast   measures 11.1 x 1.9 x 4.1 cm (2:84; previously 12.1 x 2.3 x 6.2 cm). The   collection in the left lower quadrant measures 4.7 x 4.2 x 6.3 cm (2:91;   previously 4.9 x 2.7 x11.5 cm). Scattered small foci of air within the   abdominal collections. Trace pneumoperitoneum. Multiple peritoneal   implants.  VESSELS: Atherosclerotic changes.  RETROPERITONEUM/LYMPH NODES: No lymphadenopathy.  ABDOMINAL WALL: Postsurgical changes. Extravasated contrast seen within   the inferior midline abdominal extending into the midline ostomy bag.   Extensive subcutaneous edema, slightly decreased compared to prior.  BONES: Within normal limits.    IMPRESSION:  Multiple rim-enhancing peritoneal collections, several of which contain   foci of air compatible with abscesses, overall slightly decreased   compared to prior.    Extravasated contrast seen within the inferior midline abdominal   extending into the ostomy bag, compatible with enterocutaneous fistula.    Dilated distal small bowel loop in the pelvis, nonspecific.    Additional findings as above.            --- End of Report ---            JONATAN RUTLEDGE MD; Attending Radiologist  This documenthas been electronically signed. Mar 18 2022  4:26PM    < end of copied text >      Assessment/Plan    -Pigtail catheter to remain to drainage  -will hold off on further follow-up CXR  -Pigtail to be discontinued on Monday prior to discharge  -Change dressing Q3 days by RN

## 2022-03-19 NOTE — PROGRESS NOTE ADULT - SUBJECTIVE AND OBJECTIVE BOX
Pt. seen and examined at bedside this morning. Continues to tolerated soft diet without nausea or vomiting. Passing flatus, had large BM yesterday. She denies fever, chills, chest pain, SOB, nausea, vomiting, diarrhea or constipation. No acute events overnight.     Vital Signs Last 24 Hrs  T(C): 36.9 (18 Mar 2022 21:13), Max: 37.4 (18 Mar 2022 09:48)  T(F): 98.4 (18 Mar 2022 21:13), Max: 99.3 (18 Mar 2022 09:48)  HR: 84 (18 Mar 2022 23:15) (83 - 89)  BP: 121/55 (18 Mar 2022 23:15) (112/51 - 125/26)  BP(mean): --  RR: 18 (18 Mar 2022 21:13) (18 - 18)  SpO2: 97% (18 Mar 2022 21:13) (95% - 97%)    I&O's Detail    18 Mar 2022 07:01  -  19 Mar 2022 07:00  --------------------------------------------------------  IN:  Total IN: 0 mL    OUT:    Chest Tube (mL): 20 mL    Drain (mL): 975 mL  Total OUT: 995 mL    Total NET: -995 mL      19 Mar 2022 07:01  -  19 Mar 2022 09:16  --------------------------------------------------------  IN:  Total IN: 0 mL    OUT:    Voided (mL): 215 mL  Total OUT: 215 mL    Total NET: -215 mL      Physical Exam:  General: AAOx3, Well developed, NAD  Chest: Normal respiratory effort, left chest tube in place, output SS  Heart: RRR  Abdomen: midline with stool drainage, lee pouch over incision with stool in bag, no leakage, abd soft, nondistended, minimally TTP right hemiabdomen along incision.  Neuro/Psych: No localized deficits. Normal speech, normal tone  Skin: Normal, no rashes, no lesions noted.   Extremities: Warm, well perfused, 2+ edema left foot, Pulses intact    Labs:                        8.7    12.70 )-----------( 385      ( 19 Mar 2022 05:30 )             26.4   03-19    137  |  105  |  16  ----------------------------<  109<H>  4.2   |  29  |  0.40<L>    Ca    7.8<L>      19 Mar 2022 05:30  Phos  3.0     03-19  Mg     2.1     03-19    TPro  4.4<L>  /  Alb  1.3<L>  /  TBili  0.5  /  DBili  x   /  AST  28  /  ALT  31  /  AlkPhos  142<H>  03-19    Imaging:  < from: CT Abdomen and Pelvis w/ Oral Cont and w/ IV Cont (03.18.22 @ 15:36) >  ACC: [id number] EXAM:  CT ABDOMEN AND PELVIS OC IC                          PROCEDURE DATE:  03/18/2022      < end of copied text >  < from: CT Abdomen and Pelvis w/ Oral Cont and w/ IV Cont (03.18.22 @ 15:36) >  IMPRESSION:  Multiple rim-enhancing peritoneal collections, several of which contain   foci of air compatible with abscesses, overall slightly decreased   compared to prior.    Extravasated contrast seen within the inferior midline abdominal   extending into the ostomy bag, compatible with enterocutaneous fistula.    Dilated distal small bowel loop in the pelvis, nonspecific.    Additional findings as above.    < end of copied text >

## 2022-03-19 NOTE — PROGRESS NOTE ADULT - ASSESSMENT
Gastric cancer / stage IV  Omental metastases  SBO s/p Surgery  Intraabdominal abbesses/ on antibiotics    ? does she need drainage of the absesses  surgical FU    A/P    No plans for chemotherapy given infection  Continue antibiotics  Palliative care

## 2022-03-19 NOTE — PROGRESS NOTE ADULT - ASSESSMENT
60 yo F with pmhx GEJ cancer s/p esophagectomy, [place] tumors s/p SBO presents with left sided pleural effusion   XR barium enema shows strictures in proximal and mid sigmoid colon  s/p palliative bypass, now with contained enterocutaneous fistula. CT of abd appreciated. AFVSS. Labs stable.    Plan:  - Soft diet  - Pain and nausea control PRN   - GI/DVT ppx  - Monitor bowel function  - Metamucil  - Continue octreotide - will need for discharge  - Monitor stool output within Meaghan pouch  - Hospitalist recs  - Palliative medicine recs. Patient is DNR/DNI  - Encourage ambulation  - CT surgery recs- no pleurx catheter, possible removal pigtail prior to DC  -  for dispo planning    Plan discussed with Dr. Dan

## 2022-03-20 LAB
ALBUMIN SERPL ELPH-MCNC: 1.3 G/DL — LOW (ref 3.3–5)
ALP SERPL-CCNC: 147 U/L — HIGH (ref 40–120)
ALT FLD-CCNC: 35 U/L — SIGNIFICANT CHANGE UP (ref 12–78)
ANION GAP SERPL CALC-SCNC: 4 MMOL/L — LOW (ref 5–17)
AST SERPL-CCNC: 34 U/L — SIGNIFICANT CHANGE UP (ref 15–37)
BILIRUB SERPL-MCNC: 0.7 MG/DL — SIGNIFICANT CHANGE UP (ref 0.2–1.2)
BUN SERPL-MCNC: 12 MG/DL — SIGNIFICANT CHANGE UP (ref 7–23)
CALCIUM SERPL-MCNC: 8.2 MG/DL — LOW (ref 8.5–10.1)
CHLORIDE SERPL-SCNC: 105 MMOL/L — SIGNIFICANT CHANGE UP (ref 96–108)
CO2 SERPL-SCNC: 29 MMOL/L — SIGNIFICANT CHANGE UP (ref 22–31)
CREAT SERPL-MCNC: 0.52 MG/DL — SIGNIFICANT CHANGE UP (ref 0.5–1.3)
EGFR: 104 ML/MIN/1.73M2 — SIGNIFICANT CHANGE UP
GLUCOSE SERPL-MCNC: 105 MG/DL — HIGH (ref 70–99)
HCT VFR BLD CALC: 27.4 % — LOW (ref 34.5–45)
HGB BLD-MCNC: 9 G/DL — LOW (ref 11.5–15.5)
MAGNESIUM SERPL-MCNC: 2.4 MG/DL — SIGNIFICANT CHANGE UP (ref 1.6–2.6)
MCHC RBC-ENTMCNC: 32 PG — SIGNIFICANT CHANGE UP (ref 27–34)
MCHC RBC-ENTMCNC: 32.8 GM/DL — SIGNIFICANT CHANGE UP (ref 32–36)
MCV RBC AUTO: 97.5 FL — SIGNIFICANT CHANGE UP (ref 80–100)
PHOSPHATE SERPL-MCNC: 3.3 MG/DL — SIGNIFICANT CHANGE UP (ref 2.5–4.5)
PLATELET # BLD AUTO: 497 K/UL — HIGH (ref 150–400)
POTASSIUM SERPL-MCNC: 4.1 MMOL/L — SIGNIFICANT CHANGE UP (ref 3.5–5.3)
POTASSIUM SERPL-SCNC: 4.1 MMOL/L — SIGNIFICANT CHANGE UP (ref 3.5–5.3)
PROT SERPL-MCNC: 4.8 GM/DL — LOW (ref 6–8.3)
RBC # BLD: 2.81 M/UL — LOW (ref 3.8–5.2)
RBC # FLD: 15.1 % — HIGH (ref 10.3–14.5)
SODIUM SERPL-SCNC: 138 MMOL/L — SIGNIFICANT CHANGE UP (ref 135–145)
WBC # BLD: 11.1 K/UL — HIGH (ref 3.8–10.5)
WBC # FLD AUTO: 11.1 K/UL — HIGH (ref 3.8–10.5)

## 2022-03-20 PROCEDURE — 99232 SBSQ HOSP IP/OBS MODERATE 35: CPT

## 2022-03-20 RX ORDER — NYSTATIN 500MM UNIT
500000 POWDER (EA) MISCELLANEOUS ONCE
Refills: 0 | Status: COMPLETED | OUTPATIENT
Start: 2022-03-20 | End: 2022-03-20

## 2022-03-20 RX ORDER — LORATADINE 10 MG/1
10 TABLET ORAL DAILY
Refills: 0 | Status: DISCONTINUED | OUTPATIENT
Start: 2022-03-20 | End: 2022-03-24

## 2022-03-20 RX ADMIN — Medication 1 LOZENGE: at 21:44

## 2022-03-20 RX ADMIN — PIPERACILLIN AND TAZOBACTAM 25 GRAM(S): 4; .5 INJECTION, POWDER, LYOPHILIZED, FOR SOLUTION INTRAVENOUS at 05:38

## 2022-03-20 RX ADMIN — MORPHINE SULFATE 4 MILLIGRAM(S): 50 CAPSULE, EXTENDED RELEASE ORAL at 02:44

## 2022-03-20 RX ADMIN — Medication 1 LOZENGE: at 08:46

## 2022-03-20 RX ADMIN — PANTOPRAZOLE SODIUM 40 MILLIGRAM(S): 20 TABLET, DELAYED RELEASE ORAL at 11:02

## 2022-03-20 RX ADMIN — ONDANSETRON 4 MILLIGRAM(S): 8 TABLET, FILM COATED ORAL at 05:38

## 2022-03-20 RX ADMIN — Medication 1 LOZENGE: at 15:02

## 2022-03-20 RX ADMIN — Medication 1 PACKET(S): at 05:39

## 2022-03-20 RX ADMIN — PANTOPRAZOLE SODIUM 40 MILLIGRAM(S): 20 TABLET, DELAYED RELEASE ORAL at 21:43

## 2022-03-20 RX ADMIN — Medication 15 MILLIGRAM(S): at 06:50

## 2022-03-20 RX ADMIN — ONDANSETRON 4 MILLIGRAM(S): 8 TABLET, FILM COATED ORAL at 21:43

## 2022-03-20 RX ADMIN — Medication 15 MILLIGRAM(S): at 21:43

## 2022-03-20 RX ADMIN — OCTREOTIDE ACETATE 100 MICROGRAM(S): 200 INJECTION, SOLUTION INTRAVENOUS; SUBCUTANEOUS at 22:11

## 2022-03-20 RX ADMIN — PIPERACILLIN AND TAZOBACTAM 25 GRAM(S): 4; .5 INJECTION, POWDER, LYOPHILIZED, FOR SOLUTION INTRAVENOUS at 21:43

## 2022-03-20 RX ADMIN — Medication 15 MILLIGRAM(S): at 06:22

## 2022-03-20 RX ADMIN — OCTREOTIDE ACETATE 100 MICROGRAM(S): 200 INJECTION, SOLUTION INTRAVENOUS; SUBCUTANEOUS at 05:57

## 2022-03-20 RX ADMIN — Medication 15 MILLIGRAM(S): at 22:05

## 2022-03-20 RX ADMIN — ENOXAPARIN SODIUM 40 MILLIGRAM(S): 100 INJECTION SUBCUTANEOUS at 16:39

## 2022-03-20 RX ADMIN — LORATADINE 10 MILLIGRAM(S): 10 TABLET ORAL at 11:01

## 2022-03-20 RX ADMIN — Medication 1 PACKET(S): at 21:44

## 2022-03-20 RX ADMIN — Medication 1 PACKET(S): at 15:03

## 2022-03-20 RX ADMIN — OCTREOTIDE ACETATE 100 MICROGRAM(S): 200 INJECTION, SOLUTION INTRAVENOUS; SUBCUTANEOUS at 16:39

## 2022-03-20 RX ADMIN — PIPERACILLIN AND TAZOBACTAM 25 GRAM(S): 4; .5 INJECTION, POWDER, LYOPHILIZED, FOR SOLUTION INTRAVENOUS at 15:02

## 2022-03-20 RX ADMIN — MORPHINE SULFATE 4 MILLIGRAM(S): 50 CAPSULE, EXTENDED RELEASE ORAL at 02:18

## 2022-03-20 RX ADMIN — ONDANSETRON 4 MILLIGRAM(S): 8 TABLET, FILM COATED ORAL at 15:03

## 2022-03-20 RX ADMIN — Medication 500000 UNIT(S): at 05:57

## 2022-03-20 NOTE — PROGRESS NOTE ADULT - SUBJECTIVE AND OBJECTIVE BOX
Pt. seen and examined at bedside this morning. Continues to tolerate soft diet without nausea or vomiting. She denies fever, chills, chest pain, SOB, nausea, vomiting, diarrhea or constipation. No acute events overnight.     Vital Signs Last 24 Hrs  T(C): 37 (20 Mar 2022 08:03), Max: 37.1 (20 Mar 2022 04:23)  T(F): 98.6 (20 Mar 2022 08:03), Max: 98.7 (20 Mar 2022 04:23)  HR: 84 (20 Mar 2022 08:03) (78 - 87)  BP: 116/57 (20 Mar 2022 08:03) (111/65 - 125/57)  BP(mean): --  RR: 17 (20 Mar 2022 08:03) (17 - 18)  SpO2: 94% (20 Mar 2022 08:03) (94% - 97%)    I&O's Detail    19 Mar 2022 07:01  -  20 Mar 2022 07:00  --------------------------------------------------------  IN:  Total IN: 0 mL    OUT:    Chest Tube (mL): 0 mL    Drain (mL): 400 mL    Voided (mL): 215 mL  Total OUT: 615 mL    Total NET: -615 mL        Physical Exam:  General: AAOx3, Well developed, NAD  Chest: Normal respiratory effort, left chest tube in place, output SS  Heart: RRR  Abdomen: midline with stool drainage, lee pouch over incision with stool in bag, no leakage, abd soft, nondistended, minimally TTP right hemiabdomen along incision.  Neuro/Psych: No localized deficits. Normal speech, normal tone  Skin: Normal, no rashes, no lesions noted.   Extremities: Warm, well perfused, 2+ edema left foot, Pulses intact    Labs:                        9.0    11.10 )-----------( 497      ( 20 Mar 2022 07:03 )             27.4   03-20    138  |  105  |  12  ----------------------------<  105<H>  4.1   |  29  |  0.52    Ca    8.2<L>      20 Mar 2022 07:03  Phos  3.3     03-20  Mg     2.4     03-20    TPro  4.8<L>  /  Alb  1.3<L>  /  TBili  0.7  /  DBili  x   /  AST  34  /  ALT  35  /  AlkPhos  147<H>  03-20

## 2022-03-20 NOTE — PROGRESS NOTE ADULT - SUBJECTIVE AND OBJECTIVE BOX
CTS Progress Note    HPI:    S:    Pt seen and examined  HD # 18  PMHx pmhx GEJ cancer s/p esophagectomy, [place] tumors s/p SBO presents with left sided pleural effusion   XR barium enema shows strictures in proximal and mid sigmoid colon  s/p palliative bypass  Pigtail in L chest for pleural effusion    3/12 AM: Pigtail to WS, minimal output, no AL. On TPN.  3/13 AM: Enteric contents coming from wound yesterday; wound vac removed, gauze in place; operative plan per surgery. CT ~ 100cc serous drainage, no AL on WS.    3/20 AM: Chest tube remains. Minimal output. On suction, no AL.    ROS: + pain, well controlled with PCA  Remainder of systems reviewed, negative      Allergies    latex (Unknown)  sulfa drugs (Unknown)    Intolerances        MEDICATIONS  (STANDING):  clotrimazole Lozenge 1 Lozenge Oral <User Schedule>  dextrose 40% Gel 15 Gram(s) Oral once  dextrose 5%. 1000 milliLiter(s) (50 mL/Hr) IV Continuous <Continuous>  dextrose 5%. 1000 milliLiter(s) (100 mL/Hr) IV Continuous <Continuous>  dextrose 50% Injectable 25 Gram(s) IV Push once  dextrose 50% Injectable 12.5 Gram(s) IV Push once  dextrose 50% Injectable 25 Gram(s) IV Push once  enoxaparin Injectable 40 milliGRAM(s) SubCutaneous every 24 hours  glucagon  Injectable 1 milliGRAM(s) IntraMuscular once  insulin lispro (ADMELOG) corrective regimen sliding scale   SubCutaneous three times a day before meals  loratadine 10 milliGRAM(s) Oral daily  octreotide  Injectable 100 MICROGram(s) IV Push every 8 hours  pantoprazole  Injectable 40 milliGRAM(s) IV Push every 12 hours  piperacillin/tazobactam IVPB.. 3.375 Gram(s) IV Intermittent every 8 hours  psyllium Powder 1 Packet(s) Oral three times a day    MEDICATIONS  (PRN):  acetaminophen   IVPB .. 1000 milliGRAM(s) IV Intermittent every 6 hours PRN Mild Pain (1 - 3)  ALBUTerol    90 MICROgram(s) HFA Inhaler 2 Puff(s) Inhalation every 6 hours PRN Shortness of Breath  FIRST- Mouthwash  BLM 5 milliLiter(s) Swish and Spit every 4 hours PRN Mouth Care  ketorolac   Injectable 15 milliGRAM(s) IV Push every 6 hours PRN Moderate Pain (4 - 6)  metoprolol tartrate Injectable 5 milliGRAM(s) IV Push every 6 hours PRN SBP > 140  morphine  - Injectable 4 milliGRAM(s) IV Push every 4 hours PRN Severe Pain (7 - 10)  naloxone Injectable 0.1 milliGRAM(s) IV Push every 3 minutes PRN For ANY of the following changes in patient status:  A. RR LESS THAN 10 breaths per minute, B. Oxygen saturation LESS THAN 90%, C. Sedation score of 6  naloxone Injectable 0.1 milliGRAM(s) IV Push every 3 minutes PRN For ANY of the following changes in patient status:  A. RR LESS THAN 10 breaths per minute, B. Oxygen saturation LESS THAN 90%, C. Sedation score of 6  ondansetron Injectable 4 milliGRAM(s) IV Push every 6 hours PRN Nausea  prochlorperazine   Injectable 5 milliGRAM(s) IV Push every 6 hours PRN nausea and/or vomiting      Drug Dosing Weight  Height (cm): 167.6 (03 Mar 2022 22:52)  Weight (kg): 73.1 (03 Mar 2022 22:52)  BMI (kg/m2): 26 (03 Mar 2022 22:52)  BSA (m2): 1.82 (03 Mar 2022 22:52)    PAST MEDICAL & SURGICAL HISTORY:  Essential hypertension    Gastritis    HLD (hyperlipidemia)    GERD (gastroesophageal reflux disease)    Intracranial shunt    S/P cholecystectomy    History of esophageal surgery        FAMILY HISTORY:  Family history of gastric cancer (Father, Grandparent)    Family history of colon cancer        ROS: See HPI; otherwise, all systems reviewed and negative.    O:    ICU Vital Signs Last 24 Hrs  T(C): 37 (20 Mar 2022 08:03), Max: 37.1 (20 Mar 2022 04:23)  T(F): 98.6 (20 Mar 2022 08:03), Max: 98.7 (20 Mar 2022 04:23)  HR: 84 (20 Mar 2022 08:03) (78 - 87)  BP: 116/57 (20 Mar 2022 08:03) (111/65 - 125/57)  BP(mean): --  ABP: --  ABP(mean): --  RR: 17 (20 Mar 2022 08:03) (17 - 18)  SpO2: 94% (20 Mar 2022 08:03) (94% - 97%)          I&O's Detail    19 Mar 2022 07:01  -  20 Mar 2022 07:00  --------------------------------------------------------  IN:  Total IN: 0 mL    OUT:    Chest Tube (mL): 0 mL    Drain (mL): 400 mL    Voided (mL): 215 mL  Total OUT: 615 mL    Total NET: -615 mL              Chronically ill F lying in bed  No JVD trachea midline  S1S2+  CTA B/L  1+ anasarca  + RUE PICC  + L sided pigtail  Sleeping    LABS:    CBC Full  -  ( 20 Mar 2022 07:03 )  WBC Count : 11.10 K/uL  RBC Count : 2.81 M/uL  Hemoglobin : 9.0 g/dL  Hematocrit : 27.4 %  Platelet Count - Automated : 497 K/uL  Mean Cell Volume : 97.5 fl  Mean Cell Hemoglobin : 32.0 pg  Mean Cell Hemoglobin Concentration : 32.8 gm/dL  Auto Neutrophil # : x  Auto Lymphocyte # : x  Auto Monocyte # : x  Auto Eosinophil # : x  Auto Basophil # : x  Auto Neutrophil % : x  Auto Lymphocyte % : x  Auto Monocyte % : x  Auto Eosinophil % : x  Auto Basophil % : x    03-20    138  |  105  |  12  ----------------------------<  105<H>  4.1   |  29  |  0.52    Ca    8.2<L>      20 Mar 2022 07:03  Phos  3.3     03-20  Mg     2.4     03-20    TPro  4.8<L>  /  Alb  1.3<L>  /  TBili  0.7  /  DBili  x   /  AST  34  /  ALT  35  /  AlkPhos  147<H>  03-20        CAPILLARY BLOOD GLUCOSE      POCT Blood Glucose.: 123 mg/dL (20 Mar 2022 08:24)  POCT Blood Glucose.: 108 mg/dL (19 Mar 2022 21:06)  POCT Blood Glucose.: 108 mg/dL (19 Mar 2022 17:01)  POCT Blood Glucose.: 135 mg/dL (19 Mar 2022 13:35)        LIVER FUNCTIONS - ( 20 Mar 2022 07:03 )  Alb: 1.3 g/dL / Pro: 4.8 gm/dL / ALK PHOS: 147 U/L / ALT: 35 U/L / AST: 34 U/L / GGT: x

## 2022-03-20 NOTE — PROGRESS NOTE ADULT - SUBJECTIVE AND OBJECTIVE BOX
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 Patient underwent L chest pigtail catheter placement by Thoracic Surgery on 3/4 with 1.5L of pleural fluid drained, exudative, cyto negative. 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 She is on nutritional support for severe protein calorie malnutrition. Medicine was consulted to provide co-management.     3/20 - no cp palps sob. abdo pain under control. tolerating po diet     PHYSICAL EXAM:  Vital Signs Last 24 Hrs  T(F): 98.6 (20 Mar 2022 08:03), Max: 98.7 (20 Mar 2022 04:23)  HR: 84 (20 Mar 2022 08:03) (82 - 87)  BP: 116/57 (20 Mar 2022 08:03) (116/57 - 125/57)  RR: 17 (20 Mar 2022 08:03) (17 - 18)  SpO2: 94% (20 Mar 2022 08:03) (94% - 97%)  Constitutional: NAD, sitting up in bed, pleasant, cooperative   HEENT: PERR, EOMI  Neck: Soft and supple,  No JVD  Respiratory: Breath sounds are clear bilaterally, No wheezing, rales or rhonchi  Cardiovascular: S1 and S2, regular rate and rhythm, no Murmurs  Gastrointestinal: appropriately tender post-op, eakins pouch contains stool  Extremities: mild bl pedal edema  Vascular: 2+ peripheral pulses  Neurological: A/O x 3, no focal deficits  Musculoskeletal: 5/5 strength b/l upper and lower extremities  Skin: No rashes    RADIOLOGY/EKG:  < from: CT Abdomen and Pelvis w/ Oral Cont and w/ IV Cont (03.18.22 @ 15:36) >  Multiple rim-enhancing peritoneal collections, several of which contain   foci of air compatible with abscesses, overall slightly decreased   compared to prior.  Extravasated contrast seen within the inferior midline abdominal   extending into the ostomy bag, compatible with enterocutaneous fistula.  Dilated distal small bowel loop in the pelvis, nonspecific.    LABS: All Labs Reviewed:                            9.0    11.10 )-----------( 497      ( 20 Mar 2022 07:03 )             27.4   138  |  105  |  12  ----------------------------<  105<H>  4.1   |  29  |  0.52  TPro  4.8<L>  /  Alb  1.3<L>  /  TBili  0.7  /  DBili  x   /  AST  34  /  ALT  35  /  AlkPhos  147<H>  03-20      MEDS:   acetaminophen   IVPB .. 1000 milliGRAM(s) IV Intermittent every 6 hours PRN  ALBUTerol    90 MICROgram(s) HFA Inhaler 2 Puff(s) Inhalation every 6 hours PRN  clotrimazole Lozenge 1 Lozenge Oral <User Schedule>  enoxaparin Injectable 40 milliGRAM(s) SubCutaneous every 24 hours  FIRST- Mouthwash  BLM 5 milliLiter(s) Swish and Spit every 4 hours PRN  insulin lispro (ADMELOG) corrective regimen sliding scale   SubCutaneous three times a day before meals  ketorolac   Injectable 15 milliGRAM(s) IV Push every 6 hours PRN  loratadine 10 milliGRAM(s) Oral daily  metoprolol tartrate Injectable 5 milliGRAM(s) IV Push every 6 hours PRN  morphine  - Injectable 4 milliGRAM(s) IV Push every 4 hours PRN  naloxone Injectable 0.1 milliGRAM(s) IV Push every 3 minutes PRN  naloxone Injectable 0.1 milliGRAM(s) IV Push every 3 minutes PRN  octreotide  Injectable 100 MICROGram(s) IV Push every 8 hours  ondansetron Injectable 4 milliGRAM(s) IV Push every 6 hours PRN  pantoprazole  Injectable 40 milliGRAM(s) IV Push every 12 hours  piperacillin/tazobactam IVPB.. 3.375 Gram(s) IV Intermittent every 8 hours  prochlorperazine   Injectable 5 milliGRAM(s) IV Push every 6 hours PRN  psyllium Powder 1 Packet(s) Oral three times a day

## 2022-03-20 NOTE — PROGRESS NOTE ADULT - ASSESSMENT
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 Patient underwent L chest pigtail catheter placement by Thoracic Surgery on 3/4 with 1.5L of pleural fluid drained, exudative, cyto negative. 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 She is on nutritional support for severe protein calorie malnutrition. Medicine was consulted to provide co-management.     S/P palliative bypass, contained enterocutaneous fistula  PIP TAZO   PPI  Octreotide  noted CT imaging with peritoneal collections/abscesses. EC fistula   further mx per primary - consider IR eval /long-term abx?    Severe protein calorie malnutrition  Followed by Nutritional Support Team. Continue TPN. Clears as tolerated.     Thrush  Clotrimazole lozenges     Peripheral Neuropathy 2/2 Taxol   stable    L pleural effusion s/ pigtail catheter placement  Followed by Thoracic Surgery.     Physical debility and deconditioning  Encourage daily ambulation. PT.     VTE Px - lovenox    Pls call with q   discussed with Sx and  at bedside

## 2022-03-20 NOTE — PROGRESS NOTE ADULT - ASSESSMENT
A:    61F  HD # 18    Thoracic surgery involved for:    1. L sided pleural effusion s/p pigtail    P:    Maintain pigtail to SXN throughout weekend  Monitor output  No plans for pleurex at this time    Dispo: Cont care per primary team. d/c pigtail prior to d/c home.

## 2022-03-21 ENCOUNTER — TRANSCRIPTION ENCOUNTER (OUTPATIENT)
Age: 62
End: 2022-03-21

## 2022-03-21 LAB
ALBUMIN SERPL ELPH-MCNC: 1.4 G/DL — LOW (ref 3.3–5)
ALP SERPL-CCNC: 139 U/L — HIGH (ref 40–120)
ALT FLD-CCNC: 33 U/L — SIGNIFICANT CHANGE UP (ref 12–78)
ANION GAP SERPL CALC-SCNC: 3 MMOL/L — LOW (ref 5–17)
APTT BLD: 28.3 SEC — SIGNIFICANT CHANGE UP (ref 27.5–35.5)
AST SERPL-CCNC: 31 U/L — SIGNIFICANT CHANGE UP (ref 15–37)
BILIRUB SERPL-MCNC: 0.6 MG/DL — SIGNIFICANT CHANGE UP (ref 0.2–1.2)
BUN SERPL-MCNC: 8 MG/DL — SIGNIFICANT CHANGE UP (ref 7–23)
CALCIUM SERPL-MCNC: 8.1 MG/DL — LOW (ref 8.5–10.1)
CHLORIDE SERPL-SCNC: 106 MMOL/L — SIGNIFICANT CHANGE UP (ref 96–108)
CO2 SERPL-SCNC: 30 MMOL/L — SIGNIFICANT CHANGE UP (ref 22–31)
CREAT SERPL-MCNC: 0.45 MG/DL — LOW (ref 0.5–1.3)
EGFR: 108 ML/MIN/1.73M2 — SIGNIFICANT CHANGE UP
GLUCOSE SERPL-MCNC: 109 MG/DL — HIGH (ref 70–99)
HCT VFR BLD CALC: 26.5 % — LOW (ref 34.5–45)
HGB BLD-MCNC: 8.6 G/DL — LOW (ref 11.5–15.5)
INR BLD: 1.02 RATIO — SIGNIFICANT CHANGE UP (ref 0.88–1.16)
MAGNESIUM SERPL-MCNC: 2.4 MG/DL — SIGNIFICANT CHANGE UP (ref 1.6–2.6)
MCHC RBC-ENTMCNC: 31.9 PG — SIGNIFICANT CHANGE UP (ref 27–34)
MCHC RBC-ENTMCNC: 32.5 GM/DL — SIGNIFICANT CHANGE UP (ref 32–36)
MCV RBC AUTO: 98.1 FL — SIGNIFICANT CHANGE UP (ref 80–100)
PHOSPHATE SERPL-MCNC: 2.8 MG/DL — SIGNIFICANT CHANGE UP (ref 2.5–4.5)
PLATELET # BLD AUTO: 577 K/UL — HIGH (ref 150–400)
POTASSIUM SERPL-MCNC: 3.9 MMOL/L — SIGNIFICANT CHANGE UP (ref 3.5–5.3)
POTASSIUM SERPL-SCNC: 3.9 MMOL/L — SIGNIFICANT CHANGE UP (ref 3.5–5.3)
PROT SERPL-MCNC: 4.8 GM/DL — LOW (ref 6–8.3)
PROTHROM AB SERPL-ACNC: 11.8 SEC — SIGNIFICANT CHANGE UP (ref 10.5–13.4)
RBC # BLD: 2.7 M/UL — LOW (ref 3.8–5.2)
RBC # FLD: 15.2 % — HIGH (ref 10.3–14.5)
SODIUM SERPL-SCNC: 139 MMOL/L — SIGNIFICANT CHANGE UP (ref 135–145)
WBC # BLD: 7.17 K/UL — SIGNIFICANT CHANGE UP (ref 3.8–10.5)
WBC # FLD AUTO: 7.17 K/UL — SIGNIFICANT CHANGE UP (ref 3.8–10.5)

## 2022-03-21 PROCEDURE — 99231 SBSQ HOSP IP/OBS SF/LOW 25: CPT

## 2022-03-21 PROCEDURE — 71045 X-RAY EXAM CHEST 1 VIEW: CPT | Mod: 26

## 2022-03-21 PROCEDURE — 99233 SBSQ HOSP IP/OBS HIGH 50: CPT

## 2022-03-21 PROCEDURE — 99232 SBSQ HOSP IP/OBS MODERATE 35: CPT

## 2022-03-21 PROCEDURE — 71045 X-RAY EXAM CHEST 1 VIEW: CPT | Mod: 26,77

## 2022-03-21 RX ORDER — ACETAMINOPHEN 500 MG
1 TABLET ORAL
Qty: 40 | Refills: 0
Start: 2022-03-21 | End: 2022-03-30

## 2022-03-21 RX ORDER — SIMETHICONE 80 MG/1
80 TABLET, CHEWABLE ORAL
Refills: 0 | Status: DISCONTINUED | OUTPATIENT
Start: 2022-03-21 | End: 2022-03-21

## 2022-03-21 RX ORDER — SIMETHICONE 80 MG/1
80 TABLET, CHEWABLE ORAL
Refills: 0 | Status: DISCONTINUED | OUTPATIENT
Start: 2022-03-21 | End: 2022-03-25

## 2022-03-21 RX ORDER — OCTREOTIDE ACETATE 200 UG/ML
100 INJECTION, SOLUTION INTRAVENOUS; SUBCUTANEOUS
Qty: 90 | Refills: 0
Start: 2022-03-21 | End: 2022-04-19

## 2022-03-21 RX ORDER — NYSTATIN 500MM UNIT
500000 POWDER (EA) MISCELLANEOUS
Refills: 0 | Status: DISCONTINUED | OUTPATIENT
Start: 2022-03-21 | End: 2022-03-25

## 2022-03-21 RX ADMIN — ONDANSETRON 4 MILLIGRAM(S): 8 TABLET, FILM COATED ORAL at 22:30

## 2022-03-21 RX ADMIN — MORPHINE SULFATE 4 MILLIGRAM(S): 50 CAPSULE, EXTENDED RELEASE ORAL at 01:15

## 2022-03-21 RX ADMIN — PANTOPRAZOLE SODIUM 40 MILLIGRAM(S): 20 TABLET, DELAYED RELEASE ORAL at 09:43

## 2022-03-21 RX ADMIN — ENOXAPARIN SODIUM 40 MILLIGRAM(S): 100 INJECTION SUBCUTANEOUS at 15:26

## 2022-03-21 RX ADMIN — PIPERACILLIN AND TAZOBACTAM 25 GRAM(S): 4; .5 INJECTION, POWDER, LYOPHILIZED, FOR SOLUTION INTRAVENOUS at 05:30

## 2022-03-21 RX ADMIN — Medication 15 MILLIGRAM(S): at 11:00

## 2022-03-21 RX ADMIN — OCTREOTIDE ACETATE 100 MICROGRAM(S): 200 INJECTION, SOLUTION INTRAVENOUS; SUBCUTANEOUS at 05:29

## 2022-03-21 RX ADMIN — Medication 1 LOZENGE: at 22:24

## 2022-03-21 RX ADMIN — Medication 500000 UNIT(S): at 22:22

## 2022-03-21 RX ADMIN — Medication 15 MILLIGRAM(S): at 09:49

## 2022-03-21 RX ADMIN — Medication 15 MILLIGRAM(S): at 16:01

## 2022-03-21 RX ADMIN — ONDANSETRON 4 MILLIGRAM(S): 8 TABLET, FILM COATED ORAL at 14:49

## 2022-03-21 RX ADMIN — Medication 1 PACKET(S): at 22:22

## 2022-03-21 RX ADMIN — LORATADINE 10 MILLIGRAM(S): 10 TABLET ORAL at 09:43

## 2022-03-21 RX ADMIN — PIPERACILLIN AND TAZOBACTAM 25 GRAM(S): 4; .5 INJECTION, POWDER, LYOPHILIZED, FOR SOLUTION INTRAVENOUS at 15:26

## 2022-03-21 RX ADMIN — Medication 1 LOZENGE: at 15:26

## 2022-03-21 RX ADMIN — Medication 1 PACKET(S): at 05:30

## 2022-03-21 RX ADMIN — OCTREOTIDE ACETATE 100 MICROGRAM(S): 200 INJECTION, SOLUTION INTRAVENOUS; SUBCUTANEOUS at 22:25

## 2022-03-21 RX ADMIN — OCTREOTIDE ACETATE 100 MICROGRAM(S): 200 INJECTION, SOLUTION INTRAVENOUS; SUBCUTANEOUS at 15:26

## 2022-03-21 RX ADMIN — Medication 1 LOZENGE: at 09:42

## 2022-03-21 RX ADMIN — PANTOPRAZOLE SODIUM 40 MILLIGRAM(S): 20 TABLET, DELAYED RELEASE ORAL at 22:22

## 2022-03-21 RX ADMIN — ONDANSETRON 4 MILLIGRAM(S): 8 TABLET, FILM COATED ORAL at 05:29

## 2022-03-21 RX ADMIN — SIMETHICONE 80 MILLIGRAM(S): 80 TABLET, CHEWABLE ORAL at 21:01

## 2022-03-21 RX ADMIN — Medication 15 MILLIGRAM(S): at 16:50

## 2022-03-21 RX ADMIN — MORPHINE SULFATE 4 MILLIGRAM(S): 50 CAPSULE, EXTENDED RELEASE ORAL at 00:54

## 2022-03-21 NOTE — PROGRESS NOTE ADULT - ASSESSMENT
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 Patient underwent L chest pigtail catheter placement by Thoracic Surgery on 3/4 with 1.5L of pleural fluid drained, exudative, cyto negative. 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 She is on nutritional support for severe protein calorie malnutrition. Medicine was consulted to provide co-management.     S/P palliative bypass, contained enterocutaneous fistula  PIP TAZO   PPI  Octreotide  noted CT imaging with peritoneal collections/abscesses. EC fistula   further mx per primary - consider IR eval /long-term abx?    Severe protein calorie malnutrition  Followed by Nutritional Support Team. Continue TPN. Clears as tolerated.     Thrush  Clotrimazole lozenges     Peripheral Neuropathy 2/2 Taxol   stable    L pleural effusion s/ pigtail catheter placement  Followed by Thoracic Surgery.     Physical debility and deconditioning  Encourage daily ambulation. PT.     VTE Px - lovenox    Pls call with q   discussed with Sx and  at bedside    61 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 Patient underwent L chest pigtail catheter placement by Thoracic Surgery on 3/4 with 1.5L of pleural fluid drained, exudative, cyto negative. 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 She is on nutritional support for severe protein calorie malnutrition. Medicine was consulted to provide co-management.     S/P palliative bypass, has enterocutaneous fistula  PIP TAZO PPI Octreotide  noted CT imaging with peritoneal collections/abscesses. EC fistula     Severe protein calorie malnutrition  Followed by Nutritional Support Team. PO DIET     Thrush  Clotrimazole lozenges     L pleural effusion s/ pigtail catheter  low output - DCed today     VTE Px - lovenox    RECOMMENDATIONS FOR DISCHARGE:   WBC improving, afebrile, pt stable - has peritoneal collections - antibiotics will not help with this - will not need abx on dc as this will only increase risk of cdiff/resistance etc  Octreotide - consider sending patient on Sandostatin [place] - as opposed to TID injections - would attempt pharmacy approval to get a shot here prior to discharge if it makes things easier for the patient       Pls call with q

## 2022-03-21 NOTE — PROGRESS NOTE ADULT - TIME BILLING
I attest that I have seen and examined the patient and all charting and imaging was reviewed independently
I attest that I have seen and examined the patient and reviewed all films and chart independently.
I attest that I have seen and examined the patient and all information was reviewed independently.
I attest that I have seen and examined the patient

## 2022-03-21 NOTE — PROGRESS NOTE ADULT - ASSESSMENT
Metastatic gastric cancer to omentum  Status post surgery for small bowel obstruction  Wound leak/abdominal abscesses improved on antibiotics  Abdominal abscesses, improved on radiology  Chronic anemia    Plan    Patient had a progressive disease.  The plan had been salvage chemotherapy however this will certainly be on hold given her current infectious disease issues.  Would recommend she continue on antibiotics.  Will reevaluate in the next 2 to 4 weeks  At this time management should be antibiotics, nutritional support and supportive  Prognosis remains guarded

## 2022-03-21 NOTE — PROGRESS NOTE ADULT - SUBJECTIVE AND OBJECTIVE BOX
Pt. seen and examined at bedside this morning. Continues to tolerate soft diet without nausea or vomiting. She denies fever, chills, chest pain, SOB, nausea, vomiting, diarrhea or constipation. No acute events overnight.     Vital Signs Last 24 Hrs  T(C): 37 (21 Mar 2022 05:45), Max: 37.2 (20 Mar 2022 21:30)  T(F): 98.6 (21 Mar 2022 05:45), Max: 99 (20 Mar 2022 21:30)  HR: 79 (20 Mar 2022 21:30) (79 - 84)  BP: 111/49 (20 Mar 2022 21:30) (111/49 - 116/57)  BP(mean): --  RR: 16 (20 Mar 2022 21:30) (16 - 17)  SpO2: 98% (20 Mar 2022 21:30) (94% - 98%)    I&O's Detail    19 Mar 2022 07:01  -  20 Mar 2022 07:00  --------------------------------------------------------  IN:  Total IN: 0 mL    OUT:    Chest Tube (mL): 0 mL    Drain (mL): 400 mL    Voided (mL): 215 mL  Total OUT: 615 mL    Total NET: -615 mL      20 Mar 2022 07:01  -  21 Mar 2022 06:20  --------------------------------------------------------  IN:    Oral Fluid: 240 mL  Total IN: 240 mL    OUT:    Chest Tube (mL): 0 mL    Drain (mL): 850 mL  Total OUT: 850 mL    Total NET: -610 mL      Physical Exam:  General: AAOx3, Well developed, NAD  Chest: Normal respiratory effort, left chest tube in place, output SS  Heart: RRR  Abdomen: midline with stool drainage, lee pouch over incision with stool in bag, no leakage, abd soft, nondistended  Neuro/Psych: No localized deficits. Normal speech, normal tone  Skin: Normal, no rashes, no lesions noted.   Extremities: Warm, well perfused, 2+ edema left foot, Pulses intact    Labs:                                   9.0    11.10 )-----------( 497      ( 20 Mar 2022 07:03 )             27.4   03-20    138  |  105  |  12  ----------------------------<  105<H>  4.1   |  29  |  0.52    Ca    8.2<L>      20 Mar 2022 07:03  Phos  3.3     03-20  Mg     2.4     03-20    TPro  4.8<L>  /  Alb  1.3<L>  /  TBili  0.7  /  DBili  x   /  AST  34  /  ALT  35  /  AlkPhos  147<H>  03-20

## 2022-03-21 NOTE — PROGRESS NOTE ADULT - ASSESSMENT
62 yo F with pmhx GEJ cancer s/p esophagectomy, [place] tumors s/p SBO presents with left sided pleural effusion   XR barium enema shows strictures in proximal and mid sigmoid colon  s/p palliative bypass, now with contained enterocutaneous fistula. CT of abd appreciated. AFVSS. Labs stable.    Plan:  - NPO for now  - will have IR evaluate the intraabdominal collections, will resume diet if unable to drain  - Pain and nausea control PRN   - GI/DVT ppx  - Monitor bowel function  - Metamucil  - Continue octreotide - will need for discharge  - Monitor stool output within Meaghan pouch  - Hospitalist recs  - Palliative medicine recs. Patient is DNR/DNI  - Encourage ambulation  - CT surgery recs- no pleurx catheter, possible removal pigtail prior to DC  -  for dispo planning    Plan discussed with Dr. Sahu

## 2022-03-21 NOTE — PROGRESS NOTE ADULT - NS ATTEND AMEND GEN_ALL_CORE FT
Patient seen and examined. Chest tube removed at bedside. Patient to call the office or come to ER if she has future shortness of breath. Given tumor burden is abdomen and issues with healing, she has a poor prognosis. Recommended not to pursuit a pleurX catheter at this time.   Will reassess in the future for need for pleurX catheter if she reaccumulates quickly.

## 2022-03-21 NOTE — PROGRESS NOTE ADULT - SUBJECTIVE AND OBJECTIVE BOX
INTERVAL HISTORY:    Patient with a history of recurrent gastric cancer, omental metastases, small bowel obstruction, status post surgery.  Latter complicated by wound leak, multiple abdominal abscesses.  Presently on antibiotics and feeling much improved.  No abdominal pain.  Has not had fevers or leukocytosis.    She has advance her diet to p.o. and appears to be tolerating this well.  She does have an appetite          Allergies    latex (Unknown)  sulfa drugs (Unknown)    Intolerances        MEDICATIONS  (STANDING):  clotrimazole Lozenge 1 Lozenge Oral <User Schedule>  dextrose 40% Gel 15 Gram(s) Oral once  dextrose 5%. 1000 milliLiter(s) (100 mL/Hr) IV Continuous <Continuous>  dextrose 5%. 1000 milliLiter(s) (50 mL/Hr) IV Continuous <Continuous>  dextrose 50% Injectable 25 Gram(s) IV Push once  dextrose 50% Injectable 12.5 Gram(s) IV Push once  dextrose 50% Injectable 25 Gram(s) IV Push once  enoxaparin Injectable 40 milliGRAM(s) SubCutaneous every 24 hours  glucagon  Injectable 1 milliGRAM(s) IntraMuscular once  insulin lispro (ADMELOG) corrective regimen sliding scale   SubCutaneous three times a day before meals  loratadine 10 milliGRAM(s) Oral daily  nystatin    Suspension 878745 Unit(s) Swish and Swallow two times a day  octreotide  Injectable 100 MICROGram(s) IV Push every 8 hours  pantoprazole  Injectable 40 milliGRAM(s) IV Push every 12 hours  piperacillin/tazobactam IVPB.. 3.375 Gram(s) IV Intermittent every 8 hours  psyllium Powder 1 Packet(s) Oral three times a day    MEDICATIONS  (PRN):  acetaminophen   IVPB .. 1000 milliGRAM(s) IV Intermittent every 6 hours PRN Mild Pain (1 - 3)  ALBUTerol    90 MICROgram(s) HFA Inhaler 2 Puff(s) Inhalation every 6 hours PRN Shortness of Breath  FIRST- Mouthwash  BLM 5 milliLiter(s) Swish and Spit every 4 hours PRN Mouth Care  ketorolac   Injectable 15 milliGRAM(s) IV Push every 6 hours PRN Moderate Pain (4 - 6)  metoprolol tartrate Injectable 5 milliGRAM(s) IV Push every 6 hours PRN SBP > 140  morphine  - Injectable 4 milliGRAM(s) IV Push every 4 hours PRN Severe Pain (7 - 10)  naloxone Injectable 0.1 milliGRAM(s) IV Push every 3 minutes PRN For ANY of the following changes in patient status:  A. RR LESS THAN 10 breaths per minute, B. Oxygen saturation LESS THAN 90%, C. Sedation score of 6  naloxone Injectable 0.1 milliGRAM(s) IV Push every 3 minutes PRN For ANY of the following changes in patient status:  A. RR LESS THAN 10 breaths per minute, B. Oxygen saturation LESS THAN 90%, C. Sedation score of 6  ondansetron Injectable 4 milliGRAM(s) IV Push every 6 hours PRN Nausea  prochlorperazine   Injectable 5 milliGRAM(s) IV Push every 6 hours PRN nausea and/or vomiting      Vital Signs Last 24 Hrs  T(C): 36.6 (21 Mar 2022 15:10), Max: 37.2 (20 Mar 2022 21:30)  T(F): 97.9 (21 Mar 2022 15:10), Max: 99 (20 Mar 2022 21:30)  HR: 83 (21 Mar 2022 15:10) (79 - 83)  BP: 107/59 (21 Mar 2022 15:10) (107/59 - 118/57)  BP(mean): --  RR: 18 (21 Mar 2022 15:10) (16 - 18)  SpO2: 97% (21 Mar 2022 15:10) (97% - 98%)    PHYSICAL EXAM:    GENERAL: NAD,   HEAD:  Atraumatic, Normocephalic  EYES: EOMI, PERRLA, conjunctiva and sclera clear    NECK: Supple, No JVD, Normal thyroid  NERVOUS SYSTEM:    CHEST/LUNG: Clear to percussion bilaterally; No rales, rhonchi,   HEART: Regular rate and rhythm;   ABDOMEN: Soft,  EXTREMITIES:   edema:-  LYMPH: No lymphadenopathy noted        LABS:                        8.6    7.17  )-----------( 577      ( 21 Mar 2022 06:42 )             26.5     03-21    139  |  106  |  8   ----------------------------<  109<H>  3.9   |  30  |  0.45<L>    Ca    8.1<L>      21 Mar 2022 06:42  Phos  2.8     03-21  Mg     2.4     03-21    TPro  4.8<L>  /  Alb  1.4<L>  /  TBili  0.6  /  DBili  x   /  AST  31  /  ALT  33  /  AlkPhos  139<H>  03-21    PT/INR - ( 21 Mar 2022 06:43 )   PT: 11.8 sec;   INR: 1.02 ratio         PTT - ( 21 Mar 2022 06:43 )  PTT:28.3 sec        RADIOLOGY & ADDITIONAL STUDIES:  < from: CT Abdomen and Pelvis w/ Oral Cont and w/ IV Cont (03.18.22 @ 15:36) >  ACC: [id number] EXAM:  CT ABDOMEN AND PELVIS OC IC                          PROCEDURE DATE:  03/18/2022          INTERPRETATION:  CLINICAL INFORMATION: History of SBO status post bypass   with fistula    COMPARISON: 3/4/2022, 3/12/2022.    CONTRAST/COMPLICATIONS:  IV Contrast: Omnipaque 350  90 cc administered   10 cc discarded  Oral Contrast: Omnipaque 300  Complications: None reported at time of study completion    PROCEDURE:  CT of the Abdomen and Pelvis was performed.  Sagittal and coronal reformats were performed.    FINDINGS:  LOWER CHEST: Status post gastric pull-through. Trace bilateral effusions   with associated compressive atelectasis. Left-sided pleural catheter.    LIVER: Steatosis. Large posterior right hepatic lobe cyst. Previously   seen enhancing lesion in left hepatic lobe is poorly visualized. Hepatic   capsular implants are faintly visualized.  BILE DUCTS: Mild/moderate intra and extrahepatic biliary ductal   dilatation. The common bile duct is also dilated up to 1.7 cm, unchanged.  GALLBLADDER: Cholecystectomy.  SPLEEN: Within normal limits.  PANCREAS: Within normal limits.  ADRENALS: Bilateral adrenal gland nodularity, without significant change.  KIDNEYS/URETERS: Mild cortical scarring involving the upper pole the left   kidney. No hydronephrosis.    BLADDER: Within normal limits.  REPRODUCTIVE ORGANS: Small, heterogeneous uterus.    BOWEL: Status post gastric pull-through. Thickening versus   underdistention of the descending and sigmoid colon. Small bowel   anastomoses without evidence of obstruction. Dilated distal small bowel   within the pelvis. Appendix is not definitely visualized.  PERITONEUM: Multiple rim-enhancing peritoneal collections.  A collection   in the mid upper pelvis measures 4.2 x 3.2 x 4.2 cm (2:85; previously 4.9   x 2.6 x 4.9 cm). Anterior abdominal collection containing oral contrast   measures 11.1 x 1.9 x 4.1 cm (2:84; previously 12.1 x 2.3 x 6.2 cm). The   collection in the left lower quadrant measures 4.7 x 4.2 x 6.3 cm (2:91;   previously 4.9 x 2.7 x11.5 cm). Scattered small foci of air within the   abdominal collections. Trace pneumoperitoneum. Multiple peritoneal   implants.  VESSELS: Atherosclerotic changes.  RETROPERITONEUM/LYMPH NODES: No lymphadenopathy.  ABDOMINAL WALL: Postsurgical changes. Extravasated contrast seen within   the inferior midline abdominal extending into the midline ostomy bag.   Extensive subcutaneous edema, slightly decreased compared to prior.  BONES: Within normal limits.    IMPRESSION:  Multiple rim-enhancing peritoneal collections, several of which contain   foci of air compatible with abscesses, overall slightly decreased   compared to prior.    Extravasated contrast seen within the inferior midline abdominal   extending into the ostomy bag, compatible with enterocutaneous fistula.    Dilated distal small bowel loop in the pelvis, nonspecific.    Additional findings as above.    < end of copied text >        PATHOLOGY:

## 2022-03-21 NOTE — DISCHARGE NOTE PROVIDER - HOSPITAL COURSE
60 yo F w/ PMH of GEJ cancer s/p esophagectomy, [place] tumors s/p BSO presents to ED with sob for 1.5 weeks duration. Patient underwent PET CT roughly one month ago which showed small amount of left sided pleural effusion, as per patient. 2 days ago, patient saw her oncologist who ordered a CXR which showed marked increase in pleural effusion. Of note, patient was admitted few months ago for abdominal pain and partial SBO. CT surgery was consulted and pt had left side pleural effusion drained with pigtail placement. Pt had been treated conservatively on previous admissions with NGT and gastrografin challenge and was previously discharged home. Patient was treated and given FOLFIRI and immunotherapy without response. Patient endorses last BM this am.  Patient denies fever, chills, chest pain, abdominal pain, n/v/d. Pt had barium enema study which showed multiple colonic strictures.  Pt was taken for Ex Lap, Lysis of adhesions, small bowel to small bowel bypass with linear staple and common enterotomy closed primarily. Multiple colonic strictures and carcinomatosis present with dense adhesions. No diversion or ostomy created. All enterotomies closed. Post operatively her midline wound incision started to drain stool and repeat CT showed enterocutaneously fistula. Her midline wound is being managed with Meaghan pouch. Pt is doing well tolerating diet and ambulating w/assistance and ready to be discharged with follow up in the office        Pt needs   pt with malignant bowel obstruction and enterocutaneous fistula , can no longer safely ambulate within her home with a walker, cane or crutches, w/o a transport wheelchair. SHe will not be able to access the   bathroom for toileting, dinning facility for her meals. pt is unable to use self propelled wheelchair. SHe is agreeable to tranport wheelchair and has family members able to aid in the transport/use

## 2022-03-21 NOTE — PROGRESS NOTE ADULT - SUBJECTIVE AND OBJECTIVE BOX
Subjective:  Pt seen, chest tube removed, minimal output. C/o abdominal pain.    Vital Signs:  Vital Signs Last 24 Hrs  T(C): 36.6 (03-21-22 @ 15:10), Max: 37.2 (03-20-22 @ 21:30)  T(F): 97.9 (03-21-22 @ 15:10), Max: 99 (03-20-22 @ 21:30)  HR: 83 (03-21-22 @ 15:10) (79 - 83)  BP: 107/59 (03-21-22 @ 15:10) (107/59 - 118/57)  RR: 18 (03-21-22 @ 15:10) (16 - 18)  SpO2: 97% (03-21-22 @ 15:10) (97% - 98%) on (O2)    Telemetry/Alarms:    Relevant labs, radiology and Medications reviewed                        8.6    7.17  )-----------( 577      ( 21 Mar 2022 06:42 )             26.5     03-21    139  |  106  |  8   ----------------------------<  109<H>  3.9   |  30  |  0.45<L>    Ca    8.1<L>      21 Mar 2022 06:42  Phos  2.8     03-21  Mg     2.4     03-21    TPro  4.8<L>  /  Alb  1.4<L>  /  TBili  0.6  /  DBili  x   /  AST  31  /  ALT  33  /  AlkPhos  139<H>  03-21    PT/INR - ( 21 Mar 2022 06:43 )   PT: 11.8 sec;   INR: 1.02 ratio         PTT - ( 21 Mar 2022 06:43 )  PTT:28.3 sec  MEDICATIONS  (STANDING):  clotrimazole Lozenge 1 Lozenge Oral <User Schedule>  dextrose 40% Gel 15 Gram(s) Oral once  dextrose 5%. 1000 milliLiter(s) (50 mL/Hr) IV Continuous <Continuous>  dextrose 5%. 1000 milliLiter(s) (100 mL/Hr) IV Continuous <Continuous>  dextrose 50% Injectable 25 Gram(s) IV Push once  dextrose 50% Injectable 12.5 Gram(s) IV Push once  dextrose 50% Injectable 25 Gram(s) IV Push once  enoxaparin Injectable 40 milliGRAM(s) SubCutaneous every 24 hours  glucagon  Injectable 1 milliGRAM(s) IntraMuscular once  insulin lispro (ADMELOG) corrective regimen sliding scale   SubCutaneous three times a day before meals  loratadine 10 milliGRAM(s) Oral daily  nystatin    Suspension [id number] Unit(s) Swish and Swallow two times a day  octreotide  Injectable 100 MICROGram(s) IV Push every 8 hours  pantoprazole  Injectable 40 milliGRAM(s) IV Push every 12 hours  piperacillin/tazobactam IVPB.. 3.375 Gram(s) IV Intermittent every 8 hours  psyllium Powder 1 Packet(s) Oral three times a day    MEDICATIONS  (PRN):  acetaminophen   IVPB .. 1000 milliGRAM(s) IV Intermittent every 6 hours PRN Mild Pain (1 - 3)  ALBUTerol    90 MICROgram(s) HFA Inhaler 2 Puff(s) Inhalation every 6 hours PRN Shortness of Breath  FIRST- Mouthwash  BLM 5 milliLiter(s) Swish and Spit every 4 hours PRN Mouth Care  ketorolac   Injectable 15 milliGRAM(s) IV Push every 6 hours PRN Moderate Pain (4 - 6)  metoprolol tartrate Injectable 5 milliGRAM(s) IV Push every 6 hours PRN SBP > 140  morphine  - Injectable 4 milliGRAM(s) IV Push every 4 hours PRN Severe Pain (7 - 10)  naloxone Injectable 0.1 milliGRAM(s) IV Push every 3 minutes PRN For ANY of the following changes in patient status:  A. RR LESS THAN 10 breaths per minute, B. Oxygen saturation LESS THAN 90%, C. Sedation score of 6  naloxone Injectable 0.1 milliGRAM(s) IV Push every 3 minutes PRN For ANY of the following changes in patient status:  A. RR LESS THAN 10 breaths per minute, B. Oxygen saturation LESS THAN 90%, C. Sedation score of 6  ondansetron Injectable 4 milliGRAM(s) IV Push every 6 hours PRN Nausea  prochlorperazine   Injectable 5 milliGRAM(s) IV Push every 6 hours PRN nausea and/or vomiting      Physical exam  Neuro: Alert Awake NAD  Pulm: decreased  at bases + Left Pigtail , drained minimal cc's last 24 hours  CV: RRR S1 S2  Abd:  Soft ,  laparotomy incision w ostomy in place, leaking yellow drainage from incision.  Extremities: 1+ edema     I&O's Summary    20 Mar 2022 07:01  -  21 Mar 2022 07:00  --------------------------------------------------------  IN: 240 mL / OUT: 850 mL / NET: -610 mL        Assessment  61y Female  w/ PAST MEDICAL & SURGICAL HISTORY:  Essential hypertension    Gastritis    HLD (hyperlipidemia)    GERD (gastroesophageal reflux disease)    Intracranial shunt    S/P cholecystectomy    History of esophageal surgery    admitted with complaints of Patient is a 61y old  Female who presents with a chief complaint of pleural effusion and partial SBO (21 Mar 2022 16:44)  .  .  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 Patient underwent PET CT roughly one month ago which showed small amount of left sided pleural effusion, as per patient. 2 days ago, patient saw her oncologist who ordered a CXR which showed marked increase in pleural effusion.  Pt had CT + partial bowel obstruction.  Plan for OR next week.    Consulted for Lt Pleural effusion.  3/4 s/p Left Pigtail 1.5 L Drained Exudative effusion, cyto negative. 3/9/22 s/p Bypass, ileum, small bowel to small bowel bypass Closure, enterotomy, small intestine and Open enterolysis. Post op course c/b SB enterocutaneous fistula.    Chest tube removed on full inspiration and occlusive dressing applied. CXR ordered post pull.  f/u Dr. Aguilar only as needed    d/c insturctions:  Leave dressing on for 48 hours after chest tube removed. May leave open to air. Shower only, no soaking in tub or pool.    will sign off please recall as needed      Discussed with Cardiothoracic Team at AM rounds.

## 2022-03-21 NOTE — DISCHARGE NOTE PROVIDER - PROVIDER RX CONTACT NUMBER
08/04/21 1500   Engagement   Intervention Group   Topic Detail OT: Sand Art to promote concentration, attention to detail, planning, sequencing, frustration tolerance, coping skills, healthy leisure exploration, and opportunity for positive social interactions.     Attendance Attended   Patient Response Needs reinforcement/repetition to learn materials;Positive attitude;Prosocial behavior   Concentrated on Task duration of group   Cognition Takes initiative;Sequences task;Attends to detail   Mood/Affect Pleasant  (smiling, cheerful )   Social/Behavioral Engaged;Motivated     Able to complete task during group, worked efficiently with good attention to detail. Some frustration with task but able to laugh off, appeared to benefit from group process.    [phone]

## 2022-03-21 NOTE — DISCHARGE NOTE PROVIDER - NSDCMRMEDTOKEN_GEN_ALL_CORE_FT
acetaminophen 500 mg oral tablet: 1 tab(s) orally every 6 hours   amLODIPine 2.5 mg oral tablet: 1 tab(s) orally once a day  meloxicam 7.5 mg oral tablet: 1 tab(s) orally 2 times a day  rosuvastatin 5 mg oral tablet: 1 tab(s) orally once a day  SandoSTATIN 100 mcg/mL injectable solution: 100 microgram(s) subcutaneously 3 times a day   topiramate 25 mg oral capsule: 1 cap(s) orally 2 times a day

## 2022-03-21 NOTE — DISCHARGE NOTE PROVIDER - DETAILS OF MALNUTRITION DIAGNOSIS/DIAGNOSES
This patient has been assessed with a concern for Malnutrition and was treated during this hospitalization for the following Nutrition diagnosis/diagnoses:     -  03/08/2022: Severe protein-calorie malnutrition   -  03/04/2022: Moderate protein-calorie malnutrition

## 2022-03-21 NOTE — PROGRESS NOTE ADULT - SUBJECTIVE AND OBJECTIVE BOX
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 Patient underwent L chest pigtail catheter placement by Thoracic Surgery on 3/4 with 1.5L of pleural fluid drained, exudative, cyto negative. 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 She is s/p palliative bypass complicated by fluid collections in the abdomen, small bowel enterocutaneous fistula managed with an Meahgan pouch. She is on nutritional support for severe protein calorie malnutrition. Medicine was consulted to provide co-management.     3/20 - no cp palps sob. abdo pain under control. tolerating po diet     PHYSICAL EXAM:  Vital Signs Last 24 Hrs  T(F): 98.6 (20 Mar 2022 08:03), Max: 98.7 (20 Mar 2022 04:23)  HR: 84 (20 Mar 2022 08:03) (82 - 87)  BP: 116/57 (20 Mar 2022 08:03) (116/57 - 125/57)  RR: 17 (20 Mar 2022 08:03) (17 - 18)  SpO2: 94% (20 Mar 2022 08:03) (94% - 97%)  Constitutional: NAD, sitting up in bed, pleasant, cooperative   HEENT: PERR, EOMI  Neck: Soft and supple,  No JVD  Respiratory: Breath sounds are clear bilaterally, No wheezing, rales or rhonchi  Cardiovascular: S1 and S2, regular rate and rhythm, no Murmurs  Gastrointestinal: appropriately tender post-op, eakins pouch contains stool  Extremities: mild bl pedal edema  Vascular: 2+ peripheral pulses  Neurological: A/O x 3, no focal deficits  Musculoskeletal: 5/5 strength b/l upper and lower extremities  Skin: No rashes    RADIOLOGY/EKG:  < from: CT Abdomen and Pelvis w/ Oral Cont and w/ IV Cont (03.18.22 @ 15:36) >  Multiple rim-enhancing peritoneal collections, several of which contain   foci of air compatible with abscesses, overall slightly decreased   compared to prior.  Extravasated contrast seen within the inferior midline abdominal   extending into the ostomy bag, compatible with enterocutaneous fistula.  Dilated distal small bowel loop in the pelvis, nonspecific.    LABS: All Labs Reviewed:                            9.0    11.10 )-----------( 497      ( 20 Mar 2022 07:03 )             27.4   138  |  105  |  12  ----------------------------<  105<H>  4.1   |  29  |  0.52  TPro  4.8<L>  /  Alb  1.3<L>  /  TBili  0.7  /  DBili  x   /  AST  34  /  ALT  35  /  AlkPhos  147<H>  03-20      MEDS:   acetaminophen   IVPB .. 1000 milliGRAM(s) IV Intermittent every 6 hours PRN  ALBUTerol    90 MICROgram(s) HFA Inhaler 2 Puff(s) Inhalation every 6 hours PRN  clotrimazole Lozenge 1 Lozenge Oral <User Schedule>  enoxaparin Injectable 40 milliGRAM(s) SubCutaneous every 24 hours  FIRST- Mouthwash  BLM 5 milliLiter(s) Swish and Spit every 4 hours PRN  insulin lispro (ADMELOG) corrective regimen sliding scale   SubCutaneous three times a day before meals  ketorolac   Injectable 15 milliGRAM(s) IV Push every 6 hours PRN  loratadine 10 milliGRAM(s) Oral daily  metoprolol tartrate Injectable 5 milliGRAM(s) IV Push every 6 hours PRN  morphine  - Injectable 4 milliGRAM(s) IV Push every 4 hours PRN  naloxone Injectable 0.1 milliGRAM(s) IV Push every 3 minutes PRN  naloxone Injectable 0.1 milliGRAM(s) IV Push every 3 minutes PRN  octreotide  Injectable 100 MICROGram(s) IV Push every 8 hours  ondansetron Injectable 4 milliGRAM(s) IV Push every 6 hours PRN  pantoprazole  Injectable 40 milliGRAM(s) IV Push every 12 hours  piperacillin/tazobactam IVPB.. 3.375 Gram(s) IV Intermittent every 8 hours  prochlorperazine   Injectable 5 milliGRAM(s) IV Push every 6 hours PRN  psyllium Powder 1 Packet(s) Oral three times a day                   CC: 61 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 Patient underwent L chest pigtail catheter placement by Thoracic Surgery on 3/4 with 1.5L of pleural fluid drained, exudative, cyto negative. 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 She is on nutritional support for severe protein calorie malnutrition. Medicine was consulted to provide co-management.     3/21 - no cp palps sob. abdo pain under control. tolerating po diet     PHYSICAL EXAM:  Vital Signs Last 24 Hrs  T(F): 97.9 (21 Mar 2022 15:10), Max: 99 (20 Mar 2022 21:30)  HR: 83 (21 Mar 2022 15:10) (79 - 83)  BP: 107/59 (21 Mar 2022 15:10) (107/59 - 118/57)  RR: 18 (21 Mar 2022 15:10) (16 - 18)  SpO2: 97% (21 Mar 2022 15:10) (97% - 98%)  Constitutional: NAD, sitting up in bed, pleasant, cooperative   HEENT: PERR, EOMI  Neck: Soft and supple,  No JVD  Respiratory: Breath sounds are clear bilaterally, No wheezing, rales or rhonchi  Cardiovascular: S1 and S2, regular rate and rhythm, no Murmurs  Gastrointestinal: appropriately tender post-op, eakins pouch contains stool  Extremities: mild bl pedal edema  Vascular: 2+ peripheral pulses  Neurological: A/O x 3, no focal deficits  Musculoskeletal: 5/5 strength b/l upper and lower extremities  Skin: No rashes    RADIOLOGY/EKG:  < from: CT Abdomen and Pelvis w/ Oral Cont and w/ IV Cont (03.18.22 @ 15:36) >  Multiple rim-enhancing peritoneal collections, several of which contain   foci of air compatible with abscesses, overall slightly decreased   compared to prior.  Extravasated contrast seen within the inferior midline abdominal   extending into the ostomy bag, compatible with enterocutaneous fistula.  Dilated distal small bowel loop in the pelvis, nonspecific.    LABS: All Labs Reviewed:                        8.6    7.17  )-----------( 577      ( 21 Mar 2022 06:42 )             26.5     139  |  106  |  8   ----------------------------<  109<H>  3.9   |  30  |  0.45<L>      acetaminophen   IVPB .. 1000 milliGRAM(s) IV Intermittent every 6 hours PRN  ALBUTerol    90 MICROgram(s) HFA Inhaler 2 Puff(s) Inhalation every 6 hours PRN  clotrimazole Lozenge 1 Lozenge Oral <User Schedule>  enoxaparin Injectable 40 milliGRAM(s) SubCutaneous every 24 hours  FIRST- Mouthwash  BLM 5 milliLiter(s) Swish and Spit every 4 hours PRN  insulin lispro (ADMELOG) corrective regimen sliding scale   SubCutaneous three times a day before meals  ketorolac   Injectable 15 milliGRAM(s) IV Push every 6 hours PRN  loratadine 10 milliGRAM(s) Oral daily  metoprolol tartrate Injectable 5 milliGRAM(s) IV Push every 6 hours PRN  morphine  - Injectable 4 milliGRAM(s) IV Push every 4 hours PRN  naloxone Injectable 0.1 milliGRAM(s) IV Push every 3 minutes PRN  naloxone Injectable 0.1 milliGRAM(s) IV Push every 3 minutes PRN  nystatin    Suspension [id number] Unit(s) Swish and Swallow two times a day  octreotide  Injectable 100 MICROGram(s) IV Push every 8 hours  ondansetron Injectable 4 milliGRAM(s) IV Push every 6 hours PRN  pantoprazole  Injectable 40 milliGRAM(s) IV Push every 12 hours  piperacillin/tazobactam IVPB.. 3.375 Gram(s) IV Intermittent every 8 hours  prochlorperazine   Injectable 5 milliGRAM(s) IV Push every 6 hours PRN  psyllium Powder 1 Packet(s) Oral three times a day  simethicone drops 80 milliGRAM(s) Oral two times a day

## 2022-03-21 NOTE — DISCHARGE NOTE PROVIDER - CARE PROVIDER_API CALL
Ryan Sahu (MD)  Surgery  [address] [place] [address]  Phone: [phone]  Fax: [phone]  Scheduled Appointment: 03/31/2022

## 2022-03-22 LAB
ALBUMIN SERPL ELPH-MCNC: 1.4 G/DL — LOW (ref 3.3–5)
ALP SERPL-CCNC: 136 U/L — HIGH (ref 40–120)
ALT FLD-CCNC: 30 U/L — SIGNIFICANT CHANGE UP (ref 12–78)
ANION GAP SERPL CALC-SCNC: 4 MMOL/L — LOW (ref 5–17)
AST SERPL-CCNC: 29 U/L — SIGNIFICANT CHANGE UP (ref 15–37)
BILIRUB SERPL-MCNC: 0.5 MG/DL — SIGNIFICANT CHANGE UP (ref 0.2–1.2)
BUN SERPL-MCNC: 7 MG/DL — SIGNIFICANT CHANGE UP (ref 7–23)
CALCIUM SERPL-MCNC: 8.1 MG/DL — LOW (ref 8.5–10.1)
CHLORIDE SERPL-SCNC: 106 MMOL/L — SIGNIFICANT CHANGE UP (ref 96–108)
CO2 SERPL-SCNC: 30 MMOL/L — SIGNIFICANT CHANGE UP (ref 22–31)
CREAT SERPL-MCNC: 0.46 MG/DL — LOW (ref 0.5–1.3)
EGFR: 107 ML/MIN/1.73M2 — SIGNIFICANT CHANGE UP
GLUCOSE SERPL-MCNC: 107 MG/DL — HIGH (ref 70–99)
HCT VFR BLD CALC: 26.2 % — LOW (ref 34.5–45)
HGB BLD-MCNC: 8.5 G/DL — LOW (ref 11.5–15.5)
MAGNESIUM SERPL-MCNC: 2.4 MG/DL — SIGNIFICANT CHANGE UP (ref 1.6–2.6)
MCHC RBC-ENTMCNC: 32 PG — SIGNIFICANT CHANGE UP (ref 27–34)
MCHC RBC-ENTMCNC: 32.4 GM/DL — SIGNIFICANT CHANGE UP (ref 32–36)
MCV RBC AUTO: 98.5 FL — SIGNIFICANT CHANGE UP (ref 80–100)
PHOSPHATE SERPL-MCNC: 2.4 MG/DL — LOW (ref 2.5–4.5)
PLATELET # BLD AUTO: 670 K/UL — HIGH (ref 150–400)
POTASSIUM SERPL-MCNC: 4.3 MMOL/L — SIGNIFICANT CHANGE UP (ref 3.5–5.3)
POTASSIUM SERPL-SCNC: 4.3 MMOL/L — SIGNIFICANT CHANGE UP (ref 3.5–5.3)
PROT SERPL-MCNC: 4.9 GM/DL — LOW (ref 6–8.3)
RBC # BLD: 2.66 M/UL — LOW (ref 3.8–5.2)
RBC # FLD: 15.3 % — HIGH (ref 10.3–14.5)
SARS-COV-2 RNA SPEC QL NAA+PROBE: SIGNIFICANT CHANGE UP
SODIUM SERPL-SCNC: 140 MMOL/L — SIGNIFICANT CHANGE UP (ref 135–145)
WBC # BLD: 6.23 K/UL — SIGNIFICANT CHANGE UP (ref 3.8–10.5)
WBC # FLD AUTO: 6.23 K/UL — SIGNIFICANT CHANGE UP (ref 3.8–10.5)

## 2022-03-22 PROCEDURE — 99232 SBSQ HOSP IP/OBS MODERATE 35: CPT

## 2022-03-22 RX ADMIN — OCTREOTIDE ACETATE 100 MICROGRAM(S): 200 INJECTION, SOLUTION INTRAVENOUS; SUBCUTANEOUS at 14:45

## 2022-03-22 RX ADMIN — MORPHINE SULFATE 4 MILLIGRAM(S): 50 CAPSULE, EXTENDED RELEASE ORAL at 23:15

## 2022-03-22 RX ADMIN — LORATADINE 10 MILLIGRAM(S): 10 TABLET ORAL at 09:40

## 2022-03-22 RX ADMIN — MORPHINE SULFATE 4 MILLIGRAM(S): 50 CAPSULE, EXTENDED RELEASE ORAL at 11:50

## 2022-03-22 RX ADMIN — OCTREOTIDE ACETATE 100 MICROGRAM(S): 200 INJECTION, SOLUTION INTRAVENOUS; SUBCUTANEOUS at 07:46

## 2022-03-22 RX ADMIN — Medication 1 TABLET(S): at 09:40

## 2022-03-22 RX ADMIN — MORPHINE SULFATE 4 MILLIGRAM(S): 50 CAPSULE, EXTENDED RELEASE ORAL at 11:38

## 2022-03-22 RX ADMIN — SIMETHICONE 80 MILLIGRAM(S): 80 TABLET, CHEWABLE ORAL at 09:40

## 2022-03-22 RX ADMIN — Medication 1 PACKET(S): at 07:01

## 2022-03-22 RX ADMIN — ONDANSETRON 4 MILLIGRAM(S): 8 TABLET, FILM COATED ORAL at 07:38

## 2022-03-22 RX ADMIN — Medication 1 PACKET(S): at 22:34

## 2022-03-22 RX ADMIN — ENOXAPARIN SODIUM 40 MILLIGRAM(S): 100 INJECTION SUBCUTANEOUS at 14:45

## 2022-03-22 RX ADMIN — PANTOPRAZOLE SODIUM 40 MILLIGRAM(S): 20 TABLET, DELAYED RELEASE ORAL at 22:33

## 2022-03-22 RX ADMIN — Medication 1 LOZENGE: at 09:47

## 2022-03-22 RX ADMIN — Medication 500000 UNIT(S): at 07:02

## 2022-03-22 RX ADMIN — ONDANSETRON 4 MILLIGRAM(S): 8 TABLET, FILM COATED ORAL at 22:23

## 2022-03-22 RX ADMIN — Medication 500000 UNIT(S): at 17:09

## 2022-03-22 RX ADMIN — Medication 1 TABLET(S): at 23:15

## 2022-03-22 RX ADMIN — PANTOPRAZOLE SODIUM 40 MILLIGRAM(S): 20 TABLET, DELAYED RELEASE ORAL at 09:40

## 2022-03-22 RX ADMIN — OCTREOTIDE ACETATE 100 MICROGRAM(S): 200 INJECTION, SOLUTION INTRAVENOUS; SUBCUTANEOUS at 22:33

## 2022-03-22 RX ADMIN — SIMETHICONE 80 MILLIGRAM(S): 80 TABLET, CHEWABLE ORAL at 22:34

## 2022-03-22 RX ADMIN — MORPHINE SULFATE 4 MILLIGRAM(S): 50 CAPSULE, EXTENDED RELEASE ORAL at 02:47

## 2022-03-22 RX ADMIN — ONDANSETRON 4 MILLIGRAM(S): 8 TABLET, FILM COATED ORAL at 14:18

## 2022-03-22 RX ADMIN — MORPHINE SULFATE 4 MILLIGRAM(S): 50 CAPSULE, EXTENDED RELEASE ORAL at 23:30

## 2022-03-22 RX ADMIN — Medication 1 LOZENGE: at 22:28

## 2022-03-22 NOTE — PROGRESS NOTE ADULT - SUBJECTIVE AND OBJECTIVE BOX
Pt. seen and examined at bedside this morning. Continues to tolerate soft diet without nausea or vomiting. Chest tube was removed yesterday. States she has an area of erythema over the right abdomen that is "hard" in texture. She denies fever, chills, chest pain, SOB, nausea, vomiting, diarrhea or constipation. No acute events overnight.     Vital Signs Last 24 Hrs  T(C): 36.9 (22 Mar 2022 08:58), Max: 37 (21 Mar 2022 21:27)  T(F): 98.5 (22 Mar 2022 08:58), Max: 98.6 (21 Mar 2022 21:27)  HR: 79 (22 Mar 2022 08:58) (79 - 83)  BP: 126/58 (22 Mar 2022 08:58) (101/51 - 126/58)  BP(mean): --  RR: 17 (22 Mar 2022 08:58) (17 - 18)  SpO2: 95% (22 Mar 2022 08:58) (95% - 99%)    I&O's Detail    21 Mar 2022 07:01  -  22 Mar 2022 07:00  --------------------------------------------------------  IN:  Total IN: 0 mL    OUT:    Drain (mL): 950 mL  Total OUT: 950 mL    Total NET: -950 mL          Physical Exam:  General: AAOx3, Well developed, NAD  Chest: Normal respiratory effort  Heart: RRR  Abdomen: midline with stool drainage, lee pouch over incision with stool in bag, no leakage, abd soft, nondistended, right abdomen with area of induration and erythema  Neuro/Psych: No localized deficits. Normal speech, normal tone  Skin: Normal, no rashes, no lesions noted.   Extremities: Warm, well perfused, 2+ edema left foot, Pulses intact    Labs:                                   8.5    6.23  )-----------( 670      ( 22 Mar 2022 07:17 )             26.2   03-22    140  |  106  |  7   ----------------------------<  107<H>  4.3   |  30  |  0.46<L>    Ca    8.1<L>      22 Mar 2022 07:17  Phos  2.4     03-22  Mg     2.4     03-22    TPro  4.9<L>  /  Alb  1.4<L>  /  TBili  0.5  /  DBili  x   /  AST  29  /  ALT  30  /  AlkPhos  136<H>  03-22

## 2022-03-22 NOTE — PROGRESS NOTE ADULT - ASSESSMENT
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 Patient underwent L chest pigtail catheter placement by Thoracic Surgery on 3/4 with 1.5L of pleural fluid drained, exudative, cyto negative. 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 She is on nutritional support for severe protein calorie malnutrition. Medicine was consulted to provide co-management.     S/P palliative bypass, has enterocutaneous fistula  PIP TAZO PPI Octreotide  noted CT imaging with peritoneal collections/abscesses. EC fistula     Severe protein calorie malnutrition  Followed by Nutritional Support Team. 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 Patient underwent L chest pigtail catheter placement by Thoracic Surgery on 3/4 with 1.5L of pleural fluid drained, exudative, cyto negative. 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 She is on nutritional support for severe protein calorie malnutrition. Medicine was consulted to provide co-management.     S/P palliative bypass, has enterocutaneous fistula  possible cellulitis abdo wall   DC PIP TAZO - is on Augmentin   PPI Octreotide  noted CT imaging with peritoneal collections/abscesses. EC fistula     Severe protein calorie malnutrition  Followed by Nutritional Support Team. PO DIET     Thrush  Clotrimazole lozenges     L pleural effusion s/p pigtail catheter  now dced     VTE Px - lovenox    RECOMMENDATIONS FOR DISCHARGE:   WBC improving, on augmentin per primary team for suspected abdominal wall cellulitis  Octreotide - consider Sandostatin LAR Depot  at [facility] office     Pls call with q

## 2022-03-22 NOTE — PROGRESS NOTE ADULT - SUBJECTIVE AND OBJECTIVE BOX
HPI:    3/22/22  Patient was seen and examined.  Denies nausea, vomiting, shortness of breath, chest pain, headaches, abdominal pain, constipation     PAIN: ( )Yes   (x )No  DYSPNEA: ( ) Yes  (x ) No  Level:        Review of Systems:    Anxiety- denies  Depression-denies  Physical Discomfort-denies  Dyspnea-denies  Constipation-denies  Diarrhea-denies  Nausea-denies  Vomiting-denies  Anorexia-denies  Weight Loss- denies  Cough-denies  Secretions-denies  Fatigue-denies  Weakness-denies  Delirium-none    All other systems reviewed and negative        PHYSICAL EXAM:    Vital Signs Last 24 Hrs  T(C): 36.8 (23 Mar 2022 09:33), Max: 36.8 (22 Mar 2022 15:46)  T(F): 98.3 (23 Mar 2022 09:33), Max: 98.3 (22 Mar 2022 21:54)  HR: 76 (23 Mar 2022 09:33) (76 - 84)  BP: 115/50 (23 Mar 2022 09:33) (107/55 - 115/50)  BP(mean): 68 (22 Mar 2022 21:54) (68 - 69)  RR: 18 (23 Mar 2022 09:33) (18 - 18)  SpO2: 94% (23 Mar 2022 09:33) (93% - 95%)  Daily     Daily     PPSV2: 45  %  FAST: na    General: calm NAD in bed  Mental Status: A+Ox3   HEENT: EOMI   Lungs: ctabl no wheezing  or rales  Cardiac: s1s2  no mgr  GI: soft +output  : voids  Ext: moves all 4 extremities  Neuro: follows commands      LABS:                        9.4    8.65  )-----------( 776      ( 23 Mar 2022 06:39 )             29.7     03-22    140  |  106  |  7   ----------------------------<  107<H>  4.3   |  30  |  0.46<L>    Ca    8.1<L>      22 Mar 2022 07:17  Phos  2.4     03-22  Mg     2.4     03-22    TPro  4.9<L>  /  Alb  1.4<L>  /  TBili  0.5  /  DBili  x   /  AST  29  /  ALT  30  /  AlkPhos  136<H>  03-22      Albumin: Albumin, Serum: 1.4 g/dL (03-22 @ 07:17)      Allergies    latex (Unknown)  sulfa drugs (Unknown)    Intolerances      MEDICATIONS  (STANDING):  amoxicillin  500 milliGRAM(s)/clavulanate 1 Tablet(s) Oral two times a day  clotrimazole Lozenge 1 Lozenge Oral <User Schedule>  dextrose 40% Gel 15 Gram(s) Oral once  dextrose 5%. 1000 milliLiter(s) (50 mL/Hr) IV Continuous <Continuous>  dextrose 5%. 1000 milliLiter(s) (100 mL/Hr) IV Continuous <Continuous>  dextrose 50% Injectable 25 Gram(s) IV Push once  dextrose 50% Injectable 12.5 Gram(s) IV Push once  dextrose 50% Injectable 25 Gram(s) IV Push once  enoxaparin Injectable 40 milliGRAM(s) SubCutaneous every 24 hours  glucagon  Injectable 1 milliGRAM(s) IntraMuscular once  insulin lispro (ADMELOG) corrective regimen sliding scale   SubCutaneous three times a day before meals  loratadine 10 milliGRAM(s) Oral daily  nystatin    Suspension 652568 Unit(s) Swish and Swallow two times a day  octreotide  Injectable 100 MICROGram(s) IV Push every 8 hours  pantoprazole  Injectable 40 milliGRAM(s) IV Push every 12 hours  psyllium Powder 1 Packet(s) Oral three times a day  simethicone drops 80 milliGRAM(s) Oral two times a day    MEDICATIONS  (PRN):  acetaminophen   IVPB .. 1000 milliGRAM(s) IV Intermittent every 6 hours PRN Mild Pain (1 - 3)  ALBUTerol    90 MICROgram(s) HFA Inhaler 2 Puff(s) Inhalation every 6 hours PRN Shortness of Breath  FIRST- Mouthwash  BLM 5 milliLiter(s) Swish and Spit every 4 hours PRN Mouth Care  metoprolol tartrate Injectable 5 milliGRAM(s) IV Push every 6 hours PRN SBP > 140  morphine  - Injectable 4 milliGRAM(s) IV Push every 4 hours PRN Severe Pain (7 - 10)  naloxone Injectable 0.1 milliGRAM(s) IV Push every 3 minutes PRN For ANY of the following changes in patient status:  A. RR LESS THAN 10 breaths per minute, B. Oxygen saturation LESS THAN 90%, C. Sedation score of 6  naloxone Injectable 0.1 milliGRAM(s) IV Push every 3 minutes PRN For ANY of the following changes in patient status:  A. RR LESS THAN 10 breaths per minute, B. Oxygen saturation LESS THAN 90%, C. Sedation score of 6  ondansetron Injectable 4 milliGRAM(s) IV Push every 6 hours PRN Nausea  prochlorperazine   Injectable 5 milliGRAM(s) IV Push every 6 hours PRN nausea and/or vomiting      RADIOLOGY:

## 2022-03-22 NOTE — PROGRESS NOTE ADULT - ASSESSMENT
62 yo F with pmhx GEJ cancer s/p esophagectomy, [place] tumors s/p SBO presents with left sided pleural effusion   XR barium enema shows strictures in proximal and mid sigmoid colon  s/p palliative bypass, now with contained enterocutaneous fistula. CT of abd appreciated. AFVSS. Labs stable.    Plan:  - continue with regular diet  - Pain and nausea control PRN   - GI/DVT ppx  - Monitor bowel function  - Metamucil  - Continue octreotide - will need for discharge  - Monitor stool output within Meaghan pouch  - Hospitalist recs  - Palliative medicine recs. Patient is DNR/DNI  - Encourage ambulation  - ID recs - 500mg Augmentin BID x 2 weeks for abdominal wall cellulitis  -  for dispo planning - anticipate discharge home tomorrow    Plan discussed with Dr. Sahu

## 2022-03-22 NOTE — PROGRESS NOTE ADULT - ASSESSMENT
Assessment:   62 yo F with pmhx GEJ cancer s/p esophagectomy, [place] tumors s/p SBO presents with left sided pleural effusion   XR barium enema shows strictures in proximal and mid sigmoid colon  s/p palliative bypass, now with contained enterocutaneous fistula.    Process of Care  --Reviewed dx/treatment problems and alignment with Goals of Care    Physical Aspects of Care  --Pain  patient denies at this time  c/w current management    --Bowel Regimen  denies constipation  risk for constipation   cw current mmanagement    --Dyspnea  No SOB at this time  comfortable and in NAD    --Nausea Vomiting  denies    --Weakness  PT as tolerated     Psychological and Psychiatric Aspects of Care:   Grief Bereavement emotional support provided  --Hx of psychiatric dx: none  -Pastoral Care Available PRN     Social Aspects of Care  -SW involved     Cultural Aspects  -Primary Language: English    Goals of Care:     [facility] did not take patients insurance  interested in referral to [facility] for home hospice.   Pt in good spirits and humerus when talking.   She hopes to go home and be comfortable with the hopes that she could 'turn this around' but realistically understands that might not happen.     [facility] was sent referral.   as per chart possible DC monday.       3/22  pt stated they do not want home hospice at this time  c/w current management hope to return home w/ palliative home care and if continued to decline would consider comfort/hospice approach at that time.   pt in good spirits looking forward to leaving       Prognosis: Death can occur at anytime, but if disease continues to progress naturally patient likely has days to weeks.    Ethical and Legal Aspects:   NA    Capacity- yes  HCP/Surrogate: galdino ()  Code Status- DNR DNI  MOLST- completed  Dispo Plan- possible home w/ hospice    Discussed With:  case coordinated with attending and SW and RN     Time Spent: 45 minutes including the care, coordination and counseling of this patient, 50% of which was spent coordinating and counseling.  Assessment:   62 yo F with pmhx GEJ cancer s/p esophagectomy, [place]run[place] tumors s/p SBO presents with left sided pleural effusion   XR barium enema shows strictures in proximal and mid sigmoid colon  s/p palliative bypass, now with contained enterocutaneous fistula.    Process of Care  --Reviewed dx/treatment problems and alignment with Goals of Care    Physical Aspects of Care  --Pain  patient denies at this time  c/w current management    --Bowel Regimen  denies constipation  risk for constipation   cw current mmanagement    --Dyspnea  No SOB at this time  comfortable and in NAD    --Nausea Vomiting  denies    --Weakness  PT as tolerated     Psychological and Psychiatric Aspects of Care:   Grief Bereavement emotional support provided  --Hx of psychiatric dx: none  -Pastoral Care Available PRN     Social Aspects of Care  -SW involved     Cultural Aspects  -Primary Language: English    Goals of Care:     [facility] did not take patients insurance  interested in referral to [facility] for home hospice.   Pt in good spirits and humerus when talking.   She hopes to go home and be comfortable with the hopes that she could 'turn this around' but realistically understands that might not happen.     [facility] was sent referral.   as per chart possible DC monday.       3/22  pt stated they do not want home hospice at this time  c/w current management hope to return home w/ palliative home care and if continued to decline would consider comfort/hospice approach at that time.   pt in good spirits looking forward to leaving   Goals are clear, symptoms managed- will Sign off please reconsult PRN     Prognosis: Death can occur at anytime, but if disease continues to progress naturally patient likely has days to weeks.    Ethical and Legal Aspects:   NA    Capacity- yes  HCP/Surrogate: galdino ()  Code Status- DNR DNI  MOLST- completed  Dispo Plan- possible home w/ hospice    Discussed With:  case coordinated with attending and SW and RN     Time Spent: 45 minutes including the care, coordination and counseling of this patient, 50% of which was spent coordinating and counseling.

## 2022-03-22 NOTE — PROGRESS NOTE ADULT - SUBJECTIVE AND OBJECTIVE BOX
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 Patient underwent L chest pigtail catheter placement by Thoracic Surgery on 3/4 with 1.5L of pleural fluid drained, exudative, cyto negative. 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 She is on nutritional support for severe protein calorie malnutrition. Medicine was consulted to provide co-management.     3/21 - no cp palps sob. abdo pain under control. tolerating po diet     PHYSICAL EXAM:  Vital Signs Last 24 Hrs  T(F): 97.9 (21 Mar 2022 15:10), Max: 99 (20 Mar 2022 21:30)  HR: 83 (21 Mar 2022 15:10) (79 - 83)  BP: 107/59 (21 Mar 2022 15:10) (107/59 - 118/57)  RR: 18 (21 Mar 2022 15:10) (16 - 18)  SpO2: 97% (21 Mar 2022 15:10) (97% - 98%)  Constitutional: NAD, sitting up in bed, pleasant, cooperative   HEENT: PERR, EOMI  Neck: Soft and supple,  No JVD  Respiratory: Breath sounds are clear bilaterally, No wheezing, rales or rhonchi  Cardiovascular: S1 and S2, regular rate and rhythm, no Murmurs  Gastrointestinal: appropriately tender post-op, eakins pouch contains stool  Extremities: mild bl pedal edema  Vascular: 2+ peripheral pulses  Neurological: A/O x 3, no focal deficits  Musculoskeletal: 5/5 strength b/l upper and lower extremities  Skin: No rashes    RADIOLOGY/EKG:  < from: CT Abdomen and Pelvis w/ Oral Cont and w/ IV Cont (03.18.22 @ 15:36) >  Multiple rim-enhancing peritoneal collections, several of which contain   foci of air compatible with abscesses, overall slightly decreased   compared to prior.  Extravasated contrast seen within the inferior midline abdominal   extending into the ostomy bag, compatible with enterocutaneous fistula.  Dilated distal small bowel loop in the pelvis, nonspecific.    LABS: All Labs Reviewed:                        8.6    7.17  )-----------( 577      ( 21 Mar 2022 06:42 )             26.5     139  |  106  |  8   ----------------------------<  109<H>  3.9   |  30  |  0.45<L>      acetaminophen   IVPB .. 1000 milliGRAM(s) IV Intermittent every 6 hours PRN  ALBUTerol    90 MICROgram(s) HFA Inhaler 2 Puff(s) Inhalation every 6 hours PRN  clotrimazole Lozenge 1 Lozenge Oral <User Schedule>  enoxaparin Injectable 40 milliGRAM(s) SubCutaneous every 24 hours  FIRST- Mouthwash  BLM 5 milliLiter(s) Swish and Spit every 4 hours PRN  insulin lispro (ADMELOG) corrective regimen sliding scale   SubCutaneous three times a day before meals  ketorolac   Injectable 15 milliGRAM(s) IV Push every 6 hours PRN  loratadine 10 milliGRAM(s) Oral daily  metoprolol tartrate Injectable 5 milliGRAM(s) IV Push every 6 hours PRN  morphine  - Injectable 4 milliGRAM(s) IV Push every 4 hours PRN  naloxone Injectable 0.1 milliGRAM(s) IV Push every 3 minutes PRN  naloxone Injectable 0.1 milliGRAM(s) IV Push every 3 minutes PRN  nystatin    Suspension 705247 Unit(s) Swish and Swallow two times a day  octreotide  Injectable 100 MICROGram(s) IV Push every 8 hours  ondansetron Injectable 4 milliGRAM(s) IV Push every 6 hours PRN  pantoprazole  Injectable 40 milliGRAM(s) IV Push every 12 hours  piperacillin/tazobactam IVPB.. 3.375 Gram(s) IV Intermittent every 8 hours  prochlorperazine   Injectable 5 milliGRAM(s) IV Push every 6 hours PRN  psyllium Powder 1 Packet(s) Oral three times a day  simethicone drops 80 milliGRAM(s) Oral two times a day                         CC: 61 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 Patient underwent L chest pigtail catheter placement by Thoracic Surgery on 3/4 with 1.5L of pleural fluid drained, exudative, cyto negative. 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 She is on nutritional support for severe protein calorie malnutrition. Medicine was consulted to provide co-management.     3/21 - no cp palps sob. abdo pain under control. tolerating po diet   3/22 - no cp palps sob; mild redness on right side of the abdomen    PHYSICAL EXAM:  Vital Signs Last 24 Hrs  T(F): 97.9 (21 Mar 2022 15:10), Max: 99 (20 Mar 2022 21:30)  HR: 83 (21 Mar 2022 15:10) (79 - 83)  BP: 107/59 (21 Mar 2022 15:10) (107/59 - 118/57)  RR: 18 (21 Mar 2022 15:10) (16 - 18)  SpO2: 97% (21 Mar 2022 15:10) (97% - 98%)  Constitutional: NAD, sitting up in bed, pleasant, cooperative   HEENT: PERR, EOMI  Neck: Soft and supple,  No JVD  Respiratory: Breath sounds are clear bilaterally, No wheezing, rales or rhonchi  Cardiovascular: S1 and S2, regular rate and rhythm, no Murmurs  Gastrointestinal: appropriately tender post-op, eakins pouch contains stool; skin with mild redness   Extremities: mild bl pedal edema  Vascular: 2+ peripheral pulses  Neurological: A/O x 3, no focal deficits  Musculoskeletal: 5/5 strength b/l upper and lower extremities  Skin: No rashes    RADIOLOGY/EKG:  < from: CT Abdomen and Pelvis w/ Oral Cont and w/ IV Cont (03.18.22 @ 15:36) >  Multiple rim-enhancing peritoneal collections, several of which contain   foci of air compatible with abscesses, overall slightly decreased   compared to prior.  Extravasated contrast seen within the inferior midline abdominal   extending into the ostomy bag, compatible with enterocutaneous fistula.  Dilated distal small bowel loop in the pelvis, nonspecific.    LABS: All Labs Reviewed:                        8.5    6.23  )-----------( 670      ( 22 Mar 2022 07:17 )             26.2     140  |  106  |  7   ----------------------------<  107<H>  4.3   |  30  |  0.46<L>      MEDS:   acetaminophen   IVPB .. 1000 milliGRAM(s) IV Intermittent every 6 hours PRN  ALBUTerol    90 MICROgram(s) HFA Inhaler 2 Puff(s) Inhalation every 6 hours PRN  amoxicillin  500 milliGRAM(s)/clavulanate 1 Tablet(s) Oral two times a day  clotrimazole Lozenge 1 Lozenge Oral <User Schedule>  enoxaparin Injectable 40 milliGRAM(s) SubCutaneous every 24 hours  FIRST- Mouthwash  BLM 5 milliLiter(s) Swish and Spit every 4 hours PRN  insulin lispro (ADMELOG) corrective regimen sliding scale   SubCutaneous three times a day before meals  ketorolac   Injectable 15 milliGRAM(s) IV Push every 6 hours PRN  loratadine 10 milliGRAM(s) Oral daily  metoprolol tartrate Injectable 5 milliGRAM(s) IV Push every 6 hours PRN  morphine  - Injectable 4 milliGRAM(s) IV Push every 4 hours PRN  naloxone Injectable 0.1 milliGRAM(s) IV Push every 3 minutes PRN  naloxone Injectable 0.1 milliGRAM(s) IV Push every 3 minutes PRN  nystatin    Suspension 250042 Unit(s) Swish and Swallow two times a day  octreotide  Injectable 100 MICROGram(s) IV Push every 8 hours  ondansetron Injectable 4 milliGRAM(s) IV Push every 6 hours PRN  pantoprazole  Injectable 40 milliGRAM(s) IV Push every 12 hours  prochlorperazine   Injectable 5 milliGRAM(s) IV Push every 6 hours PRN  psyllium Powder 1 Packet(s) Oral three times a day  simethicone drops 80 milliGRAM(s) Oral two times a day

## 2022-03-23 ENCOUNTER — TRANSCRIPTION ENCOUNTER (OUTPATIENT)
Age: 62
End: 2022-03-23

## 2022-03-23 LAB
HCT VFR BLD CALC: 29.7 % — LOW (ref 34.5–45)
HGB BLD-MCNC: 9.4 G/DL — LOW (ref 11.5–15.5)
MCHC RBC-ENTMCNC: 31.6 GM/DL — LOW (ref 32–36)
MCHC RBC-ENTMCNC: 32.2 PG — SIGNIFICANT CHANGE UP (ref 27–34)
MCV RBC AUTO: 101.7 FL — HIGH (ref 80–100)
PLATELET # BLD AUTO: 776 K/UL — HIGH (ref 150–400)
RBC # BLD: 2.92 M/UL — LOW (ref 3.8–5.2)
RBC # FLD: 15.5 % — HIGH (ref 10.3–14.5)
WBC # BLD: 8.65 K/UL — SIGNIFICANT CHANGE UP (ref 3.8–10.5)
WBC # FLD AUTO: 8.65 K/UL — SIGNIFICANT CHANGE UP (ref 3.8–10.5)

## 2022-03-23 PROCEDURE — 99232 SBSQ HOSP IP/OBS MODERATE 35: CPT

## 2022-03-23 RX ADMIN — Medication 1 PACKET(S): at 22:15

## 2022-03-23 RX ADMIN — Medication 1 TABLET(S): at 09:37

## 2022-03-23 RX ADMIN — Medication 1 PACKET(S): at 06:20

## 2022-03-23 RX ADMIN — Medication 500000 UNIT(S): at 18:38

## 2022-03-23 RX ADMIN — Medication 1 LOZENGE: at 22:16

## 2022-03-23 RX ADMIN — PANTOPRAZOLE SODIUM 40 MILLIGRAM(S): 20 TABLET, DELAYED RELEASE ORAL at 09:36

## 2022-03-23 RX ADMIN — Medication 1 PACKET(S): at 14:28

## 2022-03-23 RX ADMIN — ONDANSETRON 4 MILLIGRAM(S): 8 TABLET, FILM COATED ORAL at 06:19

## 2022-03-23 RX ADMIN — ONDANSETRON 4 MILLIGRAM(S): 8 TABLET, FILM COATED ORAL at 13:51

## 2022-03-23 RX ADMIN — SIMETHICONE 80 MILLIGRAM(S): 80 TABLET, CHEWABLE ORAL at 18:40

## 2022-03-23 RX ADMIN — OCTREOTIDE ACETATE 100 MICROGRAM(S): 200 INJECTION, SOLUTION INTRAVENOUS; SUBCUTANEOUS at 22:55

## 2022-03-23 RX ADMIN — ENOXAPARIN SODIUM 40 MILLIGRAM(S): 100 INJECTION SUBCUTANEOUS at 16:13

## 2022-03-23 RX ADMIN — Medication 1 LOZENGE: at 13:53

## 2022-03-23 RX ADMIN — LORATADINE 10 MILLIGRAM(S): 10 TABLET ORAL at 09:37

## 2022-03-23 RX ADMIN — Medication 500000 UNIT(S): at 06:19

## 2022-03-23 RX ADMIN — MORPHINE SULFATE 4 MILLIGRAM(S): 50 CAPSULE, EXTENDED RELEASE ORAL at 22:52

## 2022-03-23 RX ADMIN — OCTREOTIDE ACETATE 100 MICROGRAM(S): 200 INJECTION, SOLUTION INTRAVENOUS; SUBCUTANEOUS at 06:31

## 2022-03-23 RX ADMIN — Medication 1 LOZENGE: at 08:58

## 2022-03-23 RX ADMIN — ONDANSETRON 4 MILLIGRAM(S): 8 TABLET, FILM COATED ORAL at 22:15

## 2022-03-23 RX ADMIN — PANTOPRAZOLE SODIUM 40 MILLIGRAM(S): 20 TABLET, DELAYED RELEASE ORAL at 22:16

## 2022-03-23 RX ADMIN — Medication 1 TABLET(S): at 22:16

## 2022-03-23 RX ADMIN — SIMETHICONE 80 MILLIGRAM(S): 80 TABLET, CHEWABLE ORAL at 12:45

## 2022-03-23 RX ADMIN — OCTREOTIDE ACETATE 100 MICROGRAM(S): 200 INJECTION, SOLUTION INTRAVENOUS; SUBCUTANEOUS at 14:36

## 2022-03-23 NOTE — DISCHARGE NOTE NURSING/CASE MANAGEMENT/SOCIAL WORK - NSSCCARECORD_GEN_ALL_CORE
Home Care Agency/Durable Medical Equipment Agency Home Care Agency/Durable Medical Equipment Agency/Community [facility]

## 2022-03-23 NOTE — DISCHARGE NOTE NURSING/CASE MANAGEMENT/SOCIAL WORK - NSDCCRNAME_GEN_ALL_CORE_FT
yellow discharge planning folder provided including private hire list, home care list, community and cancer resources

## 2022-03-23 NOTE — PROGRESS NOTE ADULT - ASSESSMENT
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 Patient underwent L chest pigtail catheter placement by Thoracic Surgery on 3/4 with 1.5L of pleural fluid drained, exudative, cyto negative. 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 She is on nutritional support for severe protein calorie malnutrition. Medicine was consulted to provide co-management.     S/P palliative bypass, has enterocutaneous fistula  possible cellulitis abdo wall   DC PIP TAZO - is on Augmentin   PPI Octreotide  noted CT imaging with peritoneal collections/abscesses. EC fistula     Severe protein calorie malnutrition  Followed by Nutritional Support Team. PO DIET     Thrush  Clotrimazole lozenges     L pleural effusion s/p pigtail catheter  now dced     VTE Px - lovenox    RECOMMENDATIONS FOR DISCHARGE:   WBC improving, on augmentin per primary team for suspected abdominal wall cellulitis  Octreotide - consider Sandostatin LAR Depot  at [facility] office     Pls call with q

## 2022-03-23 NOTE — PROGRESS NOTE ADULT - ASSESSMENT
60 yo F with pmhx GEJ cancer s/p esophagectomy, krunken[place] tumors s/p SBO presents with left sided pleural effusion   XR barium enema shows strictures in proximal and mid sigmoid colon  s/p palliative bypass, now with contained enterocutaneous fistula. CT of abd appreciated. AFVSS. Labs stable.  Cellulitis of RLQ of abd skin improving. Abdominal cramping likely side effect from Augmentin.     Plan:  - Continue with regular diet  - Pain and nausea control PRN   - GI/DVT ppx  - Monitor bowel function  - Metamucil  - Continue octreotide - will need for discharge  - Monitor stool output within Meaghan pouch. Change Meaghan pouch today  - Hospitalist recs  - Palliative medicine recs. Patient is DNR/DNI  - Encourage ambulation  - ID recs - 500mg Augmentin BID x 2 weeks for abdominal wall cellulitis  -  for dispo planning -plan for discharge today    Plan discussed with Dr. Sahu.

## 2022-03-23 NOTE — DISCHARGE NOTE NURSING/CASE MANAGEMENT/SOCIAL WORK - PATIENT PORTAL LINK FT
You can access the FollowMyHealth Patient Portal offered by [facility] by registering at the following website: [email or web address]. By joining [employer]’s FollowMyHealth portal, you will also be able to view your health information using other applications (apps) compatible with our system.

## 2022-03-23 NOTE — DISCHARGE NOTE NURSING/CASE MANAGEMENT/SOCIAL WORK - NSDCDMENAME_GEN_ALL_CORE_FT
[employer] for transport wheelchair (to be delivered to home pending authorization from insurance) [employer] for transport wheelchair and 3:1 commode (pending insurance auth)

## 2022-03-23 NOTE — PROGRESS NOTE ADULT - SUBJECTIVE AND OBJECTIVE BOX
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 Patient underwent L chest pigtail catheter placement by Thoracic Surgery on 3/4 with 1.5L of pleural fluid drained, exudative, cyto negative. 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 She is on nutritional support for severe protein calorie malnutrition. Medicine was consulted to provide co-management.     3/23 - no cp palps sob; mild redness on right side of the abdomen improving     PHYSICAL EXAM:  Vital Signs Last 24 Hrs  T(F): 98.2 (23 Mar 2022 15:14), Max: 98.3 (22 Mar 2022 21:54)  HR: 75 (23 Mar 2022 15:14) (75 - 84)  BP: 114/58 (23 Mar 2022 15:14) (107/55 - 115/50)  RR: 18 (23 Mar 2022 15:14) (18 - 18)  SpO2: 95% (23 Mar 2022 15:14) (93% - 95%)  Constitutional: NAD, sitting up in bed, pleasant, cooperative   HEENT: PERR, EOMI  Neck: Soft and supple,  No JVD  Respiratory: Breath sounds are clear bilaterally, No wheezing, rales or rhonchi  Cardiovascular: S1 and S2, regular rate and rhythm, no Murmurs  Gastrointestinal: appropriately tender post-op, eakins pouch contains stool; skin with mild redness   Extremities: mild bl pedal edema  Vascular: 2+ peripheral pulses  Neurological: A/O x 3, no focal deficits  Musculoskeletal: 5/5 strength b/l upper and lower extremities  Skin: No rashes    RADIOLOGY/EKG:  < from: CT Abdomen and Pelvis w/ Oral Cont and w/ IV Cont (03.18.22 @ 15:36) >  Multiple rim-enhancing peritoneal collections, several of which contain   foci of air compatible with abscesses, overall slightly decreased   compared to prior.  Extravasated contrast seen within the inferior midline abdominal   extending into the ostomy bag, compatible with enterocutaneous fistula.  Dilated distal small bowel loop in the pelvis, nonspecific.    LABS: All Labs Reviewed:                      9.4    8.65  )-----------( 776      ( 23 Mar 2022 06:39 )             29.7   140  |  106  |  7   ----------------------------<  107<H>  4.3   |  30  |  0.46<L>  TPro  4.9<L>  /  Alb  1.4<L>  /  TBili  0.5  /  DBili  x   /  AST  29  /  ALT  30  /  AlkPhos  136<H>  03-22      MEDS:   acetaminophen   IVPB .. 1000 milliGRAM(s) IV Intermittent every 6 hours PRN  ALBUTerol    90 MICROgram(s) HFA Inhaler 2 Puff(s) Inhalation every 6 hours PRN  amoxicillin  500 milliGRAM(s)/clavulanate 1 Tablet(s) Oral two times a day  clotrimazole Lozenge 1 Lozenge Oral <User Schedule>  enoxaparin Injectable 40 milliGRAM(s) SubCutaneous every 24 hours  FIRST- Mouthwash  BLM 5 milliLiter(s) Swish and Spit every 4 hours PRN  insulin lispro (ADMELOG) corrective regimen sliding scale   SubCutaneous three times a day before meals  loratadine 10 milliGRAM(s) Oral daily  metoprolol tartrate Injectable 5 milliGRAM(s) IV Push every 6 hours PRN  morphine  - Injectable 4 milliGRAM(s) IV Push every 4 hours PRN  naloxone Injectable 0.1 milliGRAM(s) IV Push every 3 minutes PRN  naloxone Injectable 0.1 milliGRAM(s) IV Push every 3 minutes PRN  nystatin    Suspension 218746 Unit(s) Swish and Swallow two times a day  octreotide  Injectable 100 MICROGram(s) IV Push every 8 hours  ondansetron Injectable 4 milliGRAM(s) IV Push every 6 hours PRN  pantoprazole  Injectable 40 milliGRAM(s) IV Push every 12 hours  prochlorperazine   Injectable 5 milliGRAM(s) IV Push every 6 hours PRN  psyllium Powder 1 Packet(s) Oral three times a day  simethicone drops 80 milliGRAM(s) Oral two times a day

## 2022-03-23 NOTE — DISCHARGE NOTE NURSING/CASE MANAGEMENT/SOCIAL WORK - NSDCPEFALRISK_GEN_ALL_CORE
For information on Fall & Injury Prevention, visit: [email or web address] OR  [email or web address] OR  https://www.cdc.gov/steadi/patient.html

## 2022-03-23 NOTE — PROGRESS NOTE ADULT - SUBJECTIVE AND OBJECTIVE BOX
Pt. seen and examined at bedside this morning. Continues to tolerate soft diet without nausea or vomiting. She states her redness in the abdomen is better. She complained of abdominal cramps which she attributes to Augmentin. She denies passing gas. She denies fever, chills, chest pain, SOB, nausea, vomiting, diarrhea or constipation. No acute events overnight.     Vitals:  T(C): 36.8 ( @ 09:33), Max: 36.8 ( @ 15:46)  HR: 76 ( @ 09:33) (76 - 84)  BP: 115/50 ( @ 09:33) (107/55 - 115/50)  RR: 18 ( @ 09:33) (18 - 18)  SpO2: 94% ( @ :33) (93% - 95%)     @ 07:01  -   @ 07:00  --------------------------------------------------------  IN:  Total IN: 0 mL    OUT:    Drain (mL): 1000 mL  Total OUT: 1000 mL    Total NET: -1000 mL      Physical Exam:  General: AAOx3, Well developed, NAD  Chest: Normal respiratory effort  Heart: RRR  Abdomen: midline with stool drainage, lee pouch over incision with stool in bag, no leakage, abd soft, nondistended, right abdomen with area of induration and erythema improved  Neuro/Psych: No localized deficits. Normal speech, normal tone  Skin: Normal, no rashes, no lesions noted.   Extremities: Warm, well perfused, 2+ edema left foot, Pulses intact         @ 06:39                    9.4  CBC: 8.65>)-------(<776                     29.7                 - | - | -    CMP:  ----------------------< -               - | - | -                      Ca:-  Phos:-  Mg:-               -|      |-        LFTs:  ------|-|-----             -|      |-   @ 07:17                    8.5  CBC: 6.23>)-------(<670                     26.2                 140 | 106 | 7    CMP:  ----------------------< 107               4.3 | 30 | 0.46                      Ca:8.1  Phos:2.4  M.4               0.5|      |29        LFTs:  ------|136|-----             -|      |-      Current Inpatient Medications:  acetaminophen   IVPB .. 1000 milliGRAM(s) IV Intermittent every 6 hours PRN  ALBUTerol    90 MICROgram(s) HFA Inhaler 2 Puff(s) Inhalation every 6 hours PRN  amoxicillin  500 milliGRAM(s)/clavulanate 1 Tablet(s) Oral two times a day  clotrimazole Lozenge 1 Lozenge Oral <User Schedule>  dextrose 40% Gel 15 Gram(s) Oral once  dextrose 5%. 1000 milliLiter(s) (50 mL/Hr) IV Continuous <Continuous>  dextrose 5%. 1000 milliLiter(s) (100 mL/Hr) IV Continuous <Continuous>  dextrose 50% Injectable 25 Gram(s) IV Push once  dextrose 50% Injectable 12.5 Gram(s) IV Push once  dextrose 50% Injectable 25 Gram(s) IV Push once  enoxaparin Injectable 40 milliGRAM(s) SubCutaneous every 24 hours  FIRST- Mouthwash  BLM 5 milliLiter(s) Swish and Spit every 4 hours PRN  glucagon  Injectable 1 milliGRAM(s) IntraMuscular once  insulin lispro (ADMELOG) corrective regimen sliding scale   SubCutaneous three times a day before meals  loratadine 10 milliGRAM(s) Oral daily  metoprolol tartrate Injectable 5 milliGRAM(s) IV Push every 6 hours PRN  morphine  - Injectable 4 milliGRAM(s) IV Push every 4 hours PRN  naloxone Injectable 0.1 milliGRAM(s) IV Push every 3 minutes PRN  naloxone Injectable 0.1 milliGRAM(s) IV Push every 3 minutes PRN  nystatin    Suspension [id number] Unit(s) Swish and Swallow two times a day  octreotide  Injectable 100 MICROGram(s) IV Push every 8 hours  ondansetron Injectable 4 milliGRAM(s) IV Push every 6 hours PRN  pantoprazole  Injectable 40 milliGRAM(s) IV Push every 12 hours  prochlorperazine   Injectable 5 milliGRAM(s) IV Push every 6 hours PRN  psyllium Powder 1 Packet(s) Oral three times a day  simethicone drops 80 milliGRAM(s) Oral two times a day

## 2022-03-24 LAB
ANION GAP SERPL CALC-SCNC: 5 MMOL/L — SIGNIFICANT CHANGE UP (ref 5–17)
BUN SERPL-MCNC: 5 MG/DL — LOW (ref 7–23)
CALCIUM SERPL-MCNC: 8.1 MG/DL — LOW (ref 8.5–10.1)
CHLORIDE SERPL-SCNC: 106 MMOL/L — SIGNIFICANT CHANGE UP (ref 96–108)
CO2 SERPL-SCNC: 25 MMOL/L — SIGNIFICANT CHANGE UP (ref 22–31)
CREAT SERPL-MCNC: 0.58 MG/DL — SIGNIFICANT CHANGE UP (ref 0.5–1.3)
EGFR: 102 ML/MIN/1.73M2 — SIGNIFICANT CHANGE UP
GLUCOSE SERPL-MCNC: 121 MG/DL — HIGH (ref 70–99)
HCT VFR BLD CALC: 30 % — LOW (ref 34.5–45)
HGB BLD-MCNC: 9 G/DL — LOW (ref 11.5–15.5)
MAGNESIUM SERPL-MCNC: 2.1 MG/DL — SIGNIFICANT CHANGE UP (ref 1.6–2.6)
MCHC RBC-ENTMCNC: 30 GM/DL — LOW (ref 32–36)
MCHC RBC-ENTMCNC: 32.4 PG — SIGNIFICANT CHANGE UP (ref 27–34)
MCV RBC AUTO: 107.9 FL — HIGH (ref 80–100)
PHOSPHATE SERPL-MCNC: 3.1 MG/DL — SIGNIFICANT CHANGE UP (ref 2.5–4.5)
PLATELET # BLD AUTO: 737 K/UL — HIGH (ref 150–400)
POTASSIUM SERPL-MCNC: 4.1 MMOL/L — SIGNIFICANT CHANGE UP (ref 3.5–5.3)
POTASSIUM SERPL-SCNC: 4.1 MMOL/L — SIGNIFICANT CHANGE UP (ref 3.5–5.3)
RBC # BLD: 2.78 M/UL — LOW (ref 3.8–5.2)
RBC # FLD: 15.8 % — HIGH (ref 10.3–14.5)
SODIUM SERPL-SCNC: 136 MMOL/L — SIGNIFICANT CHANGE UP (ref 135–145)
WBC # BLD: 7.4 K/UL — SIGNIFICANT CHANGE UP (ref 3.8–10.5)
WBC # FLD AUTO: 7.4 K/UL — SIGNIFICANT CHANGE UP (ref 3.8–10.5)

## 2022-03-24 PROCEDURE — 99232 SBSQ HOSP IP/OBS MODERATE 35: CPT

## 2022-03-24 RX ORDER — OCTREOTIDE ACETATE 200 UG/ML
20 INJECTION, SOLUTION INTRAVENOUS; SUBCUTANEOUS ONCE
Refills: 0 | Status: COMPLETED | OUTPATIENT
Start: 2022-03-25 | End: 2022-03-25

## 2022-03-24 RX ORDER — MELOXICAM 15 MG/1
1 TABLET ORAL
Qty: 0 | Refills: 0 | DISCHARGE

## 2022-03-24 RX ORDER — LORATADINE 10 MG/1
10 TABLET ORAL DAILY
Refills: 0 | Status: DISCONTINUED | OUTPATIENT
Start: 2022-03-24 | End: 2022-03-25

## 2022-03-24 RX ORDER — ACETAMINOPHEN 500 MG
975 TABLET ORAL EVERY 6 HOURS
Refills: 0 | Status: DISCONTINUED | OUTPATIENT
Start: 2022-03-24 | End: 2022-03-25

## 2022-03-24 RX ORDER — PANTOPRAZOLE SODIUM 20 MG/1
1 TABLET, DELAYED RELEASE ORAL
Qty: 14 | Refills: 0
Start: 2022-03-24 | End: 2022-04-06

## 2022-03-24 RX ORDER — KETOROLAC TROMETHAMINE 30 MG/ML
15 SYRINGE (ML) INJECTION EVERY 6 HOURS
Refills: 0 | Status: DISCONTINUED | OUTPATIENT
Start: 2022-03-24 | End: 2022-03-24

## 2022-03-24 RX ORDER — OCTREOTIDE ACETATE 200 UG/ML
20 INJECTION, SOLUTION INTRAVENOUS; SUBCUTANEOUS
Qty: 1 | Refills: 0
Start: 2022-03-24 | End: 2022-04-22

## 2022-03-24 RX ORDER — NYSTATIN 500MM UNIT
5 POWDER (EA) MISCELLANEOUS
Qty: 100 | Refills: 0
Start: 2022-03-24 | End: 2022-04-02

## 2022-03-24 RX ORDER — TOPIRAMATE 25 MG
25 TABLET ORAL EVERY 12 HOURS
Refills: 0 | Status: DISCONTINUED | OUTPATIENT
Start: 2022-03-24 | End: 2022-03-25

## 2022-03-24 RX ORDER — ROSUVASTATIN CALCIUM 5 MG/1
1 TABLET ORAL
Qty: 0 | Refills: 0 | DISCHARGE

## 2022-03-24 RX ORDER — SIMETHICONE 80 MG/1
1.2 TABLET, CHEWABLE ORAL
Qty: 24 | Refills: 0
Start: 2022-03-24 | End: 2022-04-02

## 2022-03-24 RX ORDER — CLOTRIMAZOLE 10 MG
1 TROCHE MUCOUS MEMBRANE
Qty: 45 | Refills: 0
Start: 2022-03-24 | End: 2022-04-07

## 2022-03-24 RX ORDER — LANOLIN ALCOHOL/MO/W.PET/CERES
5 CREAM (GRAM) TOPICAL AT BEDTIME
Refills: 0 | Status: DISCONTINUED | OUTPATIENT
Start: 2022-03-24 | End: 2022-03-25

## 2022-03-24 RX ORDER — IBUPROFEN 200 MG
600 TABLET ORAL EVERY 6 HOURS
Refills: 0 | Status: DISCONTINUED | OUTPATIENT
Start: 2022-03-24 | End: 2022-03-25

## 2022-03-24 RX ADMIN — MORPHINE SULFATE 4 MILLIGRAM(S): 50 CAPSULE, EXTENDED RELEASE ORAL at 22:57

## 2022-03-24 RX ADMIN — ONDANSETRON 4 MILLIGRAM(S): 8 TABLET, FILM COATED ORAL at 13:46

## 2022-03-24 RX ADMIN — Medication 1 TABLET(S): at 10:20

## 2022-03-24 RX ADMIN — Medication 5 MILLIGRAM(S): at 23:37

## 2022-03-24 RX ADMIN — MORPHINE SULFATE 4 MILLIGRAM(S): 50 CAPSULE, EXTENDED RELEASE ORAL at 16:10

## 2022-03-24 RX ADMIN — PANTOPRAZOLE SODIUM 40 MILLIGRAM(S): 20 TABLET, DELAYED RELEASE ORAL at 10:20

## 2022-03-24 RX ADMIN — ENOXAPARIN SODIUM 40 MILLIGRAM(S): 100 INJECTION SUBCUTANEOUS at 13:46

## 2022-03-24 RX ADMIN — OCTREOTIDE ACETATE 100 MICROGRAM(S): 200 INJECTION, SOLUTION INTRAVENOUS; SUBCUTANEOUS at 23:38

## 2022-03-24 RX ADMIN — MORPHINE SULFATE 4 MILLIGRAM(S): 50 CAPSULE, EXTENDED RELEASE ORAL at 15:12

## 2022-03-24 RX ADMIN — OCTREOTIDE ACETATE 100 MICROGRAM(S): 200 INJECTION, SOLUTION INTRAVENOUS; SUBCUTANEOUS at 14:48

## 2022-03-24 RX ADMIN — Medication 1 PACKET(S): at 05:42

## 2022-03-24 RX ADMIN — LORATADINE 10 MILLIGRAM(S): 10 TABLET ORAL at 10:20

## 2022-03-24 RX ADMIN — OCTREOTIDE ACETATE 100 MICROGRAM(S): 200 INJECTION, SOLUTION INTRAVENOUS; SUBCUTANEOUS at 06:39

## 2022-03-24 RX ADMIN — ONDANSETRON 4 MILLIGRAM(S): 8 TABLET, FILM COATED ORAL at 05:42

## 2022-03-24 RX ADMIN — Medication 1 PACKET(S): at 13:47

## 2022-03-24 RX ADMIN — SIMETHICONE 80 MILLIGRAM(S): 80 TABLET, CHEWABLE ORAL at 13:45

## 2022-03-24 RX ADMIN — Medication 1 LOZENGE: at 10:20

## 2022-03-24 RX ADMIN — Medication 25 MILLIGRAM(S): at 22:52

## 2022-03-24 NOTE — PROGRESS NOTE ADULT - SUBJECTIVE AND OBJECTIVE BOX
Pt. seen and examined at bedside this morning. Continues to tolerate soft diet without nausea or vomiting. She states her redness in the abdomen is better. She denies fever, chills, chest pain, SOB, nausea, vomiting, diarrhea or constipation. No acute events overnight.     Vital Signs Last 24 Hrs  T(C): 37 (24 Mar 2022 08:33), Max: 37 (24 Mar 2022 08:33)  T(F): 98.6 (24 Mar 2022 08:33), Max: 98.6 (24 Mar 2022 08:33)  HR: 75 (24 Mar 2022 08:33) (75 - 79)  BP: 111/54 (24 Mar 2022 08:33) (111/54 - 116/60)  BP(mean): --  RR: 17 (24 Mar 2022 08:33) (17 - 18)  SpO2: 96% (24 Mar 2022 08:33) (94% - 96%)    Physical Exam:  General: AAOx3, Well developed, NAD  Chest: Normal respiratory effort  Heart: RRR  Abdomen: midline with stool drainage, lee pouch over incision with stool in bag, no leakage, abd soft, nondistended, right abdomen with area of induration and erythema improved  Neuro/Psych: No localized deficits. Normal speech, normal tone  Skin: Normal, no rashes, no lesions noted.   Extremities: Warm, well perfused, 2+ edema left foot, Pulses intact    I&O's Detail    23 Mar 2022 07:01  -  24 Mar 2022 07:00  --------------------------------------------------------  IN:  Total IN: 0 mL    OUT:    Drain (mL): 950 mL  Total OUT: 950 mL    Total NET: -950 mL        Labs:                    9.0    7.40  )-----------( 737      ( 24 Mar 2022 07:04 )             30.0     03-24    136  |  106  |  5<L>  ----------------------------<  121<H>  4.1   |  25  |  0.58    Ca    8.1<L>      24 Mar 2022 07:04  Phos  3.1     03-24  Mg     2.1     03-24        Current Inpatient Medications:  acetaminophen   IVPB .. 1000 milliGRAM(s) IV Intermittent every 6 hours PRN  ALBUTerol    90 MICROgram(s) HFA Inhaler 2 Puff(s) Inhalation every 6 hours PRN  amoxicillin  500 milliGRAM(s)/clavulanate 1 Tablet(s) Oral two times a day  clotrimazole Lozenge 1 Lozenge Oral <User Schedule>  dextrose 40% Gel 15 Gram(s) Oral once  dextrose 5%. 1000 milliLiter(s) (50 mL/Hr) IV Continuous <Continuous>  dextrose 5%. 1000 milliLiter(s) (100 mL/Hr) IV Continuous <Continuous>  dextrose 50% Injectable 25 Gram(s) IV Push once  dextrose 50% Injectable 12.5 Gram(s) IV Push once  dextrose 50% Injectable 25 Gram(s) IV Push once  enoxaparin Injectable 40 milliGRAM(s) SubCutaneous every 24 hours  FIRST- Mouthwash  BLM 5 milliLiter(s) Swish and Spit every 4 hours PRN  glucagon  Injectable 1 milliGRAM(s) IntraMuscular once  insulin lispro (ADMELOG) corrective regimen sliding scale   SubCutaneous three times a day before meals  loratadine 10 milliGRAM(s) Oral daily  metoprolol tartrate Injectable 5 milliGRAM(s) IV Push every 6 hours PRN  morphine  - Injectable 4 milliGRAM(s) IV Push every 4 hours PRN  naloxone Injectable 0.1 milliGRAM(s) IV Push every 3 minutes PRN  naloxone Injectable 0.1 milliGRAM(s) IV Push every 3 minutes PRN  nystatin    Suspension [id number] Unit(s) Swish and Swallow two times a day  octreotide  Injectable 100 MICROGram(s) IV Push every 8 hours  ondansetron Injectable 4 milliGRAM(s) IV Push every 6 hours PRN  pantoprazole  Injectable 40 milliGRAM(s) IV Push every 12 hours  prochlorperazine   Injectable 5 milliGRAM(s) IV Push every 6 hours PRN  psyllium Powder 1 Packet(s) Oral three times a day  simethicone drops 80 milliGRAM(s) Oral two times a day

## 2022-03-24 NOTE — PROGRESS NOTE ADULT - ASSESSMENT
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 Patient underwent L chest pigtail catheter placement by Thoracic Surgery on 3/4 with 1.5L of pleural fluid drained, exudative, cyto negative. 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 She is on nutritional support for severe protein calorie malnutrition. Medicine was consulted to provide co-management.     S/P palliative bypass, has enterocutaneous fistula  possible cellulitis abdo wall   DC PIP TAZO - is on Augmentin   PPI Octreotide  noted CT imaging with peritoneal collections/abscesses. EC fistula     Severe protein calorie malnutrition  Followed by Nutritional Support Team. 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 Patient underwent L chest pigtail catheter placement by Thoracic Surgery on 3/4 with 1.5L of pleural fluid drained, exudative, cyto negative. 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 She is on nutritional support for severe protein calorie malnutrition. Medicine was consulted to provide co-management.     S/P palliative bypass, has enterocutaneous fistula  possible cellulitis abdo wall   DC PIP TAZO - is on Augmentin per primary team   PPI Octreotide  noted CT imaging with peritoneal collections/abscesses. EC fistula     Severe protein calorie malnutrition  Followed by Nutritional Support Team. PO DIET     Thrush  Clotrimazole lozenges/ Nystatin     L pleural effusion s/p pigtail catheter  now dced     Neuropathy from h/o taxol use  resume topamax     VTE Px - lovenox    RECOMMENDATIONS FOR DISCHARGE:   Octreotide - Sandostatin LAR Depot tmr as IP and next dose in 4 weeks prob at [facility] office   Scripts for augmentin /antifungals / simethicone liquid sent to [facility]; resume topamax and PPI   Pain meds etc per primary team     Pls call with q   Dr Saldivar will be in tmr

## 2022-03-24 NOTE — PROVIDER CONTACT NOTE (OTHER) - NAME OF MD/NP/PA/DO NOTIFIED:
Surgical resident
Dr Boyer
MD Persaud
MD Peña
surgical resident
Dr Heriberto Celaya (PCP)
MD Whyte
Surgical Resident, MD Gonzales

## 2022-03-24 NOTE — PROVIDER CONTACT NOTE (OTHER) - DATE AND TIME:
06-Mar-2022 22:51
10-Mar-2022 14:22
24-Mar-2022 23:29
11-Mar-2022 09:25
12-Mar-2022 15:27
10-Mar-2022 17:41
05-Mar-2022 07:28
06-Mar-2022 21:00

## 2022-03-24 NOTE — PROGRESS NOTE ADULT - ASSESSMENT
60 yo F with pmhx GEJ cancer s/p esophagectomy, [place] tumors s/p SBO presents with left sided pleural effusion   XR barium enema shows strictures in proximal and mid sigmoid colon  s/p palliative bypass, now with contained enterocutaneous fistula. CT of abd appreciated. AFVSS. Labs stable.  Cellulitis of RLQ of abd skin improving    Plan:  - Continue with regular diet  - Pain and nausea control PRN   - GI/DVT ppx  - Monitor bowel function  - Metamucil  - Continue octreotide - will need for discharge  - Monitor stool output within Meaghan pouch. Change Meaghan pouch today  - Hospitalist recs  - Palliative medicine recs. Patient is DNR/DNI  - Encourage ambulation  - ID recs - 500mg Augmentin BID x 2 weeks for abdominal wall cellulitis  -  for dispo planning -plan for discharge today once octreotide depot set up    Plan discussed with Dr. Sahu

## 2022-03-24 NOTE — PROVIDER CONTACT NOTE (OTHER) - SITUATION
Spoke with office to inform dr that patient is in [facility].  Please fax discharge papers to [phone].
Post bolus, pt still hypotensive, MAP below 65
seal broken on prevena; went to change/re-seal, noted stool leaking from site through and around prevena sponge.
Pt requesting Melatonin for sleep
hypotensive, as pt has been. but MAP lower than earlier in shift.  urine output 150 in 5 hours
Pt noted to have  and serum glucose 255. Night RN did not check BGM q6h overnight. BGM taken at 0700 this AM is elevated.
Pt requesting port access
TPN usually goes through [facility], pt does not have this at this time.  port has already been used for iv meds

## 2022-03-24 NOTE — PROGRESS NOTE ADULT - SUBJECTIVE AND OBJECTIVE BOX
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 Patient underwent L chest pigtail catheter placement by Thoracic Surgery on 3/4 with 1.5L of pleural fluid drained, exudative, cyto negative. 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 She is on nutritional support for severe protein calorie malnutrition. Medicine was consulted to provide co-management.     3/24 - no cp palps sob; mild redness on right side of the abdomen improving     PHYSICAL EXAM:  Vital Signs Last 24 Hrs  T(F): 98.2 (23 Mar 2022 15:14), Max: 98.3 (22 Mar 2022 21:54)  HR: 75 (23 Mar 2022 15:14) (75 - 84)  BP: 114/58 (23 Mar 2022 15:14) (107/55 - 115/50)  RR: 18 (23 Mar 2022 15:14) (18 - 18)  SpO2: 95% (23 Mar 2022 15:14) (93% - 95%)  Constitutional: NAD, sitting up in bed, pleasant, cooperative   HEENT: PERR, EOMI  Neck: Soft and supple,  No JVD  Respiratory: Breath sounds are clear bilaterally, No wheezing, rales or rhonchi  Cardiovascular: S1 and S2, regular rate and rhythm, no Murmurs  Gastrointestinal: appropriately tender post-op, eakins pouch contains stool; skin with mild redness   Extremities: mild bl pedal edema  Vascular: 2+ peripheral pulses  Neurological: A/O x 3, no focal deficits  Musculoskeletal: 5/5 strength b/l upper and lower extremities  Skin: No rashes    RADIOLOGY/EKG:  < from: CT Abdomen and Pelvis w/ Oral Cont and w/ IV Cont (03.18.22 @ 15:36) >  Multiple rim-enhancing peritoneal collections, several of which contain   foci of air compatible with abscesses, overall slightly decreased   compared to prior.  Extravasated contrast seen within the inferior midline abdominal   extending into the ostomy bag, compatible with enterocutaneous fistula.  Dilated distal small bowel loop in the pelvis, nonspecific.    LABS: All Labs Reviewed:                      9.0    7.40  )-----------( 737      ( 24 Mar 2022 07:04 )             30.0   136  |  106  |  5<L>  ----------------------------<  121<H>  4.1   |  25  |  0.58  Ca    8.1<L>      24 Mar 2022 07:04  Phos  3.1     03-24  Mg     2.1     03-24    MEDS:   acetaminophen   IVPB .. 1000 milliGRAM(s) IV Intermittent every 6 hours PRN  ALBUTerol    90 MICROgram(s) HFA Inhaler 2 Puff(s) Inhalation every 6 hours PRN  amoxicillin  500 milliGRAM(s)/clavulanate 1 Tablet(s) Oral two times a day  clotrimazole Lozenge 1 Lozenge Oral <User Schedule>  enoxaparin Injectable 40 milliGRAM(s) SubCutaneous every 24 hours  FIRST- Mouthwash  BLM 5 milliLiter(s) Swish and Spit every 4 hours PRN  ketorolac   Injectable 15 milliGRAM(s) IV Push every 6 hours PRN  loratadine 10 milliGRAM(s) Oral daily  metoprolol tartrate Injectable 5 milliGRAM(s) IV Push every 6 hours PRN  morphine  - Injectable 4 milliGRAM(s) IV Push every 4 hours PRN  naloxone Injectable 0.1 milliGRAM(s) IV Push every 3 minutes PRN  naloxone Injectable 0.1 milliGRAM(s) IV Push every 3 minutes PRN  nystatin    Suspension [id number] Unit(s) Swish and Swallow two times a day  octreotide  Injectable 100 MICROGram(s) IV Push every 8 hours  ondansetron Injectable 4 milliGRAM(s) IV Push every 6 hours PRN  pantoprazole  Injectable 40 milliGRAM(s) IV Push every 12 hours  prochlorperazine   Injectable 5 milliGRAM(s) IV Push every 6 hours PRN  psyllium Powder 1 Packet(s) Oral three times a day  simethicone drops 80 milliGRAM(s) Oral two times a day                                           CC: 61 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 Patient underwent L chest pigtail catheter placement by Thoracic Surgery on 3/4 with 1.5L of pleural fluid drained, exudative, cyto negative. 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 She is on nutritional support for severe protein calorie malnutrition. Medicine was consulted to provide co-management.     3/24 - no cp palps sob; mild redness on right side of the abdomen improving;   has mild abdo discomfort (is getting pain meds)  tingling in feet     PHYSICAL EXAM:  Vital Signs Last 24 Hrs  T(F): 98.2 (24 Mar 2022 15:10), Max: 98.6 (24 Mar 2022 08:33)  HR: 77 (24 Mar 2022 15:10) (75 - 79)  BP: 104/50 (24 Mar 2022 15:10) (104/50 - 116/60)  RR: 17 (24 Mar 2022 15:10) (17 - 18)  SpO2: 95% (24 Mar 2022 15:10) (95% - 96%)  Constitutional: NAD, sitting up in bed, pleasant, cooperative   HEENT: PERR, EOMI  Neck: Soft and supple,  No JVD  Respiratory: Breath sounds are clear bilaterally, No wheezing, rales or rhonchi  Cardiovascular: S1 and S2, regular rate and rhythm, no Murmurs  Gastrointestinal: appropriately tender post-op, eakins pouch contains stool; skin with mild redness   Extremities: mild bl pedal edema  Vascular: 2+ peripheral pulses  Neurological: A/O x 3, no focal deficits  Musculoskeletal: 5/5 strength b/l upper and lower extremities  Skin: No rashes    RADIOLOGY/EKG:  < from: CT Abdomen and Pelvis w/ Oral Cont and w/ IV Cont (03.18.22 @ 15:36) >  Multiple rim-enhancing peritoneal collections, several of which contain   foci of air compatible with abscesses, overall slightly decreased   compared to prior.  Extravasated contrast seen within the inferior midline abdominal   extending into the ostomy bag, compatible with enterocutaneous fistula.  Dilated distal small bowel loop in the pelvis, nonspecific.    LABS: All Labs Reviewed:                      9.0    7.40  )-----------( 737      ( 24 Mar 2022 07:04 )             30.0   136  |  106  |  5<L>  ----------------------------<  121<H>  4.1   |  25  |  0.58  Ca    8.1<L>      24 Mar 2022 07:04  Phos  3.1     03-24  Mg     2.1     03-24    MEDS:   acetaminophen   IVPB .. 1000 milliGRAM(s) IV Intermittent every 6 hours PRN  ALBUTerol    90 MICROgram(s) HFA Inhaler 2 Puff(s) Inhalation every 6 hours PRN  amoxicillin  500 milliGRAM(s)/clavulanate 1 Tablet(s) Oral two times a day  clotrimazole Lozenge 1 Lozenge Oral <User Schedule>  enoxaparin Injectable 40 milliGRAM(s) SubCutaneous every 24 hours  FIRST- Mouthwash  BLM 5 milliLiter(s) Swish and Spit every 4 hours PRN  ketorolac   Injectable 15 milliGRAM(s) IV Push every 6 hours PRN  loratadine 10 milliGRAM(s) Oral daily  metoprolol tartrate Injectable 5 milliGRAM(s) IV Push every 6 hours PRN  morphine  - Injectable 4 milliGRAM(s) IV Push every 4 hours PRN  naloxone Injectable 0.1 milliGRAM(s) IV Push every 3 minutes PRN  naloxone Injectable 0.1 milliGRAM(s) IV Push every 3 minutes PRN  nystatin    Suspension [id number] Unit(s) Swish and Swallow two times a day  octreotide  Injectable 100 MICROGram(s) IV Push every 8 hours  ondansetron Injectable 4 milliGRAM(s) IV Push every 6 hours PRN  pantoprazole  Injectable 40 milliGRAM(s) IV Push every 12 hours  prochlorperazine   Injectable 5 milliGRAM(s) IV Push every 6 hours PRN  psyllium Powder 1 Packet(s) Oral three times a day  simethicone drops 80 milliGRAM(s) Oral two times a day

## 2022-03-25 VITALS
SYSTOLIC BLOOD PRESSURE: 109 MMHG | RESPIRATION RATE: 18 BRPM | TEMPERATURE: 98 F | OXYGEN SATURATION: 96 % | DIASTOLIC BLOOD PRESSURE: 58 MMHG | HEART RATE: 73 BPM

## 2022-03-25 LAB
ALBUMIN SERPL ELPH-MCNC: 1.7 G/DL — LOW (ref 3.3–5)
ALP SERPL-CCNC: 138 U/L — HIGH (ref 40–120)
ALT FLD-CCNC: 27 U/L — SIGNIFICANT CHANGE UP (ref 12–78)
ANION GAP SERPL CALC-SCNC: 6 MMOL/L — SIGNIFICANT CHANGE UP (ref 5–17)
AST SERPL-CCNC: 34 U/L — SIGNIFICANT CHANGE UP (ref 15–37)
BASOPHILS # BLD AUTO: 0.18 K/UL — SIGNIFICANT CHANGE UP (ref 0–0.2)
BASOPHILS NFR BLD AUTO: 2 % — SIGNIFICANT CHANGE UP (ref 0–2)
BILIRUB SERPL-MCNC: 0.4 MG/DL — SIGNIFICANT CHANGE UP (ref 0.2–1.2)
BUN SERPL-MCNC: 6 MG/DL — LOW (ref 7–23)
CALCIUM SERPL-MCNC: 8.4 MG/DL — LOW (ref 8.5–10.1)
CHLORIDE SERPL-SCNC: 105 MMOL/L — SIGNIFICANT CHANGE UP (ref 96–108)
CO2 SERPL-SCNC: 27 MMOL/L — SIGNIFICANT CHANGE UP (ref 22–31)
CREAT SERPL-MCNC: 0.75 MG/DL — SIGNIFICANT CHANGE UP (ref 0.5–1.3)
EGFR: 89 ML/MIN/1.73M2 — SIGNIFICANT CHANGE UP
EOSINOPHIL # BLD AUTO: 0.54 K/UL — HIGH (ref 0–0.5)
EOSINOPHIL NFR BLD AUTO: 6 % — SIGNIFICANT CHANGE UP (ref 0–6)
GLUCOSE SERPL-MCNC: 119 MG/DL — HIGH (ref 70–99)
HCT VFR BLD CALC: 29.7 % — LOW (ref 34.5–45)
HGB BLD-MCNC: 9.3 G/DL — LOW (ref 11.5–15.5)
LYMPHOCYTES # BLD AUTO: 0.45 K/UL — LOW (ref 1–3.3)
LYMPHOCYTES # BLD AUTO: 5 % — LOW (ref 13–44)
MAGNESIUM SERPL-MCNC: 2.4 MG/DL — SIGNIFICANT CHANGE UP (ref 1.6–2.6)
MCHC RBC-ENTMCNC: 31.3 GM/DL — LOW (ref 32–36)
MCHC RBC-ENTMCNC: 32.4 PG — SIGNIFICANT CHANGE UP (ref 27–34)
MCV RBC AUTO: 103.5 FL — HIGH (ref 80–100)
MONOCYTES # BLD AUTO: 2.25 K/UL — HIGH (ref 0–0.9)
MONOCYTES NFR BLD AUTO: 25 % — HIGH (ref 2–14)
NEUTROPHILS # BLD AUTO: 5.21 K/UL — SIGNIFICANT CHANGE UP (ref 1.8–7.4)
NEUTROPHILS NFR BLD AUTO: 41 % — LOW (ref 43–77)
NRBC # BLD: SIGNIFICANT CHANGE UP /100 WBCS (ref 0–0)
PHOSPHATE SERPL-MCNC: 3.3 MG/DL — SIGNIFICANT CHANGE UP (ref 2.5–4.5)
PLATELET # BLD AUTO: 763 K/UL — HIGH (ref 150–400)
POTASSIUM SERPL-MCNC: 4.3 MMOL/L — SIGNIFICANT CHANGE UP (ref 3.5–5.3)
POTASSIUM SERPL-SCNC: 4.3 MMOL/L — SIGNIFICANT CHANGE UP (ref 3.5–5.3)
PROT SERPL-MCNC: 5.7 GM/DL — LOW (ref 6–8.3)
RBC # BLD: 2.87 M/UL — LOW (ref 3.8–5.2)
RBC # FLD: 15.9 % — HIGH (ref 10.3–14.5)
SODIUM SERPL-SCNC: 138 MMOL/L — SIGNIFICANT CHANGE UP (ref 135–145)
WBC # BLD: 8.98 K/UL — SIGNIFICANT CHANGE UP (ref 3.8–10.5)
WBC # FLD AUTO: 8.98 K/UL — SIGNIFICANT CHANGE UP (ref 3.8–10.5)

## 2022-03-25 PROCEDURE — 99232 SBSQ HOSP IP/OBS MODERATE 35: CPT

## 2022-03-25 RX ORDER — MORPHINE 10 MG/ML
1 SOLUTION ORAL
Qty: 120 | Refills: 0
Start: 2022-03-25

## 2022-03-25 RX ORDER — OXYCODONE 5 MG/1
5 TABLET ORAL
Qty: 120 | Refills: 0 | Status: ACTIVE | COMMUNITY
Start: 2022-03-25 | End: 1900-01-01

## 2022-03-25 RX ORDER — MORPHINE 10 MG/ML
0.6 SOLUTION ORAL
Qty: 72 | Refills: 0
Start: 2022-03-25

## 2022-03-25 RX ORDER — AMLODIPINE BESYLATE 2.5 MG/1
1 TABLET ORAL
Qty: 0 | Refills: 0 | DISCHARGE

## 2022-03-25 RX ORDER — TOPIRAMATE 25 MG
1 TABLET ORAL
Qty: 0 | Refills: 0 | DISCHARGE

## 2022-03-25 RX ORDER — SIMETHICONE 80 MG/1
1 TABLET, CHEWABLE ORAL
Qty: 45 | Refills: 0
Start: 2022-03-25 | End: 2022-04-08

## 2022-03-25 RX ORDER — LOPERAMIDE HCL 2 MG
10 TABLET ORAL
Qty: 300 | Refills: 0
Start: 2022-03-25 | End: 2022-04-08

## 2022-03-25 RX ORDER — OXYCODONE HYDROCHLORIDE 5 MG/1
0.5 TABLET ORAL
Qty: 60 | Refills: 0
Start: 2022-03-25

## 2022-03-25 RX ADMIN — Medication 600 MILLIGRAM(S): at 12:54

## 2022-03-25 RX ADMIN — OCTREOTIDE ACETATE 20 MILLIGRAM(S): 200 INJECTION, SOLUTION INTRAVENOUS; SUBCUTANEOUS at 12:35

## 2022-03-25 RX ADMIN — Medication 600 MILLIGRAM(S): at 18:55

## 2022-03-25 RX ADMIN — Medication 1 TABLET(S): at 09:31

## 2022-03-25 RX ADMIN — SIMETHICONE 80 MILLIGRAM(S): 80 TABLET, CHEWABLE ORAL at 09:31

## 2022-03-25 RX ADMIN — Medication 25 MILLIGRAM(S): at 09:32

## 2022-03-25 RX ADMIN — PANTOPRAZOLE SODIUM 40 MILLIGRAM(S): 20 TABLET, DELAYED RELEASE ORAL at 09:31

## 2022-03-25 RX ADMIN — ONDANSETRON 4 MILLIGRAM(S): 8 TABLET, FILM COATED ORAL at 05:50

## 2022-03-25 RX ADMIN — ENOXAPARIN SODIUM 40 MILLIGRAM(S): 100 INJECTION SUBCUTANEOUS at 13:51

## 2022-03-25 RX ADMIN — Medication 1 PACKET(S): at 13:51

## 2022-03-25 RX ADMIN — OCTREOTIDE ACETATE 100 MICROGRAM(S): 200 INJECTION, SOLUTION INTRAVENOUS; SUBCUTANEOUS at 06:42

## 2022-03-25 RX ADMIN — Medication 500000 UNIT(S): at 09:31

## 2022-03-25 RX ADMIN — Medication 1 LOZENGE: at 09:32

## 2022-03-25 NOTE — PROGRESS NOTE ADULT - SUBJECTIVE AND OBJECTIVE BOX
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 Patient underwent L chest pigtail catheter placement by Thoracic Surgery on 3/4 with 1.5L of pleural fluid drained, exudative, cyto negative. 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 She is on nutritional support for severe protein calorie malnutrition. Medicine was consulted to provide co-management.     3/24 - no cp palps sob; mild redness on right side of the abdomen improving; has mild abdo discomfort (is getting pain meds)  tingling in feet   3/25 - pt seen and examined, afebrile, feeling better, tolerating po intake, plan discussed     Review of system- Rest of the review of system are negative except mentioned in HPI    Vitals reviewed for last 24 hours  T(C): 36.9 (03-25-22 @ 15:09), Max: 37.1 (03-24-22 @ 21:16)  T(F): 98.5 (03-25-22 @ 15:09), Max: 98.8 (03-24-22 @ 21:16)  HR: 73 (03-25-22 @ 15:09) (72 - 93)  BP: 109/58 (03-25-22 @ 15:09) (97/53 - 119/55)  RR: 18 (03-25-22 @ 15:09) (17 - 18)  SpO2: 96% (03-25-22 @ 15:09) (93% - 97%)  Wt(kg): --    PHYSICAL EXAM:  Constitutional: NAD, sitting up in bed, pleasant, cooperative   HEENT: PERR, EOMI  Neck: Soft and supple,  No JVD  Respiratory: Breath sounds are clear bilaterally, No wheezing, rales or rhonchi  Cardiovascular: S1 and S2, regular rate and rhythm, no Murmurs  Gastrointestinal: appropriately tender post-op, eakins pouch contains stool; skin with mild redness   Extremities: mild bl pedal edema  Vascular: 2+ peripheral pulses  Neurological: A/O x 3, no focal deficits  Musculoskeletal: 5/5 strength b/l upper and lower extremities  Skin: No rashes    RADIOLOGY/EKG: all reviewed     12 Lead ECG (03.06.22 @ 07:08) >  Ventricular Rate 63 BPM    Diagnosis Line Normal sinus rhythm  Low voltage QRS  Borderline ECG  When compared with ECG of 08-DEC-2021 21:28,  No significant change was found         CT Abdomen and Pelvis w/ Oral Cont and w/ IV Cont (03.18.22 @ 15:36) >  Multiple rim-enhancing peritoneal collections, several of which contain   foci of air compatible with abscesses, overall slightly decreased   compared to prior.  Extravasated contrast seen within the inferior midline abdominal   extending into the ostomy bag, compatible with enterocutaneous fistula.  Dilated distal small bowel loop in the pelvis, nonspecific.    LABS: All Labs Reviewed:           03-25    138  |  105  |  6<L>  ----------------------------<  119<H>  4.3   |  27  |  0.75    Ca    8.4<L>      25 Mar 2022 06:39  Phos  3.3     03-25  Mg     2.4     03-25    TPro  5.7<L>  /  Alb  1.7<L>  /  TBili  0.4  /  DBili  x   /  AST  34  /  ALT  27  /  AlkPhos  138<H>  03-25                            9.3    8.98  )-----------( 763      ( 25 Mar 2022 06:39 )             29.7             LIVER FUNCTIONS - ( 25 Mar 2022 06:39 )  Alb: 1.7 g/dL / Pro: 5.7 gm/dL / ALK PHOS: 138 U/L / ALT: 27 U/L / AST: 34 U/L / GGT: x                              9.0    7.40  )-----------( 737      ( 24 Mar 2022 07:04 )             30.0   136  |  106  |  5<L>  ----------------------------<  121<H>  4.1   |  25  |  0.58  Ca    8.1<L>      24 Mar 2022 07:04  Phos  3.1     03-24  Mg     2.1     03-24    MEDS:   acetaminophen   IVPB .. 1000 milliGRAM(s) IV Intermittent every 6 hours PRN  ALBUTerol    90 MICROgram(s) HFA Inhaler 2 Puff(s) Inhalation every 6 hours PRN  amoxicillin  500 milliGRAM(s)/clavulanate 1 Tablet(s) Oral two times a day  clotrimazole Lozenge 1 Lozenge Oral <User Schedule>  enoxaparin Injectable 40 milliGRAM(s) SubCutaneous every 24 hours  FIRST- Mouthwash  BLM 5 milliLiter(s) Swish and Spit every 4 hours PRN  ketorolac   Injectable 15 milliGRAM(s) IV Push every 6 hours PRN  loratadine 10 milliGRAM(s) Oral daily  metoprolol tartrate Injectable 5 milliGRAM(s) IV Push every 6 hours PRN  morphine  - Injectable 4 milliGRAM(s) IV Push every 4 hours PRN  naloxone Injectable 0.1 milliGRAM(s) IV Push every 3 minutes PRN  naloxone Injectable 0.1 milliGRAM(s) IV Push every 3 minutes PRN  nystatin    Suspension [id number] Unit(s) Swish and Swallow two times a day  octreotide  Injectable 100 MICROGram(s) IV Push every 8 hours  ondansetron Injectable 4 milliGRAM(s) IV Push every 6 hours PRN  pantoprazole  Injectable 40 milliGRAM(s) IV Push every 12 hours  prochlorperazine   Injectable 5 milliGRAM(s) IV Push every 6 hours PRN  psyllium Powder 1 Packet(s) Oral three times a day  simethicone drops 80 milliGRAM(s) Oral two times a day

## 2022-03-25 NOTE — PROGRESS NOTE ADULT - PROVIDER SPECIALTY LIST ADULT
Heme/Onc
Hospitalist
Surgery
Surgery
Thoracic Surgery
Heme/Onc
Hospitalist
Surgery
Thoracic Surgery
Heme/Onc
Hospitalist
Palliative Care
Surgery
Thoracic Surgery
Heme/Onc
Hospice
Hospitalist
Surgery
Thoracic Surgery
Hospitalist
Thoracic Surgery

## 2022-03-25 NOTE — PROGRESS NOTE ADULT - ASSESSMENT
62 yo F with pmhx GEJ cancer s/p esophagectomy, [place] tumors s/p SBO presents with left sided pleural effusion   XR barium enema shows strictures in proximal and mid sigmoid colon  s/p palliative bypass, now with contained enterocutaneous fistula. CT of abd appreciated. AFVSS. Labs stable.  Cellulitis of RLQ of abd skin improving  Meaghan pouch changed 3/24    Plan:  - Continue with regular diet  - Pain and nausea control PRN   - GI/DVT ppx  - Monitor bowel function  - Metamucil  - Continue octreotide  - Monitor stool output within Meaghan pouch  - Hospitalist recs  - Palliative medicine recs. Patient is DNR/DNI  - Encourage ambulation  - ID recs - 500mg Augmentin BID x 2 weeks for abdominal wall cellulitis  -  for dispo planning -plan for discharge today     Plan discussed with Dr. Sahu

## 2022-03-25 NOTE — PROGRESS NOTE ADULT - NUTRITIONAL ASSESSMENT
This patient has been assessed with a concern for Malnutrition and has been determined to have a diagnosis/diagnoses of Severe protein-calorie malnutrition.    This patient is being managed with:   Diet Regular-  Soft and Bite Sized (SOFTBTSZ)  Supplement Feeding Modality:  Oral  Ensure Clear Cans or Servings Per Day:  3       Frequency:  Three Times a day  Entered: Mar 17 2022  9:21AM    
This patient has been assessed with a concern for Malnutrition and has been determined to have a diagnosis/diagnoses of Severe protein-calorie malnutrition.    This patient is being managed with:   Parenteral Nutrition - Adult-  Entered: Mar  8 2022 10:00PM    fat emulsion (Fish Oil and Plant Based) 20% Infusion-[Known as SMOFLIPID 20% Infusion]  50 Gram(s) in IV Solution 250 milliLiter(s) infuse at 20.7 mL/Hr  Dose Rate: 0.68 Gm/kG/Day Infuse Over: 12 Hours; Stop After 12 Hours  Administration Instructions: Use 1.2 micron in-line filter  Entered: Mar  8 2022 10:00PM    Diet NPO after Midnight-     NPO Start Date: 08-Mar-2022   NPO Start Time: 23:59  Entered: Mar  8 2022  4:41PM    Parenteral Nutrition - Adult-  Entered: Mar  7 2022 10:00PM    Diet Clear Liquid-  Entered: Mar  6 2022  5:13PM    
This patient has been assessed with a concern for Malnutrition and has been determined to have a diagnosis/diagnoses of Severe protein-calorie malnutrition.    This patient is being managed with:   Parenteral Nutrition - Adult-  Entered: Mar  9 2022 10:00PM    fat emulsion (Fish Oil and Plant Based) 20% Infusion-[Known as SMOFLIPID 20% Infusion]  50 Gram(s) in IV Solution 250 milliLiter(s) infuse at 20.83 mL/Hr  Dose Rate: 0.68 Gm/kG/Day Infuse Over: 12 Hours; Stop After 12 Hours  Administration Instructions: Use 1.2 micron in-line filter  Entered: Mar  9 2022 10:00PM    Parenteral Nutrition - Adult-  Entered: Mar  8 2022 10:00PM    fat emulsion (Fish Oil and Plant Based) 20% Infusion-[Known as SMOFLIPID 20% Infusion]  50 Gram(s) in IV Solution 250 milliLiter(s) infuse at 20.7 mL/Hr  Dose Rate: 0.68 Gm/kG/Day Infuse Over: 12 Hours; Stop After 12 Hours  Administration Instructions: Use 1.2 micron in-line filter  Entered: Mar  8 2022 10:00PM    
This patient has been assessed with a concern for Malnutrition and has been determined to have a diagnosis/diagnoses of Severe protein-calorie malnutrition.    This patient is being managed with:   Parenteral Nutrition - Adult-  Entered: Mar 16 2022 10:00PM    fat emulsion (Fish Oil and Plant Based) 20% Infusion-[Known as SMOFLIPID 20% Infusion]  50 Gram(s) in IV Solution 250 milliLiter(s) infuse at 20.7 mL/Hr  Dose Rate: 0.68 Gm/kG/Day Infuse Over: 12 Hours; Stop After 12 Hours  Administration Instructions: Use 1.2 micron in-line filter/LATEX FREE PREPARAION  Entered: Mar 16 2022 10:00PM    Diet Clear Liquid-  Entered: Mar 15 2022 10:57AM    
This patient has been assessed with a concern for Malnutrition and has been determined to have a diagnosis/diagnoses of Moderate protein-calorie malnutrition.    This patient is being managed with:   Parenteral Nutrition - Adult-  Entered: Mar  6 2022  5:00PM    Parenteral Nutrition - Adult-  Entered: Mar  5 2022 10:00PM    Diet Clear Liquid-  Entered: Mar  4 2022 11:15AM    The following pending diet order is being considered for treatment of Moderate protein-calorie malnutrition:  fat emulsion (Fish Oil and Plant Based) 20% Infusion-[Known as SMOFLIPID 20% Infusion]  50 Gram(s) in IV Solution 250 milliLiter(s) infuse at 20.8 mL/Hr  Dose Rate: 0.684 Gm/kG/Day Infuse Over: 12 Hours; Stop After 12 Hours  Administration Instructions: Use 1.2 micron in-line filter  Entered: Mar  6 2022  5:00PM  
This patient has been assessed with a concern for Malnutrition and has been determined to have a diagnosis/diagnoses of Severe protein-calorie malnutrition.    This patient is being managed with:   Parenteral Nutrition - Adult-  Entered: Mar 11 2022  5:00PM    fat emulsion (Fish Oil and Plant Based) 20% Infusion-[Known as SMOFLIPID 20% Infusion]  50 Gram(s) in IV Solution 250 milliLiter(s) infuse at 20.7 mL/Hr  Dose Rate: 0.68 Gm/kG/Day Infuse Over: 12 Hours; Stop After 12 Hours  Administration Instructions: Use 1.2 micron in-line filter  Entered: Mar 11 2022  5:00PM    Parenteral Nutrition - Adult-  Entered: Mar 10 2022 10:00PM    fat emulsion (Fish Oil and Plant Based) 20% Infusion-[Known as SMOFLIPID 20% Infusion]  50 Gram(s) in IV Solution 250 milliLiter(s) infuse at 20.83 mL/Hr  Dose Rate: 0.68 Gm/kG/Day Infuse Over: 12 Hours; Stop After 12 Hours  Administration Instructions: Use 1.2 micron in-line filter  Entered: Mar 10 2022 10:00PM    Diet NPO-  Entered: Mar 10 2022  5:40PM    The following pending diet order is being considered for treatment of Severe protein-calorie malnutrition:null
This patient has been assessed with a concern for Malnutrition and has been determined to have a diagnosis/diagnoses of Severe protein-calorie malnutrition.    This patient is being managed with:   Parenteral Nutrition - Adult-  Entered: Mar 16 2022 10:00PM    fat emulsion (Fish Oil and Plant Based) 20% Infusion-[Known as SMOFLIPID 20% Infusion]  50 Gram(s) in IV Solution 250 milliLiter(s) infuse at 20.7 mL/Hr  Dose Rate: 0.68 Gm/kG/Day Infuse Over: 12 Hours; Stop After 12 Hours  Administration Instructions: Use 1.2 micron in-line filter/LATEX FREE PREPARAION  Entered: Mar 16 2022 10:00PM    Parenteral Nutrition - Adult-  Entered: Mar 15 2022 10:00PM    fat emulsion (Fish Oil and Plant Based) 20% Infusion-[Known as SMOFLIPID 20% Infusion]  50 Gram(s) in IV Solution 250 milliLiter(s) infuse at 20.7 mL/Hr  Dose Rate: 0.68 Gm/kG/Day Infuse Over: 12 Hours; Stop After 12 Hours  Administration Instructions: Use 1.2 micron in-line filter  Entered: Mar 15 2022 10:00PM    Diet Clear Liquid-  Entered: Mar 15 2022 10:57AM    
This patient has been assessed with a concern for Malnutrition and has been determined to have a diagnosis/diagnoses of Moderate protein-calorie malnutrition.    This patient is being managed with:   Parenteral Nutrition - Adult-  Entered: Mar  5 2022 10:00PM    Parenteral Nutrition - Adult-  Entered: Mar  4 2022 10:00PM    Diet Clear Liquid-  Entered: Mar  4 2022 11:15AM    
This patient has been assessed with a concern for Malnutrition and has been determined to have a diagnosis/diagnoses of Moderate protein-calorie malnutrition.    This patient is being managed with:   Parenteral Nutrition - Adult-  Entered: Mar  6 2022 10:00PM    fat emulsion (Fish Oil and Plant Based) 20% Infusion-[Known as SMOFLIPID 20% Infusion]  50 Gram(s) in IV Solution 250 milliLiter(s) infuse at 20.83 mL/Hr  Dose Rate: 0.684 Gm/kG/Day Infuse Over: 12 Hours; Stop After 12 Hours  Administration Instructions: Use 1.2 micron in-line filter  Entered: Mar  6 2022 10:00PM    Diet Clear Liquid-  Entered: Mar  6 2022  5:13PM    
This patient has been assessed with a concern for Malnutrition and has been determined to have a diagnosis/diagnoses of Severe protein-calorie malnutrition.    This patient is being managed with:   Diet Regular-  Soft and Bite Sized (SOFTBTSZ)  Supplement Feeding Modality:  Oral  Ensure Clear Cans or Servings Per Day:  3       Frequency:  Three Times a day  Entered: Mar 17 2022  9:21AM    
This patient has been assessed with a concern for Malnutrition and has been determined to have a diagnosis/diagnoses of Severe protein-calorie malnutrition.    This patient is being managed with:   Diet Regular-  Supplement Feeding Modality:  Oral  Ensure Enlive Cans or Servings Per Day:  1       Frequency:  Three Times a day  Entered: Mar 21 2022  3:37PM    
This patient has been assessed with a concern for Malnutrition and has been determined to have a diagnosis/diagnoses of Severe protein-calorie malnutrition.    This patient is being managed with:   Diet Regular-  Supplement Feeding Modality:  Oral  Ensure Enlive Cans or Servings Per Day:  1       Frequency:  Three Times a day  Entered: Mar 21 2022  3:37PM    
This patient has been assessed with a concern for Malnutrition and has been determined to have a diagnosis/diagnoses of Severe protein-calorie malnutrition.    This patient is being managed with:   Parenteral Nutrition - Adult-  Entered: Mar 13 2022 10:00PM    fat emulsion (Fish Oil and Plant Based) 20% Infusion-[Known as SMOFLIPID 20% Infusion]  50 Gram(s) in IV Solution 250 milliLiter(s) infuse at 20.83 mL/Hr  Dose Rate: 0.69 Gm/kG/Day Infuse Over: 12 Hours; Stop After 12 Hours  Administration Instructions: Use 1.2 micron in-line filter  *****CAUTION :: LATEX ALLERGY*******  Entered: Mar 13 2022 10:00PM    Parenteral Nutrition - Adult-  Entered: Mar 12 2022 10:00PM    fat emulsion (Fish Oil and Plant Based) 20% Infusion-[Known as SMOFLIPID 20% Infusion]  50 Gram(s) in IV Solution 250 milliLiter(s) infuse at 20.83 mL/Hr  Dose Rate: 0.69 Gm/kG/Day Infuse Over: 12 Hours; Stop After 12 Hours  Administration Instructions: Use 1.2 micron in-line filter  ****Caution :: LATEX ALLERGY******  Entered: Mar 12 2022 10:00PM    Diet NPO-  Entered: Mar 12 2022  9:43PM    
This patient has been assessed with a concern for Malnutrition and has been determined to have a diagnosis/diagnoses of Severe protein-calorie malnutrition.    This patient is being managed with:   Parenteral Nutrition - Adult-  Entered: Mar 14 2022 10:00PM    fat emulsion (Fish Oil and Plant Based) 20% Infusion-[Known as SMOFLIPID 20% Infusion]  50 Gram(s) in IV Solution 250 milliLiter(s) infuse at 20.7 mL/Hr  Dose Rate: 0.68 Gm/kG/Day Infuse Over: 12 Hours; Stop After 12 Hours  Administration Instructions: Use 1.2 micron in-line filter  Entered: Mar 14 2022 10:00PM    Parenteral Nutrition - Adult-  Entered: Mar 13 2022 10:00PM    fat emulsion (Fish Oil and Plant Based) 20% Infusion-[Known as SMOFLIPID 20% Infusion]  50 Gram(s) in IV Solution 250 milliLiter(s) infuse at 20.83 mL/Hr  Dose Rate: 0.69 Gm/kG/Day Infuse Over: 12 Hours; Stop After 12 Hours  Administration Instructions: Use 1.2 micron in-line filter  *****CAUTION :: LATEX ALLERGY*******  Entered: Mar 13 2022 10:00PM    Diet NPO-  Entered: Mar 12 2022  9:43PM    
This patient has been assessed with a concern for Malnutrition and has been determined to have a diagnosis/diagnoses of Severe protein-calorie malnutrition.    This patient is being managed with:   Diet Regular-  Supplement Feeding Modality:  Oral  Ensure Enlive Cans or Servings Per Day:  1       Frequency:  Three Times a day  Entered: Mar 21 2022  3:37PM    
This patient has been assessed with a concern for Malnutrition and has been determined to have a diagnosis/diagnoses of Severe protein-calorie malnutrition.    This patient is being managed with:   Diet Regular-  Supplement Feeding Modality:  Oral  Ensure Enlive Cans or Servings Per Day:  1       Frequency:  Three Times a day  Entered: Mar 21 2022  3:37PM    
This patient has been assessed with a concern for Malnutrition and has been determined to have a diagnosis/diagnoses of Severe protein-calorie malnutrition.    This patient is being managed with:   Parenteral Nutrition - Adult-  Entered: Mar  9 2022 10:00PM    fat emulsion (Fish Oil and Plant Based) 20% Infusion-[Known as SMOFLIPID 20% Infusion]  50 Gram(s) in IV Solution 250 milliLiter(s) infuse at 20.83 mL/Hr  Dose Rate: 0.68 Gm/kG/Day Infuse Over: 12 Hours; Stop After 12 Hours  Administration Instructions: Use 1.2 micron in-line filter  Entered: Mar  9 2022 10:00PM    
This patient has been assessed with a concern for Malnutrition and has been determined to have a diagnosis/diagnoses of Severe protein-calorie malnutrition.    This patient is being managed with:   Parenteral Nutrition - Adult-  Entered: Mar 11 2022 10:00PM    fat emulsion (Fish Oil and Plant Based) 20% Infusion-[Known as SMOFLIPID 20% Infusion]  50 Gram(s) in IV Solution 250 milliLiter(s) infuse at 20.83 mL/Hr  Dose Rate: 0.68 Gm/kG/Day Infuse Over: 12 Hours; Stop After 12 Hours  Administration Instructions: Use 1.2 micron in-line filter  Entered: Mar 11 2022 10:00PM    Diet NPO-  Entered: Mar 10 2022  5:40PM    
This patient has been assessed with a concern for Malnutrition and has been determined to have a diagnosis/diagnoses of Severe protein-calorie malnutrition.    This patient is being managed with:   Parenteral Nutrition - Adult-  Entered: Mar 12 2022 10:00PM    fat emulsion (Fish Oil and Plant Based) 20% Infusion-[Known as SMOFLIPID 20% Infusion]  50 Gram(s) in IV Solution 250 milliLiter(s) infuse at 20.83 mL/Hr  Dose Rate: 0.69 Gm/kG/Day Infuse Over: 12 Hours; Stop After 12 Hours  Administration Instructions: Use 1.2 micron in-line filter  ****Caution :: LATEX ALLERGY******  Entered: Mar 12 2022 10:00PM    Diet Clear Liquid-  Entered: Mar 12 2022 10:37AM    Parenteral Nutrition - Adult-  Entered: Mar 11 2022 10:00PM    fat emulsion (Fish Oil and Plant Based) 20% Infusion-[Known as SMOFLIPID 20% Infusion]  50 Gram(s) in IV Solution 250 milliLiter(s) infuse at 20.83 mL/Hr  Dose Rate: 0.68 Gm/kG/Day Infuse Over: 12 Hours; Stop After 12 Hours  Administration Instructions: Use 1.2 micron in-line filter  Entered: Mar 11 2022 10:00PM    
This patient has been assessed with a concern for Malnutrition and has been determined to have a diagnosis/diagnoses of Severe protein-calorie malnutrition.    This patient is being managed with:   Parenteral Nutrition - Adult-  Entered: Mar 17 2022 10:00PM    fat emulsion (Fish Oil and Plant Based) 20% Infusion-[Known as SMOFLIPID 20% Infusion]  50 Gram(s) in IV Solution 250 milliLiter(s) infuse at 20.7 mL/Hr  Dose Rate: 0.68 Gm/kG/Day Infuse Over: 12 Hours; Stop After 12 Hours  Administration Instructions: Use 1.2 micron in-line filter  Entered: Mar 17 2022 10:00PM    Diet Regular-  Soft and Bite Sized (SOFTBTSZ)  Supplement Feeding Modality:  Oral  Ensure Clear Cans or Servings Per Day:  3       Frequency:  Three Times a day  Entered: Mar 17 2022  9:21AM    Parenteral Nutrition - Adult-  Entered: Mar 16 2022 10:00PM    fat emulsion (Fish Oil and Plant Based) 20% Infusion-[Known as SMOFLIPID 20% Infusion]  50 Gram(s) in IV Solution 250 milliLiter(s) infuse at 20.7 mL/Hr  Dose Rate: 0.68 Gm/kG/Day Infuse Over: 12 Hours; Stop After 12 Hours  Administration Instructions: Use 1.2 micron in-line filter/LATEX FREE PREPARAION  Entered: Mar 16 2022 10:00PM

## 2022-03-25 NOTE — PROGRESS NOTE ADULT - ASSESSMENT
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 Patient underwent L chest pigtail catheter placement by Thoracic Surgery on 3/4 with 1.5L of pleural fluid drained, exudative, cyto negative. 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 She is on nutritional support for severe protein calorie malnutrition. Medicine was consulted to provide co-management.     S/P palliative bypass, has enterocutaneous fistula  possible cellulitis abdo wall   DC PIP TAZO - is on Augmentin per primary team   PPI Octreotide  noted CT imaging with peritoneal collections/abscesses. EC fistula     Severe protein calorie malnutrition  Followed by Nutritional Support Team. PO DIET     Thrush  Clotrimazole lozenges/ Nystatin     L pleural effusion s/p pigtail catheter  now dced , O2 sat wnl on RA     Neuropathy from h/o taxol use  resume topamax     VTE Px - lovenox    RECOMMENDATIONS FOR DISCHARGE:   Octreotide - Sandostatin L[place] tmr as IP and next dose in 4 weeks prob at [facility] office   Scripts for augmentin /antifungals / simethicone liquid sent to [facility]; resume topamax and PPI   Pain meds etc per primary team       Stable for discharge   thank you for consult

## 2022-03-25 NOTE — PROGRESS NOTE ADULT - REASON FOR ADMISSION
pleural effusion and partial SBO

## 2022-03-25 NOTE — PROGRESS NOTE ADULT - SUBJECTIVE AND OBJECTIVE BOX
Pt. seen and examined at bedside this morning. Continues to tolerate soft diet without nausea or vomiting. She states her redness in the abdomen is better. Lee pouch changed yesterday, remains intact without leakage. She denies fever, chills, chest pain, SOB, nausea, vomiting, diarrhea or constipation. No acute events overnight.     Vital Signs (24 Hrs):  T(C): 36.9 (03-25-22 @ 05:13), Max: 37.1 (03-24-22 @ 21:16)  HR: 72 (03-25-22 @ 05:13) (72 - 93)  BP: 97/53 (03-25-22 @ 05:13) (97/53 - 119/55)  RR: 18 (03-24-22 @ 21:16) (17 - 18)  SpO2: 97% (03-25-22 @ 05:13) (94% - 97%)  Wt(kg): --  Daily     Daily     I&O's Summary    24 Mar 2022 07:01  -  25 Mar 2022 07:00  --------------------------------------------------------  IN: 0 mL / OUT: 750 mL / NET: -750 mL        Physical Exam:  General: AAOx3, Well developed, NAD  Chest: Normal respiratory effort  Heart: RRR  Abdomen: midline with stool drainage, lee pouch over incision with stool in bag, no leakage, abd soft, nondistended, right abdomen with area of induration and erythema improved  Neuro/Psych: No localized deficits. Normal speech, normal tone  Skin: Normal, no rashes, no lesions noted.   Extremities: Warm, well perfused, 2+ edema left foot, Pulses intact    .  LABS:                         9.3    8.98  )-----------( 763      ( 25 Mar 2022 06:39 )             29.7     03-25    138  |  105  |  6<L>  ----------------------------<  119<H>  4.3   |  27  |  0.75    Ca    8.4<L>      25 Mar 2022 06:39  Phos  3.3     03-25  Mg     2.4     03-25    TPro  5.7<L>  /  Alb  1.7<L>  /  TBili  0.4  /  DBili  x   /  AST  34  /  ALT  27  /  AlkPhos  138<H>  03-25              RADIOLOGY, EKG & ADDITIONAL TESTS: Reviewed.

## 2022-03-29 DIAGNOSIS — K21.9 GASTRO-ESOPHAGEAL REFLUX DISEASE WITHOUT ESOPHAGITIS: ICD-10-CM

## 2022-03-29 DIAGNOSIS — L02.211 CUTANEOUS ABSCESS OF ABDOMINAL WALL: ICD-10-CM

## 2022-03-29 DIAGNOSIS — D64.9 ANEMIA, UNSPECIFIED: ICD-10-CM

## 2022-03-29 DIAGNOSIS — C78.6 SECONDARY MALIGNANT NEOPLASM OF RETROPERITONEUM AND PERITONEUM: ICD-10-CM

## 2022-03-29 DIAGNOSIS — E43 UNSPECIFIED SEVERE PROTEIN-CALORIE MALNUTRITION: ICD-10-CM

## 2022-03-29 DIAGNOSIS — K63.2 FISTULA OF INTESTINE: ICD-10-CM

## 2022-03-29 DIAGNOSIS — K46.0 UNSPECIFIED ABDOMINAL HERNIA WITH OBSTRUCTION, WITHOUT GANGRENE: ICD-10-CM

## 2022-03-29 DIAGNOSIS — J90 PLEURAL EFFUSION, NOT ELSEWHERE CLASSIFIED: ICD-10-CM

## 2022-03-29 DIAGNOSIS — I10 ESSENTIAL (PRIMARY) HYPERTENSION: ICD-10-CM

## 2022-03-29 DIAGNOSIS — B37.0 CANDIDAL STOMATITIS: ICD-10-CM

## 2022-03-29 DIAGNOSIS — Z91.040 LATEX ALLERGY STATUS: ICD-10-CM

## 2022-03-29 DIAGNOSIS — Z66 DO NOT RESUSCITATE: ICD-10-CM

## 2022-03-29 DIAGNOSIS — L03.311 CELLULITIS OF ABDOMINAL WALL: ICD-10-CM

## 2022-03-29 DIAGNOSIS — C16.0 MALIGNANT NEOPLASM OF CARDIA: ICD-10-CM

## 2022-03-29 DIAGNOSIS — E88.09 OTHER DISORDERS OF PLASMA-PROTEIN METABOLISM, NOT ELSEWHERE CLASSIFIED: ICD-10-CM

## 2022-03-29 DIAGNOSIS — R10.9 UNSPECIFIED ABDOMINAL PAIN: ICD-10-CM

## 2022-03-29 DIAGNOSIS — Z88.2 ALLERGY STATUS TO SULFONAMIDES: ICD-10-CM

## 2022-03-29 DIAGNOSIS — G62.9 POLYNEUROPATHY, UNSPECIFIED: ICD-10-CM

## 2022-03-29 DIAGNOSIS — E78.5 HYPERLIPIDEMIA, UNSPECIFIED: ICD-10-CM

## 2022-03-29 DIAGNOSIS — K56.600 PARTIAL INTESTINAL OBSTRUCTION, UNSPECIFIED AS TO CAUSE: ICD-10-CM

## 2022-03-29 DIAGNOSIS — Z87.891 PERSONAL HISTORY OF NICOTINE DEPENDENCE: ICD-10-CM

## 2022-03-30 ENCOUNTER — APPOINTMENT (OUTPATIENT)
Dept: THORACIC SURGERY | Facility: CLINIC | Age: 62
End: 2022-03-30
Payer: COMMERCIAL

## 2022-03-30 DIAGNOSIS — C15.9 MALIGNANT NEOPLASM OF ESOPHAGUS, UNSPECIFIED: ICD-10-CM

## 2022-03-30 PROCEDURE — 99213 OFFICE O/P EST LOW 20 MIN: CPT

## 2022-03-30 NOTE — DATA REVIEWED
[FreeTextEntry1] : Collected Date/Time:                   3/4/2022 09:30 EST\par Received Date/Time:                    3/4/2022 10:49 EST\par \par Non-Gynecologic Report - Auth (Verified)\par \par Specimen(s) Submitted\par PLEURAL FLUID LEFT\par \par Final Diagnosis\par PLEURAL FLUID LEFT\par \par NEGATIVE FOR MALIGNANT CELLS.\par

## 2022-03-30 NOTE — HISTORY OF PRESENT ILLNESS
[FreeTextEntry1] : This is a 61 year old female with a PMH of GEJ cancer s/p esophagectomy, [place] tumors s/p BSO presents to ED with sob for 1.5 weeks duration. Patient underwent PET CT roughly one month ago which showed small amount of left sided pleural effusion, as per patient. CT surgery was consulted and pt had left side pleural effusion drained with pigtail placement.\par \par She states she is improving overall and her breathing has greatly improved. She states she is eating a regular diet, no cough or fevers.

## 2022-03-30 NOTE — REVIEW OF SYSTEMS
[Feeling Tired] : feeling tired [Negative] : Heme/Lymph [Feeling Poorly] : not feeling poorly [Chest Pain] : no chest pain [Palpitations] : no palpitations [Shortness Of Breath] : no shortness of breath [Cough] : no cough [SOB on Exertion] : no shortness of breath during exertion

## 2022-03-30 NOTE — ASSESSMENT
[FreeTextEntry1] : She is improving overall and will follow up as needed. \par \par PLAN:\par - Return to care as needed\par \par \par \par \par \par \par \par \par Reji CAMARENA NP am scribing for and in the presence of Dr. Aguilar the following sections HISTORY OF PRESENT ILLNESS, PAST MEDICAL/FAMILY/SOCIAL HISTORY; REVIEW OF SYSTEMS; VITAL SIGNS; PHYSICAL EXAM; DISPOSITION.\par \par "I personally performed the services described in the documentation, reviewed the documentation recorded by the scribe in my presence and accurately and completely records my words and actions."\par

## 2022-03-31 ENCOUNTER — APPOINTMENT (OUTPATIENT)
Dept: SURGICAL ONCOLOGY | Facility: CLINIC | Age: 62
End: 2022-03-31
Payer: COMMERCIAL

## 2022-03-31 PROCEDURE — 99024 POSTOP FOLLOW-UP VISIT: CPT

## 2022-03-31 NOTE — REASON FOR VISIT
[de-identified] : palliative entero-enterostomy for malignant small bowel obstruction [de-identified] : 3/9/22 [de-identified] : 3 [Spouse] : spouse

## 2022-03-31 NOTE — PHYSICAL EXAM
[de-identified] : large Meaghan pouch without leak. EC fistula visualized with thick/feculent output in pouch/tubing.

## 2022-03-31 NOTE — ASSESSMENT
[FreeTextEntry1] : 61 year old woman with EC fistula, borderline low output (sometimes > 500cc, sometimes < 500cc daily), managed well with imodium, metamucil, Meaghan pouch. She is eating so from that standpoint she has been well palliated. I told her to return to see me in 2 weeks. I told her if her pain worsens we can discuss a fentanyl patch for better day-long control.

## 2022-03-31 NOTE — REVIEW OF SYSTEMS
[FreeTextEntry2] : fatigue after 2=3 hours of wakeful activity [FreeTextEntry8] : fistula pouch without leaking, bulked up with Metamucil and imodium

## 2022-03-31 NOTE — HISTORY OF PRESENT ILLNESS
[FreeTextEntry1] : Ms. 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 She developed a post-op EC fistula in the first week that has been successfully managed with a large abdominal Meaghan pouch. Her output has been 300-700cc/day per her 's daily log and is relatively thick in consistency. She is eating regular food and supplementing her diet/hydration with electrolytes. She reports a good appetite and good pain control with her oxycodone. She is out of bed for 2-3 hours, twice daily.  She has been home for 1 week. She is ambulating minimally, with help.

## 2022-04-02 LAB
CULTURE RESULTS: SIGNIFICANT CHANGE UP
SPECIMEN SOURCE: SIGNIFICANT CHANGE UP

## 2022-04-14 ENCOUNTER — APPOINTMENT (OUTPATIENT)
Dept: SURGICAL ONCOLOGY | Facility: CLINIC | Age: 62
End: 2022-04-14
Payer: COMMERCIAL

## 2022-04-14 VITALS
BODY MASS INDEX: 20.4 KG/M2 | OXYGEN SATURATION: 95 % | SYSTOLIC BLOOD PRESSURE: 90 MMHG | HEART RATE: 81 BPM | HEIGHT: 67 IN | WEIGHT: 130 LBS | DIASTOLIC BLOOD PRESSURE: 60 MMHG

## 2022-04-14 PROCEDURE — 99024 POSTOP FOLLOW-UP VISIT: CPT

## 2022-04-14 RX ORDER — METOCLOPRAMIDE HYDROCHLORIDE 5 MG/5ML
10 SOLUTION ORAL EVERY 6 HOURS
Qty: 40 | Refills: 0 | Status: ACTIVE | COMMUNITY
Start: 2022-04-14 | End: 1900-01-01

## 2022-04-14 NOTE — ASSESSMENT
[FreeTextEntry1] : Ms. Issa has likely entered the end-stages of her disease process, and I expressed that to her. She told me at this point she would like to switch to more aggressive comfort care even if that means she will not survive much longer. We got on the phone with Racquel, her palliative care nurse, who explained the process of switching over to hospice care at home. I will order her for liquid reglan now in an attempt to help her nausea and hiccups. I gave her a hug and told her I don’t want to see her suffer any longer.

## 2022-04-14 NOTE — PHYSICAL EXAM
[de-identified] : large Meaghan pouch without leak. EC fistula visualized with thick/feculent output in pouch/tubing.

## 2022-04-14 NOTE — HISTORY OF PRESENT ILLNESS
[de-identified] : Ms. Issa presents today stating an acute worsening of her quality of life. She reports that in the last few days her nausea, anxiety, and hiccuping have become intractable. She told me she no longer has the will to live like this. \par \par She reports that her PO intake is mostly fluids, but it is about 1L/day, and her fistula is only about 500ml/day.\par \par She is with her  who agrees with her sentiments.

## 2022-09-12 NOTE — PROCEDURAL SAFETY CHECKLIST WITH OR WITHOUT SEDATION - NSDXIMAGING_GEN_ALL_CORE
Received signed and completed form from Physician's Office.    Complete form was sent to the patient portal.      not applicable

## 2022-09-27 NOTE — H&P ADULT - NSHPREVIEWOFSYSTEMS_GEN_ALL_CORE
as per HPI PAST MEDICAL HISTORY:  Chronic kidney disease (CKD)     Pacemaker     Symptomatic bradycardia

## 2023-03-27 NOTE — DISCHARGE NOTE NURSING/CASE MANAGEMENT/SOCIAL WORK - HAVE YOU HAD A FIRST COVID-19 BOOSTER?
Wife requested writer to call patient around 4:30pm on mobile phone [phone].   Booster status unknown

## 2024-01-03 NOTE — ED ADULT TRIAGE NOTE - ESI TRIAGE ACUITY LEVEL, MLM
Follow up call completed via  #[id number]; Mom states that she had a sore left nipple and was not feeding infant on the left side yesterday but things are better now. No further concerns or questions at this time. -YESSI Johnson, RN, IBCLC    
3

## 2024-03-06 NOTE — ED STATDOCS - CPE ED SKIN NORM
Natalya Briggs is a 49 year old male presenting for   Chief Complaint   Patient presents with    Penis/Scrotum Problem     Sensitivity      Denies Latex allergy or sensitivity.    Medication verified, no changes  Refills needed today: No  Patient would like communication of their results via:      Home Phone: [phone] (home)  Okay to leave a message containing results? Yes     Health Maintenance Due   Topic Date Due    Pneumococcal Vaccine 0-64 (1 of 2 - PCV) Never done    Hepatitis B Vaccine (3 of 3 - Hep B Twinrix 3-dose series) 08/19/2009    Shingles Vaccine (1 of 2) Never done       Patient is due for topics as listed above but is not proceeding with Immunization(s) Hep B, Pneumococcal, and Shingles at this time.           Depression Screening:  Review Flowsheet  More data exists         1/26/2024   PHQ 2/9 Score   Adult PHQ 2 Score 0   Adult PHQ 2 Interpretation No further screening needed   Little interest or pleasure in activity? Not at all   Feeling down, depressed or hopeless? Not at all        Advance Directives:  not discussed   normal...

## 2024-09-02 NOTE — DIETITIAN INITIAL EVALUATION ADULT. - WEIGHT FOR BMI (KG)
"Reason for Disposition   [1] Symptoms of a yeast infection (i.e., itchy, white discharge, not bad smelling) AND [2] not improved > 3 days following CARE ADVICE    Answer Assessment - Initial Assessment Questions  1. SYMPTOM: \"What's the main symptom you're concerned about?\" (e.g., pain, itching, dryness)      Irritation with walking; inner labial lips have \"little balls\" and inside feels swollen  Scant blood when wiping    2. LOCATION: \"Where is the  bumps/swelling located?\" (e.g., inside/outside, left/right)      Vaginal area    3. ONSET: \"When did the  the  bumps/swelling  start?\"      Yesterday afternoon    4. PAIN: \"Is there any pain?\" If Yes, ask: \"How bad is it?\" (Scale: 1-10; mild, moderate, severe)      More concerned with itching  5. ITCHING: \"Is there any itching?\" If Yes, ask: \"How bad is it?\" (Scale: 1-10; mild, moderate, severe)      Yes    6. CAUSE: \"What do you think is causing the discharge?\" \"Have you had the same problem before? What happened then?\"      Denies - no prior similar symptoms  Does report having sex on Friday    7. OTHER SYMPTOMS: \"Do you have any other symptoms?\" (e.g., fever, itching, vaginal bleeding, pain with urination, injury to genital area, vaginal foreign body)      Some burning with urination  Denies fever  Denies discharge    8. PREGNANCY: \"Is there any chance you are pregnant?\" \"When was your last menstrual period?\"      Denies    Protocols used: Vaginal Symptoms-[facility]    Home care reviewed with patient; advised needs to be seen.    Please follow up with patient Tuesday to schedule an exam.  "
"Regarding: bumps in vagina/blood  ----- Message from Dina MACHADO sent at 9/2/2024  8:06 AM EDT -----  Pt stated, \"I have something inside my vagina that is bothering me. There are little balls on the lips of my vagina and the inside. I see a little bit of blood also.\"    "
73.1

## 2025-05-21 NOTE — PROGRESS NOTE ADULT - ASSESSMENT
62 yo F with pmhx GEJ cancer s/p esophagectomy, [place] tumors s/p BSO presents with left sided pleural effusion   abdomen minimal distended    Plan:   -limited cleaer  -cont PPN and convert to TPN  -NGT offered pt refused  - Pain control   -GI/DVT prop  -hospitalist consult  -daily labs  -PICC line      Plan d/w Attending  PDMP reviewed, patient compliant. Medication refilled.